# Patient Record
Sex: MALE | Race: WHITE | NOT HISPANIC OR LATINO | Employment: OTHER | URBAN - METROPOLITAN AREA
[De-identification: names, ages, dates, MRNs, and addresses within clinical notes are randomized per-mention and may not be internally consistent; named-entity substitution may affect disease eponyms.]

---

## 2017-01-17 RX ORDER — METFORMIN HYDROCHLORIDE 500 MG/1
500 TABLET ORAL 2 TIMES DAILY WITH MEALS
Qty: 60 TABLET | Refills: 0 | Status: ON HOLD | OUTPATIENT
Start: 2017-01-17 | End: 2017-02-21

## 2017-01-17 RX ORDER — METFORMIN HYDROCHLORIDE 500 MG/1
TABLET ORAL
Qty: 60 TABLET | Refills: 0 | OUTPATIENT
Start: 2017-01-17

## 2017-01-17 NOTE — TELEPHONE ENCOUNTER
I sent in a refill for metformin per pt request. Pt needs to make appt with PCP for regular f/u, as he missed his last one.

## 2017-01-20 ENCOUNTER — TELEPHONE (OUTPATIENT)
Dept: TRANSPLANT | Facility: CLINIC | Age: 47
End: 2017-01-20

## 2017-01-20 NOTE — TELEPHONE ENCOUNTER
Pt states that he has not made an appt with his Cardiologist as instructed by Dr. Michel.  Pt states that he is still having chest pain and will call Dr. Green's office to schedule an appointment on Monday.  Explained that Dr. Michel does want to continue to follow him, but would like cardiac optimization first.  Explained that he may need heart cath with PCI and possible stent placement.  He stated that he already has two stents and will call the Cardiologist to schedule.

## 2017-01-20 NOTE — TELEPHONE ENCOUNTER
Radames for patient as f/u to appt with Dr. Michel in December.  It was recommended that he f/u with his Cardiologist for optimization before moving forward with transplant.  Need to know who his Cardiologist is and if he has had any cardiac work up done.  Having daily chest pains.

## 2017-01-25 DIAGNOSIS — Z72.0 TOBACCO ABUSE: ICD-10-CM

## 2017-01-25 RX ORDER — ALPRAZOLAM 0.25 MG/1
TABLET ORAL
Qty: 90 TABLET | OUTPATIENT
Start: 2017-01-25

## 2017-01-25 RX ORDER — VARENICLINE TARTRATE 0.5 (11)-1
1 KIT ORAL 2 TIMES DAILY
Qty: 60 TABLET | Refills: 1 | Status: SHIPPED | OUTPATIENT
Start: 2017-01-25 | End: 2017-04-21 | Stop reason: SDUPTHER

## 2017-01-26 RX ORDER — ALPRAZOLAM 0.25 MG/1
TABLET ORAL
Qty: 90 TABLET | Refills: 1 | OUTPATIENT
Start: 2017-01-26

## 2017-01-26 NOTE — TELEPHONE ENCOUNTER
----- Message from Frances Arizmendi sent at 1/26/2017 10:19 AM CST -----  Contact: Pt  Pt is out of refills for Xanax. Pt is requesting to have another rx sent to.    [facility] - [facility] [address]  [address] [address]  Phone: [phone] Fax: [phone]    Pt states to let him know if he needs to come in for an appt. Pls call pt back at [phone].      You can tell him he missed his follow up and physical appts.  After Analilia, needs to be seen for full physical in a couple of months.  Federal regulations now restrict these narcotic rxs and pt has to be seen regularly or needs to establish with a Psychiatrist for his anxiety meds.  SM

## 2017-01-27 RX ORDER — ALPRAZOLAM 0.25 MG/1
0.25 TABLET ORAL 3 TIMES DAILY PRN
Qty: 20 TABLET | Refills: 0 | Status: SHIPPED | OUTPATIENT
Start: 2017-01-27 | End: 2017-04-21 | Stop reason: SDUPTHER

## 2017-02-01 ENCOUNTER — OFFICE VISIT (OUTPATIENT)
Dept: INTERNAL MEDICINE | Facility: CLINIC | Age: 47
End: 2017-02-01
Payer: COMMERCIAL

## 2017-02-01 ENCOUNTER — TELEPHONE (OUTPATIENT)
Dept: TRANSPLANT | Facility: CLINIC | Age: 47
End: 2017-02-01

## 2017-02-01 VITALS
BODY MASS INDEX: 34.02 KG/M2 | WEIGHT: 237.63 LBS | HEART RATE: 62 BPM | HEIGHT: 70 IN | DIASTOLIC BLOOD PRESSURE: 72 MMHG | TEMPERATURE: 97 F | SYSTOLIC BLOOD PRESSURE: 116 MMHG | OXYGEN SATURATION: 96 %

## 2017-02-01 DIAGNOSIS — E55.9 VITAMIN D DEFICIENCY: ICD-10-CM

## 2017-02-01 DIAGNOSIS — Z76.82 LUNG TRANSPLANT CANDIDATE: ICD-10-CM

## 2017-02-01 DIAGNOSIS — Z29.9 PREVENTIVE MEASURE: ICD-10-CM

## 2017-02-01 DIAGNOSIS — I25.84 CORONARY ARTERY DISEASE DUE TO CALCIFIED CORONARY LESION: ICD-10-CM

## 2017-02-01 DIAGNOSIS — J44.9 COPD, SEVERE: Chronic | ICD-10-CM

## 2017-02-01 DIAGNOSIS — I10 ESSENTIAL HYPERTENSION: ICD-10-CM

## 2017-02-01 DIAGNOSIS — Z12.5 SCREENING FOR PROSTATE CANCER: ICD-10-CM

## 2017-02-01 DIAGNOSIS — I25.10 CORONARY ARTERY DISEASE DUE TO CALCIFIED CORONARY LESION: ICD-10-CM

## 2017-02-01 DIAGNOSIS — H61.22 IMPACTED CERUMEN OF LEFT EAR: ICD-10-CM

## 2017-02-01 DIAGNOSIS — F41.9 ANXIETY: Primary | ICD-10-CM

## 2017-02-01 DIAGNOSIS — J44.9 COPD, SEVERE: Primary | Chronic | ICD-10-CM

## 2017-02-01 DIAGNOSIS — E11.65 UNCONTROLLED TYPE 2 DIABETES MELLITUS WITH OTHER CIRCULATORY COMPLICATION, UNSPECIFIED LONG TERM INSULIN USE STATUS: ICD-10-CM

## 2017-02-01 DIAGNOSIS — E11.59 UNCONTROLLED TYPE 2 DIABETES MELLITUS WITH OTHER CIRCULATORY COMPLICATION, UNSPECIFIED LONG TERM INSULIN USE STATUS: ICD-10-CM

## 2017-02-01 DIAGNOSIS — E78.2 MIXED HYPERLIPIDEMIA: ICD-10-CM

## 2017-02-01 PROCEDURE — 99214 OFFICE O/P EST MOD 30 MIN: CPT | Mod: S$GLB,,, | Performed by: PHYSICIAN ASSISTANT

## 2017-02-01 PROCEDURE — 99999 PR PBB SHADOW E&M-EST. PATIENT-LVL V: CPT | Mod: PBBFAC,,, | Performed by: PHYSICIAN ASSISTANT

## 2017-02-01 PROCEDURE — 4010F ACE/ARB THERAPY RXD/TAKEN: CPT | Mod: S$GLB,,, | Performed by: PHYSICIAN ASSISTANT

## 2017-02-01 PROCEDURE — 3078F DIAST BP <80 MM HG: CPT | Mod: S$GLB,,, | Performed by: PHYSICIAN ASSISTANT

## 2017-02-01 PROCEDURE — 3074F SYST BP LT 130 MM HG: CPT | Mod: S$GLB,,, | Performed by: PHYSICIAN ASSISTANT

## 2017-02-01 PROCEDURE — 3044F HG A1C LEVEL LT 7.0%: CPT | Mod: S$GLB,,, | Performed by: PHYSICIAN ASSISTANT

## 2017-02-01 RX ORDER — ALPRAZOLAM 0.25 MG/1
TABLET ORAL
Qty: 90 TABLET | Refills: 1 | Status: SHIPPED | OUTPATIENT
Start: 2017-02-01 | End: 2017-03-25 | Stop reason: SDUPTHER

## 2017-02-01 NOTE — MR AVS SNAPSHOT
[facility]  [address] [place] [address]  Phone: [phone]  Fax: [phone]                  Kodak Valentine   2017 8:00 AM   Office Visit    Description:  Male : 1970   Provider:  IRMA Quijano   Department:  [facility] - Internal Medicine           Diagnoses this Visit        Comments    Anxiety    -  Primary     Impacted cerumen of left ear         COPD, severe         Coronary artery disease due to calcified coronary lesion         Essential hypertension         Uncontrolled type 2 diabetes mellitus with other circulatory complication, unspecified long term insulin use status         Preventive measure         Screening for prostate cancer         Mixed hyperlipidemia         Vitamin D deficiency                To Do List           Future Appointments        Provider Department Dept Phone    2017 8:15 AM Eliz Patel MD [facility] [phone]    3/8/2017 11:00 AM Blake Michel MD [facility] - Lung Transplant [phone]    4/15/2017 8:20 AM LABORATORY, [facility] - [facility] [phone]    2017 7:20 AM Eliza Galindo MD [facility] [phone]    2017 8:20 AM PULMONARY LAB, [facility] - Pulm Function [facility] [phone]      Goals (5 Years of Data)     None       These Medications        Disp Refills Start End    alprazolam (XANAX) 0.25 MG tablet 90 tablet 1 2017     TAKE ONE TABLET BY MOUTH 3 TIMES DAILY AS NEEDED FOR ANXIETY (MAY CAUSE DROWSINESS) *NO ALCOHOLIC BEVERAGES*    Pharmacy: [facility] [address] Ph #: [phone]       Notes to Pharmacy: This prescription was filled today. Any refills authorized will be placed on file.      [facility]     [facility] Care [facility] -  Assistance  Registered nurses in the [facility] provide clinical advisement, health education, appointment booking, and other advisory  services.  Call for this free service at [phone].             Medications           Message regarding Medications     Verify the changes and/or additions to your medication regime listed below are the same as discussed with your clinician today.  If any of these changes or additions are incorrect, please notify your healthcare provider.             Verify that the below list of medications is an accurate representation of the medications you are currently taking.  If none reported, the list may be blank. If incorrect, please contact your healthcare provider. Carry this list with you in case of emergency.           Current Medications     alprazolam (XANAX) 0.25 MG tablet Take 1 tablet (0.25 mg total) by mouth 3 (three) times daily as needed for Anxiety.    alprazolam (XANAX) 0.25 MG tablet TAKE ONE TABLET BY MOUTH 3 TIMES DAILY AS NEEDED FOR ANXIETY (MAY CAUSE DROWSINESS) *NO ALCOHOLIC BEVERAGES*    aspirin (ECOTRIN) 81 MG EC tablet Take 81 mg by mouth once daily.    atorvastatin (LIPITOR) 20 MG tablet TAKE 1 TABLET BY MOUTH ONCE A DAY IN THE EVENING FOR CHOLESTEROL    blood sugar diagnostic Strp 1 strip by Misc.(Non-Drug; Combo Route) route 2 (two) times daily.    BREO ELLIPTA 200-25 mcg/dose DsDv diskus inhaler INHALE 1 PUFF ONCE A DAY    chlorhexidine (PERIDEX) 0.12 % solution     cholecalciferol, vitamin D3, 50,000 unit capsule Take 1 capsule (50,000 Units total) by mouth every 7 days.    duloxetine (CYMBALTA) 60 MG capsule TAKE 1 CAPSULE BY MOUTH ONCE A DAY    fenofibrate 160 MG Tab Take 1 tablet (160 mg total) by mouth once daily.    lancets 33 gauge Misc 1 lancet by Misc.(Non-Drug; Combo Route) route 2 (two) times daily.    lisinopril (PRINIVIL,ZESTRIL) 20 MG tablet Take 1 tablet (20 mg total) by mouth once daily.    metformin (GLUCOPHAGE) 500 MG tablet Take 1 tablet (500 mg total) by mouth 2 (two) times daily with meals.    metoprolol tartrate (LOPRESSOR) 50 MG tablet TAKE ONE TABLET BY MOUTH EVERY 12  "HOURS    nitroGLYCERIN 0.2 mg/hr TD PT24 (NITRODUR) 0.2 mg/hr Place 1 patch onto the skin once daily.    oxyMORphone (OPANA) 10 MG tablet Take 10 mg by mouth 4 (four) times daily.    pantoprazole (PROTONIX) 40 MG tablet TAKE ONE TABLET BY MOUTH TWICE DAILY    penicillin v potassium (VEETID) 500 MG tablet Take 1 tablet (500 mg total) by mouth 4 (four) times daily.    PROAIR HFA 90 mcg/actuation inhaler INHALE 2 PUFFS 3 TIMES A DAY AS NEEDED FOR WHEEZING    ranolazine (RANEXA) 1,000 mg Tb12 Take 1 tablet (1,000 mg total) by mouth 2 (two) times daily.    tiotropium bromide 1.25 mcg/actuation Mist Inhale 2.5 mcg into the lungs once daily. Educate on medication adminstration    varenicline (CHANTIX STARTING MONTH BOX) 0.5 mg (11)- 1 mg (42) tablet Take 1 tablet by mouth 2 (two) times daily. one 1mg tab twice daily    ipratropium (ATROVENT) 0.02 % nebulizer solution Take 2.5 mLs (500 mcg total) by nebulization 2 (two) times daily.    predniSONE (DELTASONE) 20 MG tablet TAKE 3 TABLETS EVERY DAY FOR 3 DAYS 2 TABLETS EVERY DAY FOR 3 DAYS 1 TABLET EVERY DAY FOR 3 DAYS THEN 1/2 TABLET EVERY DAY FOR 3 DAYS           Clinical Reference Information           Vital Signs - Last Recorded  Most recent update: 2/1/2017  8:27 AM by Carroll Olivia MA    BP Pulse Temp Ht    116/72 (BP Location: Right arm, Patient Position: Sitting, BP Method: Manual) 62 96.9 °F (36.1 °C) (Tympanic) 5' 10" (1.778 m)    Wt SpO2 BMI    107.8 kg (237 lb 10.5 oz) 96% 34.1 kg/m2      Blood Pressure          Most Recent Value    BP  116/72      Allergies as of 2/1/2017     No Known Allergies      Immunizations Administered on Date of Encounter - 2/1/2017     None      Orders Placed During Today's Visit      Normal Orders This Visit    Ambulatory referral to ENT     Future Labs/Procedures Expected by Expires    CBC auto differential  2/1/2017 4/2/2018    Comprehensive metabolic panel  2/1/2017 4/2/2018    Hemoglobin A1c  2/1/2017 4/2/2018    Lipid panel  " 2/1/2017 4/2/2018    PSA, Screening  2/1/2017 4/2/2018    TSH  2/1/2017 4/2/2018    Vitamin D  2/1/2017 4/2/2018      Maintenance Dialysis History     Patient has no recorded history of maintenance dialysis.

## 2017-02-01 NOTE — TELEPHONE ENCOUNTER
Called Mr. Valentine to see if he has contacted Dr. Green's office to schedule an appointment as discussed previously.  He states he forgot and will call their office after we hang up.  Reminded him that Dr. Michel would like for him to see his cardiologist prior to his f/u appointment scheduled with us on 3/8 so we can see what the plan is as far as his cardiac status.  Pt verbalized understanding.

## 2017-02-01 NOTE — PROGRESS NOTES
Subjective:       Patient ID: Kodak Valentine is a 46 y.o. male.    Chief Complaint: Medication Refill    HPI Comments: 46 year old male presents to clinic for refill of Xanax. PCP Dr. Galindo. Pt reports this is managing his anxiety due to severe COPD and he takes it TID without complications. He reports otherwise doing okay and is down to about 3 Cokes daily. A1C has improved from 7.4 in 7/2016 to 5.7 in 10/2016. Pt reports he has not been checking his BP or glucose. He reports he is waiting on a lung transplant and is seeing his specialists as recommended. He is using his CPAP. He saw transplant team Dec 2016. He reports no N/V, fever, chills, current CP or SOB, or other medical complaints.    Past Medical History:    Anxiety                                                       CAD (coronary artery disease)                                   Comment:PTCA x 2 LAD, restenosis and restent 7/12.    Chest pain syndrome                             10/2/2015     COPD (chronic obstructive pulmonary disease)                  CPAP (continuous positive airway pressure) dep*                 Comment:@ night    Diabetes mellitus type 2, uncontrolled          4/13/2016     History of duodenal ulcer                                       Comment:with bleed    Hypertension                                                  Mixed hyperlipidemia                                          JUDI (obstructive sleep apnea)                                   Comment:severe    S/P PTCA (percutaneous transluminal coronary a* 3/11/2015     Vitamin D deficiency                                              Medication Refill   Pertinent negatives include no chest pain, chills, coughing, fever, headaches, nausea, numbness, rash, vomiting or weakness.     Review of Systems   Constitutional: Negative for chills and fever.   Respiratory: Negative for cough and shortness of breath.    Cardiovascular: Negative for chest pain, palpitations and leg  swelling.   Gastrointestinal: Negative for nausea and vomiting.   Skin: Negative for rash.   Neurological: Negative for dizziness, weakness, numbness and headaches.   Psychiatric/Behavioral: Negative for confusion.       Objective:      Physical Exam   Constitutional: He is oriented to person, place, and time. He appears well-developed and well-nourished. No distress.   HENT:   Head: Normocephalic and atraumatic.   Mouth/Throat: Oropharynx is clear and moist. No oropharyngeal exudate.   Cerumen impaction L ear   Eyes: EOM are normal. No scleral icterus.   Neck: Neck supple.   Cardiovascular: Normal rate and regular rhythm.    Pulmonary/Chest: Effort normal. No respiratory distress. He has no wheezes. He has no rhonchi. He has no rales.   Musculoskeletal: Normal range of motion. He exhibits no edema.   Neurological: He is alert and oriented to person, place, and time. No cranial nerve deficit.   Skin: Skin is dry. No rash noted. He is not diaphoretic.   Psychiatric: He has a normal mood and affect. His speech is normal and behavior is normal. Thought content normal.       Assessment:       1. Anxiety    2. Impacted cerumen of left ear    3. COPD, severe    4. Coronary artery disease due to calcified coronary lesion    5. Essential hypertension    6. Uncontrolled type 2 diabetes mellitus with other circulatory complication, unspecified long term insulin use status    7. Preventive measure    8. Screening for prostate cancer    9. Mixed hyperlipidemia    10. Vitamin D deficiency        Plan:         1. Xanax refilled for pt today (#90 with one refill) - sedation and addiction warnings discussed with pt. Use with caution.  2. Continue current medications and monitor BP and glucose. Follow healthy diet. F/u with specialists as recommended.   3. Refer to ENT for cerumen removal. Avoid using Q-tips in ear canals. Pt to consider OTC earwax softening drops as directed for management.  4. F/u with PCP for annual physical with  prior labs for further health management. ER if any severe sxs occur.

## 2017-02-10 ENCOUNTER — OFFICE VISIT (OUTPATIENT)
Dept: CARDIOLOGY | Facility: CLINIC | Age: 47
End: 2017-02-10
Payer: COMMERCIAL

## 2017-02-10 ENCOUNTER — CLINICAL SUPPORT (OUTPATIENT)
Dept: CARDIOLOGY | Facility: CLINIC | Age: 47
End: 2017-02-10
Payer: COMMERCIAL

## 2017-02-10 VITALS — HEART RATE: 68 BPM | HEIGHT: 70 IN | WEIGHT: 231.94 LBS | OXYGEN SATURATION: 98 % | BODY MASS INDEX: 33.21 KG/M2

## 2017-02-10 DIAGNOSIS — J44.9 COPD, SEVERE: Chronic | ICD-10-CM

## 2017-02-10 DIAGNOSIS — G47.33 OSA TREATED WITH BIPAP: Chronic | ICD-10-CM

## 2017-02-10 DIAGNOSIS — I25.10 CORONARY ARTERY DISEASE, ANGINA PRESENCE UNSPECIFIED, UNSPECIFIED VESSEL OR LESION TYPE, UNSPECIFIED WHETHER NATIVE OR TRANSPLANTED HEART: Primary | ICD-10-CM

## 2017-02-10 DIAGNOSIS — I25.10 CORONARY ARTERY DISEASE, ANGINA PRESENCE UNSPECIFIED, UNSPECIFIED VESSEL OR LESION TYPE, UNSPECIFIED WHETHER NATIVE OR TRANSPLANTED HEART: ICD-10-CM

## 2017-02-10 DIAGNOSIS — E78.2 MIXED HYPERLIPIDEMIA: ICD-10-CM

## 2017-02-10 DIAGNOSIS — I10 ESSENTIAL HYPERTENSION: ICD-10-CM

## 2017-02-10 DIAGNOSIS — Z98.61 S/P PTCA (PERCUTANEOUS TRANSLUMINAL CORONARY ANGIOPLASTY): ICD-10-CM

## 2017-02-10 PROCEDURE — 93000 ELECTROCARDIOGRAM COMPLETE: CPT | Mod: S$GLB,,, | Performed by: INTERNAL MEDICINE

## 2017-02-10 PROCEDURE — 99999 PR PBB SHADOW E&M-EST. PATIENT-LVL IV: CPT | Mod: PBBFAC,,, | Performed by: PHYSICIAN ASSISTANT

## 2017-02-10 PROCEDURE — 99214 OFFICE O/P EST MOD 30 MIN: CPT | Mod: S$GLB,,, | Performed by: PHYSICIAN ASSISTANT

## 2017-02-10 NOTE — PROGRESS NOTES
Subjective:    Patient ID:  Kodak Valentine is a 46 y.o. male who presents for follow-up of chest pain    HPI   Mr Valentine is a 46 year year old male with a PMHx of CAD, HTN, JUDI on CPAP, COPD, HLD, s/p PTCA with LAD stent who presents today for routine follow-up. Patient is in the process of lung transplant workup and Dr. Michel who has recommended repeat LHC for further evaluation of chest pain. Patient returns today and states he is doing ok. He is still experiencing intermittent episodes of substernal chest burning/heaviness. States these can occur at anytime, not necessarily precipitated by exertion. Associated symptoms include SOB (chronic). Patient denies any associated nausea, vomiting, or diaphoresis. States symptoms feel similar to previous angina, just not as severe. He reports compliance with his medications. EKG today shows NSR with sinus arrhythmia. Last LHC in 2014 showed, in which medical management was recommended.     Review of Systems   Constitution: Negative for chills, decreased appetite, fever, weakness and malaise/fatigue.   HENT: Negative for congestion, headaches, hoarse voice and sore throat.    Eyes: Negative for blurred vision and discharge.   Cardiovascular: Positive for chest pain and dyspnea on exertion. Negative for claudication, cyanosis, irregular heartbeat, leg swelling, near-syncope, orthopnea, palpitations and paroxysmal nocturnal dyspnea.   Respiratory: Positive for shortness of breath. Negative for cough, hemoptysis, snoring, sputum production and wheezing.    Endocrine: Negative for cold intolerance and heat intolerance.   Hematologic/Lymphatic: Negative for bleeding problem. Does not bruise/bleed easily.   Skin: Negative for rash.   Musculoskeletal: Negative for arthritis, back pain, joint pain, joint swelling, muscle cramps, muscle weakness and myalgias.   Gastrointestinal: Negative for abdominal pain, constipation, diarrhea, heartburn, melena and nausea.   Genitourinary:  "Negative for hematuria.   Neurological: Negative for dizziness, focal weakness, light-headedness, loss of balance, numbness, paresthesias and seizures.   Psychiatric/Behavioral: Negative for memory loss. The patient does not have insomnia.    Allergic/Immunologic: Negative for hives.         Visit Vitals    Pulse 68    Ht 5' 10" (1.778 m)    Wt 105.2 kg (231 lb 14.8 oz)    SpO2 98%    BMI 33.28 kg/m2       Objective:    Physical Exam   Constitutional: He is oriented to person, place, and time. He appears well-developed and well-nourished. No distress.   HENT:   Head: Normocephalic and atraumatic.   Eyes: Pupils are equal, round, and reactive to light. Right eye exhibits no discharge. Left eye exhibits no discharge.   Neck: Neck supple. No JVD present. No tracheal deviation present. No thyromegaly present.   Cardiovascular: Normal rate, regular rhythm, S1 normal, S2 normal and normal heart sounds.  PMI is not displaced.  Exam reveals no gallop, no S3, no S4 and no friction rub.    No murmur heard.  Pulses:       Radial pulses are 2+ on the right side, and 2+ on the left side.   Pulmonary/Chest: Effort normal and breath sounds normal. No respiratory distress. He has no wheezes. He has no rales.   Abdominal: Soft. He exhibits no distension. There is no tenderness. There is no rebound.   Musculoskeletal: He exhibits no edema.   Neurological: He is alert and oriented to person, place, and time.   Skin: Skin is warm and dry. He is not diaphoretic. No erythema.   Psychiatric: He has a normal mood and affect. His behavior is normal. Thought content normal.   Nursing note and vitals reviewed.      2D echo CONCLUSIONS     1 - Normal left ventricular systolic function (EF 60-65%).     2 - Normal left ventricular diastolic function.     3 - Normal right ventricular systolic function .     4 - The estimated PA systolic pressure is 25 mmHg.     5 - Mild tricuspid regurgitation.   Assessment:       1. Coronary artery disease, " angina presence unspecified, unspecified vessel or lesion type, unspecified whether native or transplanted heart    2. JUDI treated with BiPAP    3. COPD, severe    4. Essential hypertension    5. Mixed hyperlipidemia    6. S/P PTCA (percutaneous transluminal coronary angioplasty)       Patient doing ok, still experiencing CP/angina, on optimal medical therapy. Agree with recommendation for repeat LHC/possible PCI if warranted. Continue current meds. All risks, benefits, and treatment alternatives explained to patient in detail. All questions answered. He has agreed to proceed.   Plan:     -Continue current medical management and risk factor modification  -Cardiac, low salt diet  -ED if severe symptoms  -[facility] TBA    Chart reviewed. Dr. Green agrees with plan as outlined above.

## 2017-02-13 ENCOUNTER — TELEPHONE (OUTPATIENT)
Dept: CARDIOLOGY | Facility: CLINIC | Age: 47
End: 2017-02-13

## 2017-02-13 DIAGNOSIS — I20.9 ANGINA PECTORIS: Primary | ICD-10-CM

## 2017-02-13 NOTE — TELEPHONE ENCOUNTER
Mr. Valentine returned phone call to Cardiology and confirmed [facility] wih Dr. Green on 2/21/17 for 10 AM  Will get lab work on 2/15/17 and sign consents

## 2017-02-14 DIAGNOSIS — J44.9 COPD, SEVERE: ICD-10-CM

## 2017-02-14 RX ORDER — FLUTICASONE FUROATE AND VILANTEROL TRIFENATATE 200; 25 UG/1; UG/1
POWDER RESPIRATORY (INHALATION)
Qty: 60 EACH | Refills: 4 | Status: SHIPPED | OUTPATIENT
Start: 2017-02-14 | End: 2018-03-16 | Stop reason: SDUPTHER

## 2017-02-15 ENCOUNTER — LAB VISIT (OUTPATIENT)
Dept: LAB | Facility: HOSPITAL | Age: 47
End: 2017-02-15
Attending: INTERNAL MEDICINE
Payer: COMMERCIAL

## 2017-02-15 DIAGNOSIS — I20.9 ANGINA PECTORIS: ICD-10-CM

## 2017-02-15 LAB
ANION GAP SERPL CALC-SCNC: 10 MMOL/L
APTT BLDCRRT: 25.5 SEC
BASOPHILS # BLD AUTO: 0.04 K/UL
BASOPHILS NFR BLD: 0.4 %
BUN SERPL-MCNC: 9 MG/DL
CALCIUM SERPL-MCNC: 9.5 MG/DL
CHLORIDE SERPL-SCNC: 99 MMOL/L
CO2 SERPL-SCNC: 24 MMOL/L
CREAT SERPL-MCNC: 1.2 MG/DL
DIFFERENTIAL METHOD: ABNORMAL
EOSINOPHIL # BLD AUTO: 0.1 K/UL
EOSINOPHIL NFR BLD: 1.2 %
ERYTHROCYTE [DISTWIDTH] IN BLOOD BY AUTOMATED COUNT: 12.7 %
EST. GFR  (AFRICAN AMERICAN): >60 ML/MIN/1.73 M^2
EST. GFR  (NON AFRICAN AMERICAN): >60 ML/MIN/1.73 M^2
GLUCOSE SERPL-MCNC: 186 MG/DL
HCT VFR BLD AUTO: 43.8 %
HGB BLD-MCNC: 14.9 G/DL
INR PPP: 1
LYMPHOCYTES # BLD AUTO: 2.1 K/UL
LYMPHOCYTES NFR BLD: 21.8 %
MCH RBC QN AUTO: 35.6 PG
MCHC RBC AUTO-ENTMCNC: 34 %
MCV RBC AUTO: 105 FL
MONOCYTES # BLD AUTO: 0.5 K/UL
MONOCYTES NFR BLD: 4.9 %
NEUTROPHILS # BLD AUTO: 6.9 K/UL
NEUTROPHILS NFR BLD: 71 %
PLATELET # BLD AUTO: 259 K/UL
PMV BLD AUTO: 10.4 FL
POTASSIUM SERPL-SCNC: 4.2 MMOL/L
PROTHROMBIN TIME: 10.5 SEC
RBC # BLD AUTO: 4.19 M/UL
SODIUM SERPL-SCNC: 133 MMOL/L
WBC # BLD AUTO: 9.68 K/UL

## 2017-02-15 PROCEDURE — 85610 PROTHROMBIN TIME: CPT | Mod: PO

## 2017-02-15 PROCEDURE — 80048 BASIC METABOLIC PNL TOTAL CA: CPT

## 2017-02-15 PROCEDURE — 36415 COLL VENOUS BLD VENIPUNCTURE: CPT | Mod: PO

## 2017-02-15 PROCEDURE — 85025 COMPLETE CBC W/AUTO DIFF WBC: CPT

## 2017-02-15 PROCEDURE — 85730 THROMBOPLASTIN TIME PARTIAL: CPT | Mod: PO

## 2017-02-17 DIAGNOSIS — J44.9 CHRONIC OBSTRUCTIVE PULMONARY DISEASE, UNSPECIFIED COPD TYPE: ICD-10-CM

## 2017-02-17 RX ORDER — AZITHROMYCIN 500 MG/1
TABLET, FILM COATED ORAL
Qty: 10 TABLET | Refills: 3 | Status: SHIPPED | OUTPATIENT
Start: 2017-02-17 | End: 2017-04-26 | Stop reason: ALTCHOICE

## 2017-02-21 ENCOUNTER — SURGERY (OUTPATIENT)
Age: 47
End: 2017-02-21

## 2017-02-21 ENCOUNTER — HOSPITAL ENCOUNTER (OUTPATIENT)
Facility: HOSPITAL | Age: 47
Discharge: HOME | End: 2017-02-21
Attending: INTERNAL MEDICINE | Admitting: INTERNAL MEDICINE
Payer: COMMERCIAL

## 2017-02-21 VITALS
HEART RATE: 77 BPM | DIASTOLIC BLOOD PRESSURE: 72 MMHG | SYSTOLIC BLOOD PRESSURE: 114 MMHG | RESPIRATION RATE: 15 BRPM | HEIGHT: 70 IN | TEMPERATURE: 98 F | WEIGHT: 240 LBS | BODY MASS INDEX: 34.36 KG/M2 | OXYGEN SATURATION: 99 %

## 2017-02-21 DIAGNOSIS — I20.9 ANGINA PECTORIS: ICD-10-CM

## 2017-02-21 DIAGNOSIS — R07.9 CHEST PAIN: ICD-10-CM

## 2017-02-21 DIAGNOSIS — I25.10 CORONARY ARTERY DISEASE, ANGINA PRESENCE UNSPECIFIED, UNSPECIFIED VESSEL OR LESION TYPE, UNSPECIFIED WHETHER NATIVE OR TRANSPLANTED HEART: ICD-10-CM

## 2017-02-21 LAB — CORONARY STENOSIS: ABNORMAL

## 2017-02-21 PROCEDURE — C1887 CATHETER, GUIDING: HCPCS

## 2017-02-21 PROCEDURE — 93458 L HRT ARTERY/VENTRICLE ANGIO: CPT | Mod: 26,,, | Performed by: INTERNAL MEDICINE

## 2017-02-21 PROCEDURE — 99152 MOD SED SAME PHYS/QHP 5/>YRS: CPT | Mod: ,,, | Performed by: INTERNAL MEDICINE

## 2017-02-21 PROCEDURE — 63600175 PHARM REV CODE 636 W HCPCS

## 2017-02-21 PROCEDURE — 93571 IV DOP VEL&/PRESS C FLO 1ST: CPT

## 2017-02-21 PROCEDURE — 93571 IV DOP VEL&/PRESS C FLO 1ST: CPT | Mod: 26,,, | Performed by: INTERNAL MEDICINE

## 2017-02-21 PROCEDURE — 25000003 PHARM REV CODE 250

## 2017-02-21 RX ORDER — SODIUM CHLORIDE 9 MG/ML
INJECTION, SOLUTION INTRAVENOUS CONTINUOUS
Status: DISCONTINUED | OUTPATIENT
Start: 2017-02-21 | End: 2017-02-21 | Stop reason: HOSPADM

## 2017-02-21 RX ORDER — DIPHENHYDRAMINE HCL 50 MG
50 CAPSULE ORAL ONCE
Status: COMPLETED | OUTPATIENT
Start: 2017-02-21 | End: 2017-02-21

## 2017-02-21 RX ORDER — METFORMIN HYDROCHLORIDE 500 MG/1
500 TABLET ORAL 2 TIMES DAILY WITH MEALS
Qty: 60 TABLET | Refills: 6 | Status: SHIPPED | OUTPATIENT
Start: 2017-02-23 | End: 2017-10-24 | Stop reason: SDUPTHER

## 2017-02-21 RX ORDER — NAPROXEN SODIUM 220 MG/1
81 TABLET, FILM COATED ORAL ONCE
Status: COMPLETED | OUTPATIENT
Start: 2017-02-21 | End: 2017-02-21

## 2017-02-21 RX ORDER — DIAZEPAM 5 MG/1
5 TABLET ORAL
Status: DISCONTINUED | OUTPATIENT
Start: 2017-02-21 | End: 2017-02-21 | Stop reason: HOSPADM

## 2017-02-21 RX ADMIN — DIAZEPAM 5 MG: 5 TABLET ORAL at 08:02

## 2017-02-21 RX ADMIN — Medication 50 MG: at 08:02

## 2017-02-21 RX ADMIN — SODIUM CHLORIDE: 9 INJECTION, SOLUTION INTRAVENOUS at 09:02

## 2017-02-21 RX ADMIN — NAPROXEN SODIUM 81 MG: 220 TABLET, FILM COATED ORAL at 08:02

## 2017-02-21 NOTE — BRIEF OP NOTE
Date: 02/21/2017  Surgeon/Physician: Sebastian Green MD  Assistants: NAZIA LOPEZ    Pre Op Diagnosis: CHEST PAIN     Post OP Diagnosis: CHEST PAIN    Procedure Performed:  LHC FFR LAD      ANESTHESIA:RN IV SEDATION    COMPLICATION: NONE    Specimen / Tissue Removed: No    Estimated Blood Loss: <50 cc    Prostetics/Devices: None    Findings / Operative Note:  LAD INSTENT 50-70% FFR 0.85  LCX DOMINANT PDA 50% UNCHANGED OTHERWISE NON OBS DISEASE.  RCA NON DOMINANT NORMAL.  LVF NORMAL.      PLAN:  MEDICAL THERAPY REASSURE.      Discharge Note  Short Stay      SUMMARY     Admit Date: 2/21/2017    Attending Physician: Erma Green MD     Discharge Physician: Sebastian Green MD     Discharge Date: 2/21/2017    Final Diagnosis: CAD CHEST PAIN     Outcome of Stay:PATIENT TOLERATED PROCEDURE WELL NO COMPLICATIONS HE WAS DEEMED STABLE FOR DISCHARGE . HE IS ADEQUATELY REVASCULARIZED.    Disposition: Home or Self Care    Patient Instructions:   Current Discharge Medication List      CONTINUE these medications which have CHANGED    Details   metformin (GLUCOPHAGE) 500 MG tablet Take 1 tablet (500 mg total) by mouth 2 (two) times daily with meals.  Qty: 60 tablet, Refills: 6         CONTINUE these medications which have NOT CHANGED    Details   !! alprazolam (XANAX) 0.25 MG tablet TAKE ONE TABLET BY MOUTH 3 TIMES DAILY AS NEEDED FOR ANXIETY (MAY CAUSE DROWSINESS) *NO ALCOHOLIC BEVERAGES*  Qty: 90 tablet, Refills: 1    Comments: This prescription was filled today. Any refills authorized will be placed on file.  Associated Diagnoses: Anxiety; COPD, severe      aspirin (ECOTRIN) 81 MG EC tablet Take 81 mg by mouth once daily.      atorvastatin (LIPITOR) 20 MG tablet TAKE 1 TABLET BY MOUTH ONCE A DAY IN THE EVENING FOR CHOLESTEROL  Qty: 30 tablet, Refills: 11    Comments: This prescription was filled today. Any refills authorized will be placed on file.      BREO ELLIPTA 200-25 mcg/dose DsDv diskus inhaler INHALE 1 PUFF ONCE A  DAY  Qty: 60 each, Refills: 4    Associated Diagnoses: COPD, severe      chlorhexidine (PERIDEX) 0.12 % solution Refills: 3      cholecalciferol, vitamin D3, 50,000 unit capsule Take 1 capsule (50,000 Units total) by mouth every 7 days.  Qty: 4 capsule, Refills: 12    Comments: This prescription was filled today. Any refills authorized will be placed on file.      duloxetine (CYMBALTA) 60 MG capsule TAKE 1 CAPSULE BY MOUTH ONCE A DAY  Qty: 30 capsule, Refills: 11      fenofibrate 160 MG Tab Take 1 tablet (160 mg total) by mouth once daily.  Qty: 30 tablet, Refills: 6    Associated Diagnoses: Mixed hyperlipidemia      lisinopril (PRINIVIL,ZESTRIL) 20 MG tablet Take 1 tablet (20 mg total) by mouth once daily.  Qty: 30 tablet, Refills: 11    Associated Diagnoses: Essential hypertension      metoprolol tartrate (LOPRESSOR) 50 MG tablet TAKE ONE TABLET BY MOUTH EVERY 12 HOURS  Qty: 60 tablet, Refills: 11    Comments: This prescription was filled today(12/28/2016). Any refills authorized will be placed on file.      nitroGLYCERIN 0.2 mg/hr TD PT24 (NITRODUR) 0.2 mg/hr Place 1 patch onto the skin once daily.  Qty: 30 patch, Refills: 6    Comments: This prescription was filled today. Any refills authorized will be placed on file.  Associated Diagnoses: S/P PTCA (percutaneous transluminal coronary angioplasty); Chest pain syndrome      oxyMORphone (OPANA) 10 MG tablet Take 10 mg by mouth 4 (four) times daily.  Refills: 0      pantoprazole (PROTONIX) 40 MG tablet TAKE ONE TABLET BY MOUTH TWICE DAILY  Qty: 60 tablet, Refills: 11      predniSONE (DELTASONE) 20 MG tablet TAKE 3 TABLETS EVERY DAY FOR 3 DAYS 2 TABLETS EVERY DAY FOR 3 DAYS 1 TABLET EVERY DAY FOR 3 DAYS THEN 1/2 TABLET EVERY DAY FOR 3 DAYS  Qty: 26 tablet, Refills: 1    Associated Diagnoses: Chronic obstructive pulmonary disease, unspecified COPD type      PROAIR HFA 90 mcg/actuation inhaler INHALE 2 PUFFS 3 TIMES A DAY AS NEEDED FOR WHEEZING  Qty: 8.5 g, Refills:  11    Comments: This prescription was filled today. Any refills authorized will be placed on file.  Associated Diagnoses: COPD, severity to be determined      ranolazine (RANEXA) 1,000 mg Tb12 Take 1 tablet (1,000 mg total) by mouth 2 (two) times daily.  Qty: 60 tablet, Refills: 11      tiotropium bromide 1.25 mcg/actuation Mist Inhale 2.5 mcg into the lungs once daily. Educate on medication adminstration  Qty: 4 g, Refills: 11    Associated Diagnoses: COPD, severe      varenicline (CHANTIX STARTING MONTH BOX) 0.5 mg (11)- 1 mg (42) tablet Take 1 tablet by mouth 2 (two) times daily. one 1mg tab twice daily  Qty: 60 tablet, Refills: 1    Comments: Please call to pick  Associated Diagnoses: Tobacco abuse      !! alprazolam (XANAX) 0.25 MG tablet Take 1 tablet (0.25 mg total) by mouth 3 (three) times daily as needed for Anxiety.  Qty: 20 tablet, Refills: 0      azithromycin (ZITHROMAX) 500 MG tablet Take 1 tablet (500 mg total) by mouth once daily.  Qty: 10 tablet, Refills: 3    Comments: This prescription was filled today(2/17/2017). Any refills authorized will be placed on file.  Associated Diagnoses: Chronic obstructive pulmonary disease, unspecified COPD type      blood sugar diagnostic Strp 1 strip by Misc.(Non-Drug; Combo Route) route 2 (two) times daily.  Qty: 100 strip, Refills: 11      lancets 33 gauge Misc 1 lancet by Misc.(Non-Drug; Combo Route) route 2 (two) times daily.  Qty: 100 each, Refills: 11      penicillin v potassium (VEETID) 500 MG tablet Take 1 tablet (500 mg total) by mouth 4 (four) times daily.  Qty: 40 tablet, Refills: 1    Associated Diagnoses: Dental infection       !! - Potential duplicate medications found. Please discuss with provider.      STOP taking these medications       ipratropium (ATROVENT) 0.02 % nebulizer solution Comments:   Reason for Stopping:               Discharge Procedure Orders (must include Diet, Follow-up, Activity)    Discharge Procedure Orders (must include Diet,  Follow-up, Activity)  Diet general     Activity as tolerated   Order Comments: As per post cath instructions     Call MD for:  temperature >100.4     Call MD for:  persistent nausea and vomiting     Call MD for:  severe uncontrolled pain     Call MD for:  difficulty breathing, headache or visual disturbances     Call MD for:  redness, tenderness, or signs of infection (pain, swelling, redness, odor or green/yellow discharge around incision site)     Call MD for:  hives     Call MD for:  persistent dizziness or light-headedness     Call MD for:  extreme fatigue       Follow-up Information     Follow up with [facility] Weaver In 1 week.    Specialty:  Cardiology    Contact information:    [address] [place] [address] DR Marika TOWNSEND [phone]

## 2017-02-21 NOTE — INTERVAL H&P NOTE
The patient has been examined and the H&P has been reviewed:  Past Medical History   Diagnosis Date    Anxiety     CAD (coronary artery disease)      PTCA x 2 LAD, restenosis and restent 7/12.    Chest pain syndrome 10/2/2015    COPD (chronic obstructive pulmonary disease)     CPAP (continuous positive airway pressure) dependence      @ night    Diabetes mellitus type 2, uncontrolled 4/13/2016    History of duodenal ulcer      with bleed    Hypertension     Mixed hyperlipidemia     JUDI (obstructive sleep apnea)      severe    S/P PTCA (percutaneous transluminal coronary angioplasty) 3/11/2015    Vitamin D deficiency      Past Surgical History   Procedure Laterality Date    Nissen fundoplication       lap    Hemorrhoid surgery      Coronary stent placement  2012    Skin lesion excision Right      leg    Appendectomy      Cataract extraction w/  intraocular lens implant Right 4-22-15     Family History   Problem Relation Age of Onset    Heart disease Mother     Heart block Father     Heart disease Sister     COPD Maternal Grandmother     Diabetes Maternal Grandfather     COPD Maternal Grandfather      Social History   Substance Use Topics    Smoking status: Former Smoker     Packs/day: 0.50     Years: 20.00     Types: Cigarettes     Quit date: 6/3/2014    Smokeless tobacco: Never Used      Comment: currently vaping with nicotine since quit smoking in 6/ 2014    Alcohol use 2.4 oz/week     4 Cans of beer per week      Comment: 4 beers daily     Review of patient's allergies indicates:  No Known Allergies  I concur with the findings and no changes have occurred since H&P was written.  He is going to have lung transplant eh is being worked up he continues to have shortness of breath with  minimal exertional and continues to have exertional and non exertional chest pain at the request of transplant team will proceed with coronary angio/ptca. Only bare metal if needed.  Anesthesia/Surgery risks,  benefits and alternative options discussed and understood by patient/family.  I have explained the risks, benefits , and alternatives of the procedure in detail.the patient voices understanding and all questions have been answered.the patient agrees to proceed as planned.          Active Hospital Problems    Diagnosis  POA    Chest pain [R07.9]  Yes     Priority: High      Resolved Hospital Problems    Diagnosis Date Resolved POA   No resolved problems to display.

## 2017-02-21 NOTE — PLAN OF CARE
Problem: Patient Care Overview  Goal: Individualization & Mutuality  Wife gaston [phone] at bedside

## 2017-02-21 NOTE — IP AVS SNAPSHOT
[facility]  [id number] [place] [address] [address] Marika TOWNSEND [address]           Patient Discharge Instructions     Our goal is to set you up for success. This packet includes information on your condition, medications, and your home care. It will help you to care for yourself so you don't get sicker and need to go back to the hospital.     Please ask your nurse if you have any questions.        There are many details to remember when preparing to leave the hospital. Here is what you will need to do:    1. Take your medicine. If you are prescribed medications, review your Medication List in the following pages. You may have new medications to  at the pharmacy and others that you'll need to stop taking. Review the instructions for how and when to take your medications. Talk with your doctor or nurses if you are unsure of what to do.     2. Go to your follow-up appointments. Specific follow-up information is listed in the following pages. Your may be contacted by a transition nurse or clinical provider about future appointments. Be sure we have all of the phone numbers to reach you, if needed. Please contact your provider's office if you are unable to make an appointment.     3. Watch for warning signs. Your doctor or nurse will give you detailed warning signs to watch for and when to call for assistance. These instructions may also include educational information about your condition. If you experience any of warning signs to your health, call your doctor.               [facility]  Unless otherwise directed by your provider, please contact [facility], our nurse care line that is available for 24/7 assistance.     [phone] (toll-free)    Registered nurses in the [facility] provide clinical advisement, health education, appointment booking, and other advisory services.                    ** Verify the list of medication(s) below is accurate and up to date. Carry this with you  in case of emergency. If your medications have changed, please notify your healthcare provider.             Medication List      CONTINUE taking these medications        Additional Info    Begin Date AM Noon PM Bedtime    * alprazolam 0.25 MG tablet   Commonly known as:  XANAX   Quantity:  20 tablet   Refills:  0   Dose:  0.25 mg    Instructions:  Take 1 tablet (0.25 mg total) by mouth 3 (three) times daily as needed for Anxiety.                            * alprazolam 0.25 MG tablet   Commonly known as:  XANAX   Quantity:  90 tablet   Refills:  1   Comments:  This prescription was filled today. Any refills authorized will be placed on file.    Instructions:  TAKE ONE TABLET BY MOUTH 3 TIMES DAILY AS NEEDED FOR ANXIETY (MAY CAUSE DROWSINESS) *NO ALCOHOLIC BEVERAGES*                            aspirin 81 MG EC tablet   Commonly known as:  ECOTRIN   Refills:  0   Dose:  81 mg    Instructions:  Take 81 mg by mouth once daily.                            atorvastatin 20 MG tablet   Commonly known as:  LIPITOR   Quantity:  30 tablet   Refills:  11   Comments:  This prescription was filled today. Any refills authorized will be placed on file.    Instructions:  TAKE 1 TABLET BY MOUTH ONCE A DAY IN THE EVENING FOR CHOLESTEROL                            azithromycin 500 MG tablet   Commonly known as:  ZITHROMAX   Quantity:  10 tablet   Refills:  3   Comments:  This prescription was filled today(2/17/2017). Any refills authorized will be placed on file.    Instructions:  Take 1 tablet (500 mg total) by mouth once daily.                            blood sugar diagnostic Strp   Quantity:  100 strip   Refills:  11   Dose:  1 strip    Instructions:  1 strip by Misc.(Non-Drug; Combo Route) route 2 (two) times daily.                            BREO ELLIPTA 200-25 mcg/dose Dsdv diskus inhaler   Quantity:  60 each   Refills:  4   Generic drug:  fluticasone-vilanterol    Instructions:  INHALE 1 PUFF ONCE A DAY                             chlorhexidine 0.12 % solution   Commonly known as:  PERIDEX   Refills:  3                             cholecalciferol (vitamin D3) 50,000 unit capsule   Quantity:  4 capsule   Refills:  12   Dose:  18068 Units   Comments:  This prescription was filled today. Any refills authorized will be placed on file.    Instructions:  Take 1 capsule (50,000 Units total) by mouth every 7 days.                            duloxetine 60 MG capsule   Commonly known as:  CYMBALTA   Quantity:  30 capsule   Refills:  11    Instructions:  TAKE 1 CAPSULE BY MOUTH ONCE A DAY                            fenofibrate 160 MG Tab   Quantity:  30 tablet   Refills:  6   Dose:  160 mg    Instructions:  Take 1 tablet (160 mg total) by mouth once daily.                            lancets 33 gauge Misc   Quantity:  100 each   Refills:  11   Dose:  1 lancet    Instructions:  1 lancet by Misc.(Non-Drug; Combo Route) route 2 (two) times daily.                            lisinopril 20 MG tablet   Commonly known as:  PRINIVIL,ZESTRIL   Quantity:  30 tablet   Refills:  11   Dose:  20 mg    Instructions:  Take 1 tablet (20 mg total) by mouth once daily.                            metformin 500 MG tablet   Commonly known as:  GLUCOPHAGE   Quantity:  60 tablet   Refills:  6   Dose:  500 mg   Notes to Patient:  RESUME TAKING 2/24/17    Start Date:  2/23/2017   Instructions:  Take 1 tablet (500 mg total) by mouth 2 (two) times daily with meals.                            metoprolol tartrate 50 MG tablet   Commonly known as:  LOPRESSOR   Quantity:  60 tablet   Refills:  11   Comments:  This prescription was filled today(12/28/2016). Any refills authorized will be placed on file.    Instructions:  TAKE ONE TABLET BY MOUTH EVERY 12 HOURS                            nitroGLYCERIN 0.2 mg/hr TD PT24 0.2 mg/hr   Commonly known as:  NITRODUR   Quantity:  30 patch   Refills:  6   Dose:  1 patch   Comments:  This prescription was filled today. Any refills authorized  will be placed on file.    Instructions:  Place 1 patch onto the skin once daily.                            oxyMORphone 10 MG tablet   Commonly known as:  OPANA   Refills:  0   Dose:  10 mg    Instructions:  Take 10 mg by mouth 4 (four) times daily.                            pantoprazole 40 MG tablet   Commonly known as:  PROTONIX   Quantity:  60 tablet   Refills:  11    Instructions:  TAKE ONE TABLET BY MOUTH TWICE DAILY                            penicillin v potassium 500 MG tablet   Commonly known as:  VEETID   Quantity:  40 tablet   Refills:  1    Instructions:  Take 1 tablet (500 mg total) by mouth 4 (four) times daily.                            predniSONE 20 MG tablet   Commonly known as:  DELTASONE   Quantity:  26 tablet   Refills:  1    Instructions:  TAKE 3 TABLETS EVERY DAY FOR 3 DAYS 2 TABLETS EVERY DAY FOR 3 DAYS 1 TABLET EVERY DAY FOR 3 DAYS THEN 1/2 TABLET EVERY DAY FOR 3 DAYS                            PROAIR HFA 90 mcg/actuation inhaler   Quantity:  8.5 g   Refills:  11   Comments:  This prescription was filled today. Any refills authorized will be placed on file.   Generic drug:  albuterol    Instructions:  INHALE 2 PUFFS 3 TIMES A DAY AS NEEDED FOR WHEEZING                            ranolazine 1,000 mg Tb12   Commonly known as:  RANEXA   Quantity:  60 tablet   Refills:  11   Dose:  1000 mg    Instructions:  Take 1 tablet (1,000 mg total) by mouth 2 (two) times daily.                            tiotropium bromide 1.25 mcg/actuation Mist   Quantity:  4 g   Refills:  11   Dose:  2.5 mcg    Instructions:  Inhale 2.5 mcg into the lungs once daily. Educate on medication adminstration                            varenicline 0.5 mg (11)- 1 mg (42) tablet   Commonly known as:  CHANTIX STARTING MONTH BOX   Quantity:  60 tablet   Refills:  1   Dose:  1 tablet   Comments:  Please call to pick    Instructions:  Take 1 tablet by mouth 2 (two) times daily. one 1mg tab twice daily                            *  Notice:  This list has 2 medication(s) that are the same as other medications prescribed for you. Read the directions carefully, and ask your doctor or other care provider to review them with you.      STOP taking these medications     ipratropium 0.02 % nebulizer solution   Commonly known as:  ATROVENT            Where to Get Your Medications      These medications were sent to [facility] [address] [place] [address]  [address] [facility] [address]     Phone:  [phone]     metformin 500 MG tablet                  Please bring to all follow up appointments:    1. A copy of your discharge instructions.  2. All medicines you are currently taking in their original bottles.  3. Identification and insurance card.    Please arrive 15 minutes ahead of scheduled appointment time.    Please call 24 hours in advance if you must reschedule your appointment and/or time.        Your Scheduled Appointments     Feb 28, 2017 11:00 AM CST   Established Patient Visit with IRMA Cleaning   [facility] ([facility]    [address]  [place] [address] [phone]            Mar 08, 2017  9:40 AM CST   Proc 15Min/6Min Walk with SIX, MINUTE WALK   [facility] - Pulmonary Lab ([facility] )    [address]  [place] [address]   [phone]            Mar 08, 2017 10:15 AM CST   Spirometry with Tracing with PULMONARY FUNCTION   [facility] - Pulmonary Lab ([facility] )    [address]  [place] [address]   [phone]            Mar 08, 2017 11:00 AM CST   Established Patient Visit with Blake Michel MD   [facility] - Lung Transplant ([facility] )    [address]  [place] [address]   [phone]            Apr 15, 2017  8:20 AM CDT   Fasting Lab with LABORATORY, [facility] ([facility])    [address] [place] [id number] [phone]              Follow-up Information     Follow up with Lulú EDDY  "IRMA Fernando In 1 week.    Specialty:  Cardiology    Contact information:    [address] [place] [address] DR Marika TOWNSEND [address]  [phone]          Discharge Instructions     Future Orders    Activity as tolerated     Comments:    As per post cath instructions    Call MD for:  difficulty breathing, headache or visual disturbances     Call MD for:  extreme fatigue     Call MD for:  hives     Call MD for:  persistent dizziness or light-headedness     Call MD for:  persistent nausea and vomiting     Call MD for:  redness, tenderness, or signs of infection (pain, swelling, redness, odor or green/yellow discharge around incision site)     Call MD for:  severe uncontrolled pain     Call MD for:  temperature >100.4     Diet general     Questions:    Total calories:      Fat restriction, if any:      Protein restriction, if any:      Na restriction, if any:      Fluid restriction:      Additional restrictions:          Discharge Instructions       Post-op Heart Catheterization    1. DIET: It is advisable for you to follow a diet that limits the intake of salt, sugar, saturated fats and cholesterol.     2. DRIVING: Due to sedation you received during your procedure, DO NOT drive or operate machinery for 24 hours. Avoid making critical decisions or signing legal documents until tomorrow.    3. ACTIVITY: AVOID activities that require bending of the affected arm/wrist for 3 days and submerging the site in water for 3 days. REMOVE the dressing the day after  the procedure, you may apply a bandaid to the site if you desire. You may RESUME       your normal activities or prescribed exercise program as instructed by your physician after 3 days.                                                                                                                 4. WOUND CARE: It is not unusual to have a small amount of bruising to appear at or near the puncture site. It is also common to have a tender "knot" develop beneath the skin at the " "puncture site of the wrist/arm. This is usually scar tissue and is not a cause for concern or alarm. This tender knot may take several weeks to fully resolve. The bruise will usually spread over several days. However, if the lump gets bigger, call your doctor immediately.    5. DISCOMFORT: For general discomfort at the puncture site, you may take 1 or 2 Acetaminophen (Tylenol) tablets every 4 - 6 hours as needed. (Do not take more than 4000 mg a day)    6. SIGNS AND SYMPTOMS TO REPORT:  Call your physician immediately if any of the following occur:                                            1. Loss of feeling, warmth or color to the affected arm/wrist                                                                                                          2. Mild beeding from the site                 3. Pain that is sudden, sharp or persistent in the affected arm/wrist                 4. Swelling or a change in "lump" size, increased redness or drainage at                               the puncture site                                                                               5. High fever (101 degrees or higher)    7. GO TO  THE EMERGENCY ROOM OR CALL 911 IF YOU HAVE: Chest pains or discomforts not relieved with 3 nitroglycerin doses (sublingual tablets or spray), numbness or severe pain of limb, if your limb becomes cold or discolored or if you develop uncontrolled bleeding from the puncture site (quickly apply firm, direct pressure above the site).        Primary Diagnosis     Your primary diagnosis was:  Chest Pain      Admission Information     Date & Time Provider Department CSN    2/21/2017  7:57 AM Erma Green MD [facility] [id number]      Care Providers     Provider Role Specialty Primary office phone    Erma Green MD Attending Provider Cardiology [phone]    Erma Green MD Surgeon  Cardiology [phone]      Your Vitals Were     BP Pulse Temp Resp Height Weight    " "123/76 71 97.9 °F (36.6 °C) (Oral) 12 5' 10" (1.778 m) 108.9 kg (240 lb)    SpO2 BMI             99% 34.44 kg/m2         Recent Lab Values        10/26/2015 1/11/2016 4/13/2016 7/13/2016 10/12/2016              11:47 AM  3:03 PM  8:53 AM  9:25 AM  8:23 AM       A1C 6.4 (H) 7.1 (H) 8.9 (H) 7.4 (H) 5.7       Comment for A1C at  9:25 AM on 7/13/2016:  According to ADA guidelines, hemoglobin A1C <7.0% represents  optimal control in non-pregnant diabetic patients.  Different  metrics may apply to specific populations.   Standards of Medical Care in Diabetes - 2016.  For the purpose of screening for the presence of diabetes:  <5.7%     Consistent with the absence of diabetes  5.7-6.4%  Consistent with increasing risk for diabetes   (prediabetes)  >or=6.5%  Consistent with diabetes  Currently no consensus exists for use of hemoglobin A1C  for diagnosis of diabetes for children.      Comment for A1C at  8:23 AM on 10/12/2016:  According to ADA guidelines, hemoglobin A1C <7.0% represents  optimal control in non-pregnant diabetic patients.  Different  metrics may apply to specific populations.   Standards of Medical Care in Diabetes - 2016.  For the purpose of screening for the presence of diabetes:  <5.7%     Consistent with the absence of diabetes  5.7-6.4%  Consistent with increasing risk for diabetes   (prediabetes)  >or=6.5%  Consistent with diabetes  Currently no consensus exists for use of hemoglobin A1C  for diagnosis of diabetes for children.        Allergies as of 2/21/2017     No Known Allergies      Advance Directives     An advance directive is a document which, in the event you are no longer able to make decisions for yourself, tells your healthcare team what kind of treatment you do or do not want to receive, or who you would like to make those decisions for you.  If you do not currently have an advance directive, [facility] encourages you to create one.  For more information call:  [phone]), " [phone] ([phone]),  or log on to [email or web address].        Smoking Cessation     If you would like to quit smoking:   You may be eligible for free services if you are a [place] resident and started smoking cigarettes before September 1, 1988.  Call the Smoking Cessation Trust (SCT) toll free at (911) 585-4683 or (709) 672-3574.   Call 5-316-QUIT-NOW if you do not meet the above criteria.            Language Assistance Services     ATTENTION: Language assistance services are available, free of charge. Please call [phone].      ATENCIÓN: Si habla español, tiene a west disposición servicios gratuitos de asistencia lingüística. Llame al [phone].     CHÚ Ý: N?u b?n nói Ti?ng Vi?t, có các d?ch v? h? tr? ngôn ng? mi?n phí dành cho b?n. G?i s? [phone].        Diabetes Discharge Instructions                                    [facility]  complies with applicable Federal civil rights laws and does not discriminate on the basis of race, color, national origin, age, disability, or sex.

## 2017-02-21 NOTE — H&P (VIEW-ONLY)
Subjective:    Patient ID:  Kodak Valentine is a 46 y.o. male who presents for follow-up of chest pain    HPI   Mr Valentine is a 46 year year old male with a PMHx of CAD, HTN, JUDI on CPAP, COPD, HLD, s/p PTCA with LAD stent who presents today for routine follow-up. Patient is in the process of lung transplant workup and Dr. Michel who has recommended repeat LHC for further evaluation of chest pain. Patient returns today and states he is doing ok. He is still experiencing intermittent episodes of substernal chest burning/heaviness. States these can occur at anytime, not necessarily precipitated by exertion. Associated symptoms include SOB (chronic). Patient denies any associated nausea, vomiting, or diaphoresis. States symptoms feel similar to previous angina, just not as severe. He reports compliance with his medications. EKG today shows NSR with sinus arrhythmia. Last LHC in 2014 showed, in which medical management was recommended.     Review of Systems   Constitution: Negative for chills, decreased appetite, fever, weakness and malaise/fatigue.   HENT: Negative for congestion, headaches, hoarse voice and sore throat.    Eyes: Negative for blurred vision and discharge.   Cardiovascular: Positive for chest pain and dyspnea on exertion. Negative for claudication, cyanosis, irregular heartbeat, leg swelling, near-syncope, orthopnea, palpitations and paroxysmal nocturnal dyspnea.   Respiratory: Positive for shortness of breath. Negative for cough, hemoptysis, snoring, sputum production and wheezing.    Endocrine: Negative for cold intolerance and heat intolerance.   Hematologic/Lymphatic: Negative for bleeding problem. Does not bruise/bleed easily.   Skin: Negative for rash.   Musculoskeletal: Negative for arthritis, back pain, joint pain, joint swelling, muscle cramps, muscle weakness and myalgias.   Gastrointestinal: Negative for abdominal pain, constipation, diarrhea, heartburn, melena and nausea.   Genitourinary:  "Negative for hematuria.   Neurological: Negative for dizziness, focal weakness, light-headedness, loss of balance, numbness, paresthesias and seizures.   Psychiatric/Behavioral: Negative for memory loss. The patient does not have insomnia.    Allergic/Immunologic: Negative for hives.         Visit Vitals    Pulse 68    Ht 5' 10" (1.778 m)    Wt 105.2 kg (231 lb 14.8 oz)    SpO2 98%    BMI 33.28 kg/m2       Objective:    Physical Exam   Constitutional: He is oriented to person, place, and time. He appears well-developed and well-nourished. No distress.   HENT:   Head: Normocephalic and atraumatic.   Eyes: Pupils are equal, round, and reactive to light. Right eye exhibits no discharge. Left eye exhibits no discharge.   Neck: Neck supple. No JVD present. No tracheal deviation present. No thyromegaly present.   Cardiovascular: Normal rate, regular rhythm, S1 normal, S2 normal and normal heart sounds.  PMI is not displaced.  Exam reveals no gallop, no S3, no S4 and no friction rub.    No murmur heard.  Pulses:       Radial pulses are 2+ on the right side, and 2+ on the left side.   Pulmonary/Chest: Effort normal and breath sounds normal. No respiratory distress. He has no wheezes. He has no rales.   Abdominal: Soft. He exhibits no distension. There is no tenderness. There is no rebound.   Musculoskeletal: He exhibits no edema.   Neurological: He is alert and oriented to person, place, and time.   Skin: Skin is warm and dry. He is not diaphoretic. No erythema.   Psychiatric: He has a normal mood and affect. His behavior is normal. Thought content normal.   Nursing note and vitals reviewed.      2D echo CONCLUSIONS     1 - Normal left ventricular systolic function (EF 60-65%).     2 - Normal left ventricular diastolic function.     3 - Normal right ventricular systolic function .     4 - The estimated PA systolic pressure is 25 mmHg.     5 - Mild tricuspid regurgitation.   Assessment:       1. Coronary artery disease, " angina presence unspecified, unspecified vessel or lesion type, unspecified whether native or transplanted heart    2. JUDI treated with BiPAP    3. COPD, severe    4. Essential hypertension    5. Mixed hyperlipidemia    6. S/P PTCA (percutaneous transluminal coronary angioplasty)       Patient doing ok, still experiencing CP/angina, on optimal medical therapy. Agree with recommendation for repeat LHC/possible PCI if warranted. Continue current meds. All risks, benefits, and treatment alternatives explained to patient in detail. All questions answered. He has agreed to proceed.   Plan:     -Continue current medical management and risk factor modification  -Cardiac, low salt diet  -ED if severe symptoms  -[facility] TBA    Chart reviewed. Dr. Green agrees with plan as outlined above.

## 2017-02-24 LAB — POCT GLUCOSE: 127 MG/DL (ref 70–110)

## 2017-02-25 LAB
POC ACTIVATED CLOTTING TIME K: 204 SEC (ref 74–137)
SAMPLE: ABNORMAL

## 2017-02-28 DIAGNOSIS — J44.9 COPD, SEVERE: Chronic | ICD-10-CM

## 2017-02-28 DIAGNOSIS — F41.9 ANXIETY: ICD-10-CM

## 2017-03-03 RX ORDER — ALPRAZOLAM 0.25 MG/1
TABLET ORAL
Qty: 90 TABLET | OUTPATIENT
Start: 2017-03-03

## 2017-03-08 ENCOUNTER — HOSPITAL ENCOUNTER (OUTPATIENT)
Dept: PULMONOLOGY | Facility: CLINIC | Age: 47
Discharge: HOME | End: 2017-03-08
Payer: COMMERCIAL

## 2017-03-08 ENCOUNTER — OFFICE VISIT (OUTPATIENT)
Dept: TRANSPLANT | Facility: CLINIC | Age: 47
End: 2017-03-08
Payer: COMMERCIAL

## 2017-03-08 VITALS
OXYGEN SATURATION: 100 % | RESPIRATION RATE: 20 BRPM | WEIGHT: 237.63 LBS | HEART RATE: 56 BPM | TEMPERATURE: 97 F | WEIGHT: 237 LBS | HEIGHT: 70 IN | DIASTOLIC BLOOD PRESSURE: 70 MMHG | BODY MASS INDEX: 33.93 KG/M2 | SYSTOLIC BLOOD PRESSURE: 126 MMHG | HEIGHT: 70 IN | BODY MASS INDEX: 34.02 KG/M2

## 2017-03-08 DIAGNOSIS — Z76.82 LUNG TRANSPLANT CANDIDATE: ICD-10-CM

## 2017-03-08 DIAGNOSIS — J44.9 COPD, SEVERE: Chronic | ICD-10-CM

## 2017-03-08 DIAGNOSIS — Z76.82 LUNG TRANSPLANT CANDIDATE: Primary | ICD-10-CM

## 2017-03-08 DIAGNOSIS — I25.10 CORONARY ARTERY DISEASE DUE TO CALCIFIED CORONARY LESION: ICD-10-CM

## 2017-03-08 DIAGNOSIS — G47.33 OSA TREATED WITH BIPAP: Chronic | ICD-10-CM

## 2017-03-08 DIAGNOSIS — I25.84 CORONARY ARTERY DISEASE DUE TO CALCIFIED CORONARY LESION: ICD-10-CM

## 2017-03-08 LAB
PRE FEV1 FVC: 69
PRE FEV1: 2
PRE FVC: 2.91
PREDICTED FEV1 FVC: 81
PREDICTED FEV1: 3.92
PREDICTED FVC: 4.78

## 2017-03-08 PROCEDURE — 1160F RVW MEDS BY RX/DR IN RCRD: CPT | Mod: S$GLB,,, | Performed by: INTERNAL MEDICINE

## 2017-03-08 PROCEDURE — 99213 OFFICE O/P EST LOW 20 MIN: CPT | Mod: 25,S$GLB,, | Performed by: INTERNAL MEDICINE

## 2017-03-08 PROCEDURE — 3078F DIAST BP <80 MM HG: CPT | Mod: S$GLB,,, | Performed by: INTERNAL MEDICINE

## 2017-03-08 PROCEDURE — 94010 BREATHING CAPACITY TEST: CPT | Mod: S$GLB,,, | Performed by: INTERNAL MEDICINE

## 2017-03-08 PROCEDURE — 99999 PR PBB SHADOW E&M-EST. PATIENT-LVL III: CPT | Mod: PBBFAC,,, | Performed by: INTERNAL MEDICINE

## 2017-03-08 PROCEDURE — 3074F SYST BP LT 130 MM HG: CPT | Mod: S$GLB,,, | Performed by: INTERNAL MEDICINE

## 2017-03-08 PROCEDURE — 94620 PR PULMONARY STRESS TESTING,SIMPLE: CPT | Mod: S$GLB,,, | Performed by: INTERNAL MEDICINE

## 2017-03-08 RX ORDER — OXYCODONE AND ACETAMINOPHEN 10; 325 MG/1; MG/1
1 TABLET ORAL 4 TIMES DAILY PRN
Refills: 0 | COMMUNITY
Start: 2017-02-17 | End: 2018-11-19

## 2017-03-08 NOTE — PROGRESS NOTES
LUNG TRANSPLANT PRE FOLLOW-UP    Referring Physician: Isaac Polanco    Reason for Visit:  Pre-lung transplant follow-up.         Date of Initial Evaluation:                                                                                              History of Present Illness: Kodak Valentine is a 46 y.o. male who is on 2L of oxygen at night only with CPAP.  He is on CPAP for sleep apnea.  His [place] Heart Association Class is III  and a Karnofsky score of 50. Requires considerable assistance and frequent medical care.  He is diabetic non insulin dependent.  He is currently is being evaluated for lung transplantation for COPD.  Since last visit, he has had resolution of his anginal chest pain since having his Ranexa increased to BID.  He had a LHC which was unchanged from 2014--remains with 50-70% instent stenosis in LAD and 50% PDA.  He states that his breathing is unchanged.  Remains with dyspnea while walking at a brisk pace and up hills.  Denies any cough or sputum production.  Continues inhaler therapy with ICS/LABA, LAMA, and prn BOB.  No recent hospitalizations or ED/Urgent Care visits.  States that he continues to vape but uses nicotine free solutions.    Review of Systems   Constitutional: Negative for chills, diaphoresis, fever, malaise/fatigue and weight loss.   HENT: Negative for congestion, nosebleeds and sore throat.    Eyes: Negative for blurred vision, double vision and photophobia.   Respiratory: Positive for cough and shortness of breath. Negative for hemoptysis, sputum production, wheezing and stridor.    Cardiovascular: Negative for chest pain, palpitations, orthopnea, claudication, leg swelling and PND.   Gastrointestinal: Negative.  Negative for abdominal pain, blood in stool, constipation, diarrhea, heartburn, melena, nausea and vomiting.   Genitourinary: Negative for dysuria, frequency, hematuria and urgency.   Musculoskeletal: Negative for back pain, falls, joint pain, myalgias and  "neck pain.   Skin: Negative for itching and rash.   Neurological: Negative.  Negative for dizziness, tingling, sensory change, speech change, focal weakness, weakness and headaches.   Endo/Heme/Allergies: Negative for environmental allergies and polydipsia. Does not bruise/bleed easily.   Psychiatric/Behavioral: Negative for depression, hallucinations, memory loss, substance abuse and suicidal ideas. The patient is not nervous/anxious and does not have insomnia.      Objective:   /70 (BP Location: Left arm, Patient Position: Sitting, BP Method: Automatic)  Pulse (!) 56  Temp 96.5 °F (35.8 °C) (Oral)   Resp 20  Ht 5' 10" (1.778 m)  Wt 107.5 kg (237 lb)  SpO2 100% Comment: room air  BMI 34.01 kg/m2    Physical Exam   Constitutional: He is oriented to person, place, and time and well-developed, well-nourished, and in no distress. No distress.   HENT:   Head: Normocephalic and atraumatic.   Nose: Nose normal.   Mouth/Throat: Oropharynx is clear and moist. No oropharyngeal exudate.   Eyes: Conjunctivae and EOM are normal. Pupils are equal, round, and reactive to light. Left eye exhibits no discharge. No scleral icterus.   Neck: Normal range of motion. Neck supple. No JVD present. No tracheal deviation present. No thyromegaly present.   Cardiovascular: Normal rate, regular rhythm, normal heart sounds and intact distal pulses.  Exam reveals no gallop and no friction rub.    No murmur heard.  Pulmonary/Chest: Effort normal. No stridor. No respiratory distress. He has no wheezes. He has no rales. He exhibits no tenderness.   Decreased breath sounds throughout all lung fields   Abdominal: Soft. Bowel sounds are normal. He exhibits no distension and no mass. There is no tenderness. There is no rebound and no guarding.   Musculoskeletal: Normal range of motion. He exhibits no edema, tenderness or deformity.   Lymphadenopathy:     He has no cervical adenopathy.   Neurological: He is alert and oriented to person, " place, and time. No cranial nerve deficit. GCS score is 15.   Skin: Skin is warm and dry. No rash noted. He is not diaphoretic. No erythema.   Psychiatric: Mood and affect normal.     Labs:  No visits with results within 7 Day(s) from this visit.  Latest known visit with results is:    Admission on 02/21/2017, Discharged on 02/21/2017   Component Date Value    Coronary Stenosis 02/21/2017 >= 50%*    POCT Glucose 02/21/2017 127*    POC ACTIVATED CLOTTING T* 02/21/2017 204*    Sample 02/21/2017 unknown        Pulmonary Function Tests 3/8/2017 3/27/2015 6/9/2014   FVC 2.91 2.66 3.38   FEV1 2 0.91 -   FEV1/FVC - - 63.3   FVC% 62 51 -   FEV1% 52 22 -   FEF 25-75 1.35 - -   FEF 25-75% 34 - -     Other: 6MWT 1200 feet.  No desatuations    Assessment:-  1. Lung transplant candidate    2. COPD, severe    3. JUID treated with BiPAP    4. Coronary artery disease due to calcified coronary lesion      Plan:   1. Today with a ZACH score of 4.  FEV1 improved from previous exams.  Symptomatically stable.  Does not require supplemental oxygen.    2. Continue inhaler therapy for COPD which includes Breo Ellipta, Tiotroprium, and prn Albuterol.    3. Continue CPAP therapy for JUDI.    4. Had a recent C which showed stable disease when compared to previous study in 2014.  Has 50-70% instent stenosis in LAD and 50% stenosis to PDA.  No further intervention from cardiology standpoint.  Angina has improved with the discontinuation of nicotine and the increase in his Ranexa.    5. Based on his current symptoms and PFTs will not proceed with transplant work-up at this time.  Encouraged to quit vaping.  Encouraged healthy diet and exercise to keep BMI <35.  He is to continue routine follow-up with his primary pulmonologist in [place].    6. Follow-up in 12 months or earlier if needed.    Luly Nair DO  [facility] Fellow  [facility]    Attending Note:    I have seen and evaluated the patient with the fellow. Their note  reflects the content of our discussion and my plan of care. Angina and PFT's dramatically improved after discontinuation of vaped nicotine (still vaping without nicotine). I advised him that vaping without nicotine will still place him at risk of worsening of his lung disease.     No need for transplant at this moment since his condition has dramatically improved.    RTC in 12 months or earlier of necessary.      Blake Michel MD  Pulmonary/Critical Care Medicine

## 2017-03-08 NOTE — LETTER
March 8, 2017        Isaac Polanco  [address] [place] [address]  Phone: [phone]  Fax: [phone]             [facility]  [address]  [facility] [address]  Phone: [phone]   Patient: Kodak Valentine   MR Number: [id number]   YOB: 1970   Date of Visit: 3/8/2017       Dear Dr. Isaac Polanco    Thank you for referring Kodak Valentine to me for evaluation. Attached you will find relevant portions of my assessment and plan of care.    If you have questions, please do not hesitate to call me. I look forward to following Kodak Valentine along with you.    Sincerely,    Blake Michel MD    Enclosure    If you would like to receive this communication electronically, please contact [email or web address] or [phone] to request [employer] access.    [employer] is a tool which provides read-only access to select patient information with whom you have a relationship. Its easy to use and provides real time access to review your patients record including encounter summaries, notes, results, and demographic information.    If you feel you have received this communication in error or would no longer like to receive these types of communications, please e-mail [email or web address]

## 2017-03-10 NOTE — PROCEDURES
Kodak Valentine is a 46 y.o.  male patient, who presents for a 6 minute walk test ordered by [facility] Michel MD.  The diagnosis is Pre Lung Transplant Evaluation; COPD.  The patient's BMI is 34.7 kg/m2.  Predicted distance (lower limit of normal) is 459.21 meters.      Test Results:    The test was completed without stopping.  The total time walked was 360 seconds.  During walking, the patient reported:  Chest pain, Dyspnea, Lightheadedness. The patient used no assistive devices during testing.     03/08/2017---------Distance: 365.76 meters (1200 feet)     O2 Sat % Supplemental Oxygen Heart Rate Blood Pressure Jen Scale   Pre-exercise  (Resting) 97 % Room Air 64 bpm 119/72 mmHg 3   During Exercise 98 % Room Air 71 bpm 135/69 mmHg 5-6   Post-exercise  (Recovery) 98 % Room Air  62 bpm       Recovery Time: 64 seconds    Performing nurse/tech: KIMBERLY Diaz      PREVIOUS STUDY at [facility] on 10/14/2016:  366 meters (1200 feet)      CLINICAL INTERPRETATION:  Six minute walk distance is 365.76 meters (1200 feet) with heavy dyspnea.  During exercise, there was no desaturation while breathing room air.  Both blood pressure and heart rate remained stable with walking.  The patient reported non-pulmonary symptoms during exercise.  Since the previous study in October 2016, exercise capacity is unchanged.  Based upon age and body mass index, exercise capacity is less than predicted.

## 2017-03-15 ENCOUNTER — TELEPHONE (OUTPATIENT)
Dept: PULMONOLOGY | Facility: CLINIC | Age: 47
End: 2017-03-15

## 2017-03-15 NOTE — TELEPHONE ENCOUNTER
----- Message from Christie Child sent at 3/14/2017 11:32 AM CDT -----  Contact: pt  States he would like you to called the pharmacy, he needs a refill on prespadone. Pt uses     [facility] [place] - [address]  [address] [address]  Phone: [phone] Fax: [phone]    Please call pt at [phone]. Thank you

## 2017-03-25 DIAGNOSIS — F41.9 ANXIETY: ICD-10-CM

## 2017-03-25 DIAGNOSIS — J44.9 COPD, SEVERE: Chronic | ICD-10-CM

## 2017-03-28 RX ORDER — ALPRAZOLAM 0.25 MG/1
TABLET ORAL
Qty: 90 TABLET | Refills: 0 | Status: SHIPPED | OUTPATIENT
Start: 2017-03-28 | End: 2017-04-21

## 2017-03-29 DIAGNOSIS — J44.9 COPD, SEVERITY TO BE DETERMINED: ICD-10-CM

## 2017-03-29 RX ORDER — ALBUTEROL SULFATE 90 UG/1
AEROSOL, METERED RESPIRATORY (INHALATION)
Qty: 8.5 G | Refills: 11 | Status: SHIPPED | OUTPATIENT
Start: 2017-03-29 | End: 2018-04-26 | Stop reason: SDUPTHER

## 2017-03-29 RX ORDER — RANOLAZINE 1000 MG/1
TABLET, FILM COATED, EXTENDED RELEASE ORAL
Qty: 60 TABLET | Refills: 11 | Status: SHIPPED | OUTPATIENT
Start: 2017-03-29 | End: 2018-02-15 | Stop reason: SDUPTHER

## 2017-04-17 ENCOUNTER — TELEPHONE (OUTPATIENT)
Dept: INTERNAL MEDICINE | Facility: CLINIC | Age: 47
End: 2017-04-17

## 2017-04-17 NOTE — TELEPHONE ENCOUNTER
----- Message from Meliza Zuluaga sent at 4/17/2017 12:02 PM CDT -----  Contact: pt   States she is calling to see if he can come in any later for his appt on Friday the 21st due to having to take his grandson to school and can be reached at [phone]//trell/thor

## 2017-04-17 NOTE — TELEPHONE ENCOUNTER
Pt called to say that he may be 15 minutes late for his appt Friday am.  I told him as long as he's not 30 min late, then he would have to reschedule.  Verbalized understanding./rpr

## 2017-04-18 ENCOUNTER — LAB VISIT (OUTPATIENT)
Dept: LAB | Facility: HOSPITAL | Age: 47
End: 2017-04-18
Attending: INTERNAL MEDICINE
Payer: COMMERCIAL

## 2017-04-18 DIAGNOSIS — Z29.9 PREVENTIVE MEASURE: ICD-10-CM

## 2017-04-18 DIAGNOSIS — E11.65 UNCONTROLLED TYPE 2 DIABETES MELLITUS WITH OTHER CIRCULATORY COMPLICATION, UNSPECIFIED LONG TERM INSULIN USE STATUS: ICD-10-CM

## 2017-04-18 DIAGNOSIS — E11.59 UNCONTROLLED TYPE 2 DIABETES MELLITUS WITH OTHER CIRCULATORY COMPLICATION, UNSPECIFIED LONG TERM INSULIN USE STATUS: ICD-10-CM

## 2017-04-18 DIAGNOSIS — I10 ESSENTIAL HYPERTENSION: ICD-10-CM

## 2017-04-18 DIAGNOSIS — E78.2 MIXED HYPERLIPIDEMIA: ICD-10-CM

## 2017-04-18 DIAGNOSIS — F41.9 ANXIETY: ICD-10-CM

## 2017-04-18 DIAGNOSIS — J44.9 COPD, SEVERE: Chronic | ICD-10-CM

## 2017-04-18 DIAGNOSIS — E55.9 VITAMIN D DEFICIENCY: ICD-10-CM

## 2017-04-18 DIAGNOSIS — I25.10 CORONARY ARTERY DISEASE DUE TO CALCIFIED CORONARY LESION: ICD-10-CM

## 2017-04-18 DIAGNOSIS — Z12.5 SCREENING FOR PROSTATE CANCER: ICD-10-CM

## 2017-04-18 DIAGNOSIS — I25.84 CORONARY ARTERY DISEASE DUE TO CALCIFIED CORONARY LESION: ICD-10-CM

## 2017-04-18 LAB
25(OH)D3+25(OH)D2 SERPL-MCNC: 20 NG/ML
ALBUMIN SERPL BCP-MCNC: 3.9 G/DL
ALP SERPL-CCNC: 56 U/L
ALT SERPL W/O P-5'-P-CCNC: 17 U/L
ANION GAP SERPL CALC-SCNC: 10 MMOL/L
AST SERPL-CCNC: 20 U/L
BASOPHILS # BLD AUTO: 0.04 K/UL
BASOPHILS NFR BLD: 0.6 %
BILIRUB SERPL-MCNC: 0.5 MG/DL
BUN SERPL-MCNC: 9 MG/DL
CALCIUM SERPL-MCNC: 9.8 MG/DL
CHLORIDE SERPL-SCNC: 101 MMOL/L
CHOLEST/HDLC SERPL: 4.9 {RATIO}
CO2 SERPL-SCNC: 25 MMOL/L
COMPLEXED PSA SERPL-MCNC: 0.35 NG/ML
CREAT SERPL-MCNC: 1.1 MG/DL
DIFFERENTIAL METHOD: ABNORMAL
EOSINOPHIL # BLD AUTO: 0.2 K/UL
EOSINOPHIL NFR BLD: 2.2 %
ERYTHROCYTE [DISTWIDTH] IN BLOOD BY AUTOMATED COUNT: 12.8 %
EST. GFR  (AFRICAN AMERICAN): >60 ML/MIN/1.73 M^2
EST. GFR  (NON AFRICAN AMERICAN): >60 ML/MIN/1.73 M^2
GLUCOSE SERPL-MCNC: 110 MG/DL
HCT VFR BLD AUTO: 40.5 %
HDL/CHOLESTEROL RATIO: 20.3 %
HDLC SERPL-MCNC: 182 MG/DL
HDLC SERPL-MCNC: 37 MG/DL
HGB BLD-MCNC: 14.3 G/DL
LDLC SERPL CALC-MCNC: 86.6 MG/DL
LYMPHOCYTES # BLD AUTO: 1.7 K/UL
LYMPHOCYTES NFR BLD: 23.8 %
MCH RBC QN AUTO: 35 PG
MCHC RBC AUTO-ENTMCNC: 35.3 %
MCV RBC AUTO: 99 FL
MONOCYTES # BLD AUTO: 0.5 K/UL
MONOCYTES NFR BLD: 6.8 %
NEUTROPHILS # BLD AUTO: 4.7 K/UL
NEUTROPHILS NFR BLD: 65.5 %
NONHDLC SERPL-MCNC: 145 MG/DL
PLATELET # BLD AUTO: 241 K/UL
PMV BLD AUTO: 9.8 FL
POTASSIUM SERPL-SCNC: 4.5 MMOL/L
PROT SERPL-MCNC: 7 G/DL
RBC # BLD AUTO: 4.08 M/UL
SODIUM SERPL-SCNC: 136 MMOL/L
T4 FREE SERPL-MCNC: 0.98 NG/DL
TRIGL SERPL-MCNC: 292 MG/DL
TSH SERPL DL<=0.005 MIU/L-ACNC: 0.32 UIU/ML
WBC # BLD AUTO: 7.18 K/UL

## 2017-04-18 PROCEDURE — 84439 ASSAY OF FREE THYROXINE: CPT

## 2017-04-18 PROCEDURE — 83036 HEMOGLOBIN GLYCOSYLATED A1C: CPT

## 2017-04-18 PROCEDURE — 80053 COMPREHEN METABOLIC PANEL: CPT

## 2017-04-18 PROCEDURE — 82306 VITAMIN D 25 HYDROXY: CPT

## 2017-04-18 PROCEDURE — 85025 COMPLETE CBC W/AUTO DIFF WBC: CPT

## 2017-04-18 PROCEDURE — 84153 ASSAY OF PSA TOTAL: CPT

## 2017-04-18 PROCEDURE — 84443 ASSAY THYROID STIM HORMONE: CPT

## 2017-04-18 PROCEDURE — 36415 COLL VENOUS BLD VENIPUNCTURE: CPT | Mod: PO

## 2017-04-18 PROCEDURE — 80061 LIPID PANEL: CPT

## 2017-04-19 LAB
ESTIMATED AVG GLUCOSE: 128 MG/DL
HBA1C MFR BLD HPLC: 6.1 %

## 2017-04-20 DIAGNOSIS — J44.9 COPD, SEVERE: Chronic | ICD-10-CM

## 2017-04-21 ENCOUNTER — OFFICE VISIT (OUTPATIENT)
Dept: INTERNAL MEDICINE | Facility: CLINIC | Age: 47
End: 2017-04-21
Payer: COMMERCIAL

## 2017-04-21 VITALS
OXYGEN SATURATION: 98 % | SYSTOLIC BLOOD PRESSURE: 124 MMHG | TEMPERATURE: 97 F | HEART RATE: 78 BPM | WEIGHT: 238.31 LBS | HEIGHT: 70 IN | BODY MASS INDEX: 34.12 KG/M2 | DIASTOLIC BLOOD PRESSURE: 72 MMHG

## 2017-04-21 DIAGNOSIS — E55.9 VITAMIN D DEFICIENCY: ICD-10-CM

## 2017-04-21 DIAGNOSIS — E11.9 CONTROLLED TYPE 2 DIABETES MELLITUS WITHOUT COMPLICATION, WITHOUT LONG-TERM CURRENT USE OF INSULIN: ICD-10-CM

## 2017-04-21 DIAGNOSIS — I10 ESSENTIAL HYPERTENSION: ICD-10-CM

## 2017-04-21 DIAGNOSIS — E78.2 MIXED HYPERLIPIDEMIA: ICD-10-CM

## 2017-04-21 DIAGNOSIS — Z00.00 ENCOUNTER FOR PREVENTIVE HEALTH EXAMINATION: ICD-10-CM

## 2017-04-21 DIAGNOSIS — G47.36 NOCTURNAL HYPOXEMIA DUE TO OBSTRUCTIVE CHRONIC BRONCHITIS: Chronic | ICD-10-CM

## 2017-04-21 DIAGNOSIS — R79.9 ABNORMAL BLOOD CHEMISTRY: ICD-10-CM

## 2017-04-21 DIAGNOSIS — I25.10 CORONARY ARTERY DISEASE WITHOUT ANGINA PECTORIS, UNSPECIFIED VESSEL OR LESION TYPE, UNSPECIFIED WHETHER NATIVE OR TRANSPLANTED HEART: ICD-10-CM

## 2017-04-21 DIAGNOSIS — Z72.0 TOBACCO ABUSE: ICD-10-CM

## 2017-04-21 DIAGNOSIS — G47.33 OSA TREATED WITH BIPAP: Primary | Chronic | ICD-10-CM

## 2017-04-21 DIAGNOSIS — F41.9 ANXIETY: ICD-10-CM

## 2017-04-21 DIAGNOSIS — J44.89 NOCTURNAL HYPOXEMIA DUE TO OBSTRUCTIVE CHRONIC BRONCHITIS: Chronic | ICD-10-CM

## 2017-04-21 PROCEDURE — 99999 PR PBB SHADOW E&M-EST. PATIENT-LVL III: CPT | Mod: PBBFAC,,, | Performed by: INTERNAL MEDICINE

## 2017-04-21 PROCEDURE — 3078F DIAST BP <80 MM HG: CPT | Mod: S$GLB,,, | Performed by: INTERNAL MEDICINE

## 2017-04-21 PROCEDURE — 99396 PREV VISIT EST AGE 40-64: CPT | Mod: S$GLB,,, | Performed by: INTERNAL MEDICINE

## 2017-04-21 PROCEDURE — 3074F SYST BP LT 130 MM HG: CPT | Mod: S$GLB,,, | Performed by: INTERNAL MEDICINE

## 2017-04-21 RX ORDER — VARENICLINE TARTRATE 1 MG/1
TABLET, FILM COATED ORAL
Qty: 56 TABLET | Refills: 2 | Status: SHIPPED | OUTPATIENT
Start: 2017-04-21 | End: 2017-12-20 | Stop reason: SDUPTHER

## 2017-04-21 RX ORDER — ALPRAZOLAM 0.25 MG/1
0.25 TABLET ORAL 3 TIMES DAILY PRN
Qty: 90 TABLET | Refills: 3 | Status: SHIPPED | OUTPATIENT
Start: 2017-04-21 | End: 2017-07-18 | Stop reason: SDUPTHER

## 2017-04-21 RX ORDER — ERGOCALCIFEROL 1.25 MG/1
50000 CAPSULE ORAL WEEKLY
Qty: 4 CAPSULE | Refills: 11 | Status: SHIPPED | OUTPATIENT
Start: 2017-04-21 | End: 2018-01-31 | Stop reason: SDUPTHER

## 2017-04-21 RX ORDER — ERGOCALCIFEROL 1.25 MG/1
50000 CAPSULE ORAL WEEKLY
Qty: 4 CAPSULE | Refills: 4 | Status: SHIPPED | OUTPATIENT
Start: 2017-04-21 | End: 2017-04-21 | Stop reason: SDUPTHER

## 2017-04-21 NOTE — PROGRESS NOTES
"Subjective:       Patient ID: Kodak Valentine is a 47 y.o. male.    Chief Complaint: Follow-up    HPI Comments: Here for follow up of medical problems.  Anxiety adeq control on rxs.  Sleeping with O2 now, and generally feeling better.  Not taking vit D x past 3 months.  No f/c/sw/cough.  No cp/palp.  Exercise restricted by back pain and breathing.  BMs normal.  On percocet tid for pain.  No steroids at this time.    Updated/ annual due 11/16:  HM: 11/16 fluvax, 7/15 [id number], 1/12 [id number], 7/10 TDaP, 4/17 psa, 6/10 HCV neg, 4/17 Eye doc.                Review of Systems   Constitutional: Negative for chills, diaphoresis, fatigue and fever.   Respiratory: Negative for cough, chest tightness and shortness of breath.    Cardiovascular: Negative for chest pain, palpitations and leg swelling.   Gastrointestinal: Negative for blood in stool, constipation, diarrhea, nausea and vomiting.   Genitourinary: Negative for difficulty urinating and frequency.   Musculoskeletal: Negative for arthralgias.       Objective:   /72 (BP Location: Right arm, Patient Position: Sitting, BP Method: Manual)  Pulse 78  Temp 97.1 °F (36.2 °C) (Tympanic)   Ht 5' 9.5" (1.765 m)  Wt 108.1 kg (238 lb 5.1 oz)  SpO2 98%  BMI 34.69 kg/m2    Physical Exam   Constitutional: He is oriented to person, place, and time. He appears well-developed and well-nourished.   HENT:   Mouth/Throat: Oropharynx is clear and moist.   Neck: Normal range of motion. Neck supple.   Cardiovascular: Normal rate, regular rhythm and intact distal pulses.  Exam reveals no gallop and no friction rub.    No murmur heard.  Pulses:       Dorsalis pedis pulses are 2+ on the right side, and 2+ on the left side.        Posterior tibial pulses are 2+ on the right side, and 2+ on the left side.   Pulmonary/Chest: Effort normal and breath sounds normal. He has no wheezes. He has no rales.   Abdominal: Soft. Bowel sounds are normal. He exhibits no mass. There is no " tenderness.   Musculoskeletal: He exhibits no edema.   Feet:   Right Foot:   Protective Sensation: 10 sites tested. 10 sites sensed.   Skin Integrity: Negative for ulcer, blister, skin breakdown, erythema, warmth, callus or dry skin.   Left Foot:   Protective Sensation: 10 sites tested. 10 sites sensed.   Skin Integrity: Negative for ulcer, blister, skin breakdown, erythema, warmth, callus or dry skin.   Lymphadenopathy:     He has no cervical adenopathy.   Neurological: He is alert and oriented to person, place, and time.   Psychiatric: He has a normal mood and affect.     Results for KIMBERLI BHATTI (MRN [id number]) as of 4/21/2017 08:07   Ref. Range 4/18/2017 10:06   WBC Latest Ref Range: 3.90 - 12.70 K/uL 7.18   RBC Latest Ref Range: 4.60 - 6.20 M/uL 4.08 (L)   Hemoglobin Latest Ref Range: 14.0 - 18.0 g/dL 14.3   Hematocrit Latest Ref Range: 40.0 - 54.0 % 40.5   MCV Latest Ref Range: 82 - 98 fL 99 (H)   MCH Latest Ref Range: 27.0 - 31.0 pg 35.0 (H)   MCHC Latest Ref Range: 32.0 - 36.0 % 35.3   RDW Latest Ref Range: 11.5 - 14.5 % 12.8   Platelets Latest Ref Range: 150 - 350 K/uL 241   MPV Latest Ref Range: 9.2 - 12.9 fL 9.8   Gran% Latest Ref Range: 38.0 - 73.0 % 65.5   Gran # Latest Ref Range: 1.8 - 7.7 K/uL 4.7   Lymph% Latest Ref Range: 18.0 - 48.0 % 23.8   Lymph # Latest Ref Range: 1.0 - 4.8 K/uL 1.7   Mono% Latest Ref Range: 4.0 - 15.0 % 6.8   Mono # Latest Ref Range: 0.3 - 1.0 K/uL 0.5   Eosinophil% Latest Ref Range: 0.0 - 8.0 % 2.2   Eos # Latest Ref Range: 0.0 - 0.5 K/uL 0.2   Basophil% Latest Ref Range: 0.0 - 1.9 % 0.6   Baso # Latest Ref Range: 0.00 - 0.20 K/uL 0.04   Sodium Latest Ref Range: 136 - 145 mmol/L 136   Potassium Latest Ref Range: 3.5 - 5.1 mmol/L 4.5   Chloride Latest Ref Range: 95 - 110 mmol/L 101   CO2 Latest Ref Range: 23 - 29 mmol/L 25   Anion Gap Latest Ref Range: 8 - 16 mmol/L 10   BUN, Bld Latest Ref Range: 6 - 20 mg/dL 9   Creatinine Latest Ref Range: 0.5 - 1.4 mg/dL 1.1   eGFR if  non  Latest Ref Range: >60 mL/min/1.73 m^2 >60.0   eGFR if African American Latest Ref Range: >60 mL/min/1.73 m^2 >60.0   Glucose Latest Ref Range: 70 - 110 mg/dL 110   Calcium Latest Ref Range: 8.7 - 10.5 mg/dL 9.8   Alkaline Phosphatase Latest Ref Range: 55 - 135 U/L 56   Total Protein Latest Ref Range: 6.0 - 8.4 g/dL 7.0   Albumin Latest Ref Range: 3.5 - 5.2 g/dL 3.9   Total Bilirubin Latest Ref Range: 0.1 - 1.0 mg/dL 0.5   AST Latest Ref Range: 10 - 40 U/L 20   ALT Latest Ref Range: 10 - 44 U/L 17   Triglycerides Latest Ref Range: 30 - 150 mg/dL 292 (H)   Cholesterol Latest Ref Range: 120 - 199 mg/dL 182   HDL Latest Ref Range: 40 - 75 mg/dL 37 (L)   LDL Cholesterol Latest Ref Range: 63.0 - 159.0 mg/dL 86.6   Total Cholesterol/HDL Ratio Latest Ref Range: 2.0 - 5.0  4.9   Vit D, 25-Hydroxy Latest Ref Range: 30 - 96 ng/mL 20 (L)   Hemoglobin A1C Latest Ref Range: 4.5 - 6.2 % 6.1   Estimated Avg Glucose Latest Ref Range: 68 - 131 mg/dL 128   TSH Latest Ref Range: 0.400 - 4.000 uIU/mL 0.316 (L)   Free T4 Latest Ref Range: 0.71 - 1.51 ng/dL 0.98   PSA, SCREEN Latest Ref Range: 0.00 - 4.00 ng/mL 0.35     Assessment:       1. JUDI treated with BiPAP    2. Nocturnal hypoxemia due to obstructive chronic bronchitis    3. Vitamin D deficiency    4. Mixed hyperlipidemia    5. Essential hypertension    6. Controlled type 2 diabetes mellitus without complication, without long-term current use of insulin    7. Anxiety    8. Coronary artery disease without angina pectoris, unspecified vessel or lesion type, unspecified whether native or transplanted heart    9. Encounter for preventive health examination    10. Abnormal blood chemistry        Plan:       Kodak was seen today for follow-up.    Diagnoses and all orders for this visit:    JUDI treated with BiPAP/ Nocturnal hypoxemia due to obstructive chronic bronchitis- noct O2.    Vitamin D deficiency- restart weekly rx.  -     Vitamin D; Future  -      ergocalciferol (ERGOCALCIFEROL) 50,000 unit Cap; Take 1 capsule (50,000 Units total) by mouth once a week.    Mixed hyperlipidemia- stable on rx.    Essential hypertension- stable on rx.    Controlled type 2 diabetes mellitus without complication, without long-term current use of insulin- doing well on metformin.  cont to follow.  -     Microalbumin/creatinine urine ratio; Future  -     Hemoglobin A1c; Future  -     Basic metabolic panel; Future    Anxiety- cont SNRI and xanax daily.    Coronary artery disease without angina pectoris, unspecified vessel or lesion type, unspecified whether native or transplanted heart- clin stable.    Encounter for preventive health examination- utd.    Abnormal blood chemistry- recheck thyroid 3mo.  -     TSH; Future    RTC 3 mo.

## 2017-04-21 NOTE — MR AVS SNAPSHOT
[facility]  [address] [facility] [address]  [place] [address]  Phone: [phone]  Fax: [phone]                  Kodak Valentine   2017 7:20 AM   Office Visit    Description:  Male : 1970   Provider:  Eliza Galindo MD   Department:  [facility]           Reason for Visit     Follow-up           Diagnoses this Visit        Comments    JUDI treated with BiPAP    -  Primary     Nocturnal hypoxemia due to obstructive chronic bronchitis         Vitamin D deficiency         Mixed hyperlipidemia         Essential hypertension         Controlled type 2 diabetes mellitus without complication, without long-term current use of insulin         Anxiety         Coronary artery disease without angina pectoris, unspecified vessel or lesion type, unspecified whether native or transplanted heart         Encounter for preventive health examination         Abnormal blood chemistry                To Do List           Future Appointments        Provider Department Dept Phone    2017 8:20 AM PULMONARY LAB, [facility] [phone]    2017 8:40 AM PULMONARY LAB, [facility] [phone]    2017 9:00 AM Isaac Polanco MD [facility] - Pulmonary Services [phone]    2017 9:40 AM LABORATORY, [facility] [phone]    2017 9:50 AM SPECIMEN, [facility] [phone]      Goals (5 Years of Data)     None      Follow-Up and Disposition     Return in about 3 months (around 2017).    Follow-up and Disposition History       These Medications        Disp Refills Start End    ergocalciferol (ERGOCALCIFEROL) 50,000 unit Cap 4 capsule 11 2017     Take 1 capsule (50,000 Units total) by mouth once a week. - Oral    Pharmacy: [facility] [place], [place] - [address] [place] [address] Ph #: [phone]       alprazolam (XANAX) 0.25 MG tablet 90 tablet 3  4/21/2017     Take 1 tablet (0.25 mg total) by mouth 3 (three) times daily as needed for Anxiety. - Oral    Pharmacy: [facility] -  - [place] [address] #: [phone]         [facility]     [facility] Nurse Care Line - 24/7 Assistance  Unless otherwise directed by your provider, please contact [facility], our nurse care line that is available for 24/7 assistance.     Registered nurses in the [facility] provide: appointment scheduling, clinical advisement, health education, and other advisory services.  Call: [phone] (toll free)               Medications           Message regarding Medications     Verify the changes and/or additions to your medication regime listed below are the same as discussed with your clinician today.  If any of these changes or additions are incorrect, please notify your healthcare provider.        START taking these NEW medications        Refills    ergocalciferol (ERGOCALCIFEROL) 50,000 unit Cap 11    Sig: Take 1 capsule (50,000 Units total) by mouth once a week.    Class: Normal    Route: Oral      STOP taking these medications     oxyMORphone (OPANA) 10 MG tablet Take 10 mg by mouth 4 (four) times daily.    predniSONE (DELTASONE) 20 MG tablet TAKE 3 TABLETS EVERY DAY FOR 3 DAYS 2 TABLETS EVERY DAY FOR 3 DAYS 1 TABLET EVERY DAY FOR 3 DAYS THEN 1/2 TABLET EVERY DAY FOR 3 DAYS           Verify that the below list of medications is an accurate representation of the medications you are currently taking.  If none reported, the list may be blank. If incorrect, please contact your healthcare provider. Carry this list with you in case of emergency.           Current Medications     aspirin (ECOTRIN) 81 MG EC tablet Take 81 mg by mouth once daily.    atorvastatin (LIPITOR) 20 MG tablet TAKE 1 TABLET BY MOUTH ONCE A DAY IN THE EVENING FOR CHOLESTEROL    azithromycin (ZITHROMAX) 500 MG tablet Take 1 tablet (500 mg total) by  mouth once daily.    blood sugar diagnostic Strp 1 strip by Misc.(Non-Drug; Combo Route) route 2 (two) times daily.    BREO ELLIPTA 200-25 mcg/dose DsDv diskus inhaler INHALE 1 PUFF ONCE A DAY    chlorhexidine (PERIDEX) 0.12 % solution     cholecalciferol, vitamin D3, 50,000 unit capsule Take 1 capsule (50,000 Units total) by mouth every 7 days.    duloxetine (CYMBALTA) 60 MG capsule TAKE 1 CAPSULE BY MOUTH ONCE A DAY    fenofibrate 160 MG Tab Take 1 tablet (160 mg total) by mouth once daily.    lancets 33 gauge Misc 1 lancet by Misc.(Non-Drug; Combo Route) route 2 (two) times daily.    lisinopril (PRINIVIL,ZESTRIL) 20 MG tablet Take 1 tablet (20 mg total) by mouth once daily.    metformin (GLUCOPHAGE) 500 MG tablet Take 1 tablet (500 mg total) by mouth 2 (two) times daily with meals.    metoprolol tartrate (LOPRESSOR) 50 MG tablet TAKE ONE TABLET BY MOUTH EVERY 12 HOURS    nitroGLYCERIN 0.2 mg/hr TD PT24 (NITRODUR) 0.2 mg/hr Place 1 patch onto the skin once daily.    oxycodone-acetaminophen (PERCOCET)  mg per tablet Take 1 tablet by mouth 4 (four) times daily as needed for Pain.    pantoprazole (PROTONIX) 40 MG tablet TAKE ONE TABLET BY MOUTH TWICE DAILY    penicillin v potassium (VEETID) 500 MG tablet Take 1 tablet (500 mg total) by mouth 4 (four) times daily.    PROAIR HFA 90 mcg/actuation inhaler INHALE 2 PUFFS BY MOUTH 3 TIMES A DAY AS NEEDED FOR WHEEZING    RANEXA 1,000 mg Tb12 TAKE ONE TABLET BY MOUTH TWICE DAILY    tiotropium bromide (SPIRIVA RESPIMAT) 2.5 mcg/actuation Mist Inhale 2 puffs into the lungs once daily.    alprazolam (XANAX) 0.25 MG tablet Take 1 tablet (0.25 mg total) by mouth 3 (three) times daily as needed for Anxiety.    ergocalciferol (ERGOCALCIFEROL) 50,000 unit Cap Take 1 capsule (50,000 Units total) by mouth once a week.           Clinical Reference Information           Your Vitals Were     BP Pulse Temp Height    124/72 (BP Location: Right arm, Patient Position: Sitting, BP  "Method: Manual) 78 97.1 °F (36.2 °C) (Tympanic) 5' 9.5" (1.765 m)    Weight SpO2 BMI    108.1 kg (238 lb 5.1 oz) 98% 34.69 kg/m2      Blood Pressure          Most Recent Value    BP  124/72      Allergies as of 4/21/2017     No Known Allergies      Immunizations Administered on Date of Encounter - 4/21/2017     None      Orders Placed During Today's Visit     Future Labs/Procedures Expected by Expires    Basic metabolic panel  4/21/2017 4/21/2018    Hemoglobin A1c  4/21/2017 6/20/2018    Microalbumin/creatinine urine ratio  4/21/2017 6/20/2018    TSH  4/21/2017 4/21/2018    Vitamin D  As directed 4/21/2018      Maintenance Dialysis History     Patient has no recorded history of maintenance dialysis.      Language Assistance Services     ATTENTION: Language assistance services are available, free of charge. Please call [phone].      ATENCIÓN: Si habla ganesh, tiene a west disposición servicios gratuitos de asistencia lingüística. Llame al [phone].     CHÚ Ý: N?u b?n nói Ti?ng Vi?t, có các d?ch v? h? tr? ngôn ng? mi?n phí dành cho b?n. G?i s? [phone].         [facility] complies with applicable Federal civil rights laws and does not discriminate on the basis of race, color, national origin, age, disability, or sex.        "

## 2017-04-25 DIAGNOSIS — E78.2 MIXED HYPERLIPIDEMIA: ICD-10-CM

## 2017-04-25 RX ORDER — FENOFIBRATE 160 MG/1
TABLET ORAL
Qty: 30 TABLET | Refills: 6 | Status: SHIPPED | OUTPATIENT
Start: 2017-04-25 | End: 2017-12-18 | Stop reason: SDUPTHER

## 2017-04-26 ENCOUNTER — OFFICE VISIT (OUTPATIENT)
Dept: PULMONOLOGY | Facility: CLINIC | Age: 47
End: 2017-04-26
Payer: COMMERCIAL

## 2017-04-26 ENCOUNTER — PROCEDURE VISIT (OUTPATIENT)
Dept: PULMONOLOGY | Facility: CLINIC | Age: 47
End: 2017-04-26
Payer: COMMERCIAL

## 2017-04-26 VITALS
HEIGHT: 70 IN | SYSTOLIC BLOOD PRESSURE: 120 MMHG | OXYGEN SATURATION: 99 % | WEIGHT: 239 LBS | HEART RATE: 58 BPM | BODY MASS INDEX: 34.22 KG/M2 | DIASTOLIC BLOOD PRESSURE: 83 MMHG | RESPIRATION RATE: 18 BRPM

## 2017-04-26 VITALS — WEIGHT: 239.44 LBS | BODY MASS INDEX: 34.28 KG/M2 | HEIGHT: 70 IN

## 2017-04-26 DIAGNOSIS — J44.9 COPD, SEVERE: Chronic | ICD-10-CM

## 2017-04-26 DIAGNOSIS — G47.36 NOCTURNAL HYPOXEMIA DUE TO OBSTRUCTIVE CHRONIC BRONCHITIS: Chronic | ICD-10-CM

## 2017-04-26 DIAGNOSIS — J44.89 NOCTURNAL HYPOXEMIA DUE TO OBSTRUCTIVE CHRONIC BRONCHITIS: Chronic | ICD-10-CM

## 2017-04-26 DIAGNOSIS — G47.33 OSA TREATED WITH BIPAP: Chronic | ICD-10-CM

## 2017-04-26 DIAGNOSIS — J44.9 COPD, SEVERE: Primary | Chronic | ICD-10-CM

## 2017-04-26 PROCEDURE — 3079F DIAST BP 80-89 MM HG: CPT | Mod: S$GLB,,, | Performed by: INTERNAL MEDICINE

## 2017-04-26 PROCEDURE — 94620 PR PULMONARY STRESS TESTING,SIMPLE: CPT | Mod: S$GLB,,, | Performed by: INTERNAL MEDICINE

## 2017-04-26 PROCEDURE — 99999 PR PBB SHADOW E&M-EST. PATIENT-LVL III: CPT | Mod: PBBFAC,,, | Performed by: INTERNAL MEDICINE

## 2017-04-26 PROCEDURE — 94010 BREATHING CAPACITY TEST: CPT | Mod: S$GLB,,, | Performed by: INTERNAL MEDICINE

## 2017-04-26 PROCEDURE — 99214 OFFICE O/P EST MOD 30 MIN: CPT | Mod: 25,S$GLB,, | Performed by: INTERNAL MEDICINE

## 2017-04-26 PROCEDURE — 1160F RVW MEDS BY RX/DR IN RCRD: CPT | Mod: S$GLB,,, | Performed by: INTERNAL MEDICINE

## 2017-04-26 PROCEDURE — 3074F SYST BP LT 130 MM HG: CPT | Mod: S$GLB,,, | Performed by: INTERNAL MEDICINE

## 2017-04-26 NOTE — ASSESSMENT & PLAN NOTE
[place] score 8  Bed time: 09pm  Wake time: 5 am  BASHIRAP 18/14    Naps    Needs to bring for download

## 2017-04-26 NOTE — PROCEDURES
"[facility]  Six Minute Walk     SUMMARY     Ordering Provider: Dr. Polanco   Interpreting Provider: Dr. Polanco  Performing nurse/tech/RT: Flavia RRT  Diagnosis: COPD  Height: 5' 10" (177.8 cm)  Weight: 108.6 kg (239 lb 6.7 oz)  BMI (Calculated): 34.4   Patient Race:             Phase Oxygen Assessment Supplemental O2 Heart   Rate Blood Pressure Jen Dyspnea Scale Rating   Resting 98 % Room Air 59 bpm 125/86 5-6   Exercise        Minute        1 92 % Room Air 73 bpm     2 96 % Room Air 81 bpm     3 97 % Room Air 85 bpm     4 97 % Room Air 79 bpm     5 96 % Room Air 81 bpm     6  94 % Room Air 80 bpm 124/82 9   Recovery        Minute        1 99 % Room Air 78 bpm     2 98 % Room Air 67 bpm     3 97 % Room Air 65 bpm     4 97 % Room Air 61 bpm   7-8     Six Minute Walk Summary  6MWT Status: completed without stopping  Patient Reported: Dizziness, Dyspnea (Right hip pain)     Interpretation:  Did the patient stop or pause?: No         Total Time Walked (Calculated): 360 seconds  Final Partial Lap Distance (feet): 0 feet  Total Distance Meters (Calculated): 365.76 meters  Predicted Distance Meters (Calculated): 599.83 meters  Percentage of Predicted (Calculated): 60.98  Peak VO2 (Calculated): 14.95  Mets: 4.27  Has The Patient Had a Previous Six Minute Walk Test?: Yes       Previous 6MWT Results  Has The Patient Had a Previous Six Minute Walk Test?: Yes  Date of Previous Test: 10/17/16  Total Time Walked: 360 seconds  Total Distance (meters): 365.76  Predicted Distance (meters): 613.17 meters  Percentage of Predicted: 59.65  Percent Change (Calculated): 0    REPORT  6 minute walk was completed with 360 seconds.  Distance completed was 365 m out of 519 19 m.  This was 60% predicted  Exercise capacity was mildly impaired.  Supplementary oxygen was not required  Dyspnea was severe.  Blood pressure was stable  Maximal heart rate 85 bpm  Clinical correlation for right hip pain  Compared to prior " study done 10/17/2016 no significant change    Isaac Polanco MD

## 2017-04-26 NOTE — PATIENT INSTRUCTIONS
Thank You    Thank you for choosing the PULMONARY MEDICINE DEPARTMENT at [facility]. At [facility], your satisfaction is our priority. You may receive a survey asking about your experience with us. We would appreciate you taking the time to complete and return the survey. Our goal is to provide you with a level 5 or Very Good experience. We thank you for allowing us to serve you and value your feedback.    Your Nurse/Medical Assistant: ___Keara Piedra  ___________________________    Sincerely,  Pulmonary Department  Phone: [phone]  Fax: [phone]

## 2017-04-26 NOTE — PROGRESS NOTES
Subjective:      Kodak Valentine is a 47 y.o. male with known COPD who presents  Last visit was 11/2/2016  COPD test score 25  Followed up in [place] also by lung transplant team.,Karnofsky score of 50.    Lower [place] index is 4.    This has improved from prior study is 5  Immunizations are up-to-date  Has chronic dyspnea. mRC grade 2  Medications.BREO ELLIPTA ProAir HFA and Spiriva.  No interval exacerbations since I last saw him.  He is not on any daytime oxygen  He has a nighttime concentrator which has not been serviced for many years and like him to contact his DME provider to have the machine serviced  He has a BiPAP machine which download is not available but he tells me he uses it during the daytime or nighttime.  His spirometry FEV1 has improved overall since he stopped a day.    Nicotine vaping was discouraged  Encourage him to continue to exercise as much as he        The following portions of the patient's history were reviewed and updated as appropriate:   He  has a past medical history of Anxiety; CAD (coronary artery disease); Chest pain syndrome (10/2/2015); COPD (chronic obstructive pulmonary disease); CPAP (continuous positive airway pressure) dependence; Diabetes mellitus type 2, uncontrolled (4/13/2016); History of duodenal ulcer; Hypertension; Mixed hyperlipidemia; JUDI (obstructive sleep apnea); S/P PTCA (percutaneous transluminal coronary angioplasty) (3/11/2015); and Vitamin D deficiency.  He  does not have any pertinent problems on file.  He  has a past surgical history that includes Nissen fundoplication; Hemorrhoid surgery; Coronary stent placement (2012); Skin lesion excision (Right); Appendectomy; Cataract extraction w/  intraocular lens implant (Right, 4-22-15); and Cardiac catheterization.  His family history includes COPD in his maternal grandfather and maternal grandmother; Diabetes in his maternal grandfather; Heart block in his father; Heart disease in his mother and sister.  He   reports that he quit smoking about 2 years ago. His smoking use included Cigarettes. He has a 10.00 pack-year smoking history. He has never used smokeless tobacco. He reports that he does not drink alcohol or use illicit drugs.  He has a current medication list which includes the following prescription(s): alprazolam, aspirin, atorvastatin, blood sugar diagnostic, breo ellipta, chantix continuing month box, chlorhexidine, cholecalciferol (vitamin d3), duloxetine, ergocalciferol, fenofibrate, lancets, lisinopril, metformin, metoprolol tartrate, nitroglycerin 0.2 mg/hr td pt24, oxycodone-acetaminophen, pantoprazole, penicillin v potassium, proair hfa, ranexa, and tiotropium bromide.  Current Outpatient Prescriptions on File Prior to Visit   Medication Sig Dispense Refill    alprazolam (XANAX) 0.25 MG tablet Take 1 tablet (0.25 mg total) by mouth 3 (three) times daily as needed for Anxiety. 90 tablet 3    aspirin (ECOTRIN) 81 MG EC tablet Take 81 mg by mouth once daily.      atorvastatin (LIPITOR) 20 MG tablet TAKE 1 TABLET BY MOUTH ONCE A DAY IN THE EVENING FOR CHOLESTEROL 30 tablet 11    blood sugar diagnostic Strp 1 strip by Misc.(Non-Drug; Combo Route) route 2 (two) times daily. 100 strip 11    BREO ELLIPTA 200-25 mcg/dose DsDv diskus inhaler INHALE 1 PUFF ONCE A DAY 60 each 4    CHANTIX CONTINUING MONTH BOX 1 mg Tab TAKE 1 TABLET BY MOUTH TWICE A DAY 56 tablet 2    chlorhexidine (PERIDEX) 0.12 % solution   3    cholecalciferol, vitamin D3, 50,000 unit capsule Take 1 capsule (50,000 Units total) by mouth every 7 days. 4 capsule 12    duloxetine (CYMBALTA) 60 MG capsule TAKE 1 CAPSULE BY MOUTH ONCE A DAY 30 capsule 11    ergocalciferol (ERGOCALCIFEROL) 50,000 unit Cap Take 1 capsule (50,000 Units total) by mouth once a week. 4 capsule 11    fenofibrate 160 MG Tab TAKE 1 TABLET BY MOUTH once DAILY 30 tablet 6    lancets 33 gauge Misc 1 lancet by Misc.(Non-Drug; Combo Route) route 2 (two) times daily. 100  "each 11    lisinopril (PRINIVIL,ZESTRIL) 20 MG tablet Take 1 tablet (20 mg total) by mouth once daily. 30 tablet 11    metformin (GLUCOPHAGE) 500 MG tablet Take 1 tablet (500 mg total) by mouth 2 (two) times daily with meals. 60 tablet 6    metoprolol tartrate (LOPRESSOR) 50 MG tablet TAKE ONE TABLET BY MOUTH EVERY 12 HOURS 60 tablet 11    nitroGLYCERIN 0.2 mg/hr TD PT24 (NITRODUR) 0.2 mg/hr Place 1 patch onto the skin once daily. 30 patch 6    oxycodone-acetaminophen (PERCOCET)  mg per tablet Take 1 tablet by mouth 4 (four) times daily as needed for Pain.  0    pantoprazole (PROTONIX) 40 MG tablet TAKE ONE TABLET BY MOUTH TWICE DAILY 60 tablet 11    penicillin v potassium (VEETID) 500 MG tablet Take 1 tablet (500 mg total) by mouth 4 (four) times daily. 40 tablet 1    PROAIR HFA 90 mcg/actuation inhaler INHALE 2 PUFFS BY MOUTH 3 TIMES A DAY AS NEEDED FOR WHEEZING 8.5 g 11    RANEXA 1,000 mg Tb12 TAKE ONE TABLET BY MOUTH TWICE DAILY 60 tablet 11    tiotropium bromide (SPIRIVA RESPIMAT) 2.5 mcg/actuation Mist Inhale 2 puffs into the lungs once daily. 4 g 11    [DISCONTINUED] azithromycin (ZITHROMAX) 500 MG tablet Take 1 tablet (500 mg total) by mouth once daily. 10 tablet 3     No current facility-administered medications on file prior to visit.      He has No Known Allergies..    Review of Systems  A comprehensive review of systems was negative except for: Respiratory: positive for dyspnea on exertion       Objective:      /83  Pulse (!) 58  Resp 18  Ht 5' 10" (1.778 m)  Wt 108.4 kg (239 lb)  SpO2 99%  BMI 34.29 kg/m2    General appearance: alert, appears stated age, cooperative and no distress  Head: Normocephalic, without obvious abnormality  Eyes: negative findings: conjunctivae and sclerae normal  Throat: lips, mucosa, and tongue normal; teeth and gums normal  Neck: no adenopathy, no carotid bruit, no JVD, supple, symmetrical, trachea midline and thyroid not enlarged, symmetric, no " tenderness/mass/nodules  Back: negative  Lungs: clear to auscultation bilaterally and normal percussion bilaterally  Heart: regular rate and rhythm, S1, S2 normal, no murmur, click, rub or gallop  Abdomen: soft, non-tender; bowel sounds normal; no masses,  no organomegaly  Extremities: extremities normal, atraumatic, no cyanosis or edema  Pulses: 2+ and symmetric  Skin: Skin color, texture, turgor normal. No rashes or lesions  Lymph nodes: Cervical, supraclavicular, and axillary nodes normal.  Neurologic: Grossly normal       6 minute walk protocol was completed walking 360 seconds.  Distance completed was 365 m out of 599 m.  This was 60% predicted  Very similar to prior study done 10/17/2016  Supplementary oxygen was not required during the walk  Blood pressure remained stable dyspnea was severe.  Maximal heart rate was 85 bpm    Spirometry was reviewed.  Severe obstructive defect.  FEV1 1.2 is 1 (30% predicted) FEV1/FEC was 67.  FVC was 1.81 (35% predicted) FEV1 overall improved by 109%.    Assessment:     Problem List Items Addressed This Visit     JUDI treated with BiPAP (Chronic)     [place] score 8  Bed time: 09pm  Wake time: 5 am  BIPAP 18/14    Naps    Needs to bring for download         COPD, severe - Primary (Chronic)     COPD ROS: taking medications as instructed, no medication side effects noted, no significant ongoing wheezing or shortness of breath, using bronchodilator MDI less than twice a week.   Immunisations current  Meds: BREO, Spiriva respimat, Proair HFA.  FEV1 1.21( 30%), has improved 109%  No exacabations  Discourage vaping  New concerns: Chr GARCIA, mRC grade 1-2.     Exam: appears well, vitals normal, no respiratory distress, acyanotic, normal RR, chest clear, no wheezing, crepitations, rhonchi, normal symmetric air entry.     Assessment: COPD reasonably well controlled.     Plan: current treatment plan is effective, no change in therapy.     ZACH is 4, Estimated 52 month mortality in COPD  60%         Relevant Orders    X-Ray Chest PA And Lateral    Spirometry with/without bronchodilator    Nocturnal hypoxemia due to obstructive chronic bronchitis (Chronic)     Nocturnal oxygen concentrator  Needs servicing                  Plan:      Return in about 6 months (around 10/26/2017) for cxr, reece.    This note was prepared using voice recognition system and is likely to have sound alike errors that may have been overlooked even after proof reading.  Please call me with any questions    Discussed diagnosis, its evaluation, treatment and usual course. All questions answered.    Thank you for the courtesy of participating in the care of this patient    Isaac Polanco MD

## 2017-04-26 NOTE — MR AVS SNAPSHOT
[facility]  [address] [facility] [address]  Phone: [phone]  Fax: [phone]                  Kodak Valentine   2017 9:00 AM   Office Visit    Description:  Male : 1970   Provider:  Isaac Polanco MD   Department:  [facility]           Reason for Visit     Sleep Apnea     COPD           Diagnoses this Visit        Comments    COPD, severe    -  Primary     JUDI treated with BiPAP         Nocturnal hypoxemia due to obstructive chronic bronchitis                To Do List           Future Appointments        Provider Department Dept Phone    2017 9:40 AM LABORATORY, [facility] [phone]    2017 9:50 AM SPECIMEN, [facility] [phone]    2017 8:00 AM Eliza Galindo MD [facility] Internal Medicine [phone]    2017 8:45 AM ONLH XR1- [facility] [phone]    2017 9:10 AM PULMONARY LAB, [facility] - Pulm Function cs [phone]      Goals (5 Years of Data)     None      Follow-Up and Disposition     Return in about 6 months (around 10/26/2017) for cxr, reece.      [facility]     [facility] Nurse Care [facility] -  Assistance  Unless otherwise directed by your provider, please contact [facility], our nurse care line that is available for  assistance.     Registered nurses in the [facility] provide: appointment scheduling, clinical advisement, health education, and other advisory services.  Call: [phone] (toll free)               Medications           Message regarding Medications     Verify the changes and/or additions to your medication regime listed below are the same as discussed with your clinician today.  If any of these changes or additions are incorrect, please notify your healthcare provider.        STOP taking these medications     azithromycin (ZITHROMAX) 500 MG tablet Take 1  tablet (500 mg total) by mouth once daily.           Verify that the below list of medications is an accurate representation of the medications you are currently taking.  If none reported, the list may be blank. If incorrect, please contact your healthcare provider. Carry this list with you in case of emergency.           Current Medications     alprazolam (XANAX) 0.25 MG tablet Take 1 tablet (0.25 mg total) by mouth 3 (three) times daily as needed for Anxiety.    aspirin (ECOTRIN) 81 MG EC tablet Take 81 mg by mouth once daily.    atorvastatin (LIPITOR) 20 MG tablet TAKE 1 TABLET BY MOUTH ONCE A DAY IN THE EVENING FOR CHOLESTEROL    blood sugar diagnostic Strp 1 strip by Misc.(Non-Drug; Combo Route) route 2 (two) times daily.    BREO ELLIPTA 200-25 mcg/dose DsDv diskus inhaler INHALE 1 PUFF ONCE A DAY    CHANTIX CONTINUING MONTH BOX 1 mg Tab TAKE 1 TABLET BY MOUTH TWICE A DAY    chlorhexidine (PERIDEX) 0.12 % solution     cholecalciferol, vitamin D3, 50,000 unit capsule Take 1 capsule (50,000 Units total) by mouth every 7 days.    duloxetine (CYMBALTA) 60 MG capsule TAKE 1 CAPSULE BY MOUTH ONCE A DAY    ergocalciferol (ERGOCALCIFEROL) 50,000 unit Cap Take 1 capsule (50,000 Units total) by mouth once a week.    fenofibrate 160 MG Tab TAKE 1 TABLET BY MOUTH once DAILY    lancets 33 gauge Misc 1 lancet by Misc.(Non-Drug; Combo Route) route 2 (two) times daily.    lisinopril (PRINIVIL,ZESTRIL) 20 MG tablet Take 1 tablet (20 mg total) by mouth once daily.    metformin (GLUCOPHAGE) 500 MG tablet Take 1 tablet (500 mg total) by mouth 2 (two) times daily with meals.    metoprolol tartrate (LOPRESSOR) 50 MG tablet TAKE ONE TABLET BY MOUTH EVERY 12 HOURS    nitroGLYCERIN 0.2 mg/hr TD PT24 (NITRODUR) 0.2 mg/hr Place 1 patch onto the skin once daily.    oxycodone-acetaminophen (PERCOCET)  mg per tablet Take 1 tablet by mouth 4 (four) times daily as needed for Pain.    pantoprazole (PROTONIX) 40 MG tablet TAKE ONE TABLET  "BY MOUTH TWICE DAILY    penicillin v potassium (VEETID) 500 MG tablet Take 1 tablet (500 mg total) by mouth 4 (four) times daily.    PROAIR HFA 90 mcg/actuation inhaler INHALE 2 PUFFS BY MOUTH 3 TIMES A DAY AS NEEDED FOR WHEEZING    RANEXA 1,000 mg Tb12 TAKE ONE TABLET BY MOUTH TWICE DAILY    tiotropium bromide (SPIRIVA RESPIMAT) 2.5 mcg/actuation Mist Inhale 2 puffs into the lungs once daily.           Clinical Reference Information           Your Vitals Were     BP Pulse Resp Height Weight SpO2    120/83 58 18 5' 10" (1.778 m) 108.4 kg (239 lb) 99%    BMI                34.29 kg/m2          Blood Pressure          Most Recent Value    BP  120/83      Allergies as of 4/26/2017     No Known Allergies      Immunizations Administered on Date of Encounter - 4/26/2017     None      Orders Placed During Today's Visit     Future Labs/Procedures Expected by Expires    X-Ray Chest PA And Lateral  4/26/2017 4/26/2018    Spirometry with/without bronchodilator  As directed 4/26/2018      Maintenance Dialysis History     Patient has no recorded history of maintenance dialysis.      Instructions    Thank You    Thank you for choosing the PULMONARY MEDICINE DEPARTMENT at [facility]. At [facility], your satisfaction is our priority. You may receive a survey asking about your experience with us. We would appreciate you taking the time to complete and return the survey. Our goal is to provide you with a level 5 or Very Good experience. We thank you for allowing us to serve you and value your feedback.    Your Nurse/Medical Assistant: ___Keara Piedra  ___________________________    Sincerely,  Pulmonary Department  Phone: [phone]  Fax: [phone]            Language Assistance Services     ATTENTION: Language assistance services are available, free of charge. Please call [phone].      ATENCIÓN: Si habla español, tiene a west disposición servicios gratuitos de asistencia lingüística. Llame al " [phone].     AIDEN Ý: N?u b?n nói Ti?ng Vi?t, có các d?ch v? h? tr? ngôn ng? mi?n phí dành cho b?n. G?i s? [phone].         [facility] complies with applicable Federal civil rights laws and does not discriminate on the basis of race, color, national origin, age, disability, or sex.

## 2017-05-01 LAB
PRE FEF 25 75: 0.46 L/S (ref 2.73–4.33)
PRE FET 100: 12.29 S
PRE FEV1 FVC: 67 %
PRE FEV1: 1.21 L (ref 3.57–4.36)
PRE FIF 50: 5.07 L/S
PRE FVC: 1.81 L (ref 4.63–5.57)
PRE PEF: 5.48 L/S (ref 8.73–11.05)
PREDICTED FEV1 FVC: 77.94 % (ref 73.1–82.78)
PREDICTED FEV1: 3.97 L (ref 3.57–4.36)
PREDICTED FVC: 5.1 L (ref 4.63–5.57)
PROVOCATION PROTOCOL: ABNORMAL

## 2017-07-07 ENCOUNTER — PATIENT OUTREACH (OUTPATIENT)
Dept: ADMINISTRATIVE | Facility: HOSPITAL | Age: 47
End: 2017-07-07

## 2017-07-11 DIAGNOSIS — J44.9 COPD, SEVERE: ICD-10-CM

## 2017-07-11 RX ORDER — FLUTICASONE FUROATE AND VILANTEROL TRIFENATATE 200; 25 UG/1; UG/1
POWDER RESPIRATORY (INHALATION)
Qty: 60 EACH | Refills: 4 | Status: SHIPPED | OUTPATIENT
Start: 2017-07-11 | End: 2017-10-16

## 2017-07-12 ENCOUNTER — LAB VISIT (OUTPATIENT)
Dept: LAB | Facility: HOSPITAL | Age: 47
End: 2017-07-12
Attending: INTERNAL MEDICINE
Payer: COMMERCIAL

## 2017-07-12 ENCOUNTER — PATIENT OUTREACH (OUTPATIENT)
Dept: ADMINISTRATIVE | Facility: HOSPITAL | Age: 47
End: 2017-07-12

## 2017-07-12 DIAGNOSIS — E11.9 CONTROLLED TYPE 2 DIABETES MELLITUS WITHOUT COMPLICATION, WITHOUT LONG-TERM CURRENT USE OF INSULIN: ICD-10-CM

## 2017-07-12 DIAGNOSIS — R79.9 ABNORMAL BLOOD CHEMISTRY: ICD-10-CM

## 2017-07-12 DIAGNOSIS — E55.9 VITAMIN D DEFICIENCY: ICD-10-CM

## 2017-07-12 LAB
25(OH)D3+25(OH)D2 SERPL-MCNC: 34 NG/ML
ANION GAP SERPL CALC-SCNC: 10 MMOL/L
BUN SERPL-MCNC: 8 MG/DL
CALCIUM SERPL-MCNC: 9.3 MG/DL
CHLORIDE SERPL-SCNC: 102 MMOL/L
CO2 SERPL-SCNC: 23 MMOL/L
CREAT SERPL-MCNC: 1 MG/DL
EST. GFR  (AFRICAN AMERICAN): >60 ML/MIN/1.73 M^2
EST. GFR  (NON AFRICAN AMERICAN): >60 ML/MIN/1.73 M^2
GLUCOSE SERPL-MCNC: 161 MG/DL
POTASSIUM SERPL-SCNC: 4.1 MMOL/L
SODIUM SERPL-SCNC: 135 MMOL/L
TSH SERPL DL<=0.005 MIU/L-ACNC: 0.53 UIU/ML

## 2017-07-12 PROCEDURE — 83036 HEMOGLOBIN GLYCOSYLATED A1C: CPT

## 2017-07-12 PROCEDURE — 80048 BASIC METABOLIC PNL TOTAL CA: CPT

## 2017-07-12 PROCEDURE — 36415 COLL VENOUS BLD VENIPUNCTURE: CPT | Mod: PO

## 2017-07-12 PROCEDURE — 82306 VITAMIN D 25 HYDROXY: CPT

## 2017-07-12 PROCEDURE — 84443 ASSAY THYROID STIM HORMONE: CPT

## 2017-07-12 NOTE — PROGRESS NOTES
Portal letter unread concerning overdue health maintenance. Pre visit chart audit and appt letter mailed.

## 2017-07-12 NOTE — LETTER
July 12, 2017    Kodak Valentine  [address]  [place] [address]           [facility]  [address]  [facility] [address]  Phone: [phone] Dear Mr. Valentine:    [facility] is committed to your overall health.  To help you get the most out of each of your visits, we will review your information to make sure you are up to date on all of your recommended tests and/or procedures.      Eliza Huddleston MD has found that you may be due for   Health Maintenance Due   Topic    Eye Exam       If you have had any of the above done at another facility, please bring the records or information with you so that your record at [facility] will be complete.    If you are currently taking medication, please bring it with you to your appointment for review.    We will be happy to assist you with scheduling any necessary appointments or you may contact the [facility] appointment desk at [phone] to schedule at your convenience.     This information was sent to you via your patient portal.  Please check your message portal for important information.      Thank you for choosing [facility] for your healthcare needs,    If you have any questions or concerns, please don't hesitate to call.    Sincerely,    Sally RUBI LPN  Care Coordination Department  [facility]

## 2017-07-13 LAB
ESTIMATED AVG GLUCOSE: 111 MG/DL
HBA1C MFR BLD HPLC: 5.5 %

## 2017-07-18 ENCOUNTER — OFFICE VISIT (OUTPATIENT)
Dept: INTERNAL MEDICINE | Facility: CLINIC | Age: 47
End: 2017-07-18
Payer: COMMERCIAL

## 2017-07-18 VITALS
HEIGHT: 70 IN | TEMPERATURE: 97 F | WEIGHT: 235.69 LBS | SYSTOLIC BLOOD PRESSURE: 144 MMHG | DIASTOLIC BLOOD PRESSURE: 86 MMHG | BODY MASS INDEX: 33.74 KG/M2 | HEART RATE: 70 BPM | OXYGEN SATURATION: 96 %

## 2017-07-18 DIAGNOSIS — Z98.61 S/P PTCA (PERCUTANEOUS TRANSLUMINAL CORONARY ANGIOPLASTY): ICD-10-CM

## 2017-07-18 DIAGNOSIS — E11.22 CONTROLLED TYPE 2 DIABETES MELLITUS WITH STAGE 2 CHRONIC KIDNEY DISEASE, WITHOUT LONG-TERM CURRENT USE OF INSULIN: ICD-10-CM

## 2017-07-18 DIAGNOSIS — I10 ESSENTIAL HYPERTENSION: Primary | ICD-10-CM

## 2017-07-18 DIAGNOSIS — N18.2 CONTROLLED TYPE 2 DIABETES MELLITUS WITH STAGE 2 CHRONIC KIDNEY DISEASE, WITHOUT LONG-TERM CURRENT USE OF INSULIN: ICD-10-CM

## 2017-07-18 DIAGNOSIS — E55.9 VITAMIN D DEFICIENCY: ICD-10-CM

## 2017-07-18 DIAGNOSIS — E78.2 MIXED HYPERLIPIDEMIA: ICD-10-CM

## 2017-07-18 DIAGNOSIS — F41.9 ANXIETY: ICD-10-CM

## 2017-07-18 DIAGNOSIS — I25.10 CORONARY ARTERY DISEASE WITHOUT ANGINA PECTORIS, UNSPECIFIED VESSEL OR LESION TYPE, UNSPECIFIED WHETHER NATIVE OR TRANSPLANTED HEART: ICD-10-CM

## 2017-07-18 PROCEDURE — 99999 PR PBB SHADOW E&M-EST. PATIENT-LVL III: CPT | Mod: PBBFAC,,, | Performed by: INTERNAL MEDICINE

## 2017-07-18 PROCEDURE — 99214 OFFICE O/P EST MOD 30 MIN: CPT | Mod: S$GLB,,, | Performed by: INTERNAL MEDICINE

## 2017-07-18 PROCEDURE — 4010F ACE/ARB THERAPY RXD/TAKEN: CPT | Mod: S$GLB,,, | Performed by: INTERNAL MEDICINE

## 2017-07-18 PROCEDURE — 3044F HG A1C LEVEL LT 7.0%: CPT | Mod: S$GLB,,, | Performed by: INTERNAL MEDICINE

## 2017-07-18 RX ORDER — ASPIRIN 325 MG
50000 TABLET, DELAYED RELEASE (ENTERIC COATED) ORAL
Qty: 12 CAPSULE | Refills: 3 | Status: SHIPPED | OUTPATIENT
Start: 2017-07-18 | End: 2018-01-31

## 2017-07-18 RX ORDER — ALPRAZOLAM 0.25 MG/1
0.25 TABLET ORAL 3 TIMES DAILY PRN
Qty: 90 TABLET | Refills: 3 | Status: SHIPPED | OUTPATIENT
Start: 2017-07-18 | End: 2017-12-18 | Stop reason: SDUPTHER

## 2017-07-18 NOTE — PROGRESS NOTES
"Subjective:       Patient ID: Kodak Valentine is a 47 y.o. male.    Chief Complaint: Follow-up    Here for follow up of medical problems.  Breathing has been some better, still with nocturnal O2.  Taking weekly vit D.  No checking sugars.  Taking metformin bid, walking more for exercise.  No f/c/sw/cough.  No cp/sob/palp.  BMs normal.  Still needing xanax twice a day, taking cymbalta 60mg daily.    Updated/ annual due 11/16:  HM: 11/16 fluvax, 7/15 lcosoc15, 1/12 [id number], 7/10 TDaP, 4/17 psa, 6/10 HCV neg, 4/17 Eye doc.          Review of Systems   Constitutional: Negative for chills, diaphoresis, fatigue and fever.   Respiratory: Negative for cough, chest tightness and shortness of breath.    Cardiovascular: Negative for chest pain, palpitations and leg swelling.   Gastrointestinal: Negative for blood in stool, constipation, diarrhea, nausea and vomiting.   Genitourinary: Negative for difficulty urinating and frequency.   Musculoskeletal: Negative for arthralgias.       Objective:   BP (!) 144/86 (BP Location: Right arm)   Pulse 70   Temp 96.5 °F (35.8 °C) (Tympanic)   Ht 5' 10" (1.778 m)   Wt 106.9 kg (235 lb 10.8 oz)   SpO2 96%   BMI 33.82 kg/m²     Physical Exam   Constitutional: He is oriented to person, place, and time. He appears well-developed and well-nourished.   HENT:   Mouth/Throat: Oropharynx is clear and moist.   Neck: Normal range of motion. Neck supple.   Cardiovascular: Normal rate, regular rhythm and intact distal pulses.  Exam reveals no gallop and no friction rub.    No murmur heard.  Pulmonary/Chest: Effort normal and breath sounds normal. He has no wheezes. He has no rales.   Abdominal: Soft. Bowel sounds are normal. He exhibits no mass. There is no tenderness.   Musculoskeletal: He exhibits no edema.   Lymphadenopathy:     He has no cervical adenopathy.   Neurological: He is alert and oriented to person, place, and time.   Psychiatric: He has a normal mood and affect.     Results for " KIMBERLI BHATTI (MRN [id number]) as of 7/18/2017 10:40   Ref. Range 4/18/2017 10:06 4/26/2017 08:43 7/12/2017 08:49 7/12/2017 09:00   Sodium Latest Ref Range: 136 - 145 mmol/L 136   135 (L)   Potassium Latest Ref Range: 3.5 - 5.1 mmol/L 4.5   4.1   Chloride Latest Ref Range: 95 - 110 mmol/L 101   102   CO2 Latest Ref Range: 23 - 29 mmol/L 25   23   Anion Gap Latest Ref Range: 8 - 16 mmol/L 10   10   BUN, Bld Latest Ref Range: 6 - 20 mg/dL 9   8   Creatinine Latest Ref Range: 0.5 - 1.4 mg/dL 1.1   1.0   eGFR if non African American Latest Ref Range: >60 mL/min/1.73 m^2 >60.0   >60.0   eGFR if African American Latest Ref Range: >60 mL/min/1.73 m^2 >60.0   >60.0   Glucose Latest Ref Range: 70 - 110 mg/dL 110   161 (H)   Calcium Latest Ref Range: 8.7 - 10.5 mg/dL 9.8   9.3   Alkaline Phosphatase Latest Ref Range: 55 - 135 U/L 56      Total Protein Latest Ref Range: 6.0 - 8.4 g/dL 7.0      Albumin Latest Ref Range: 3.5 - 5.2 g/dL 3.9      Total Bilirubin Latest Ref Range: 0.1 - 1.0 mg/dL 0.5      AST Latest Ref Range: 10 - 40 U/L 20      ALT Latest Ref Range: 10 - 44 U/L 17      Triglycerides Latest Ref Range: 30 - 150 mg/dL 292 (H)      Cholesterol Latest Ref Range: 120 - 199 mg/dL 182      HDL Latest Ref Range: 40 - 75 mg/dL 37 (L)      LDL Cholesterol Latest Ref Range: 63.0 - 159.0 mg/dL 86.6      Total Cholesterol/HDL Ratio Latest Ref Range: 2.0 - 5.0  4.9      Vit D, 25-Hydroxy Latest Ref Range: 30 - 96 ng/mL 20 (L)   34   Hemoglobin A1C Latest Ref Range: 4.0 - 5.6 % 6.1   5.5   Estimated Avg Glucose Latest Ref Range: 68 - 131 mg/dL 128   111   TSH Latest Ref Range: 0.400 - 4.000 uIU/mL 0.316 (L)   0.533   Free T4 Latest Ref Range: 0.71 - 1.51 ng/dL 0.98      PSA, SCREEN Latest Ref Range: 0.00 - 4.00 ng/mL 0.35      Microalbum.,U,Random Latest Units: ug/mL   60.0    Microalb Creat Ratio Latest Ref Range: 0.0 - 30.0 ug/mg   24.0      Assessment:       1. Essential hypertension    2. Controlled type 2 diabetes  mellitus with stage 2 chronic kidney disease, without long-term current use of insulin    3. Coronary artery disease without angina pectoris, unspecified vessel or lesion type, unspecified whether native or transplanted heart    4. Anxiety    5. Mixed hyperlipidemia    6. Vitamin D deficiency    7. S/P PTCA (percutaneous transluminal coronary angioplasty)        Plan:       Kodak was seen today for follow-up.    Diagnoses and all orders for this visit:    Essential hypertension- monitor at pharmacy, has been stable prior.    Controlled type 2 diabetes mellitus with stage 2 chronic kidney disease, without long-term current use of insulin- doing well, cont metformin.    Coronary artery disease without angina pectoris, unspecified vessel or lesion type, unspecified whether native or transplanted heart, S/P PTCA- clinically stable.    Anxiety- cont cymbalta and prn xanax;  -     alprazolam (XANAX) 0.25 MG tablet; Take 1 tablet (0.25 mg total) by mouth 3 (three) times daily as needed for Anxiety.    Mixed hyperlipidemia    Vitamin D deficiency- cont weekly rx:  -     cholecalciferol, vitamin D3, 50,000 unit capsule; Take 1 capsule (50,000 Units total) by mouth every 7 days.    RTC 6mo with labs prior.

## 2017-08-24 RX ORDER — ATORVASTATIN CALCIUM 20 MG/1
TABLET, FILM COATED ORAL
Qty: 30 TABLET | Refills: 11 | Status: SHIPPED | OUTPATIENT
Start: 2017-08-24 | End: 2018-09-11 | Stop reason: SDUPTHER

## 2017-09-14 DIAGNOSIS — J44.9 COPD, SEVERE: ICD-10-CM

## 2017-09-14 RX ORDER — FLUTICASONE FUROATE AND VILANTEROL TRIFENATATE 200; 25 UG/1; UG/1
POWDER RESPIRATORY (INHALATION)
Qty: 60 EACH | Refills: 5 | Status: SHIPPED | OUTPATIENT
Start: 2017-09-14 | End: 2017-10-16

## 2017-09-24 RX ORDER — METFORMIN HYDROCHLORIDE 500 MG/1
TABLET ORAL
Qty: 60 TABLET | OUTPATIENT
Start: 2017-09-24

## 2017-10-16 DIAGNOSIS — J44.9 COPD, SEVERE: ICD-10-CM

## 2017-10-16 RX ORDER — FLUTICASONE FUROATE AND VILANTEROL TRIFENATATE 200; 25 UG/1; UG/1
POWDER RESPIRATORY (INHALATION)
Qty: 60 EACH | Refills: 4 | Status: SHIPPED | OUTPATIENT
Start: 2017-10-16 | End: 2017-11-10

## 2017-10-20 ENCOUNTER — TELEPHONE (OUTPATIENT)
Dept: PULMONOLOGY | Facility: CLINIC | Age: 47
End: 2017-10-20

## 2017-10-24 DIAGNOSIS — I10 ESSENTIAL HYPERTENSION: ICD-10-CM

## 2017-10-24 RX ORDER — LISINOPRIL 20 MG/1
TABLET ORAL
Qty: 30 TABLET | Refills: 11 | Status: SHIPPED | OUTPATIENT
Start: 2017-10-24 | End: 2018-11-12 | Stop reason: SDUPTHER

## 2017-10-24 RX ORDER — METFORMIN HYDROCHLORIDE 500 MG/1
500 TABLET ORAL 2 TIMES DAILY WITH MEALS
Qty: 60 TABLET | Refills: 6 | Status: SHIPPED | OUTPATIENT
Start: 2017-10-24 | End: 2018-05-14 | Stop reason: SDUPTHER

## 2017-11-10 DIAGNOSIS — J44.9 COPD, SEVERE: ICD-10-CM

## 2017-11-10 RX ORDER — FLUTICASONE FUROATE AND VILANTEROL TRIFENATATE 200; 25 UG/1; UG/1
POWDER RESPIRATORY (INHALATION)
Qty: 60 EACH | Refills: 6 | Status: SHIPPED | OUTPATIENT
Start: 2017-11-10 | End: 2018-10-01 | Stop reason: SDUPTHER

## 2017-11-24 ENCOUNTER — TELEPHONE (OUTPATIENT)
Dept: PULMONOLOGY | Facility: CLINIC | Age: 47
End: 2017-11-24

## 2017-11-24 NOTE — TELEPHONE ENCOUNTER
----- Message from Gypsy Sabillon LPN sent at 11/21/2017  9:45 AM CST -----  Patient requested reschedule apt with lab

## 2017-11-27 DIAGNOSIS — R07.9 CHEST PAIN SYNDROME: ICD-10-CM

## 2017-11-27 DIAGNOSIS — Z98.61 S/P PTCA (PERCUTANEOUS TRANSLUMINAL CORONARY ANGIOPLASTY): ICD-10-CM

## 2017-11-27 RX ORDER — NITROGLYCERIN 40 MG/1
1 PATCH TRANSDERMAL DAILY
Qty: 30 PATCH | Refills: 6 | Status: SHIPPED | OUTPATIENT
Start: 2017-11-27 | End: 2018-11-19

## 2017-11-30 ENCOUNTER — TELEPHONE (OUTPATIENT)
Dept: SMOKING CESSATION | Facility: CLINIC | Age: 47
End: 2017-11-30

## 2017-11-30 NOTE — TELEPHONE ENCOUNTER
Attempt to contact patient in regards to our smoking cessation program. Recorded message left with return contact information.

## 2017-12-18 DIAGNOSIS — F41.9 ANXIETY: ICD-10-CM

## 2017-12-18 DIAGNOSIS — J44.9 CHRONIC OBSTRUCTIVE PULMONARY DISEASE, UNSPECIFIED COPD TYPE: ICD-10-CM

## 2017-12-18 DIAGNOSIS — E78.2 MIXED HYPERLIPIDEMIA: ICD-10-CM

## 2017-12-18 RX ORDER — FENOFIBRATE 160 MG/1
TABLET ORAL
Qty: 30 TABLET | Refills: 6 | Status: SHIPPED | OUTPATIENT
Start: 2017-12-18 | End: 2018-07-13 | Stop reason: SDUPTHER

## 2017-12-18 RX ORDER — PREDNISONE 20 MG/1
TABLET ORAL
Qty: 26 TABLET | Refills: 0 | Status: SHIPPED | OUTPATIENT
Start: 2017-12-18 | End: 2018-02-15 | Stop reason: SDUPTHER

## 2017-12-18 NOTE — TELEPHONE ENCOUNTER
S/w Luly at Dr. Mishra's office.  She stated that the pharmacy shows that Dr. Galindo gave pt rx for Percocet 10mg in November.  Advised that we do not have record of that.  She stated that it must be a pharmacy mistake and she is going to contact the pharmacy to clarify./rpr

## 2017-12-18 NOTE — TELEPHONE ENCOUNTER
----- Message from Rema Hallman sent at 12/18/2017  8:26 AM CST -----  Contact: Dr Owens/Luly  Please call Luly @ [phone] regarding pt medication, need to clarify.

## 2017-12-19 RX ORDER — PANTOPRAZOLE SODIUM 40 MG/1
TABLET, DELAYED RELEASE ORAL
Qty: 60 TABLET | Refills: 11 | Status: SHIPPED | OUTPATIENT
Start: 2017-12-19 | End: 2018-11-15 | Stop reason: SDUPTHER

## 2017-12-19 RX ORDER — DULOXETIN HYDROCHLORIDE 60 MG/1
CAPSULE, DELAYED RELEASE ORAL
Qty: 30 CAPSULE | Refills: 11 | Status: SHIPPED | OUTPATIENT
Start: 2017-12-19 | End: 2018-11-15 | Stop reason: SDUPTHER

## 2017-12-19 RX ORDER — ALPRAZOLAM 0.25 MG/1
TABLET ORAL
Qty: 90 TABLET | Refills: 1 | Status: SHIPPED | OUTPATIENT
Start: 2017-12-19 | End: 2018-01-22 | Stop reason: SDUPTHER

## 2017-12-20 DIAGNOSIS — Z76.82 LUNG TRANSPLANT CANDIDATE: ICD-10-CM

## 2017-12-20 DIAGNOSIS — J44.9 COPD, SEVERE: Primary | Chronic | ICD-10-CM

## 2017-12-20 DIAGNOSIS — Z72.0 TOBACCO ABUSE: ICD-10-CM

## 2017-12-20 RX ORDER — VARENICLINE TARTRATE 1 MG/1
1 TABLET, FILM COATED ORAL 2 TIMES DAILY
Qty: 56 TABLET | Refills: 2 | Status: SHIPPED | OUTPATIENT
Start: 2017-12-20 | End: 2018-02-15 | Stop reason: SDUPTHER

## 2017-12-20 NOTE — TELEPHONE ENCOUNTER
----- Message from Lata Palacios sent at 12/20/2017 11:58 AM CST -----  Pt at [phone]//states he would like for your office to call in a script for med/Chantix//uses [facility] on [address] in [place]//please call//trell/golden

## 2018-01-15 ENCOUNTER — TELEPHONE (OUTPATIENT)
Dept: INTERNAL MEDICINE | Facility: CLINIC | Age: 48
End: 2018-01-15

## 2018-01-15 NOTE — TELEPHONE ENCOUNTER
----- Message from Rema Hallman sent at 1/15/2018 10:58 AM CST -----  Contact: pt  Pt missed lab appt on Saturday, please put order back, pt reschedule lab for Wednesday, 1/17, but the appt need order to link., please call pt @ [phone].

## 2018-01-22 DIAGNOSIS — F41.9 ANXIETY: ICD-10-CM

## 2018-01-23 ENCOUNTER — PROCEDURE VISIT (OUTPATIENT)
Dept: PULMONOLOGY | Facility: CLINIC | Age: 48
End: 2018-01-23
Payer: COMMERCIAL

## 2018-01-23 ENCOUNTER — OFFICE VISIT (OUTPATIENT)
Dept: PULMONOLOGY | Facility: CLINIC | Age: 48
End: 2018-01-23
Payer: COMMERCIAL

## 2018-01-23 ENCOUNTER — HOSPITAL ENCOUNTER (OUTPATIENT)
Dept: RADIOLOGY | Facility: HOSPITAL | Age: 48
Discharge: HOME | End: 2018-01-23
Attending: INTERNAL MEDICINE
Payer: COMMERCIAL

## 2018-01-23 VITALS
WEIGHT: 233 LBS | RESPIRATION RATE: 18 BRPM | BODY MASS INDEX: 33.36 KG/M2 | SYSTOLIC BLOOD PRESSURE: 138 MMHG | HEART RATE: 55 BPM | DIASTOLIC BLOOD PRESSURE: 98 MMHG | OXYGEN SATURATION: 97 % | HEIGHT: 70 IN

## 2018-01-23 DIAGNOSIS — J44.9 COPD, SEVERE: Chronic | ICD-10-CM

## 2018-01-23 DIAGNOSIS — F17.201 TOBACCO DEPENDENCE IN REMISSION: ICD-10-CM

## 2018-01-23 DIAGNOSIS — J44.89 NOCTURNAL HYPOXEMIA DUE TO OBSTRUCTIVE CHRONIC BRONCHITIS: Chronic | ICD-10-CM

## 2018-01-23 DIAGNOSIS — G47.33 OSA TREATED WITH BIPAP: Chronic | ICD-10-CM

## 2018-01-23 DIAGNOSIS — G47.36 NOCTURNAL HYPOXEMIA DUE TO OBSTRUCTIVE CHRONIC BRONCHITIS: Chronic | ICD-10-CM

## 2018-01-23 DIAGNOSIS — J44.9 MODERATE COPD (CHRONIC OBSTRUCTIVE PULMONARY DISEASE): Primary | ICD-10-CM

## 2018-01-23 PROCEDURE — 90471 IMMUNIZATION ADMIN: CPT | Mod: S$GLB,,, | Performed by: INTERNAL MEDICINE

## 2018-01-23 PROCEDURE — 90686 IIV4 VACC NO PRSV 0.5 ML IM: CPT | Mod: S$GLB,,, | Performed by: INTERNAL MEDICINE

## 2018-01-23 PROCEDURE — 99214 OFFICE O/P EST MOD 30 MIN: CPT | Mod: 25,S$GLB,, | Performed by: INTERNAL MEDICINE

## 2018-01-23 PROCEDURE — 94010 BREATHING CAPACITY TEST: CPT | Mod: S$GLB,,, | Performed by: INTERNAL MEDICINE

## 2018-01-23 PROCEDURE — 71046 X-RAY EXAM CHEST 2 VIEWS: CPT | Mod: TC

## 2018-01-23 PROCEDURE — 99999 PR PBB SHADOW E&M-EST. PATIENT-LVL III: CPT | Mod: PBBFAC,,, | Performed by: INTERNAL MEDICINE

## 2018-01-23 PROCEDURE — 71046 X-RAY EXAM CHEST 2 VIEWS: CPT | Mod: 26,,, | Performed by: RADIOLOGY

## 2018-01-23 RX ORDER — ALPRAZOLAM 0.25 MG/1
TABLET ORAL
Qty: 90 TABLET | Refills: 0 | Status: SHIPPED | OUTPATIENT
Start: 2018-01-23 | End: 2018-03-13 | Stop reason: SDUPTHER

## 2018-01-23 NOTE — PROGRESS NOTES
Subjective:      Kodak Valentine is a 47 y.o. male with known COPD who presents  Last visit was 04/26/2017  COPD test score 10  No cough, No wheezing No SOb  No interval exacebations  Immunizations are up-to-date  Has chronic dyspnea. mRC grade 1  Medications.BREO ELLIPTA ProAir HFA and Spiriva.  He has a nighttime concentrator he uses and benefits  He has a BiPAP machine   Download shows adherence and benefits  His spirometry FEV1 has improved overall since he stopped a day.    Nicotine vaping was discouraged  Encourage him to continue to exercise as much as he can        The following portions of the patient's history were reviewed and updated as appropriate:   He  has a past medical history of Anxiety; CAD (coronary artery disease); Chest pain syndrome (10/2/2015); COPD (chronic obstructive pulmonary disease); CPAP (continuous positive airway pressure) dependence; Diabetes mellitus type 2, uncontrolled (4/13/2016); History of duodenal ulcer; Hypertension; Mixed hyperlipidemia; JUDI (obstructive sleep apnea); S/P PTCA (percutaneous transluminal coronary angioplasty) (3/11/2015); and Vitamin D deficiency.  He  does not have any pertinent problems on file.  He  has a past surgical history that includes Nissen fundoplication; Hemorrhoid surgery; Coronary stent placement (2012); Skin lesion excision (Right); Appendectomy; Cataract extraction w/  intraocular lens implant (Right, 4-22-15); and Cardiac catheterization.  His family history includes COPD in his maternal grandfather and maternal grandmother; Diabetes in his maternal grandfather; Heart block in his father; Heart disease in his mother and sister.  He  reports that he quit smoking about 3 years ago. His smoking use included Cigarettes. He has a 10.00 pack-year smoking history. He has never used smokeless tobacco. He reports that he drinks about 2.4 oz of alcohol per week . He reports that he does not use drugs.  He has a current medication list which includes  the following prescription(s): alprazolam, aspirin, atorvastatin, blood sugar diagnostic, breo ellipta, chlorhexidine, cholecalciferol (vitamin d3), duloxetine, ergocalciferol, fenofibrate, lancets, lisinopril, metformin, metoprolol tartrate, nitroglycerin 0.2 mg/hr td pt24, oxycodone-acetaminophen, pantoprazole, penicillin v potassium, prednisone, proair hfa, ranexa, tiotropium bromide, and varenicline.  Current Outpatient Prescriptions on File Prior to Visit   Medication Sig Dispense Refill    ALPRAZolam (XANAX) 0.25 MG tablet TAKE ONE TABLET BY MOUTH 3 TIMES DAILY AS NEEDED FOR ANXIETY 90 tablet 0    aspirin (ECOTRIN) 81 MG EC tablet Take 81 mg by mouth once daily.      atorvastatin (LIPITOR) 20 MG tablet TAKE 1 TABLET BY MOUTH ONCE A DAY IN THE EVENING FOR CHOLESTEROL 30 tablet 11    blood sugar diagnostic Strp 1 strip by Misc.(Non-Drug; Combo Route) route 2 (two) times daily. 100 strip 11    BREO ELLIPTA 200-25 mcg/dose DsDv diskus inhaler INHALE 1 PUFF ONCE A DAY 60 each 6    chlorhexidine (PERIDEX) 0.12 % solution   3    cholecalciferol, vitamin D3, 50,000 unit capsule Take 1 capsule (50,000 Units total) by mouth every 7 days. 12 capsule 3    DULoxetine (CYMBALTA) 60 MG capsule TAKE 1 CAPSULE BY MOUTH ONCE A DAY 30 capsule 11    ergocalciferol (ERGOCALCIFEROL) 50,000 unit Cap Take 1 capsule (50,000 Units total) by mouth once a week. 4 capsule 11    fenofibrate 160 MG Tab TAKE 1 TABLET BY MOUTH once DAILY 30 tablet 6    lancets 33 gauge Misc 1 lancet by Misc.(Non-Drug; Combo Route) route 2 (two) times daily. 100 each 11    lisinopril (PRINIVIL,ZESTRIL) 20 MG tablet TAKE 1 TABLET BY MOUTH ONCE A DAY 30 tablet 11    metFORMIN (GLUCOPHAGE) 500 MG tablet Take 1 tablet (500 mg total) by mouth 2 (two) times daily with meals. 60 tablet 6    metoprolol tartrate (LOPRESSOR) 50 MG tablet TAKE ONE TABLET BY MOUTH EVERY 12 HOURS 60 tablet 11    nitroGLYCERIN 0.2 mg/hr TD PT24 (NITRODUR) 0.2 mg/hr Place 1  "patch onto the skin once daily. 30 patch 6    oxycodone-acetaminophen (PERCOCET)  mg per tablet Take 1 tablet by mouth 4 (four) times daily as needed for Pain.  0    pantoprazole (PROTONIX) 40 MG tablet TAKE 1 TABLET BY MOUTH TWICE A DAY 60 tablet 11    penicillin v potassium (VEETID) 500 MG tablet Take 1 tablet (500 mg total) by mouth 4 (four) times daily. 40 tablet 1    predniSONE (DELTASONE) 20 MG tablet TAKE 3 TABLETS EVERY DAY FOR 3 DAYS 2 TABLETS EVERY DAY FOR 3 DAYS 1 TABLET EVERY DAY FOR 3 DAYS THEN 1/2 TABLET EVERY DAY FOR 3 DAYS 26 tablet 0    PROAIR HFA 90 mcg/actuation inhaler INHALE 2 PUFFS BY MOUTH 3 TIMES A DAY AS NEEDED FOR WHEEZING 8.5 g 11    RANEXA 1,000 mg Tb12 TAKE ONE TABLET BY MOUTH TWICE DAILY 60 tablet 11    tiotropium bromide (SPIRIVA RESPIMAT) 2.5 mcg/actuation Mist Inhale 2 puffs into the lungs once daily. 4 g 11    varenicline (CHANTIX CONTINUING MONTH BOX) 1 mg Tab Take 1 tablet (1 mg total) by mouth 2 (two) times daily. 56 tablet 2     No current facility-administered medications on file prior to visit.      He has No Known Allergies..    Review of Systems  A comprehensive review of systems was negative except for: Respiratory: positive for dyspnea on exertion       Objective:      BP (!) 138/98   Pulse (!) 55   Resp 18   Ht 5' 10" (1.778 m)   Wt 105.7 kg (233 lb)   SpO2 97%   BMI 33.43 kg/m²     General appearance: alert, appears stated age, cooperative and no distress  Head: Normocephalic, without obvious abnormality  Eyes: negative findings: conjunctivae and sclerae normal  Throat: lips, mucosa, and tongue normal; teeth and gums normal  Neck: no adenopathy, no carotid bruit, no JVD, supple, symmetrical, trachea midline and thyroid not enlarged, symmetric, no tenderness/mass/nodules  Back: negative  Lungs: clear to auscultation bilaterally and normal percussion bilaterally  Heart: regular rate and rhythm, S1, S2 normal, no murmur, click, rub or gallop  Abdomen: " soft, non-tender; bowel sounds normal; no masses,  no organomegaly  Extremities: extremities normal, atraumatic, no cyanosis or edema  Pulses: 2+ and symmetric  Skin: Skin color, texture, turgor normal. No rashes or lesions  Lymph nodes: Cervical, supraclavicular, and axillary nodes normal.  Neurologic: Grossly normal       CXR  Comparison: 10/14/16    Findings: The lungs appear clear without focal pulmonary consolidation. No pleural effusion. No pneumothorax. Cardiac mediastinal silhouette and osseous structures are stable in appearance. Vascular calcification is present.   Impression      No active disease or adverse change.       DOWNLOAD  12/24/2017 - 1/22/2018  YOB: 1970  Mask:  Compliance Summary  12/24/2017 - 1/22/2018 (30 days)  Days with Device Usage 30 days  Days without Device Usage 0 days  Percent Days with Device Usage 100.0%  Cumulative Usage 10 days 22 hrs. 35 mins. 59 secs.  Maximum Usage (1 Day) 11 hrs. 43 mins. 57 secs.  Average Usage (All Days) 8 hrs. 45 mins. 11 secs.  Average Usage (Days Used) 8 hrs. 45 mins. 11 secs.  Minimum Usage (1 Day) 4 hrs. 25 mins. 37 secs.  Percent of Days with Usage >= 4 Hours 100.0%  Percent of Days with Usage < 4 Hours 0.0%  Date Range  Sleep Therapy Statistics (Topix)  Average Time in Large Leak Per Day 1 mins. 24 secs.  Average AHI 0.9  EPAP 14.0 cmH2O  IPAP 18.0 cmH2O    Spirometry was reviewed.  Moderate obstructive defect.  FEV1 2.72 (68% predicted) FEV1/FEC was 70.  FVC was 3.85 (76% predicted) FEV1 and FVC overall improved  .    Assessment:     Problem List Items Addressed This Visit     JUDI treated with BiPAP (Chronic)     [place] 8  Bed time 9 pm  Wake time 5-6 am  BIPAP 18.0/14.0  Downlaod last 30 days 100% usage > 4 hrs  AHI 0.9         Relevant Orders    MyChart Patient Entered CPAP Usage    Nocturnal hypoxemia due to obstructive chronic bronchitis (Chronic)     Nocturnal Oxygen          Moderate COPD (chronic obstructive  pulmonary disease) - Primary     COPD ROS: taking medications as instructed, no medication side effects noted, no significant ongoing wheezing or shortness of breath, using bronchodilator MDI less than twice a week.   Immunisations current  Meds: BREO, Spiriva respimat, Proair HFA.  FEV1 2.72( 68%), has improved   No exacabations  Discourage vaping  New concerns: Chr GARCIA, mRC grade 1.    Group B:   Exam: appears well, vitals normal, no respiratory distress, acyanotic, normal RR, chest clear, no wheezing, crepitations, rhonchi, normal symmetric air entry.      Assessment: COPD reasonably well controlled.     Plan: current treatment plan is effective, no change in therapy.               Relevant Orders    Stress test, pulmonary    X-Ray Chest PA And Lateral    Complete PFT without bronchodilator - Clinic    Tobacco dependence in remission     Currently on Chantix                Plan:        Significant improved Spirometry values  Use BIPAP and benefits    Follow-up in about 1 year (around 1/23/2019) for Spirometry and CXR next visit, 6MWD test, Download, CPAP supplies, Weight loss and exercise, Smoking cessation counselling and referal, nurse schedule flu shot schedule.    This note was prepared using voice recognition system and is likely to have sound alike errors that may have been overlooked even after proof reading.  Please call me with any questions    Discussed diagnosis, its evaluation, treatment and usual course. All questions answered.    Thank you for the courtesy of participating in the care of this patient    Isaac Polanco MD

## 2018-01-23 NOTE — ASSESSMENT & PLAN NOTE
COPD ROS: taking medications as instructed, no medication side effects noted, no significant ongoing wheezing or shortness of breath, using bronchodilator MDI less than twice a week.   Immunisations current  Meds: BREO, Spiriva respimat, Proair HFA.  FEV1 2.72( 68%), has improved   No exacabations  Discourage vaping  New concerns: Chr GARCIA, mRC grade 1.    Group B:   Exam: appears well, vitals normal, no respiratory distress, acyanotic, normal RR, chest clear, no wheezing, crepitations, rhonchi, normal symmetric air entry.      Assessment: COPD reasonably well controlled.     Plan: current treatment plan is effective, no change in therapy.

## 2018-01-23 NOTE — ASSESSMENT & PLAN NOTE
[facility]  Bed time 9 pm  Wake time 5-6 am  BIPAP 18.0/14.0  Downlaod last 30 days 100% usage > 4 hrs  AHI 0.9

## 2018-01-26 RX ORDER — METOPROLOL TARTRATE 50 MG/1
TABLET ORAL
Qty: 60 TABLET | Refills: 11 | Status: SHIPPED | OUTPATIENT
Start: 2018-01-26 | End: 2019-02-16 | Stop reason: SDUPTHER

## 2018-01-30 DIAGNOSIS — I25.10 CORONARY ARTERY DISEASE, ANGINA PRESENCE UNSPECIFIED, UNSPECIFIED VESSEL OR LESION TYPE, UNSPECIFIED WHETHER NATIVE OR TRANSPLANTED HEART: Primary | ICD-10-CM

## 2018-01-31 ENCOUNTER — CLINICAL SUPPORT (OUTPATIENT)
Dept: CARDIOLOGY | Facility: CLINIC | Age: 48
End: 2018-01-31
Payer: COMMERCIAL

## 2018-01-31 ENCOUNTER — OFFICE VISIT (OUTPATIENT)
Dept: CARDIOLOGY | Facility: CLINIC | Age: 48
End: 2018-01-31
Payer: COMMERCIAL

## 2018-01-31 ENCOUNTER — OFFICE VISIT (OUTPATIENT)
Dept: INTERNAL MEDICINE | Facility: CLINIC | Age: 48
End: 2018-01-31
Payer: COMMERCIAL

## 2018-01-31 VITALS
BODY MASS INDEX: 26.45 KG/M2 | DIASTOLIC BLOOD PRESSURE: 98 MMHG | SYSTOLIC BLOOD PRESSURE: 164 MMHG | OXYGEN SATURATION: 97 % | HEIGHT: 70 IN | TEMPERATURE: 95 F | WEIGHT: 184.75 LBS | HEART RATE: 78 BPM

## 2018-01-31 VITALS
SYSTOLIC BLOOD PRESSURE: 168 MMHG | WEIGHT: 184 LBS | HEIGHT: 70 IN | HEART RATE: 66 BPM | DIASTOLIC BLOOD PRESSURE: 80 MMHG | BODY MASS INDEX: 26.34 KG/M2

## 2018-01-31 DIAGNOSIS — N18.2 CONTROLLED TYPE 2 DIABETES MELLITUS WITH STAGE 2 CHRONIC KIDNEY DISEASE, WITHOUT LONG-TERM CURRENT USE OF INSULIN: ICD-10-CM

## 2018-01-31 DIAGNOSIS — G47.00 INSOMNIA, UNSPECIFIED TYPE: ICD-10-CM

## 2018-01-31 DIAGNOSIS — E55.9 VITAMIN D DEFICIENCY: ICD-10-CM

## 2018-01-31 DIAGNOSIS — G47.36 NOCTURNAL HYPOXEMIA DUE TO OBSTRUCTIVE CHRONIC BRONCHITIS: Chronic | ICD-10-CM

## 2018-01-31 DIAGNOSIS — I25.10 CORONARY ARTERY DISEASE WITHOUT ANGINA PECTORIS, UNSPECIFIED VESSEL OR LESION TYPE, UNSPECIFIED WHETHER NATIVE OR TRANSPLANTED HEART: ICD-10-CM

## 2018-01-31 DIAGNOSIS — E78.2 MIXED HYPERLIPIDEMIA: ICD-10-CM

## 2018-01-31 DIAGNOSIS — I25.10 CORONARY ARTERY DISEASE, ANGINA PRESENCE UNSPECIFIED, UNSPECIFIED VESSEL OR LESION TYPE, UNSPECIFIED WHETHER NATIVE OR TRANSPLANTED HEART: ICD-10-CM

## 2018-01-31 DIAGNOSIS — G47.33 OSA TREATED WITH BIPAP: Chronic | ICD-10-CM

## 2018-01-31 DIAGNOSIS — F17.201 TOBACCO DEPENDENCE IN REMISSION: ICD-10-CM

## 2018-01-31 DIAGNOSIS — Z98.61 S/P PTCA (PERCUTANEOUS TRANSLUMINAL CORONARY ANGIOPLASTY): ICD-10-CM

## 2018-01-31 DIAGNOSIS — F41.9 ANXIETY: ICD-10-CM

## 2018-01-31 DIAGNOSIS — I25.10 CORONARY ARTERY DISEASE WITHOUT ANGINA PECTORIS, UNSPECIFIED VESSEL OR LESION TYPE, UNSPECIFIED WHETHER NATIVE OR TRANSPLANTED HEART: Primary | ICD-10-CM

## 2018-01-31 DIAGNOSIS — K21.9 GASTROESOPHAGEAL REFLUX DISEASE, ESOPHAGITIS PRESENCE NOT SPECIFIED: ICD-10-CM

## 2018-01-31 DIAGNOSIS — J44.89 NOCTURNAL HYPOXEMIA DUE TO OBSTRUCTIVE CHRONIC BRONCHITIS: Chronic | ICD-10-CM

## 2018-01-31 DIAGNOSIS — J44.9 MODERATE COPD (CHRONIC OBSTRUCTIVE PULMONARY DISEASE): ICD-10-CM

## 2018-01-31 DIAGNOSIS — E11.22 CONTROLLED TYPE 2 DIABETES MELLITUS WITH STAGE 2 CHRONIC KIDNEY DISEASE, WITHOUT LONG-TERM CURRENT USE OF INSULIN: ICD-10-CM

## 2018-01-31 DIAGNOSIS — I10 ESSENTIAL HYPERTENSION: Primary | ICD-10-CM

## 2018-01-31 DIAGNOSIS — I10 ESSENTIAL HYPERTENSION: ICD-10-CM

## 2018-01-31 PROCEDURE — 99214 OFFICE O/P EST MOD 30 MIN: CPT | Mod: S$GLB,,, | Performed by: INTERNAL MEDICINE

## 2018-01-31 PROCEDURE — 93000 ELECTROCARDIOGRAM COMPLETE: CPT | Mod: S$GLB,,, | Performed by: INTERNAL MEDICINE

## 2018-01-31 PROCEDURE — 3008F BODY MASS INDEX DOCD: CPT | Mod: S$GLB,,, | Performed by: INTERNAL MEDICINE

## 2018-01-31 PROCEDURE — 99999 PR PBB SHADOW E&M-EST. PATIENT-LVL II: CPT | Mod: PBBFAC,,, | Performed by: INTERNAL MEDICINE

## 2018-01-31 PROCEDURE — 99214 OFFICE O/P EST MOD 30 MIN: CPT | Mod: S$GLB,,, | Performed by: PHYSICIAN ASSISTANT

## 2018-01-31 PROCEDURE — 3008F BODY MASS INDEX DOCD: CPT | Mod: S$GLB,,, | Performed by: PHYSICIAN ASSISTANT

## 2018-01-31 PROCEDURE — 99999 PR PBB SHADOW E&M-EST. PATIENT-LVL IV: CPT | Mod: PBBFAC,,, | Performed by: PHYSICIAN ASSISTANT

## 2018-01-31 RX ORDER — ERGOCALCIFEROL 1.25 MG/1
50000 CAPSULE ORAL
Qty: 8 CAPSULE | Refills: 3 | Status: SHIPPED | OUTPATIENT
Start: 2018-02-01 | End: 2018-07-18

## 2018-01-31 RX ORDER — AMLODIPINE BESYLATE 2.5 MG/1
2.5 TABLET ORAL DAILY
Qty: 30 TABLET | Refills: 11 | Status: SHIPPED | OUTPATIENT
Start: 2018-01-31 | End: 2019-02-16 | Stop reason: SDUPTHER

## 2018-01-31 RX ORDER — TRAZODONE HYDROCHLORIDE 50 MG/1
50 TABLET ORAL NIGHTLY PRN
Qty: 30 TABLET | Refills: 1 | Status: SHIPPED | OUTPATIENT
Start: 2018-01-31 | End: 2018-02-21 | Stop reason: SDUPTHER

## 2018-01-31 NOTE — PROGRESS NOTES
Subjective:       Patient ID: Kodak Vaelntine is a 47 y.o. male.    Chief Complaint: Follow-up    47 year old male presents to clinic for 6 month f/u from last PCP appt. He reports BP has been 160-170/110-110 X 2 months. He reports drinking about 3 sodas daily and is not following a healthy diet. He reports eating fried foods and is not currently exercising due to chronic back pain. He does not check his glucose at home. He had prior labs and results were reviewed with him today. He reports no CP, SOB, fever, chills, or other medical complaints. Pt taking Xanax TID. Pt saw pulmonology last with week and had improvement of spirometry values. Pt is interested in trying trazodone for insomnia.    PCP: Dr. Galindo    Patient Active Problem List:     CAD (coronary artery disease)     Anxiety     Essential hypertension     Mixed hyperlipidemia     Vitamin D deficiency     GERD (gastroesophageal reflux disease)     S/P PTCA (percutaneous transluminal coronary angioplasty)     Verruca vulgaris     JUDI treated with BiPAP     Moderate COPD (chronic obstructive pulmonary disease)     Nocturnal hypoxemia due to obstructive chronic bronchitis     Controlled type 2 diabetes mellitus with stage 2 chronic kidney disease, without long-term current use of insulin     Lung transplant candidate     Tobacco dependence in remission      Review of Systems   Constitutional: Negative for chills and fever.   Respiratory: Negative for cough and shortness of breath.    Cardiovascular: Negative for chest pain, palpitations and leg swelling.   Gastrointestinal: Negative for nausea and vomiting.   Skin: Negative for rash.   Neurological: Negative for dizziness, weakness, numbness and headaches.   Psychiatric/Behavioral: Negative for confusion.       Objective:       Vitals:    01/31/18 0858   BP: (!) 164/98   BP Location: Right arm   Patient Position: Sitting   BP Method: Large (Manual)   Pulse: 78   Temp: (!) 95.2 °F (35.1 °C)   TempSrc:  "Tympanic   SpO2: 97%   Weight: 83.8 kg (184 lb 11.9 oz)   Height: 5' 10" (1.778 m)     Physical Exam   Constitutional: He is oriented to person, place, and time. He appears well-developed and well-nourished. No distress.   HENT:   Head: Normocephalic and atraumatic.   Eyes: EOM are normal. No scleral icterus.   Neck: Neck supple.   Cardiovascular: Normal rate and regular rhythm.    Pulmonary/Chest: Effort normal and breath sounds normal. No respiratory distress. He has no decreased breath sounds. He has no wheezes. He has no rhonchi. He has no rales.   Musculoskeletal: Normal range of motion. He exhibits no edema.   Neurological: He is alert and oriented to person, place, and time. No cranial nerve deficit.   Skin: Skin is dry. No rash noted. He is not diaphoretic.   Psychiatric: He has a normal mood and affect. His speech is normal and behavior is normal. Thought content normal.       Component      Latest Ref Rng & Units 1/23/2018   Hemoglobin A1C      4.0 - 5.6 % 5.2   Estimated Avg Glucose      68 - 131 mg/dL 103   Vit D, 25-Hydroxy      30 - 96 ng/mL 23 (L)     Assessment:       1. Essential hypertension    2. Coronary artery disease without angina pectoris, unspecified vessel or lesion type, unspecified whether native or transplanted heart    3. S/P PTCA (percutaneous transluminal coronary angioplasty)    4. Mixed hyperlipidemia    5. Anxiety    6. Moderate COPD (chronic obstructive pulmonary disease)    7. Nocturnal hypoxemia due to obstructive chronic bronchitis    8. JUDI treated with BiPAP    9. Vitamin D deficiency    10. Controlled type 2 diabetes mellitus with stage 2 chronic kidney disease, without long-term current use of insulin    11. Insomnia, unspecified type        Plan:         Kodak was seen today for follow-up.    Diagnoses and all orders for this visit:    Essential hypertension  Uncontrolled. Pt to see his cardiologist today for recommendations.    Coronary artery disease without angina " pectoris, S/P PTCA (percutaneous transluminal coronary angioplasty)  Pt to f/u with cardiologist today.    Mixed hyperlipidemia  Healthy diet. Pt taking statin and aspirin.    Anxiety  Pt taking Xanax TID per PCP.    Moderate COPD (chronic obstructive pulmonary disease), Nocturnal hypoxemia due to obstructive chronic bronchitis, JUDI treated with BiPAP  F/u with pulm as recommended.    Vitamin D deficiency  Increase vit D2 from once weekly to twice weekly.    Controlled type 2 diabetes mellitus with stage 2 chronic kidney disease, without long-term current use of insulin  A1C 5.2. Healthy diet.     Insomnia  Trazodone 50mg QHS PRN insomnia. Avoid taking with other sedating medications.    Case discussed with PCP Dr. Galindo today. F/u with PCP in 2 months with prior vit D lab for further management. RTC sooner if needed. ER if severe sxs or severely elevated BP occurs.

## 2018-01-31 NOTE — PROGRESS NOTES
Subjective:   Patient ID:  Kodak Valentine is a 47 y.o. male who presents for follow up of Coronary Artery Disease (Patient presents to clinic today for 6 month f/u. ); Hypertension; and Hyperlipidemia      HPI  A 48 yo male with htn cad s/p lad stent copd on o2 sleep apnea obesity who is here for f/u he is on o2 therapy he feels better he is breathing better now no smoking. He Is not exercising due to back pain he does not  pulmonary rehab due to cost . His ekg is normal. He gest  Short of breath with walking uphill. he gets short of breath walking 50 feet or doing yard work. he has no chf symptoms. He has chest pain like tioghtness gripping pain when eh exerts himself it is not consistent and can occur at rest. This is improved a since last visit. The ranexa has helped. Has no nocturnal symptoms. No leg swelling  His blood pressure howeevr has not been well controlled he thinks he si reasonably complaint with salt no tia claudication.no recent lipids his vitamin D is low.last heart cath 2/2017 50% lad instent ffr 0.85 .  Past Medical History:   Diagnosis Date    Anxiety     CAD (coronary artery disease)     PTCA x 2 LAD, restenosis and restent 7/12.    Chest pain syndrome 10/2/2015    COPD (chronic obstructive pulmonary disease)     CPAP (continuous positive airway pressure) dependence     @ night    Diabetes mellitus type 2, uncontrolled 4/13/2016    History of duodenal ulcer     with bleed    Hypertension     Mixed hyperlipidemia     JUDI (obstructive sleep apnea)     severe    S/P PTCA (percutaneous transluminal coronary angioplasty) 3/11/2015    Vitamin D deficiency        Past Surgical History:   Procedure Laterality Date    APPENDECTOMY      CARDIAC CATHETERIZATION      CATARACT EXTRACTION W/  INTRAOCULAR LENS IMPLANT Right 4-22-15    CORONARY STENT PLACEMENT  2012    HEMORRHOID SURGERY      NISSEN FUNDOPLICATION      lap    SKIN LESION EXCISION Right     leg       Social History    Substance Use Topics    Smoking status: Former Smoker     Packs/day: 0.50     Years: 20.00     Types: Cigarettes     Quit date: 6/3/2014    Smokeless tobacco: Never Used      Comment: currently vaping with nicotine since quit smoking in 6/ 2014    Alcohol use 2.4 oz/week     4 Cans of beer per week       Family History   Problem Relation Age of Onset    Heart disease Mother     Heart block Father     Heart disease Sister     COPD Maternal Grandmother     Diabetes Maternal Grandfather     COPD Maternal Grandfather        Current Outpatient Prescriptions   Medication Sig    ALPRAZolam (XANAX) 0.25 MG tablet TAKE ONE TABLET BY MOUTH 3 TIMES DAILY AS NEEDED FOR ANXIETY    aspirin (ECOTRIN) 81 MG EC tablet Take 81 mg by mouth once daily.    atorvastatin (LIPITOR) 20 MG tablet TAKE 1 TABLET BY MOUTH ONCE A DAY IN THE EVENING FOR CHOLESTEROL    blood sugar diagnostic Strp 1 strip by Misc.(Non-Drug; Combo Route) route 2 (two) times daily.    BREO ELLIPTA 200-25 mcg/dose DsDv diskus inhaler INHALE 1 PUFF ONCE A DAY    chlorhexidine (PERIDEX) 0.12 % solution     DULoxetine (CYMBALTA) 60 MG capsule TAKE 1 CAPSULE BY MOUTH ONCE A DAY    [START ON 2/1/2018] ergocalciferol (ERGOCALCIFEROL) 50,000 unit Cap Take 1 capsule (50,000 Units total) by mouth twice a week.    fenofibrate 160 MG Tab TAKE 1 TABLET BY MOUTH once DAILY    lancets 33 gauge Misc 1 lancet by Misc.(Non-Drug; Combo Route) route 2 (two) times daily.    lisinopril (PRINIVIL,ZESTRIL) 20 MG tablet TAKE 1 TABLET BY MOUTH ONCE A DAY    metFORMIN (GLUCOPHAGE) 500 MG tablet Take 1 tablet (500 mg total) by mouth 2 (two) times daily with meals.    metoprolol tartrate (LOPRESSOR) 50 MG tablet TAKE 1 TABLET BY MOUTH EVERY 12 HOURS    nitroGLYCERIN 0.2 mg/hr TD PT24 (NITRODUR) 0.2 mg/hr Place 1 patch onto the skin once daily.    oxycodone-acetaminophen (PERCOCET)  mg per tablet Take 1 tablet by mouth 4 (four) times daily as needed for Pain.     pantoprazole (PROTONIX) 40 MG tablet TAKE 1 TABLET BY MOUTH TWICE A DAY    penicillin v potassium (VEETID) 500 MG tablet Take 1 tablet (500 mg total) by mouth 4 (four) times daily.    predniSONE (DELTASONE) 20 MG tablet TAKE 3 TABLETS EVERY DAY FOR 3 DAYS 2 TABLETS EVERY DAY FOR 3 DAYS 1 TABLET EVERY DAY FOR 3 DAYS THEN 1/2 TABLET EVERY DAY FOR 3 DAYS    PROAIR HFA 90 mcg/actuation inhaler INHALE 2 PUFFS BY MOUTH 3 TIMES A DAY AS NEEDED FOR WHEEZING    RANEXA 1,000 mg Tb12 TAKE ONE TABLET BY MOUTH TWICE DAILY    tiotropium bromide (SPIRIVA RESPIMAT) 2.5 mcg/actuation Mist Inhale 2 puffs into the lungs once daily.    traZODone (DESYREL) 50 MG tablet Take 1 tablet (50 mg total) by mouth nightly as needed for Insomnia.    varenicline (CHANTIX CONTINUING MONTH BOX) 1 mg Tab Take 1 tablet (1 mg total) by mouth 2 (two) times daily.     No current facility-administered medications for this visit.      Current Outpatient Prescriptions on File Prior to Visit   Medication Sig    ALPRAZolam (XANAX) 0.25 MG tablet TAKE ONE TABLET BY MOUTH 3 TIMES DAILY AS NEEDED FOR ANXIETY    aspirin (ECOTRIN) 81 MG EC tablet Take 81 mg by mouth once daily.    atorvastatin (LIPITOR) 20 MG tablet TAKE 1 TABLET BY MOUTH ONCE A DAY IN THE EVENING FOR CHOLESTEROL    blood sugar diagnostic Strp 1 strip by Misc.(Non-Drug; Combo Route) route 2 (two) times daily.    BREO ELLIPTA 200-25 mcg/dose DsDv diskus inhaler INHALE 1 PUFF ONCE A DAY    chlorhexidine (PERIDEX) 0.12 % solution     DULoxetine (CYMBALTA) 60 MG capsule TAKE 1 CAPSULE BY MOUTH ONCE A DAY    fenofibrate 160 MG Tab TAKE 1 TABLET BY MOUTH once DAILY    lancets 33 gauge Misc 1 lancet by Misc.(Non-Drug; Combo Route) route 2 (two) times daily.    lisinopril (PRINIVIL,ZESTRIL) 20 MG tablet TAKE 1 TABLET BY MOUTH ONCE A DAY    metFORMIN (GLUCOPHAGE) 500 MG tablet Take 1 tablet (500 mg total) by mouth 2 (two) times daily with meals.    metoprolol tartrate (LOPRESSOR) 50 MG  tablet TAKE 1 TABLET BY MOUTH EVERY 12 HOURS    nitroGLYCERIN 0.2 mg/hr TD PT24 (NITRODUR) 0.2 mg/hr Place 1 patch onto the skin once daily.    oxycodone-acetaminophen (PERCOCET)  mg per tablet Take 1 tablet by mouth 4 (four) times daily as needed for Pain.    pantoprazole (PROTONIX) 40 MG tablet TAKE 1 TABLET BY MOUTH TWICE A DAY    penicillin v potassium (VEETID) 500 MG tablet Take 1 tablet (500 mg total) by mouth 4 (four) times daily.    predniSONE (DELTASONE) 20 MG tablet TAKE 3 TABLETS EVERY DAY FOR 3 DAYS 2 TABLETS EVERY DAY FOR 3 DAYS 1 TABLET EVERY DAY FOR 3 DAYS THEN 1/2 TABLET EVERY DAY FOR 3 DAYS    PROAIR HFA 90 mcg/actuation inhaler INHALE 2 PUFFS BY MOUTH 3 TIMES A DAY AS NEEDED FOR WHEEZING    RANEXA 1,000 mg Tb12 TAKE ONE TABLET BY MOUTH TWICE DAILY    tiotropium bromide (SPIRIVA RESPIMAT) 2.5 mcg/actuation Mist Inhale 2 puffs into the lungs once daily.    varenicline (CHANTIX CONTINUING MONTH BOX) 1 mg Tab Take 1 tablet (1 mg total) by mouth 2 (two) times daily.    [DISCONTINUED] cholecalciferol, vitamin D3, 50,000 unit capsule Take 1 capsule (50,000 Units total) by mouth every 7 days.    [DISCONTINUED] ergocalciferol (ERGOCALCIFEROL) 50,000 unit Cap Take 1 capsule (50,000 Units total) by mouth once a week.     No current facility-administered medications on file prior to visit.        Review of Systems   Constitution: Negative for weakness and malaise/fatigue.   Eyes: Negative for blurred vision.   Cardiovascular: Positive for chest pain and dyspnea on exertion. Negative for claudication, cyanosis, irregular heartbeat, leg swelling, near-syncope, orthopnea, palpitations and paroxysmal nocturnal dyspnea.   Respiratory: Positive for shortness of breath and snoring. Negative for cough and hemoptysis.    Hematologic/Lymphatic: Negative for bleeding problem. Does not bruise/bleed easily.   Skin: Negative for dry skin and itching.   Musculoskeletal: Negative for falls, muscle weakness  "and myalgias.   Gastrointestinal: Negative for abdominal pain, diarrhea, heartburn, hematemesis, hematochezia and melena.   Genitourinary: Negative for flank pain and hematuria.   Neurological: Negative for dizziness, focal weakness, headaches, light-headedness, numbness, paresthesias and seizures.   Psychiatric/Behavioral: Negative for altered mental status and memory loss. The patient is not nervous/anxious.    Allergic/Immunologic: Negative for hives.       Objective:   Physical Exam   Constitutional: He is oriented to person, place, and time. He appears well-developed and well-nourished. No distress.   HENT:   Head: Normocephalic and atraumatic.   Eyes: EOM are normal. Pupils are equal, round, and reactive to light. Right eye exhibits no discharge. Left eye exhibits no discharge.   Neck: Neck supple. No JVD present. No thyromegaly present.   Cardiovascular: Normal rate, regular rhythm, normal heart sounds and intact distal pulses.  Exam reveals no gallop and no friction rub.    No murmur heard.  Pulmonary/Chest: Effort normal and breath sounds normal. No respiratory distress. He has no wheezes. He has no rales. He exhibits no tenderness.   Abdominal: Soft. Bowel sounds are normal. He exhibits no distension. There is no tenderness.   Obese abdomen   Musculoskeletal: Normal range of motion. He exhibits no edema.   Neurological: He is alert and oriented to person, place, and time. No cranial nerve deficit.   Skin: Skin is warm and dry. No rash noted. He is not diaphoretic. No erythema.   Psychiatric: He has a normal mood and affect. His behavior is normal.   Nursing note and vitals reviewed.    Vitals:    01/31/18 1105   BP: (!) 168/80   Pulse: 66   Weight: 83.5 kg (184 lb)   Height: 5' 10" (1.778 m)     Lab Results   Component Value Date    CHOL 182 04/18/2017    CHOL 145 04/07/2016    CHOL 152 10/26/2015     Lab Results   Component Value Date    HDL 37 (L) 04/18/2017    HDL 29 (L) 04/07/2016    HDL 34 (L) " 10/26/2015     Lab Results   Component Value Date    LDLCALC 86.6 04/18/2017    LDLCALC 68.0 04/07/2016    LDLCALC 71.0 10/26/2015     Lab Results   Component Value Date    TRIG 292 (H) 04/18/2017    TRIG 240 (H) 04/07/2016    TRIG 235 (H) 10/26/2015     Lab Results   Component Value Date    CHOLHDL 20.3 04/18/2017    CHOLHDL 20.0 04/07/2016    CHOLHDL 22.4 10/26/2015       Chemistry        Component Value Date/Time     (L) 07/12/2017 0900    K 4.1 07/12/2017 0900     07/12/2017 0900    CO2 23 07/12/2017 0900    BUN 8 07/12/2017 0900    CREATININE 1.0 07/12/2017 0900     (H) 07/12/2017 0900        Component Value Date/Time    CALCIUM 9.3 07/12/2017 0900    ALKPHOS 56 04/18/2017 1006    AST 20 04/18/2017 1006    ALT 17 04/18/2017 1006    BILITOT 0.5 04/18/2017 1006    ESTGFRAFRICA >60.0 07/12/2017 0900    EGFRNONAA >60.0 07/12/2017 0900        Lab Results   Component Value Date    HGBA1C 5.2 01/23/2018       Lab Results   Component Value Date    TSH 0.533 07/12/2017     Lab Results   Component Value Date    INR 1.0 02/15/2017    INR 1.0 05/29/2014    INR 1.0 04/29/2013     Lab Results   Component Value Date    WBC 7.18 04/18/2017    HGB 14.3 04/18/2017    HCT 40.5 04/18/2017    MCV 99 (H) 04/18/2017     04/18/2017     BMP  Sodium   Date Value Ref Range Status   07/12/2017 135 (L) 136 - 145 mmol/L Final     Potassium   Date Value Ref Range Status   07/12/2017 4.1 3.5 - 5.1 mmol/L Final     Chloride   Date Value Ref Range Status   07/12/2017 102 95 - 110 mmol/L Final     CO2   Date Value Ref Range Status   07/12/2017 23 23 - 29 mmol/L Final     BUN, Bld   Date Value Ref Range Status   07/12/2017 8 6 - 20 mg/dL Final     Creatinine   Date Value Ref Range Status   07/12/2017 1.0 0.5 - 1.4 mg/dL Final     Calcium   Date Value Ref Range Status   07/12/2017 9.3 8.7 - 10.5 mg/dL Final     Anion Gap   Date Value Ref Range Status   07/12/2017 10 8 - 16 mmol/L Final     eGFR if    Date  Value Ref Range Status   07/12/2017 >60.0 >60 mL/min/1.73 m^2 Final     eGFR if non    Date Value Ref Range Status   07/12/2017 >60.0 >60 mL/min/1.73 m^2 Final     Comment:     Calculation used to obtain the estimated glomerular filtration  rate (eGFR) is the CKD-EPI equation. Since race is unknown   in our information system, the eGFR values for   -American and Non--American patients are given   for each creatinine result.       CrCl cannot be calculated (Patient's most recent lab result is older than the maximum 7 days allowed.).    Assessment:     1. Coronary artery disease without angina pectoris, unspecified vessel or lesion type, unspecified whether native or transplanted heart    2. JUDI treated with BiPAP    3. Nocturnal hypoxemia due to obstructive chronic bronchitis    4. Essential hypertension    5. Mixed hyperlipidemia    6. Vitamin D deficiency    7. Gastroesophageal reflux disease, esophagitis presence not specified    8. S/P PTCA (percutaneous transluminal coronary angioplasty)    9. Moderate COPD (chronic obstructive pulmonary disease)    10. Controlled type 2 diabetes mellitus with stage 2 chronic kidney disease, without long-term current use of insulin    11. Tobacco dependence in remission      His issues are lack of exercise  Being overweight and being compliant in order to improve breathing and lipids.  htn not well controlled will need to add amlodipine 2.5 mg po every evening and  assess response on f/u with mid level in 2 weeks in addition to compliance.   Plan:   Amlodipine 2.5 mg po daily   Continue current therapy  Cardiac low salt diet.  Risk factor modification and excercise program./weight loss  F/u in 6 months with lipid cmp a1c  F/u in 2 weeks with mid level.

## 2018-02-15 DIAGNOSIS — Z72.0 TOBACCO ABUSE: ICD-10-CM

## 2018-02-15 DIAGNOSIS — J44.9 CHRONIC OBSTRUCTIVE PULMONARY DISEASE, UNSPECIFIED COPD TYPE: ICD-10-CM

## 2018-02-15 RX ORDER — VARENICLINE TARTRATE 1 MG/1
TABLET, FILM COATED ORAL
Qty: 56 TABLET | Refills: 2 | Status: SHIPPED | OUTPATIENT
Start: 2018-02-15 | End: 2018-07-18

## 2018-02-15 RX ORDER — PREDNISONE 20 MG/1
TABLET ORAL
Qty: 26 TABLET | Refills: 0 | Status: SHIPPED | OUTPATIENT
Start: 2018-02-15 | End: 2018-07-18

## 2018-02-20 ENCOUNTER — PATIENT MESSAGE (OUTPATIENT)
Dept: INTERNAL MEDICINE | Facility: CLINIC | Age: 48
End: 2018-02-20

## 2018-02-20 DIAGNOSIS — E78.00 PURE HYPERCHOLESTEROLEMIA: Primary | ICD-10-CM

## 2018-02-21 ENCOUNTER — PATIENT MESSAGE (OUTPATIENT)
Dept: INTERNAL MEDICINE | Facility: CLINIC | Age: 48
End: 2018-02-21

## 2018-02-21 DIAGNOSIS — G47.00 INSOMNIA, UNSPECIFIED TYPE: ICD-10-CM

## 2018-02-21 RX ORDER — TRAZODONE HYDROCHLORIDE 50 MG/1
50-100 TABLET ORAL NIGHTLY PRN
Qty: 60 TABLET | Refills: 6 | Status: SHIPPED | OUTPATIENT
Start: 2018-02-21 | End: 2018-04-04 | Stop reason: SDUPTHER

## 2018-02-22 RX ORDER — RANOLAZINE 1000 MG/1
TABLET, FILM COATED, EXTENDED RELEASE ORAL
Qty: 60 TABLET | Refills: 0 | Status: SHIPPED | OUTPATIENT
Start: 2018-02-22 | End: 2018-04-13 | Stop reason: SDUPTHER

## 2018-02-28 DIAGNOSIS — K04.7 DENTAL INFECTION: ICD-10-CM

## 2018-02-28 RX ORDER — PENICILLIN V POTASSIUM 500 MG/1
TABLET, FILM COATED ORAL
Qty: 40 TABLET | OUTPATIENT
Start: 2018-02-28

## 2018-03-05 DIAGNOSIS — J44.9 COPD, SEVERE: ICD-10-CM

## 2018-03-05 RX ORDER — FLUTICASONE FUROATE AND VILANTEROL TRIFENATATE 200; 25 UG/1; UG/1
POWDER RESPIRATORY (INHALATION)
Qty: 60 EACH | Refills: 11 | Status: SHIPPED | OUTPATIENT
Start: 2018-03-05 | End: 2018-07-18 | Stop reason: SDUPTHER

## 2018-03-06 ENCOUNTER — TELEPHONE (OUTPATIENT)
Dept: RADIOLOGY | Facility: HOSPITAL | Age: 48
End: 2018-03-06

## 2018-03-07 ENCOUNTER — HOSPITAL ENCOUNTER (OUTPATIENT)
Dept: RADIOLOGY | Facility: HOSPITAL | Age: 48
Discharge: HOME | End: 2018-03-07
Attending: PHYSICAL MEDICINE & REHABILITATION
Payer: COMMERCIAL

## 2018-03-07 DIAGNOSIS — M54.50 LOW BACK PAIN: ICD-10-CM

## 2018-03-07 PROCEDURE — 76770 US EXAM ABDO BACK WALL COMP: CPT | Mod: TC,PO

## 2018-03-07 PROCEDURE — 76770 US EXAM ABDO BACK WALL COMP: CPT | Mod: 26,,, | Performed by: RADIOLOGY

## 2018-03-13 DIAGNOSIS — F41.9 ANXIETY: ICD-10-CM

## 2018-03-14 RX ORDER — ALPRAZOLAM 0.25 MG/1
TABLET ORAL
Qty: 90 TABLET | Refills: 0 | Status: SHIPPED | OUTPATIENT
Start: 2018-03-14 | End: 2018-04-04

## 2018-03-16 DIAGNOSIS — J44.9 COPD, SEVERE: ICD-10-CM

## 2018-03-16 RX ORDER — TIOTROPIUM BROMIDE INHALATION SPRAY 3.12 UG/1
2 SPRAY, METERED RESPIRATORY (INHALATION) DAILY
Qty: 4 G | Refills: 11 | Status: SHIPPED | OUTPATIENT
Start: 2018-03-16 | End: 2018-11-19 | Stop reason: SDUPTHER

## 2018-03-16 RX ORDER — FLUTICASONE FUROATE AND VILANTEROL TRIFENATATE 200; 25 UG/1; UG/1
POWDER RESPIRATORY (INHALATION)
Qty: 60 EACH | Refills: 4 | Status: SHIPPED | OUTPATIENT
Start: 2018-03-16 | End: 2018-07-18 | Stop reason: SDUPTHER

## 2018-03-22 ENCOUNTER — PATIENT OUTREACH (OUTPATIENT)
Dept: ADMINISTRATIVE | Facility: HOSPITAL | Age: 48
End: 2018-03-22

## 2018-03-28 ENCOUNTER — LAB VISIT (OUTPATIENT)
Dept: LAB | Facility: HOSPITAL | Age: 48
End: 2018-03-28
Attending: PHYSICIAN ASSISTANT
Payer: COMMERCIAL

## 2018-03-28 DIAGNOSIS — E78.00 PURE HYPERCHOLESTEROLEMIA: ICD-10-CM

## 2018-03-28 DIAGNOSIS — E55.9 VITAMIN D DEFICIENCY: ICD-10-CM

## 2018-03-28 LAB
25(OH)D3+25(OH)D2 SERPL-MCNC: 24 NG/ML
CHOLEST SERPL-MCNC: 135 MG/DL
CHOLEST/HDLC SERPL: 3.8 {RATIO}
HDLC SERPL-MCNC: 36 MG/DL
HDLC SERPL: 26.7 %
LDLC SERPL CALC-MCNC: 60.8 MG/DL
NONHDLC SERPL-MCNC: 99 MG/DL
TRIGL SERPL-MCNC: 191 MG/DL

## 2018-03-28 PROCEDURE — 36415 COLL VENOUS BLD VENIPUNCTURE: CPT | Mod: PO

## 2018-03-28 PROCEDURE — 82306 VITAMIN D 25 HYDROXY: CPT

## 2018-03-28 PROCEDURE — 80061 LIPID PANEL: CPT

## 2018-04-04 ENCOUNTER — OFFICE VISIT (OUTPATIENT)
Dept: INTERNAL MEDICINE | Facility: CLINIC | Age: 48
End: 2018-04-04
Payer: COMMERCIAL

## 2018-04-04 VITALS
DIASTOLIC BLOOD PRESSURE: 84 MMHG | OXYGEN SATURATION: 99 % | SYSTOLIC BLOOD PRESSURE: 130 MMHG | WEIGHT: 233.69 LBS | TEMPERATURE: 96 F | HEART RATE: 79 BPM | HEIGHT: 70 IN | BODY MASS INDEX: 33.46 KG/M2

## 2018-04-04 DIAGNOSIS — E11.69 HYPERLIPIDEMIA ASSOCIATED WITH TYPE 2 DIABETES MELLITUS: ICD-10-CM

## 2018-04-04 DIAGNOSIS — Z29.9 PREVENTIVE MEASURE: ICD-10-CM

## 2018-04-04 DIAGNOSIS — I15.2 HYPERTENSION ASSOCIATED WITH DIABETES: ICD-10-CM

## 2018-04-04 DIAGNOSIS — E11.22 CONTROLLED TYPE 2 DIABETES MELLITUS WITH STAGE 2 CHRONIC KIDNEY DISEASE, WITHOUT LONG-TERM CURRENT USE OF INSULIN: ICD-10-CM

## 2018-04-04 DIAGNOSIS — E11.59 HYPERTENSION ASSOCIATED WITH DIABETES: ICD-10-CM

## 2018-04-04 DIAGNOSIS — I25.10 CORONARY ARTERY DISEASE WITHOUT ANGINA PECTORIS, UNSPECIFIED VESSEL OR LESION TYPE, UNSPECIFIED WHETHER NATIVE OR TRANSPLANTED HEART: ICD-10-CM

## 2018-04-04 DIAGNOSIS — N18.2 CONTROLLED TYPE 2 DIABETES MELLITUS WITH STAGE 2 CHRONIC KIDNEY DISEASE, WITHOUT LONG-TERM CURRENT USE OF INSULIN: ICD-10-CM

## 2018-04-04 DIAGNOSIS — E55.9 VITAMIN D DEFICIENCY: ICD-10-CM

## 2018-04-04 DIAGNOSIS — E78.5 HYPERLIPIDEMIA ASSOCIATED WITH TYPE 2 DIABETES MELLITUS: ICD-10-CM

## 2018-04-04 DIAGNOSIS — F41.9 ANXIETY: Primary | ICD-10-CM

## 2018-04-04 DIAGNOSIS — J44.9 MODERATE COPD (CHRONIC OBSTRUCTIVE PULMONARY DISEASE): ICD-10-CM

## 2018-04-04 DIAGNOSIS — G47.00 INSOMNIA, UNSPECIFIED TYPE: ICD-10-CM

## 2018-04-04 DIAGNOSIS — J44.89 NOCTURNAL HYPOXEMIA DUE TO OBSTRUCTIVE CHRONIC BRONCHITIS: ICD-10-CM

## 2018-04-04 DIAGNOSIS — G47.36 NOCTURNAL HYPOXEMIA DUE TO OBSTRUCTIVE CHRONIC BRONCHITIS: ICD-10-CM

## 2018-04-04 PROCEDURE — 3075F SYST BP GE 130 - 139MM HG: CPT | Mod: CPTII,S$GLB,, | Performed by: INTERNAL MEDICINE

## 2018-04-04 PROCEDURE — 99999 PR PBB SHADOW E&M-EST. PATIENT-LVL III: CPT | Mod: PBBFAC,,, | Performed by: INTERNAL MEDICINE

## 2018-04-04 PROCEDURE — 3079F DIAST BP 80-89 MM HG: CPT | Mod: CPTII,S$GLB,, | Performed by: INTERNAL MEDICINE

## 2018-04-04 PROCEDURE — 99214 OFFICE O/P EST MOD 30 MIN: CPT | Mod: S$GLB,,, | Performed by: INTERNAL MEDICINE

## 2018-04-04 PROCEDURE — 3044F HG A1C LEVEL LT 7.0%: CPT | Mod: CPTII,S$GLB,, | Performed by: INTERNAL MEDICINE

## 2018-04-04 RX ORDER — ACETAMINOPHEN 500 MG
1 TABLET ORAL DAILY
Qty: 100 CAPSULE | Refills: 6 | Status: SHIPPED | OUTPATIENT
Start: 2018-04-04 | End: 2019-01-08 | Stop reason: SDUPTHER

## 2018-04-04 RX ORDER — CLONAZEPAM 0.25 MG/1
0.25 TABLET, ORALLY DISINTEGRATING ORAL 2 TIMES DAILY
Qty: 60 TABLET | Refills: 3 | Status: SHIPPED | OUTPATIENT
Start: 2018-04-04 | End: 2018-07-18

## 2018-04-04 RX ORDER — TRAZODONE HYDROCHLORIDE 100 MG/1
100 TABLET ORAL NIGHTLY PRN
Qty: 90 TABLET | Refills: 3 | Status: SHIPPED | OUTPATIENT
Start: 2018-04-04 | End: 2018-08-07 | Stop reason: SDUPTHER

## 2018-04-04 NOTE — PROGRESS NOTES
"Subjective:      Patient ID: Kodak Valentine is a 47 y.o. male.    Chief Complaint: Follow-up      HPI  Here for f/u medical problems and preventive exam.  Not able to exercise.  Breathing ok with inhalers, night O2.  Anxiety needing xanax twice a day.  Surgery was not approved for lumbar disc, on cymbalta.  Taking percocet qid.  No f/c/sw/cough.    Not on prednisone daily, last steroid more than 6mo ago.  No cp/palp.  Much better on increased ranexa.  BMs normal.  Trazodone works well for sleep, but thinks needs higher dose.    Updated/ annual overdue:  HM: 11/16 fluvax, 7/15 [id number], 1/12 [id number], 7/10 TDaP, 4/17 psa, 6/10 HCV neg, 4/17 Eye doc.     Review of Systems   Constitutional: Negative for chills, diaphoresis, fatigue and fever.   Respiratory: Negative for cough, chest tightness and shortness of breath.    Cardiovascular: Negative for chest pain, palpitations and leg swelling.   Gastrointestinal: Negative for blood in stool, constipation, diarrhea, nausea and vomiting.   Genitourinary: Negative for difficulty urinating and frequency.   Musculoskeletal: Negative for arthralgias.         Objective:   /84 (BP Location: Right arm, Patient Position: Sitting, BP Method: Medium (Manual))   Pulse 79   Temp 96.2 °F (35.7 °C) (Tympanic)   Ht 5' 10" (1.778 m)   Wt 106 kg (233 lb 11 oz)   SpO2 99%   BMI 33.53 kg/m²     Physical Exam   Constitutional: He is oriented to person, place, and time. He appears well-developed and well-nourished.   HENT:   Mouth/Throat: Oropharynx is clear and moist.   Neck: Normal range of motion. Neck supple.   Cardiovascular: Normal rate, regular rhythm and intact distal pulses.  Exam reveals no gallop and no friction rub.    No murmur heard.  Pulmonary/Chest: Effort normal and breath sounds normal. He has no wheezes. He has no rales.   Abdominal: Soft. Bowel sounds are normal. He exhibits no mass. There is no tenderness.   Musculoskeletal: He exhibits no edema. "   Lymphadenopathy:     He has no cervical adenopathy.   Neurological: He is alert and oriented to person, place, and time.   Psychiatric: He has a normal mood and affect.         Results for orders placed or performed in visit on 03/28/18   Vitamin D   Result Value Ref Range    Vit D, 25-Hydroxy 24 (L) 30 - 96 ng/mL   Lipid panel   Result Value Ref Range    Cholesterol 135 120 - 199 mg/dL    Triglycerides 191 (H) 30 - 150 mg/dL    HDL 36 (L) 40 - 75 mg/dL    LDL Cholesterol 60.8 (L) 63.0 - 159.0 mg/dL    HDL/Chol Ratio 26.7 20.0 - 50.0 %    Total Cholesterol/HDL Ratio 3.8 2.0 - 5.0    Non-HDL Cholesterol 99 mg/dL       Assessment:       1. Encounter for preventive health examination    2. Anxiety    3. Coronary artery disease without angina pectoris, unspecified vessel or lesion type, unspecified whether native or transplanted heart    4. Controlled type 2 diabetes mellitus with stage 2 chronic kidney disease, without long-term current use of insulin    5. Hypertension associated with diabetes    6. Vitamin D deficiency    7. Moderate COPD (chronic obstructive pulmonary disease)    8. Hyperlipidemia associated with type 2 diabetes mellitus    9. Nocturnal hypoxemia due to obstructive chronic bronchitis    10. Insomnia, unspecified type    11. Preventive measure          Plan:       Anxiety- cont cymbalta, change xanax to clonaz.  -     clonazePAM (KLONOPIN) 0.25 MG TbDL; Take 1 tablet (0.25 mg total) by mouth 2 (two) times daily.  Dispense: 60 tablet; Refill: 3    Coronary artery disease without angina pectoris, unspecified vessel or lesion type, unspecified whether native or transplanted heart- clin stable.    Controlled type 2 diabetes mellitus with stage 2 chronic kidney disease, without long-term current use of insulin- check lab.  -     Vitamin D; Future  -     Hemoglobin A1c; Future; Expected date: 04/04/2018  -     Microalbumin/creatinine urine ratio; Future; Expected date: 04/04/2018    Hypertension  associated with diabetes- doing well.    Vitamin D deficiency  -     cholecalciferol, vitamin D3, 2,000 unit Cap; Take 1 capsule (2,000 Units total) by mouth once daily.  Dispense: 100 capsule; Refill: 6  -     Vitamin D; Future    Moderate COPD (chronic obstructive pulmonary disease)    Hyperlipidemia associated with type 2 diabetes mellitus- on statin.    Nocturnal hypoxemia due to obstructive chronic bronchitis    Insomnia, unspecified type  -     traZODone (DESYREL) 100 MG tablet; Take 1 tablet (100 mg total) by mouth nightly as needed for Insomnia.  Dispense: 90 tablet; Refill: 3    Preventive measure- will do 3mo.  -     Vitamin D; Future  -     Hemoglobin A1c; Future; Expected date: 04/04/2018  -     Microalbumin/creatinine urine ratio; Future; Expected date: 04/04/2018  -     TSH; Future; Expected date: 04/04/2018  -     PSA, Screening; Future; Expected date: 04/04/2018  -     Comprehensive metabolic panel; Future; Expected date: 04/04/2018  -     CBC auto differential; Future; Expected date: 04/04/2018

## 2018-04-13 RX ORDER — METFORMIN HYDROCHLORIDE 500 MG/1
TABLET ORAL
Qty: 60 TABLET | OUTPATIENT
Start: 2018-04-13

## 2018-04-17 RX ORDER — RANOLAZINE 1000 MG/1
TABLET, FILM COATED, EXTENDED RELEASE ORAL
Qty: 60 TABLET | Refills: 3 | Status: SHIPPED | OUTPATIENT
Start: 2018-04-17 | End: 2018-07-13 | Stop reason: SDUPTHER

## 2018-04-18 DIAGNOSIS — J44.9 COPD, SEVERE: ICD-10-CM

## 2018-04-19 RX ORDER — METFORMIN HYDROCHLORIDE 500 MG/1
TABLET ORAL
Qty: 60 TABLET | OUTPATIENT
Start: 2018-04-19

## 2018-04-19 RX ORDER — FLUTICASONE FUROATE AND VILANTEROL 200; 25 UG/1; UG/1
POWDER RESPIRATORY (INHALATION)
Qty: 60 EACH | Refills: 4 | Status: SHIPPED | OUTPATIENT
Start: 2018-04-19 | End: 2018-09-26

## 2018-04-26 DIAGNOSIS — J44.9 COPD, SEVERITY TO BE DETERMINED: ICD-10-CM

## 2018-04-27 DIAGNOSIS — J44.9 COPD, SEVERITY TO BE DETERMINED: ICD-10-CM

## 2018-04-27 RX ORDER — ALBUTEROL SULFATE 90 UG/1
AEROSOL, METERED RESPIRATORY (INHALATION)
Qty: 8.5 G | Refills: 11 | Status: SHIPPED | OUTPATIENT
Start: 2018-04-27 | End: 2018-11-19

## 2018-04-27 RX ORDER — ALBUTEROL SULFATE 90 UG/1
AEROSOL, METERED RESPIRATORY (INHALATION)
Qty: 8.5 G | Refills: 0 | Status: SHIPPED | OUTPATIENT
Start: 2018-04-27 | End: 2018-07-16 | Stop reason: SDUPTHER

## 2018-05-10 ENCOUNTER — PATIENT MESSAGE (OUTPATIENT)
Dept: INTERNAL MEDICINE | Facility: CLINIC | Age: 48
End: 2018-05-10

## 2018-05-11 RX ORDER — ALPRAZOLAM 0.25 MG/1
0.25 TABLET ORAL 3 TIMES DAILY PRN
Qty: 90 TABLET | Refills: 1 | Status: SHIPPED | OUTPATIENT
Start: 2018-05-11 | End: 2018-07-13 | Stop reason: SDUPTHER

## 2018-05-14 RX ORDER — METFORMIN HYDROCHLORIDE 500 MG/1
TABLET ORAL
Qty: 60 TABLET | OUTPATIENT
Start: 2018-05-14

## 2018-05-14 RX ORDER — METFORMIN HYDROCHLORIDE 500 MG/1
TABLET ORAL
Qty: 60 TABLET | Refills: 11 | Status: SHIPPED | OUTPATIENT
Start: 2018-05-14 | End: 2019-05-20 | Stop reason: SDUPTHER

## 2018-05-16 DIAGNOSIS — J44.9 CHRONIC OBSTRUCTIVE PULMONARY DISEASE, UNSPECIFIED COPD TYPE: ICD-10-CM

## 2018-05-16 RX ORDER — AZITHROMYCIN 500 MG/1
TABLET, FILM COATED ORAL
Qty: 10 TABLET | Refills: 0 | Status: SHIPPED | OUTPATIENT
Start: 2018-05-16 | End: 2018-07-18

## 2018-06-13 DIAGNOSIS — Z72.0 TOBACCO ABUSE: ICD-10-CM

## 2018-06-13 RX ORDER — VARENICLINE TARTRATE 1 MG/1
TABLET, FILM COATED ORAL
Qty: 56 TABLET | OUTPATIENT
Start: 2018-06-13

## 2018-06-13 RX ORDER — ALPRAZOLAM 0.25 MG/1
TABLET ORAL
Qty: 90 TABLET | OUTPATIENT
Start: 2018-06-13

## 2018-06-18 ENCOUNTER — TELEPHONE (OUTPATIENT)
Dept: OPHTHALMOLOGY | Facility: CLINIC | Age: 48
End: 2018-06-18

## 2018-06-18 NOTE — TELEPHONE ENCOUNTER
I spoke to patient to book his annual eye exam  He defers and appointment and states he receives care elsewhere for his eye exams

## 2018-06-19 ENCOUNTER — PATIENT OUTREACH (OUTPATIENT)
Dept: ADMINISTRATIVE | Facility: HOSPITAL | Age: 48
End: 2018-06-19

## 2018-07-11 ENCOUNTER — LAB VISIT (OUTPATIENT)
Dept: LAB | Facility: HOSPITAL | Age: 48
End: 2018-07-11
Attending: INTERNAL MEDICINE
Payer: COMMERCIAL

## 2018-07-11 DIAGNOSIS — Z29.9 PREVENTIVE MEASURE: ICD-10-CM

## 2018-07-11 DIAGNOSIS — E78.5 HYPERLIPIDEMIA ASSOCIATED WITH TYPE 2 DIABETES MELLITUS: ICD-10-CM

## 2018-07-11 DIAGNOSIS — N18.2 CONTROLLED TYPE 2 DIABETES MELLITUS WITH STAGE 2 CHRONIC KIDNEY DISEASE, WITHOUT LONG-TERM CURRENT USE OF INSULIN: ICD-10-CM

## 2018-07-11 DIAGNOSIS — E11.22 CONTROLLED TYPE 2 DIABETES MELLITUS WITH STAGE 2 CHRONIC KIDNEY DISEASE, WITHOUT LONG-TERM CURRENT USE OF INSULIN: ICD-10-CM

## 2018-07-11 DIAGNOSIS — E55.9 VITAMIN D DEFICIENCY: ICD-10-CM

## 2018-07-11 DIAGNOSIS — E11.69 HYPERLIPIDEMIA ASSOCIATED WITH TYPE 2 DIABETES MELLITUS: ICD-10-CM

## 2018-07-11 LAB
25(OH)D3+25(OH)D2 SERPL-MCNC: 34 NG/ML
ALBUMIN SERPL BCP-MCNC: 3.8 G/DL
ALP SERPL-CCNC: 56 U/L
ALT SERPL W/O P-5'-P-CCNC: 20 U/L
ANION GAP SERPL CALC-SCNC: 11 MMOL/L
AST SERPL-CCNC: 24 U/L
BASOPHILS # BLD AUTO: 0.04 K/UL
BASOPHILS NFR BLD: 0.7 %
BILIRUB SERPL-MCNC: 0.5 MG/DL
BUN SERPL-MCNC: 7 MG/DL
CALCIUM SERPL-MCNC: 9.6 MG/DL
CHLORIDE SERPL-SCNC: 101 MMOL/L
CO2 SERPL-SCNC: 24 MMOL/L
COMPLEXED PSA SERPL-MCNC: 0.33 NG/ML
CREAT SERPL-MCNC: 1 MG/DL
DIFFERENTIAL METHOD: ABNORMAL
EOSINOPHIL # BLD AUTO: 0.1 K/UL
EOSINOPHIL NFR BLD: 2.5 %
ERYTHROCYTE [DISTWIDTH] IN BLOOD BY AUTOMATED COUNT: 12.4 %
EST. GFR  (AFRICAN AMERICAN): >60 ML/MIN/1.73 M^2
EST. GFR  (NON AFRICAN AMERICAN): >60 ML/MIN/1.73 M^2
ESTIMATED AVG GLUCOSE: 123 MG/DL
GLUCOSE SERPL-MCNC: 158 MG/DL
HBA1C MFR BLD HPLC: 5.9 %
HCT VFR BLD AUTO: 39.9 %
HGB BLD-MCNC: 13.8 G/DL
IMM GRANULOCYTES # BLD AUTO: 0.06 K/UL
IMM GRANULOCYTES NFR BLD AUTO: 1.1 %
LYMPHOCYTES # BLD AUTO: 1.6 K/UL
LYMPHOCYTES NFR BLD: 28 %
MCH RBC QN AUTO: 37.1 PG
MCHC RBC AUTO-ENTMCNC: 34.6 G/DL
MCV RBC AUTO: 107 FL
MONOCYTES # BLD AUTO: 0.5 K/UL
MONOCYTES NFR BLD: 9.1 %
NEUTROPHILS # BLD AUTO: 3.4 K/UL
NEUTROPHILS NFR BLD: 58.6 %
NRBC BLD-RTO: 0 /100 WBC
PLATELET # BLD AUTO: 177 K/UL
PMV BLD AUTO: 9.6 FL
POTASSIUM SERPL-SCNC: 3.7 MMOL/L
PROT SERPL-MCNC: 6.8 G/DL
RBC # BLD AUTO: 3.72 M/UL
SODIUM SERPL-SCNC: 136 MMOL/L
TSH SERPL DL<=0.005 MIU/L-ACNC: 1.38 UIU/ML
WBC # BLD AUTO: 5.71 K/UL

## 2018-07-11 PROCEDURE — 80053 COMPREHEN METABOLIC PANEL: CPT

## 2018-07-11 PROCEDURE — 83036 HEMOGLOBIN GLYCOSYLATED A1C: CPT

## 2018-07-11 PROCEDURE — 85025 COMPLETE CBC W/AUTO DIFF WBC: CPT

## 2018-07-11 PROCEDURE — 82306 VITAMIN D 25 HYDROXY: CPT

## 2018-07-11 PROCEDURE — 84153 ASSAY OF PSA TOTAL: CPT

## 2018-07-11 PROCEDURE — 36415 COLL VENOUS BLD VENIPUNCTURE: CPT | Mod: PO

## 2018-07-11 PROCEDURE — 84443 ASSAY THYROID STIM HORMONE: CPT

## 2018-07-13 DIAGNOSIS — J44.9 COPD, SEVERITY TO BE DETERMINED: ICD-10-CM

## 2018-07-13 DIAGNOSIS — E78.2 MIXED HYPERLIPIDEMIA: ICD-10-CM

## 2018-07-13 RX ORDER — FENOFIBRATE 160 MG/1
TABLET ORAL
Qty: 30 TABLET | Refills: 6 | Status: SHIPPED | OUTPATIENT
Start: 2018-07-13 | End: 2019-01-17 | Stop reason: SDUPTHER

## 2018-07-16 RX ORDER — ALBUTEROL SULFATE 90 UG/1
2 AEROSOL, METERED RESPIRATORY (INHALATION) EVERY 4 HOURS PRN
Qty: 1 INHALER | Refills: 0 | Status: SHIPPED | OUTPATIENT
Start: 2018-07-16 | End: 2018-07-18 | Stop reason: SDUPTHER

## 2018-07-16 RX ORDER — ALPRAZOLAM 0.25 MG/1
TABLET ORAL
Qty: 90 TABLET | Refills: 0 | Status: SHIPPED | OUTPATIENT
Start: 2018-07-16 | End: 2018-08-11 | Stop reason: SDUPTHER

## 2018-07-18 ENCOUNTER — OFFICE VISIT (OUTPATIENT)
Dept: INTERNAL MEDICINE | Facility: CLINIC | Age: 48
End: 2018-07-18
Payer: COMMERCIAL

## 2018-07-18 ENCOUNTER — DOCUMENTATION ONLY (OUTPATIENT)
Dept: INTERNAL MEDICINE | Facility: CLINIC | Age: 48
End: 2018-07-18

## 2018-07-18 VITALS
WEIGHT: 235 LBS | HEART RATE: 86 BPM | SYSTOLIC BLOOD PRESSURE: 140 MMHG | TEMPERATURE: 98 F | DIASTOLIC BLOOD PRESSURE: 78 MMHG | OXYGEN SATURATION: 96 % | HEIGHT: 70 IN | BODY MASS INDEX: 33.64 KG/M2

## 2018-07-18 DIAGNOSIS — Z12.11 SCREEN FOR COLON CANCER: ICD-10-CM

## 2018-07-18 DIAGNOSIS — F41.9 ANXIETY: ICD-10-CM

## 2018-07-18 DIAGNOSIS — K21.9 GASTROESOPHAGEAL REFLUX DISEASE WITHOUT ESOPHAGITIS: ICD-10-CM

## 2018-07-18 DIAGNOSIS — E78.5 HYPERLIPIDEMIA ASSOCIATED WITH TYPE 2 DIABETES MELLITUS: ICD-10-CM

## 2018-07-18 DIAGNOSIS — R13.10 DYSPHAGIA, UNSPECIFIED TYPE: ICD-10-CM

## 2018-07-18 DIAGNOSIS — E11.22 CONTROLLED TYPE 2 DIABETES MELLITUS WITH STAGE 2 CHRONIC KIDNEY DISEASE, WITHOUT LONG-TERM CURRENT USE OF INSULIN: ICD-10-CM

## 2018-07-18 DIAGNOSIS — I25.10 CORONARY ARTERY DISEASE WITHOUT ANGINA PECTORIS, UNSPECIFIED VESSEL OR LESION TYPE, UNSPECIFIED WHETHER NATIVE OR TRANSPLANTED HEART: ICD-10-CM

## 2018-07-18 DIAGNOSIS — J44.9 MODERATE COPD (CHRONIC OBSTRUCTIVE PULMONARY DISEASE): ICD-10-CM

## 2018-07-18 DIAGNOSIS — I15.2 HYPERTENSION ASSOCIATED WITH DIABETES: ICD-10-CM

## 2018-07-18 DIAGNOSIS — E11.69 HYPERLIPIDEMIA ASSOCIATED WITH TYPE 2 DIABETES MELLITUS: ICD-10-CM

## 2018-07-18 DIAGNOSIS — Z00.00 ENCOUNTER FOR PREVENTIVE HEALTH EXAMINATION: Primary | ICD-10-CM

## 2018-07-18 DIAGNOSIS — E11.59 HYPERTENSION ASSOCIATED WITH DIABETES: ICD-10-CM

## 2018-07-18 DIAGNOSIS — N18.2 CONTROLLED TYPE 2 DIABETES MELLITUS WITH STAGE 2 CHRONIC KIDNEY DISEASE, WITHOUT LONG-TERM CURRENT USE OF INSULIN: ICD-10-CM

## 2018-07-18 DIAGNOSIS — N52.9 ERECTILE DYSFUNCTION, UNSPECIFIED ERECTILE DYSFUNCTION TYPE: ICD-10-CM

## 2018-07-18 DIAGNOSIS — G47.33 OSA TREATED WITH BIPAP: Chronic | ICD-10-CM

## 2018-07-18 PROCEDURE — 3044F HG A1C LEVEL LT 7.0%: CPT | Mod: CPTII,S$GLB,, | Performed by: INTERNAL MEDICINE

## 2018-07-18 PROCEDURE — 3077F SYST BP >= 140 MM HG: CPT | Mod: CPTII,S$GLB,, | Performed by: INTERNAL MEDICINE

## 2018-07-18 PROCEDURE — 3078F DIAST BP <80 MM HG: CPT | Mod: CPTII,S$GLB,, | Performed by: INTERNAL MEDICINE

## 2018-07-18 PROCEDURE — 99396 PREV VISIT EST AGE 40-64: CPT | Mod: S$GLB,,, | Performed by: INTERNAL MEDICINE

## 2018-07-18 PROCEDURE — 99999 PR PBB SHADOW E&M-EST. PATIENT-LVL V: CPT | Mod: PBBFAC,,, | Performed by: INTERNAL MEDICINE

## 2018-07-18 RX ORDER — SILDENAFIL 50 MG/1
50 TABLET, FILM COATED ORAL DAILY PRN
Qty: 5 TABLET | Refills: 3 | Status: SHIPPED | OUTPATIENT
Start: 2018-07-18 | End: 2018-08-07 | Stop reason: SDUPTHER

## 2018-07-18 RX ORDER — SODIUM, POTASSIUM,MAG SULFATES 17.5-3.13G
1 SOLUTION, RECONSTITUTED, ORAL ORAL
Qty: 354 ML | Refills: 0 | Status: SHIPPED | OUTPATIENT
Start: 2018-07-18 | End: 2018-10-17 | Stop reason: ALTCHOICE

## 2018-07-18 NOTE — PROGRESS NOTES
"Subjective:      Patient ID: Kodak Valentine is a 48 y.o. male.    Chief Complaint: Annual Exam      HPI  Here for f/u medical problems and preventive exam.  3 days having trouble swallowing solids and liquids- feels burning.  Taking protonix 40mg daily for years.  No other changes in meds.  Prior to 3d ago was feeling well in general.  On percocet for chronic LBP.  No f/c/sw/cough.  No HA or postnasal drip.  No cp/sob/palp.  On nocturnal O2.  BMs normal.  1x nocturia.  Anxiety doing well, xanax 0-3x daily.  Better than before.  Doesn't check sugars.  Hasn't check BPs.  Off nitrates now.  Wants to try viagra for prn use.    HM: 11/16 fluvax, 7/15 [id number], 1/12 [id number], 7/10 TDaP, no cscope yet, 7/18 PSA, 6/10 HCV neg, 5/18 Eye Dr. At [facility].     Review of Systems   Constitutional: Negative for appetite change, chills, diaphoresis, fatigue and fever.   HENT: Negative for congestion, ear pain, rhinorrhea and sinus pressure.    Respiratory: Negative for cough and shortness of breath.    Cardiovascular: Negative for chest pain and palpitations.   Gastrointestinal: Negative for abdominal distention, abdominal pain, blood in stool, constipation, diarrhea, nausea and vomiting.   Genitourinary: Negative for difficulty urinating, dysuria, frequency, hematuria and urgency.   Musculoskeletal: Negative for arthralgias.   Skin: Negative for rash.   Neurological: Negative for dizziness and headaches.   Psychiatric/Behavioral: The patient is not nervous/anxious.          Objective:   BP (!) 140/78 (BP Location: Right arm, Patient Position: Sitting)   Pulse 86   Temp 97.5 °F (36.4 °C) (Tympanic)   Ht 5' 10" (1.778 m)   Wt 106.6 kg (235 lb 0.2 oz)   SpO2 96%   BMI 33.72 kg/m²     Physical Exam   Constitutional: He is oriented to person, place, and time. He appears well-developed and well-nourished.   HENT:   Head: Normocephalic and atraumatic.   Right Ear: External ear normal. Tympanic membrane is not injected.   Left Ear: " External ear normal. Tympanic membrane is not injected.   Mouth/Throat: Oropharynx is clear and moist.   Eyes: Conjunctivae are normal. Pupils are equal, round, and reactive to light.   Neck: Neck supple. No thyromegaly present.   Cardiovascular: Normal rate, regular rhythm and intact distal pulses.  Exam reveals no gallop and no friction rub.    No murmur heard.  Pulses:       Dorsalis pedis pulses are 2+ on the right side, and 2+ on the left side.        Posterior tibial pulses are 2+ on the right side, and 2+ on the left side.   Pulmonary/Chest: Effort normal and breath sounds normal. He has no wheezes. He has no rales.   Abdominal: Soft. Bowel sounds are normal. He exhibits no mass. There is no tenderness.   Musculoskeletal: He exhibits no edema.   Feet:   Right Foot:   Protective Sensation: 10 sites tested. 10 sites sensed.   Skin Integrity: Negative for ulcer, blister, skin breakdown, erythema, warmth, callus or dry skin.   Left Foot:   Protective Sensation: 10 sites tested. 10 sites sensed.   Skin Integrity: Negative for ulcer, blister, skin breakdown, erythema, warmth, callus or dry skin.   Lymphadenopathy:     He has no cervical adenopathy.   Neurological: He is alert and oriented to person, place, and time.   Skin: Skin is warm. No rash noted.   Psychiatric: He has a normal mood and affect.         Results for KIMBERLI BHATTI (MRN [id number]) as of 7/18/2018 06:53   Ref. Range 7/11/2018 07:37 7/11/2018 07:45   WBC Latest Ref Range: 3.90 - 12.70 K/uL  5.71   RBC Latest Ref Range: 4.60 - 6.20 M/uL  3.72 (L)   Hemoglobin Latest Ref Range: 14.0 - 18.0 g/dL  13.8 (L)   Hematocrit Latest Ref Range: 40.0 - 54.0 %  39.9 (L)   MCV Latest Ref Range: 82 - 98 fL  107 (H)   MCH Latest Ref Range: 27.0 - 31.0 pg  37.1 (H)   MCHC Latest Ref Range: 32.0 - 36.0 g/dL  34.6   RDW Latest Ref Range: 11.5 - 14.5 %  12.4   Platelets Latest Ref Range: 150 - 350 K/uL  177   MPV Latest Ref Range: 9.2 - 12.9 fL  9.6   Gran% Latest  Ref Range: 38.0 - 73.0 %  58.6   Gran # (ANC) Latest Ref Range: 1.8 - 7.7 K/uL  3.4   Immature Granulocytes Latest Ref Range: 0.0 - 0.5 %  1.1 (H)   Immature Grans (Abs) Latest Ref Range: 0.00 - 0.04 K/uL  0.06 (H)   Lymph% Latest Ref Range: 18.0 - 48.0 %  28.0   Lymph # Latest Ref Range: 1.0 - 4.8 K/uL  1.6   Mono% Latest Ref Range: 4.0 - 15.0 %  9.1   Mono # Latest Ref Range: 0.3 - 1.0 K/uL  0.5   Eosinophil% Latest Ref Range: 0.0 - 8.0 %  2.5   Eos # Latest Ref Range: 0.0 - 0.5 K/uL  0.1   Basophil% Latest Ref Range: 0.0 - 1.9 %  0.7   Baso # Latest Ref Range: 0.00 - 0.20 K/uL  0.04   nRBC Latest Ref Range: 0 /100 WBC  0   Sodium Latest Ref Range: 136 - 145 mmol/L  136   Potassium Latest Ref Range: 3.5 - 5.1 mmol/L  3.7   Chloride Latest Ref Range: 95 - 110 mmol/L  101   CO2 Latest Ref Range: 23 - 29 mmol/L  24   Anion Gap Latest Ref Range: 8 - 16 mmol/L  11   BUN, Bld Latest Ref Range: 6 - 20 mg/dL  7   Creatinine Latest Ref Range: 0.5 - 1.4 mg/dL  1.0   eGFR if non African American Latest Ref Range: >60 mL/min/1.73 m^2  >60.0   eGFR if African American Latest Ref Range: >60 mL/min/1.73 m^2  >60.0   Glucose Latest Ref Range: 70 - 110 mg/dL  158 (H)   Calcium Latest Ref Range: 8.7 - 10.5 mg/dL  9.6   Alkaline Phosphatase Latest Ref Range: 55 - 135 U/L  56   Total Protein Latest Ref Range: 6.0 - 8.4 g/dL  6.8   Albumin Latest Ref Range: 3.5 - 5.2 g/dL  3.8   Total Bilirubin Latest Ref Range: 0.1 - 1.0 mg/dL  0.5   AST Latest Ref Range: 10 - 40 U/L  24   ALT Latest Ref Range: 10 - 44 U/L  20   Vit D, 25-Hydroxy Latest Ref Range: 30 - 96 ng/mL  34   Hemoglobin A1C Latest Ref Range: 4.0 - 5.6 %  5.9 (H)   Estimated Avg Glucose Latest Ref Range: 68 - 131 mg/dL  123   TSH Latest Ref Range: 0.400 - 4.000 uIU/mL  1.384   PSA, SCREEN Latest Ref Range: 0.00 - 4.00 ng/mL  0.33   Microalbum.,U,Random Latest Units: ug/mL 64.0    Microalb Creat Ratio Latest Ref Range: 0.0 - 30.0 ug/mg 29.5      Assessment:       1. Encounter  for preventive health examination    2. Moderate COPD (chronic obstructive pulmonary disease)    3. JUDI treated with BiPAP    4. Controlled type 2 diabetes mellitus with stage 2 chronic kidney disease, without long-term current use of insulin    5. Hyperlipidemia associated with type 2 diabetes mellitus    6. Hypertension associated with diabetes    7. Gastroesophageal reflux disease without esophagitis    8. Coronary artery disease without angina pectoris, unspecified vessel or lesion type, unspecified whether native or transplanted heart    9. Anxiety    10. Dysphagia, unspecified type    11. Screen for colon cancer          Plan:     Encounter for preventive health examination- utd.  Viagra trial.    Moderate COPD (chronic obstructive pulmonary disease)    JUDI treated with BiPAP and nocturnal O2.    Controlled type 2 diabetes mellitus with stage 2 chronic kidney disease, without long-term current use of insulin- doing well.    Hyperlipidemia associated with type 2 diabetes mellitus- 10yr vasc risk 7.3%, cont statin and ASA.    Hypertension associated with diabetes- monitor BPs for next visit.    Gastroesophageal reflux disease without esophagitis- with symptoms despite PPI- EGD.    Coronary artery disease without angina pectoris, unspecified vessel or lesion type, unspecified whether native or transplanted heart- clin stable.    Anxiety- cont present treatment.    Dysphagia, unspecified type  -     Case request GI: ESOPHAGOGASTRODUODENOSCOPY (EGD)    Screen for colon cancer  -     Case request GI: COLONOSCOPY  -     sodium,potassium,mag sulfates (SUPREP BOWEL PREP KIT) 17.5-3.13-1.6 gram SolR; Take 354 mLs by mouth daily 2 hours after breakfast.  Dispense: 1 Bottle; Refill: 0

## 2018-07-21 RX ORDER — RANOLAZINE 1000 MG/1
TABLET, FILM COATED, EXTENDED RELEASE ORAL
Qty: 60 TABLET | Refills: 0 | Status: SHIPPED | OUTPATIENT
Start: 2018-07-21 | End: 2018-11-26 | Stop reason: SDUPTHER

## 2018-08-06 ENCOUNTER — PATIENT MESSAGE (OUTPATIENT)
Dept: INTERNAL MEDICINE | Facility: CLINIC | Age: 48
End: 2018-08-06

## 2018-08-06 DIAGNOSIS — N52.9 ERECTILE DYSFUNCTION, UNSPECIFIED ERECTILE DYSFUNCTION TYPE: ICD-10-CM

## 2018-08-06 DIAGNOSIS — G47.00 INSOMNIA, UNSPECIFIED TYPE: ICD-10-CM

## 2018-08-07 RX ORDER — SILDENAFIL 50 MG/1
50 TABLET, FILM COATED ORAL DAILY PRN
Qty: 6 TABLET | Refills: 11 | Status: SHIPPED | OUTPATIENT
Start: 2018-08-07 | End: 2018-11-19 | Stop reason: DRUGHIGH

## 2018-08-07 RX ORDER — TRAZODONE HYDROCHLORIDE 100 MG/1
100-200 TABLET ORAL NIGHTLY PRN
Qty: 60 TABLET | Refills: 11 | Status: SHIPPED | OUTPATIENT
Start: 2018-08-07 | End: 2019-09-16 | Stop reason: SDUPTHER

## 2018-08-08 ENCOUNTER — DOCUMENTATION ONLY (OUTPATIENT)
Dept: ENDOSCOPY | Facility: HOSPITAL | Age: 48
End: 2018-08-08

## 2018-08-14 RX ORDER — ALPRAZOLAM 0.25 MG/1
TABLET ORAL
Qty: 90 TABLET | Refills: 2 | Status: SHIPPED | OUTPATIENT
Start: 2018-08-14 | End: 2018-11-16 | Stop reason: SDUPTHER

## 2018-09-11 RX ORDER — ATORVASTATIN CALCIUM 20 MG/1
TABLET, FILM COATED ORAL
Qty: 30 TABLET | Refills: 11 | Status: SHIPPED | OUTPATIENT
Start: 2018-09-11 | End: 2019-08-12 | Stop reason: SDUPTHER

## 2018-09-14 RX ORDER — RANOLAZINE 1000 MG/1
TABLET, FILM COATED, EXTENDED RELEASE ORAL
Qty: 60 TABLET | Refills: 0 | OUTPATIENT
Start: 2018-09-14

## 2018-09-20 ENCOUNTER — TELEPHONE (OUTPATIENT)
Dept: INTERNAL MEDICINE | Facility: CLINIC | Age: 48
End: 2018-09-20

## 2018-09-21 DIAGNOSIS — J44.9 COPD, SEVERITY TO BE DETERMINED: ICD-10-CM

## 2018-09-24 RX ORDER — ALBUTEROL SULFATE 90 UG/1
AEROSOL, METERED RESPIRATORY (INHALATION)
Qty: 8.5 G | Refills: 0 | Status: SHIPPED | OUTPATIENT
Start: 2018-09-24 | End: 2019-02-08 | Stop reason: SDUPTHER

## 2018-09-26 ENCOUNTER — OFFICE VISIT (OUTPATIENT)
Dept: INTERNAL MEDICINE | Facility: CLINIC | Age: 48
End: 2018-09-26
Payer: COMMERCIAL

## 2018-09-26 ENCOUNTER — CLINICAL SUPPORT (OUTPATIENT)
Dept: INTERNAL MEDICINE | Facility: CLINIC | Age: 48
End: 2018-09-26
Payer: COMMERCIAL

## 2018-09-26 VITALS
BODY MASS INDEX: 32.19 KG/M2 | DIASTOLIC BLOOD PRESSURE: 76 MMHG | SYSTOLIC BLOOD PRESSURE: 128 MMHG | HEART RATE: 58 BPM | WEIGHT: 224.88 LBS | OXYGEN SATURATION: 96 % | HEIGHT: 70 IN | TEMPERATURE: 98 F

## 2018-09-26 VITALS — SYSTOLIC BLOOD PRESSURE: 136 MMHG | DIASTOLIC BLOOD PRESSURE: 78 MMHG

## 2018-09-26 DIAGNOSIS — J44.9 MODERATE COPD (CHRONIC OBSTRUCTIVE PULMONARY DISEASE): ICD-10-CM

## 2018-09-26 DIAGNOSIS — E78.5 HYPERLIPIDEMIA ASSOCIATED WITH TYPE 2 DIABETES MELLITUS: ICD-10-CM

## 2018-09-26 DIAGNOSIS — E11.22 CONTROLLED TYPE 2 DIABETES MELLITUS WITH STAGE 2 CHRONIC KIDNEY DISEASE, WITHOUT LONG-TERM CURRENT USE OF INSULIN: ICD-10-CM

## 2018-09-26 DIAGNOSIS — E11.69 HYPERLIPIDEMIA ASSOCIATED WITH TYPE 2 DIABETES MELLITUS: ICD-10-CM

## 2018-09-26 DIAGNOSIS — G47.33 OSA TREATED WITH BIPAP: Chronic | ICD-10-CM

## 2018-09-26 DIAGNOSIS — G47.36 NOCTURNAL HYPOXEMIA DUE TO OBSTRUCTIVE CHRONIC BRONCHITIS: Chronic | ICD-10-CM

## 2018-09-26 DIAGNOSIS — I25.10 CORONARY ARTERY DISEASE WITHOUT ANGINA PECTORIS, UNSPECIFIED VESSEL OR LESION TYPE, UNSPECIFIED WHETHER NATIVE OR TRANSPLANTED HEART: ICD-10-CM

## 2018-09-26 DIAGNOSIS — J44.9 COPD, SEVERE: ICD-10-CM

## 2018-09-26 DIAGNOSIS — J44.89 NOCTURNAL HYPOXEMIA DUE TO OBSTRUCTIVE CHRONIC BRONCHITIS: Chronic | ICD-10-CM

## 2018-09-26 DIAGNOSIS — N63.0 BREAST MASS IN MALE: Primary | ICD-10-CM

## 2018-09-26 DIAGNOSIS — E55.9 VITAMIN D DEFICIENCY: ICD-10-CM

## 2018-09-26 DIAGNOSIS — E11.59 HYPERTENSION ASSOCIATED WITH DIABETES: ICD-10-CM

## 2018-09-26 DIAGNOSIS — F41.9 ANXIETY: ICD-10-CM

## 2018-09-26 DIAGNOSIS — I15.2 HYPERTENSION ASSOCIATED WITH DIABETES: ICD-10-CM

## 2018-09-26 DIAGNOSIS — N18.2 CONTROLLED TYPE 2 DIABETES MELLITUS WITH STAGE 2 CHRONIC KIDNEY DISEASE, WITHOUT LONG-TERM CURRENT USE OF INSULIN: ICD-10-CM

## 2018-09-26 PROCEDURE — 90686 IIV4 VACC NO PRSV 0.5 ML IM: CPT | Mod: S$GLB,,, | Performed by: PHYSICIAN ASSISTANT

## 2018-09-26 PROCEDURE — 3078F DIAST BP <80 MM HG: CPT | Mod: CPTII,S$GLB,, | Performed by: PHYSICIAN ASSISTANT

## 2018-09-26 PROCEDURE — 99214 OFFICE O/P EST MOD 30 MIN: CPT | Mod: 25,S$GLB,, | Performed by: PHYSICIAN ASSISTANT

## 2018-09-26 PROCEDURE — 3008F BODY MASS INDEX DOCD: CPT | Mod: CPTII,S$GLB,, | Performed by: PHYSICIAN ASSISTANT

## 2018-09-26 PROCEDURE — 99999 PR PBB SHADOW E&M-EST. PATIENT-LVL V: CPT | Mod: PBBFAC,,, | Performed by: PHYSICIAN ASSISTANT

## 2018-09-26 PROCEDURE — 99999 PR PBB SHADOW E&M-EST. PATIENT-LVL III: CPT | Mod: PBBFAC,,,

## 2018-09-26 PROCEDURE — 3044F HG A1C LEVEL LT 7.0%: CPT | Mod: CPTII,S$GLB,, | Performed by: PHYSICIAN ASSISTANT

## 2018-09-26 PROCEDURE — 3074F SYST BP LT 130 MM HG: CPT | Mod: CPTII,S$GLB,, | Performed by: PHYSICIAN ASSISTANT

## 2018-09-26 PROCEDURE — 90471 IMMUNIZATION ADMIN: CPT | Mod: S$GLB,,, | Performed by: PHYSICIAN ASSISTANT

## 2018-09-26 RX ORDER — CLONAZEPAM 0.5 MG/1
0.25 TABLET ORAL 2 TIMES DAILY
Refills: 3 | COMMUNITY
Start: 2018-07-13 | End: 2019-08-30 | Stop reason: ALTCHOICE

## 2018-09-26 NOTE — PROGRESS NOTES
Subjective:       Patient ID: Kodak Valentine is a 48 y.o. male.    Chief Complaint: Mass    48 year old male c/o tender lump to R breast X 4-5 days. He reports he felt some discomfort to the affected area at onset and when he palpated area he felt a lump. He reports lump has decreased in size since onset. He reports lump is tender but he denies any other breast or nipple tenderness. He reports no known injury, swelling, redness, warmth, drainage, open wounds, rash, other masses elsewhere on body, fever, chills, CP, SOB, nipple discharge / changes, or other medical complaints. He has not used anything for sxs.    PCP: Dr. Galindo    Patient Active Problem List:     CAD (coronary artery disease)     Anxiety     Hypertension associated with diabetes     Vitamin D deficiency     GERD (gastroesophageal reflux disease)     S/P PTCA (percutaneous transluminal coronary angioplasty)     Verruca vulgaris     JUDI treated with BiPAP     Moderate COPD (chronic obstructive pulmonary disease)     Nocturnal hypoxemia due to obstructive chronic bronchitis     Controlled type 2 diabetes mellitus with stage 2 chronic kidney disease, without long-term current use of insulin     Lung transplant candidate     Tobacco dependence in remission     Hyperlipidemia associated with type 2 diabetes mellitus      Review of Systems   Constitutional: Negative for chills and fever.   HENT: Negative for congestion, ear pain, rhinorrhea, sore throat and trouble swallowing.    Eyes: Negative for visual disturbance.   Respiratory: Negative for cough and shortness of breath.    Cardiovascular: Negative for chest pain, palpitations and leg swelling.   Gastrointestinal: Negative for abdominal pain, nausea and vomiting.   Endocrine: Negative for polydipsia and polyuria.   Musculoskeletal: Negative for joint swelling.   Skin: Negative for rash.   Neurological: Negative for dizziness, weakness, numbness and headaches.   Psychiatric/Behavioral: Negative  "for confusion.       Objective:       Vitals:    09/26/18 0858   BP: 128/76   BP Location: Right arm   Patient Position: Sitting   BP Method: Small (Manual)   Pulse: (!) 58   Temp: 97.9 °F (36.6 °C)   TempSrc: Tympanic   SpO2: 96%   Weight: 102 kg (224 lb 13.9 oz)   Height: 5' 10" (1.778 m)     Physical Exam   Constitutional: He is oriented to person, place, and time. He appears well-developed and well-nourished. No distress.   HENT:   Head: Normocephalic and atraumatic.   Eyes: EOM are normal.   Neck: Neck supple.   Cardiovascular: Normal rate and regular rhythm.   Pulmonary/Chest: Effort normal and breath sounds normal. No stridor. No respiratory distress. He has no decreased breath sounds. He has no wheezes. He has no rhonchi. He has no rales. He exhibits no crepitus, no edema and no swelling. Right breast exhibits no inverted nipple, no nipple discharge and no skin change.   R medial breast with localized subQ nodule approx. 5mm in diameter with associated TTP; no erythema, warmth, induration, fluctuance, or overlying skin changes   Musculoskeletal: Normal range of motion.   Lymphadenopathy:     He has no axillary adenopathy.   Neurological: He is alert and oriented to person, place, and time. No cranial nerve deficit.   Skin: Skin is dry. No rash noted. He is not diaphoretic.   Psychiatric: He has a normal mood and affect. His speech is normal and behavior is normal. Thought content normal.       Component      Latest Ref Rng & Units 7/11/2018   WBC      3.90 - 12.70 K/uL 5.71   RBC      4.60 - 6.20 M/uL 3.72 (L)   Hemoglobin      14.0 - 18.0 g/dL 13.8 (L)   Hematocrit      40.0 - 54.0 % 39.9 (L)   MCV      82 - 98 fL 107 (H)   MCH      27.0 - 31.0 pg 37.1 (H)   MCHC      32.0 - 36.0 g/dL 34.6   RDW      11.5 - 14.5 % 12.4   Platelets      150 - 350 K/uL 177   MPV      9.2 - 12.9 fL 9.6   Immature Granulocytes      0.0 - 0.5 % 1.1 (H)   Gran # (ANC)      1.8 - 7.7 K/uL 3.4   Immature Grans (Abs)      0.00 - " 0.04 K/uL 0.06 (H)   Lymph #      1.0 - 4.8 K/uL 1.6   Mono #      0.3 - 1.0 K/uL 0.5   Eos #      0.0 - 0.5 K/uL 0.1   Baso #      0.00 - 0.20 K/uL 0.04   nRBC      0 /100 WBC 0   Gran%      38.0 - 73.0 % 58.6   Lymph%      18.0 - 48.0 % 28.0   Mono%      4.0 - 15.0 % 9.1   Eosinophil%      0.0 - 8.0 % 2.5   Basophil%      0.0 - 1.9 % 0.7   Differential Method       Automated   Sodium      136 - 145 mmol/L 136   Potassium      3.5 - 5.1 mmol/L 3.7   Chloride      95 - 110 mmol/L 101   CO2      23 - 29 mmol/L 24   Glucose      70 - 110 mg/dL 158 (H)   BUN, Bld      6 - 20 mg/dL 7   Creatinine      0.5 - 1.4 mg/dL 1.0   Calcium      8.7 - 10.5 mg/dL 9.6   Total Protein      6.0 - 8.4 g/dL 6.8   Albumin      3.5 - 5.2 g/dL 3.8   Total Bilirubin      0.1 - 1.0 mg/dL 0.5   Alkaline Phosphatase      55 - 135 U/L 56   AST      10 - 40 U/L 24   ALT      10 - 44 U/L 20   Anion Gap      8 - 16 mmol/L 11   eGFR if African American      >60 mL/min/1.73 m:2 >60.0   eGFR if non African American      >60 mL/min/1.73 m:2 >60.0   Microalbum.,U,Random      ug/mL 64.0   Creatinine, Random Ur      23.0 - 375.0 mg/dL 217.0   Microalb Creat Ratio      0.0 - 30.0 ug/mg 29.5   Hemoglobin A1C      4.0 - 5.6 % 5.9 (H)   Estimated Avg Glucose      68 - 131 mg/dL 123   Vit D, 25-Hydroxy      30 - 96 ng/mL 34   TSH      0.400 - 4.000 uIU/mL 1.384   PSA, SCREEN      0.00 - 4.00 ng/mL 0.33     Component      Latest Ref Rng & Units 3/28/2018   Cholesterol      120 - 199 mg/dL 135   Triglycerides      30 - 150 mg/dL 191 (H)   HDL      40 - 75 mg/dL 36 (L)   LDL Cholesterol      63.0 - 159.0 mg/dL 60.8 (L)   HDL/Chol Ratio      20.0 - 50.0 % 26.7   Total Cholesterol/HDL Ratio      2.0 - 5.0 3.8   Non-HDL Cholesterol      mg/dL 99     Assessment:       1. Breast mass in male    2. Moderate COPD (chronic obstructive pulmonary disease)    3. Nocturnal hypoxemia due to obstructive chronic bronchitis    4. Coronary artery disease without angina  pectoris, unspecified vessel or lesion type, unspecified whether native or transplanted heart    5. Hypertension associated with diabetes    6. Hyperlipidemia associated with type 2 diabetes mellitus    7. Vitamin D deficiency    8. Controlled type 2 diabetes mellitus with stage 2 chronic kidney disease, without long-term current use of insulin    9. JUDI treated with BiPAP    10. Anxiety        Plan:         Kodak was seen today for mass.    Diagnoses and all orders for this visit:    Breast mass in male  -     Mammo Digital Diagnostic Right With CAD; Future  -     US Breast Right Complete; Future  Breast imaging as above with result review following. Monitor for new / worsening sxs.    Moderate COPD (chronic obstructive pulmonary disease), Nocturnal hypoxemia due to obstructive chronic bronchitis, JUDI treated with BiPAP  As per pulmonology.    Coronary artery disease without angina pectoris, unspecified vessel or lesion type, unspecified whether native or transplanted heart  Stable. Pt taking metoprolol, Lipitor, Ranexa, and aspirin. F/u with cardiology as recommended.    Hypertension associated with diabetes  Controlled. Pt taking amlodipine, metoprolol, and lisinopril. Monitor BP and f/u with PCP / cardiology.    Hyperlipidemia associated with type 2 diabetes mellitus  Controlled. Pt taking Lipitor, fenofibrate, and aspirin.    Vitamin D deficiency  Controlled. Pt taking vit D suppl.    Controlled type 2 diabetes mellitus with stage 2 chronic kidney disease, without long-term current use of insulin  Pt taking metformin. Monitor glucose. Check feet daily and stay UTD with eye doctor. F/u with PCP.    Anxiety  Controlled. Pt taking Cymbalta and Xanax    Other orders  -     Influenza - Quadrivalent (3 years & older) (PF)    Follow-up with your PCP as scheduled in one month for health management.

## 2018-09-26 NOTE — PROGRESS NOTES
Pt presents to clinic to check BP/nurse visit.   States that is has been running high.  BP was 136/78.  Pt also c/o having a lump under the skin on the right chest wall.  Painful to touch.    Scheduled appt w/Analilia Marquez this am to get evaluated./rpr

## 2018-10-01 DIAGNOSIS — J44.9 COPD, SEVERE: ICD-10-CM

## 2018-10-01 RX ORDER — FLUTICASONE FUROATE AND VILANTEROL 200; 25 UG/1; UG/1
POWDER RESPIRATORY (INHALATION)
Qty: 60 EACH | Refills: 6 | Status: SHIPPED | OUTPATIENT
Start: 2018-10-01 | End: 2018-10-17 | Stop reason: SDUPTHER

## 2018-10-02 ENCOUNTER — TELEPHONE (OUTPATIENT)
Dept: PULMONOLOGY | Facility: CLINIC | Age: 48
End: 2018-10-02

## 2018-10-02 ENCOUNTER — DOCUMENTATION ONLY (OUTPATIENT)
Dept: ENDOSCOPY | Facility: HOSPITAL | Age: 48
End: 2018-10-02

## 2018-10-02 NOTE — TELEPHONE ENCOUNTER
----- Message from Naomy Loaiza sent at 10/2/2018 10:38 AM CDT -----  ..1. What is the name of the medication you are requesting? z pack  2. What is the dose? 500mg  3. How do you take the medication? Orally, topically, etc?   4. How often do you take this medication?   5. Do you need a 30 day or 90 day supply?   6. How many refills are you requesting?  7. What is your preferred pharmacy and location of the pharmacy?..  [facility] [address]  [address] [address]  Phone: [phone] Fax: [phone]      8. Who can we contact with further questions?..[phone]

## 2018-10-02 NOTE — PROGRESS NOTES
10/02/18. Pt called to reschedule procedure, no ride. Colonoscopy has been rescheduled to 01/07/18. Already has Suprep. New instructions mailed.

## 2018-10-17 ENCOUNTER — OFFICE VISIT (OUTPATIENT)
Dept: INTERNAL MEDICINE | Facility: CLINIC | Age: 48
End: 2018-10-17
Payer: COMMERCIAL

## 2018-10-17 VITALS
WEIGHT: 225.31 LBS | TEMPERATURE: 96 F | OXYGEN SATURATION: 98 % | HEART RATE: 68 BPM | HEIGHT: 70 IN | BODY MASS INDEX: 32.26 KG/M2 | SYSTOLIC BLOOD PRESSURE: 120 MMHG | DIASTOLIC BLOOD PRESSURE: 70 MMHG

## 2018-10-17 DIAGNOSIS — J44.89 NOCTURNAL HYPOXEMIA DUE TO OBSTRUCTIVE CHRONIC BRONCHITIS: Chronic | ICD-10-CM

## 2018-10-17 DIAGNOSIS — G47.33 OSA TREATED WITH BIPAP: Chronic | ICD-10-CM

## 2018-10-17 DIAGNOSIS — Z29.9 PREVENTIVE MEASURE: ICD-10-CM

## 2018-10-17 DIAGNOSIS — E11.69 HYPERLIPIDEMIA ASSOCIATED WITH TYPE 2 DIABETES MELLITUS: ICD-10-CM

## 2018-10-17 DIAGNOSIS — F41.9 ANXIETY: ICD-10-CM

## 2018-10-17 DIAGNOSIS — I15.2 HYPERTENSION ASSOCIATED WITH DIABETES: Primary | ICD-10-CM

## 2018-10-17 DIAGNOSIS — I25.10 CORONARY ARTERY DISEASE WITHOUT ANGINA PECTORIS, UNSPECIFIED VESSEL OR LESION TYPE, UNSPECIFIED WHETHER NATIVE OR TRANSPLANTED HEART: ICD-10-CM

## 2018-10-17 DIAGNOSIS — G47.36 NOCTURNAL HYPOXEMIA DUE TO OBSTRUCTIVE CHRONIC BRONCHITIS: Chronic | ICD-10-CM

## 2018-10-17 DIAGNOSIS — E11.59 HYPERTENSION ASSOCIATED WITH DIABETES: Primary | ICD-10-CM

## 2018-10-17 DIAGNOSIS — N18.2 CONTROLLED TYPE 2 DIABETES MELLITUS WITH STAGE 2 CHRONIC KIDNEY DISEASE, WITHOUT LONG-TERM CURRENT USE OF INSULIN: ICD-10-CM

## 2018-10-17 DIAGNOSIS — E11.22 CONTROLLED TYPE 2 DIABETES MELLITUS WITH STAGE 2 CHRONIC KIDNEY DISEASE, WITHOUT LONG-TERM CURRENT USE OF INSULIN: ICD-10-CM

## 2018-10-17 DIAGNOSIS — E78.5 HYPERLIPIDEMIA ASSOCIATED WITH TYPE 2 DIABETES MELLITUS: ICD-10-CM

## 2018-10-17 DIAGNOSIS — J44.9 MODERATE COPD (CHRONIC OBSTRUCTIVE PULMONARY DISEASE): ICD-10-CM

## 2018-10-17 DIAGNOSIS — N63.10 BREAST MASS, RIGHT: ICD-10-CM

## 2018-10-17 PROCEDURE — 3078F DIAST BP <80 MM HG: CPT | Mod: CPTII,S$GLB,, | Performed by: INTERNAL MEDICINE

## 2018-10-17 PROCEDURE — 99214 OFFICE O/P EST MOD 30 MIN: CPT | Mod: S$GLB,,, | Performed by: INTERNAL MEDICINE

## 2018-10-17 PROCEDURE — 3074F SYST BP LT 130 MM HG: CPT | Mod: CPTII,S$GLB,, | Performed by: INTERNAL MEDICINE

## 2018-10-17 PROCEDURE — 99999 PR PBB SHADOW E&M-EST. PATIENT-LVL IV: CPT | Mod: PBBFAC,,, | Performed by: INTERNAL MEDICINE

## 2018-10-17 PROCEDURE — 3008F BODY MASS INDEX DOCD: CPT | Mod: CPTII,S$GLB,, | Performed by: INTERNAL MEDICINE

## 2018-10-17 PROCEDURE — 3044F HG A1C LEVEL LT 7.0%: CPT | Mod: CPTII,S$GLB,, | Performed by: INTERNAL MEDICINE

## 2018-10-17 NOTE — MEDICAL/APP STUDENT
"Subjective:       Patient ID: Kodak Valentine is a 48 y.o. male.    Chief Complaint: Follow-up    HPI   Patient presents today for f/u of medical problem.  Still c tender right breast lump; has not grown.  No skin changes or discharge. No family history of breast ca, colon ca, ovarian ca, cervical ca.  Did not go for mmg or US previously ordered.  Heartburn improved c remembering Protonix. Viagra 50mg not helping E.D.  Uses 1-2 xanax/day for anxiety.  Lost 10lbs since July b/c on liquid diet due to dental work.  Does not check sugars at home.  Energy good. Does not exercise. Restarted smoking 1/2 ppd. BM and urine okay; nocturia once per night.  Denies CP, SOB, wheezing, palpitations, n/v/d, abd pain.  Denies fever, cough, sweats chills.  On vitamin D.     Updated/ annual due 7/19:  HM: 10/18 fluvax, 7/15 [id number], 1/12 [id number], 7/10 TDaP, no cscope yet, 7/18 PSA, 6/10 HCV neg, [facility]    Review of Systems   Constitutional: Negative for activity change, chills, diaphoresis, fatigue, fever and unexpected weight change.   HENT: Negative for congestion and sore throat.    Respiratory: Negative for cough, shortness of breath and wheezing.    Cardiovascular: Negative for chest pain and palpitations.   Gastrointestinal: Negative for abdominal pain, blood in stool, constipation, diarrhea, nausea and vomiting.   Endocrine: Negative for polydipsia, polyphagia and polyuria.   Genitourinary: Negative for difficulty urinating, dysuria and hematuria.   Musculoskeletal: Negative for arthralgias.   Skin: Negative for color change and rash.        Lump to right breast     Neurological: Negative for dizziness and syncope.   Psychiatric/Behavioral: The patient is not nervous/anxious.        Objective:     Blood pressure 120/70, pulse 68, temperature 96.2 °F (35.7 °C), temperature source Tympanic, height 5' 10" (1.778 m), weight 102.2 kg (225 lb 5 oz), SpO2 98 %.    Physical Exam   Constitutional: He is oriented to " person, place, and time. He appears well-developed and well-nourished.   HENT:   Head: Normocephalic.   Right Ear: External ear normal.   Left Ear: External ear normal.   Nose: Nose normal.   Mouth/Throat: Oropharynx is clear and moist.   Eyes: Conjunctivae and EOM are normal. Pupils are equal, round, and reactive to light.   Neck: Normal range of motion. Neck supple.   Cardiovascular: Normal rate, regular rhythm and normal heart sounds. Exam reveals no gallop and no friction rub.   No murmur heard.  Pulmonary/Chest: Effort normal. No respiratory distress. He has wheezes (expiratory; anterior RUL, JOHN). He has no rales. Mass: 3 o'clock on right breast. tender to palpation. movable.  Right breast exhibits mass ( 3 o'clock on right breast. tender to palpation. movable. ) and tenderness. Right breast exhibits no inverted nipple, no nipple discharge and no skin change. Left breast exhibits no inverted nipple, no mass, no nipple discharge, no skin change and no tenderness.   Abdominal: Soft. Bowel sounds are normal. He exhibits no distension and no mass. There is no tenderness. There is no guarding.   Musculoskeletal: Normal range of motion.   Lymphadenopathy:     He has no cervical adenopathy.   Neurological: He is alert and oriented to person, place, and time.   Skin: Skin is warm and dry. He is not diaphoretic.   Psychiatric: He has a normal mood and affect.       Assessment:       1. Hypertension associated with diabetes    2. Hyperlipidemia associated with type 2 diabetes mellitus    3. Controlled type 2 diabetes mellitus with stage 2 chronic kidney disease, without long-term current use of insulin    4. Coronary artery disease without angina pectoris, unspecified vessel or lesion type, unspecified whether native or transplanted heart    5. Moderate COPD (chronic obstructive pulmonary disease)    6. Nocturnal hypoxemia due to obstructive chronic bronchitis    7. JUDI treated with BiPAP    8. Anxiety    9. Preventive  measure    10. Breast mass, right        Plan:     Kodak was seen today for follow-up.    Diagnoses and all orders for this visit:    Hypertension associated with diabetes  Stable. Continue current treatment.    Hyperlipidemia associated with type 2 diabetes mellitus    Controlled type 2 diabetes mellitus with stage 2 chronic kidney disease, without long-term current use of insulin    Coronary artery disease without angina pectoris, unspecified vessel or lesion type, unspecified whether native or transplanted heart  Clinically stable. Followed by cardiology.    Moderate COPD (chronic obstructive pulmonary disease), Nocturnal hypoxemia due to obstructive chronic bronchitis, JUDI treated with BiPAP   Per pulm.    Anxiety  Still needing 1-2 xanxax/day.    Preventive measure  Already got flu shot. Discussed getting Shingrix at pharmacy. Cscope scheduled for January.    Breast mass, right  Stressed importance of getting imaging. Reordered MMG and US.  -     Mammo Digital Diagnostic Right With CAD; Future  -     US Breast Right Complete; Future

## 2018-10-17 NOTE — PROGRESS NOTES
"Subjective:      Patient ID: Kodak Valentine is a 48 y.o. male.    Chief Complaint: Follow-up      HPI  Here for follow up of medical problems.  Still with right breast mass, didn't get imaging done.  No nipple d/c.  Present for about 2 months.  No exercise.  Restarted smoking.  No more dysphagia once he restarted PPI.  Has had some dental work, has lost 10# since he was on a liquid diet.  Taking xanax about bid, working well.  Cscope scheduled in 2mo.  Breathing well lately.  CPAP working well, sleeping well.  Viagra didn't work.      Updated/ annual due 7/19:  HM: 10/18 fluvax, 7/15 [id number], 1/12 [id number], 7/10 TDaP, no cscope yet, 7/18 PSA, 6/10 HCV neg, 5/18 Eye Dr. At [facility].     Review of Systems   Constitutional: Negative for chills, diaphoresis, fatigue and fever.   Respiratory: Negative for cough, chest tightness and shortness of breath.    Cardiovascular: Negative for chest pain, palpitations and leg swelling.   Gastrointestinal: Negative for blood in stool, constipation, diarrhea, nausea and vomiting.   Genitourinary: Negative for difficulty urinating and frequency.   Musculoskeletal: Negative for arthralgias.         Objective:   /70 (BP Location: Right arm, Patient Position: Sitting, BP Method: Medium (Manual))   Pulse 68   Temp 96.2 °F (35.7 °C) (Tympanic)   Ht 5' 10" (1.778 m)   Wt 102.2 kg (225 lb 5 oz)   SpO2 98%   BMI 32.33 kg/m²     Physical Exam   Constitutional: He is oriented to person, place, and time. He appears well-developed and well-nourished.   HENT:   Mouth/Throat: Oropharynx is clear and moist.   Neck: Normal range of motion. Neck supple.   Cardiovascular: Normal rate, regular rhythm and intact distal pulses. Exam reveals no gallop and no friction rub.   No murmur heard.  Pulmonary/Chest: Effort normal and breath sounds normal. He has no wheezes. He has no rales. Right breast exhibits mass (3:00, fibrous mass, mobile, 0.5 x 1cm.).   Abdominal: Soft. Bowel sounds are " normal. He exhibits no mass. There is no tenderness.   Musculoskeletal: He exhibits no edema.   Lymphadenopathy:     He has no cervical adenopathy.     He has no axillary adenopathy.   Neurological: He is alert and oriented to person, place, and time.   Psychiatric: He has a normal mood and affect.           Assessment:       1. Hypertension associated with diabetes    2. Hyperlipidemia associated with type 2 diabetes mellitus    3. Controlled type 2 diabetes mellitus with stage 2 chronic kidney disease, without long-term current use of insulin    4. Coronary artery disease without angina pectoris, unspecified vessel or lesion type, unspecified whether native or transplanted heart    5. Moderate COPD (chronic obstructive pulmonary disease)    6. Nocturnal hypoxemia due to obstructive chronic bronchitis    7. JUDI treated with BiPAP    8. Anxiety    9. Preventive measure    10. Breast mass, right          Plan:     Hypertension associated with diabetes- stable, cont rx.    Hyperlipidemia associated with type 2 diabetes mellitus    Controlled type 2 diabetes mellitus with stage 2 chronic kidney disease, without long-term current use of insulin    Coronary artery disease without angina pectoris, unspecified vessel or lesion type, unspecified whether native or transplanted heart- clin stable.    Moderate COPD (chronic obstructive pulmonary disease), Nocturnal hypoxemia due to obstructive chronic bronchitis, JUDI treated with BiPAP- per Pulm.    Anxiety- doing well, cont rx.  RTC 3 mo.    Preventive measure- all utd.    Breast mass, right x 6 weeks-  -     Mammo Digital Diagnostic Right With CAD; Future; Expected date: 10/17/2018  -      Breast Right Complete; Future; Expected date: 10/17/2018

## 2018-10-19 RX ORDER — FLUTICASONE FUROATE AND VILANTEROL 200; 25 UG/1; UG/1
POWDER RESPIRATORY (INHALATION)
Qty: 60 EACH | Refills: 4 | Status: SHIPPED | OUTPATIENT
Start: 2018-10-19 | End: 2019-05-31 | Stop reason: SDUPTHER

## 2018-10-24 RX ORDER — RANOLAZINE 1000 MG/1
TABLET, FILM COATED, EXTENDED RELEASE ORAL
Qty: 60 TABLET | Refills: 0 | OUTPATIENT
Start: 2018-10-24

## 2018-11-12 DIAGNOSIS — I10 ESSENTIAL HYPERTENSION: ICD-10-CM

## 2018-11-12 RX ORDER — LISINOPRIL 20 MG/1
TABLET ORAL
Qty: 30 TABLET | Refills: 3 | Status: SHIPPED | OUTPATIENT
Start: 2018-11-12 | End: 2019-02-16 | Stop reason: SDUPTHER

## 2018-11-13 ENCOUNTER — TELEPHONE (OUTPATIENT)
Dept: INTERNAL MEDICINE | Facility: CLINIC | Age: 48
End: 2018-11-13

## 2018-11-13 RX ORDER — VARENICLINE TARTRATE 0.5 (11)-1
KIT ORAL
Qty: 53 TABLET | Refills: 0 | Status: SHIPPED | OUTPATIENT
Start: 2018-11-13 | End: 2018-12-10

## 2018-11-15 RX ORDER — SILDENAFIL 100 MG/1
TABLET, FILM COATED ORAL
Qty: 30 TABLET | Refills: 6 | Status: SHIPPED | OUTPATIENT
Start: 2018-11-15 | End: 2020-10-13 | Stop reason: SDUPTHER

## 2018-11-15 RX ORDER — PANTOPRAZOLE SODIUM 40 MG/1
TABLET, DELAYED RELEASE ORAL
Qty: 60 TABLET | Refills: 11 | Status: SHIPPED | OUTPATIENT
Start: 2018-11-15 | End: 2019-11-19 | Stop reason: SDUPTHER

## 2018-11-15 RX ORDER — DULOXETIN HYDROCHLORIDE 60 MG/1
CAPSULE, DELAYED RELEASE ORAL
Qty: 30 CAPSULE | Refills: 11 | Status: SHIPPED | OUTPATIENT
Start: 2018-11-15 | End: 2019-12-02 | Stop reason: SDUPTHER

## 2018-11-16 ENCOUNTER — TELEPHONE (OUTPATIENT)
Dept: RADIOLOGY | Facility: HOSPITAL | Age: 48
End: 2018-11-16

## 2018-11-16 RX ORDER — ALPRAZOLAM 0.25 MG/1
TABLET ORAL
Qty: 90 TABLET | Refills: 2 | Status: SHIPPED | OUTPATIENT
Start: 2018-11-16 | End: 2018-11-26 | Stop reason: SDUPTHER

## 2018-11-19 ENCOUNTER — PATIENT MESSAGE (OUTPATIENT)
Dept: INTERNAL MEDICINE | Facility: CLINIC | Age: 48
End: 2018-11-19

## 2018-11-26 RX ORDER — RANOLAZINE 1000 MG/1
TABLET, FILM COATED, EXTENDED RELEASE ORAL
Qty: 60 TABLET | Refills: 0 | Status: SHIPPED | OUTPATIENT
Start: 2018-11-26 | End: 2018-12-26 | Stop reason: SDUPTHER

## 2018-11-27 RX ORDER — ALPRAZOLAM 0.25 MG/1
TABLET ORAL
Qty: 90 TABLET | Refills: 1 | Status: SHIPPED | OUTPATIENT
Start: 2018-11-27 | End: 2018-12-26 | Stop reason: SDUPTHER

## 2018-12-11 ENCOUNTER — TELEPHONE (OUTPATIENT)
Dept: INTERNAL MEDICINE | Facility: CLINIC | Age: 48
End: 2018-12-11

## 2018-12-11 RX ORDER — VARENICLINE TARTRATE 0.5 (11)-1
KIT ORAL
Qty: 53 TABLET | Refills: 0 | Status: SHIPPED | OUTPATIENT
Start: 2018-12-11 | End: 2018-12-11 | Stop reason: ALTCHOICE

## 2018-12-11 NOTE — TELEPHONE ENCOUNTER
----- Message from Guerline Starks sent at 12/11/2018  8:06 AM CST -----  Contact: pt pharmacy  Caller is requesting a call back from the nurse in regards to the pt med chantix they want to know if this is to be the continuing pack   [phone]

## 2018-12-11 NOTE — TELEPHONE ENCOUNTER
Pharmacy called to ok continuation pack of Chantix.  Pt has already completed starter pk.  Is that ok?/rpr

## 2018-12-12 ENCOUNTER — TELEPHONE (OUTPATIENT)
Dept: PULMONOLOGY | Facility: CLINIC | Age: 48
End: 2018-12-12

## 2018-12-12 NOTE — TELEPHONE ENCOUNTER
----- Message from Jaspreet Mccurdy sent at 2018  9:51 AM CST -----  Contact: self [phone]  Would like to reschedule pulmonary test and office visit, order for pulmonary test  on , next available for Dr. Polanco is . Please call back at [phone].  Md Ayad

## 2018-12-26 RX ORDER — RANOLAZINE 1000 MG/1
TABLET, FILM COATED, EXTENDED RELEASE ORAL
Qty: 60 TABLET | Refills: 0 | Status: SHIPPED | OUTPATIENT
Start: 2018-12-26 | End: 2019-01-07 | Stop reason: SDUPTHER

## 2019-01-02 RX ORDER — SODIUM, POTASSIUM,MAG SULFATES 17.5-3.13G
SOLUTION, RECONSTITUTED, ORAL ORAL
Qty: 354 ML | Refills: 0 | Status: SHIPPED | OUTPATIENT
Start: 2019-01-02 | End: 2019-05-31

## 2019-01-02 RX ORDER — ALPRAZOLAM 0.25 MG/1
TABLET ORAL
Qty: 90 TABLET | Refills: 0 | Status: SHIPPED | OUTPATIENT
Start: 2019-01-02 | End: 2019-01-08 | Stop reason: SDUPTHER

## 2019-01-07 RX ORDER — RANOLAZINE 1000 MG/1
1000 TABLET, EXTENDED RELEASE ORAL 2 TIMES DAILY
Qty: 60 TABLET | Refills: 3 | Status: SHIPPED | OUTPATIENT
Start: 2019-01-07 | End: 2019-06-13 | Stop reason: SDUPTHER

## 2019-01-08 DIAGNOSIS — E55.9 VITAMIN D DEFICIENCY: ICD-10-CM

## 2019-01-08 RX ORDER — VARENICLINE TARTRATE 1 MG/1
TABLET, FILM COATED ORAL
Qty: 56 TABLET | Refills: 0 | Status: SHIPPED | OUTPATIENT
Start: 2019-01-08 | End: 2019-06-13

## 2019-01-08 RX ORDER — ALPRAZOLAM 0.25 MG/1
0.25 TABLET ORAL 3 TIMES DAILY PRN
Qty: 90 TABLET | Refills: 0 | Status: SHIPPED | OUTPATIENT
Start: 2019-01-08 | End: 2019-03-24 | Stop reason: SDUPTHER

## 2019-01-08 RX ORDER — ACETAMINOPHEN 500 MG
1 TABLET ORAL DAILY
Qty: 100 CAPSULE | Refills: 6 | Status: SHIPPED | OUTPATIENT
Start: 2019-01-08 | End: 2020-12-30 | Stop reason: SDUPTHER

## 2019-01-17 DIAGNOSIS — E78.2 MIXED HYPERLIPIDEMIA: ICD-10-CM

## 2019-01-17 RX ORDER — FENOFIBRATE 160 MG/1
TABLET ORAL
Qty: 30 TABLET | Refills: 6 | Status: SHIPPED | OUTPATIENT
Start: 2019-01-17 | End: 2019-09-16 | Stop reason: SDUPTHER

## 2019-02-08 DIAGNOSIS — J44.9 COPD, SEVERITY TO BE DETERMINED: ICD-10-CM

## 2019-02-08 RX ORDER — ALBUTEROL SULFATE 90 UG/1
AEROSOL, METERED RESPIRATORY (INHALATION)
Qty: 18 G | Refills: 0 | Status: SHIPPED | OUTPATIENT
Start: 2019-02-08 | End: 2019-05-15 | Stop reason: SDUPTHER

## 2019-02-16 DIAGNOSIS — I10 ESSENTIAL HYPERTENSION: ICD-10-CM

## 2019-02-16 RX ORDER — AMLODIPINE BESYLATE 2.5 MG/1
TABLET ORAL
Qty: 30 TABLET | Refills: 11 | Status: SHIPPED | OUTPATIENT
Start: 2019-02-16 | End: 2019-08-30

## 2019-02-18 RX ORDER — LISINOPRIL 20 MG/1
TABLET ORAL
Qty: 30 TABLET | Refills: 11 | Status: SHIPPED | OUTPATIENT
Start: 2019-02-18 | End: 2019-08-30

## 2019-02-18 RX ORDER — METOPROLOL TARTRATE 50 MG/1
TABLET ORAL
Qty: 60 TABLET | Refills: 11 | Status: SHIPPED | OUTPATIENT
Start: 2019-02-18 | End: 2019-08-30

## 2019-02-25 DIAGNOSIS — I25.10 CORONARY ARTERY DISEASE, ANGINA PRESENCE UNSPECIFIED, UNSPECIFIED VESSEL OR LESION TYPE, UNSPECIFIED WHETHER NATIVE OR TRANSPLANTED HEART: Primary | ICD-10-CM

## 2019-02-27 ENCOUNTER — TELEPHONE (OUTPATIENT)
Dept: CARDIOLOGY | Facility: CLINIC | Age: 49
End: 2019-02-27

## 2019-03-13 ENCOUNTER — PATIENT OUTREACH (OUTPATIENT)
Dept: ADMINISTRATIVE | Facility: HOSPITAL | Age: 49
End: 2019-03-13

## 2019-03-26 RX ORDER — ALPRAZOLAM 0.25 MG/1
TABLET ORAL
Qty: 90 TABLET | Refills: 2 | Status: SHIPPED | OUTPATIENT
Start: 2019-03-26 | End: 2019-05-27 | Stop reason: SDUPTHER

## 2019-03-29 DIAGNOSIS — E11.9 TYPE 2 DIABETES MELLITUS WITHOUT COMPLICATION: ICD-10-CM

## 2019-04-05 DIAGNOSIS — E11.9 TYPE 2 DIABETES MELLITUS WITHOUT COMPLICATION: ICD-10-CM

## 2019-05-13 DIAGNOSIS — J44.9 COPD, SEVERE: ICD-10-CM

## 2019-05-15 ENCOUNTER — TELEPHONE (OUTPATIENT)
Dept: PULMONOLOGY | Facility: CLINIC | Age: 49
End: 2019-05-15

## 2019-05-15 DIAGNOSIS — J44.9 COPD, SEVERITY TO BE DETERMINED: ICD-10-CM

## 2019-05-15 DIAGNOSIS — J44.9 COPD, SEVERE: ICD-10-CM

## 2019-05-15 NOTE — TELEPHONE ENCOUNTER
----- Message from Jt Cuadra, PharmD sent at 5/15/2019 11:20 AM CDT -----  Kevin Patel is looking for a refill on his spiriva respimat to be sent to [facility] at the [place].    Thanks,    Jt

## 2019-05-15 NOTE — TELEPHONE ENCOUNTER
Spoke to pt.  Scheduled appt for 5/31/19.      Pt requesting refills on Spiriva and albuterol inhaler before appt.  Please advise.

## 2019-05-16 DIAGNOSIS — J44.9 COPD, SEVERE: ICD-10-CM

## 2019-05-16 RX ORDER — ALBUTEROL SULFATE 90 UG/1
2 AEROSOL, METERED RESPIRATORY (INHALATION) EVERY 4 HOURS PRN
Qty: 18 G | Refills: 0 | Status: SHIPPED | OUTPATIENT
Start: 2019-05-16 | End: 2019-05-31 | Stop reason: SDUPTHER

## 2019-05-21 RX ORDER — METFORMIN HYDROCHLORIDE 500 MG/1
TABLET ORAL
Qty: 60 TABLET | Refills: 11 | Status: SHIPPED | OUTPATIENT
Start: 2019-05-21 | End: 2019-09-18

## 2019-05-27 RX ORDER — ALPRAZOLAM 0.25 MG/1
TABLET ORAL
Qty: 90 TABLET | Refills: 0 | Status: SHIPPED | OUTPATIENT
Start: 2019-05-27 | End: 2019-09-16 | Stop reason: SDUPTHER

## 2019-05-31 ENCOUNTER — OFFICE VISIT (OUTPATIENT)
Dept: PULMONOLOGY | Facility: CLINIC | Age: 49
End: 2019-05-31
Payer: COMMERCIAL

## 2019-05-31 VITALS
BODY MASS INDEX: 31.22 KG/M2 | RESPIRATION RATE: 17 BRPM | WEIGHT: 218.06 LBS | HEIGHT: 70 IN | OXYGEN SATURATION: 95 % | SYSTOLIC BLOOD PRESSURE: 130 MMHG | HEART RATE: 71 BPM | DIASTOLIC BLOOD PRESSURE: 80 MMHG

## 2019-05-31 DIAGNOSIS — F17.201 TOBACCO DEPENDENCE IN REMISSION: ICD-10-CM

## 2019-05-31 DIAGNOSIS — J44.9 COPD, SEVERITY TO BE DETERMINED: ICD-10-CM

## 2019-05-31 DIAGNOSIS — J44.9 MODERATE COPD (CHRONIC OBSTRUCTIVE PULMONARY DISEASE): ICD-10-CM

## 2019-05-31 DIAGNOSIS — J44.9 COPD, SEVERE: ICD-10-CM

## 2019-05-31 DIAGNOSIS — Z12.9 SCREENING FOR CANCER: ICD-10-CM

## 2019-05-31 DIAGNOSIS — G47.33 OSA TREATED WITH BIPAP: Primary | Chronic | ICD-10-CM

## 2019-05-31 PROCEDURE — 3079F DIAST BP 80-89 MM HG: CPT | Mod: CPTII,S$GLB,, | Performed by: NURSE PRACTITIONER

## 2019-05-31 PROCEDURE — 3075F PR MOST RECENT SYSTOLIC BLOOD PRESS GE 130-139MM HG: ICD-10-PCS | Mod: CPTII,S$GLB,, | Performed by: NURSE PRACTITIONER

## 2019-05-31 PROCEDURE — 3079F PR MOST RECENT DIASTOLIC BLOOD PRESSURE 80-89 MM HG: ICD-10-PCS | Mod: CPTII,S$GLB,, | Performed by: NURSE PRACTITIONER

## 2019-05-31 PROCEDURE — 99214 OFFICE O/P EST MOD 30 MIN: CPT | Mod: S$GLB,,, | Performed by: NURSE PRACTITIONER

## 2019-05-31 PROCEDURE — 99999 PR PBB SHADOW E&M-EST. PATIENT-LVL V: ICD-10-PCS | Mod: PBBFAC,,, | Performed by: NURSE PRACTITIONER

## 2019-05-31 PROCEDURE — 3008F BODY MASS INDEX DOCD: CPT | Mod: CPTII,S$GLB,, | Performed by: NURSE PRACTITIONER

## 2019-05-31 PROCEDURE — 3008F PR BODY MASS INDEX (BMI) DOCUMENTED: ICD-10-PCS | Mod: CPTII,S$GLB,, | Performed by: NURSE PRACTITIONER

## 2019-05-31 PROCEDURE — 99999 PR PBB SHADOW E&M-EST. PATIENT-LVL V: CPT | Mod: PBBFAC,,, | Performed by: NURSE PRACTITIONER

## 2019-05-31 PROCEDURE — 3075F SYST BP GE 130 - 139MM HG: CPT | Mod: CPTII,S$GLB,, | Performed by: NURSE PRACTITIONER

## 2019-05-31 PROCEDURE — 99214 PR OFFICE/OUTPT VISIT, EST, LEVL IV, 30-39 MIN: ICD-10-PCS | Mod: S$GLB,,, | Performed by: NURSE PRACTITIONER

## 2019-05-31 RX ORDER — ALBUTEROL SULFATE 90 UG/1
2 AEROSOL, METERED RESPIRATORY (INHALATION) EVERY 4 HOURS PRN
Qty: 18 G | Refills: 3 | Status: SHIPPED | OUTPATIENT
Start: 2019-05-31 | End: 2020-06-12 | Stop reason: SDUPTHER

## 2019-05-31 RX ORDER — OXYCODONE AND ACETAMINOPHEN 10; 325 MG/1; MG/1
1 TABLET ORAL 4 TIMES DAILY PRN
Refills: 0 | Status: ON HOLD | COMMUNITY
Start: 2019-05-09 | End: 2021-06-21 | Stop reason: SDUPTHER

## 2019-05-31 RX ORDER — FLUTICASONE FUROATE AND VILANTEROL 200; 25 UG/1; UG/1
POWDER RESPIRATORY (INHALATION)
Qty: 60 EACH | Refills: 11 | Status: SHIPPED | OUTPATIENT
Start: 2019-05-31 | End: 2020-06-08

## 2019-06-04 ENCOUNTER — TELEPHONE (OUTPATIENT)
Dept: PULMONOLOGY | Facility: CLINIC | Age: 49
End: 2019-06-04

## 2019-06-04 DIAGNOSIS — J44.9 CHRONIC OBSTRUCTIVE PULMONARY DISEASE, UNSPECIFIED COPD TYPE: Primary | ICD-10-CM

## 2019-06-04 NOTE — TELEPHONE ENCOUNTER
Left voicemail about screening CT. He is not of age at this time. Age 53 will start annual screening CT scans. CXR for now annually.

## 2019-06-05 ENCOUNTER — HOSPITAL ENCOUNTER (OUTPATIENT)
Dept: RADIOLOGY | Facility: HOSPITAL | Age: 49
Discharge: HOME | End: 2019-06-05
Attending: NURSE PRACTITIONER
Payer: COMMERCIAL

## 2019-06-05 DIAGNOSIS — J44.9 CHRONIC OBSTRUCTIVE PULMONARY DISEASE, UNSPECIFIED COPD TYPE: ICD-10-CM

## 2019-06-05 PROCEDURE — 71046 X-RAY EXAM CHEST 2 VIEWS: CPT | Mod: TC

## 2019-06-05 PROCEDURE — 71046 XR CHEST PA AND LATERAL: ICD-10-PCS | Mod: 26,,, | Performed by: RADIOLOGY

## 2019-06-05 PROCEDURE — 71046 X-RAY EXAM CHEST 2 VIEWS: CPT | Mod: 26,,, | Performed by: RADIOLOGY

## 2019-06-13 ENCOUNTER — TELEPHONE (OUTPATIENT)
Dept: FAMILY MEDICINE | Facility: CLINIC | Age: 49
End: 2019-06-13

## 2019-06-13 ENCOUNTER — OFFICE VISIT (OUTPATIENT)
Dept: CARDIOLOGY | Facility: CLINIC | Age: 49
End: 2019-06-13
Payer: COMMERCIAL

## 2019-06-13 VITALS
HEART RATE: 72 BPM | SYSTOLIC BLOOD PRESSURE: 142 MMHG | BODY MASS INDEX: 30.74 KG/M2 | DIASTOLIC BLOOD PRESSURE: 80 MMHG | HEIGHT: 70 IN | WEIGHT: 214.75 LBS

## 2019-06-13 DIAGNOSIS — E78.5 HYPERLIPIDEMIA ASSOCIATED WITH TYPE 2 DIABETES MELLITUS: ICD-10-CM

## 2019-06-13 DIAGNOSIS — E55.9 VITAMIN D DEFICIENCY: ICD-10-CM

## 2019-06-13 DIAGNOSIS — I15.2 HYPERTENSION ASSOCIATED WITH DIABETES: ICD-10-CM

## 2019-06-13 DIAGNOSIS — G47.33 OSA TREATED WITH BIPAP: Chronic | ICD-10-CM

## 2019-06-13 DIAGNOSIS — N18.2 CONTROLLED TYPE 2 DIABETES MELLITUS WITH STAGE 2 CHRONIC KIDNEY DISEASE, WITHOUT LONG-TERM CURRENT USE OF INSULIN: Primary | ICD-10-CM

## 2019-06-13 DIAGNOSIS — E11.59 HYPERTENSION ASSOCIATED WITH DIABETES: ICD-10-CM

## 2019-06-13 DIAGNOSIS — E11.69 HYPERLIPIDEMIA ASSOCIATED WITH TYPE 2 DIABETES MELLITUS: ICD-10-CM

## 2019-06-13 DIAGNOSIS — N18.2 CONTROLLED TYPE 2 DIABETES MELLITUS WITH STAGE 2 CHRONIC KIDNEY DISEASE, WITHOUT LONG-TERM CURRENT USE OF INSULIN: ICD-10-CM

## 2019-06-13 DIAGNOSIS — E11.22 CONTROLLED TYPE 2 DIABETES MELLITUS WITH STAGE 2 CHRONIC KIDNEY DISEASE, WITHOUT LONG-TERM CURRENT USE OF INSULIN: Primary | ICD-10-CM

## 2019-06-13 DIAGNOSIS — J44.9 MODERATE COPD (CHRONIC OBSTRUCTIVE PULMONARY DISEASE): ICD-10-CM

## 2019-06-13 DIAGNOSIS — Z98.61 S/P PTCA (PERCUTANEOUS TRANSLUMINAL CORONARY ANGIOPLASTY): ICD-10-CM

## 2019-06-13 DIAGNOSIS — E11.22 CONTROLLED TYPE 2 DIABETES MELLITUS WITH STAGE 2 CHRONIC KIDNEY DISEASE, WITHOUT LONG-TERM CURRENT USE OF INSULIN: ICD-10-CM

## 2019-06-13 DIAGNOSIS — I25.118 CORONARY ARTERY DISEASE OF NATIVE ARTERY OF NATIVE HEART WITH STABLE ANGINA PECTORIS: Primary | ICD-10-CM

## 2019-06-13 PROBLEM — M47.26 OSTEOARTHRITIS OF SPINE WITH RADICULOPATHY, LUMBAR REGION: Status: ACTIVE | Noted: 2019-06-13

## 2019-06-13 PROCEDURE — 99999 PR PBB SHADOW E&M-EST. PATIENT-LVL IV: ICD-10-PCS | Mod: PBBFAC,,, | Performed by: NURSE PRACTITIONER

## 2019-06-13 PROCEDURE — 99214 OFFICE O/P EST MOD 30 MIN: CPT | Mod: S$GLB,,, | Performed by: NURSE PRACTITIONER

## 2019-06-13 PROCEDURE — 3044F HG A1C LEVEL LT 7.0%: CPT | Mod: CPTII,S$GLB,, | Performed by: NURSE PRACTITIONER

## 2019-06-13 PROCEDURE — 3077F SYST BP >= 140 MM HG: CPT | Mod: CPTII,S$GLB,, | Performed by: NURSE PRACTITIONER

## 2019-06-13 PROCEDURE — 3077F PR MOST RECENT SYSTOLIC BLOOD PRESSURE >= 140 MM HG: ICD-10-PCS | Mod: CPTII,S$GLB,, | Performed by: NURSE PRACTITIONER

## 2019-06-13 PROCEDURE — 99999 PR PBB SHADOW E&M-EST. PATIENT-LVL IV: CPT | Mod: PBBFAC,,, | Performed by: NURSE PRACTITIONER

## 2019-06-13 PROCEDURE — 99214 PR OFFICE/OUTPT VISIT, EST, LEVL IV, 30-39 MIN: ICD-10-PCS | Mod: S$GLB,,, | Performed by: NURSE PRACTITIONER

## 2019-06-13 PROCEDURE — 3079F DIAST BP 80-89 MM HG: CPT | Mod: CPTII,S$GLB,, | Performed by: NURSE PRACTITIONER

## 2019-06-13 PROCEDURE — 3079F PR MOST RECENT DIASTOLIC BLOOD PRESSURE 80-89 MM HG: ICD-10-PCS | Mod: CPTII,S$GLB,, | Performed by: NURSE PRACTITIONER

## 2019-06-13 PROCEDURE — 3044F PR MOST RECENT HEMOGLOBIN A1C LEVEL <7.0%: ICD-10-PCS | Mod: CPTII,S$GLB,, | Performed by: NURSE PRACTITIONER

## 2019-06-13 PROCEDURE — 3008F BODY MASS INDEX DOCD: CPT | Mod: CPTII,S$GLB,, | Performed by: NURSE PRACTITIONER

## 2019-06-13 PROCEDURE — 3008F PR BODY MASS INDEX (BMI) DOCUMENTED: ICD-10-PCS | Mod: CPTII,S$GLB,, | Performed by: NURSE PRACTITIONER

## 2019-06-13 RX ORDER — ASPIRIN 81 MG/1
81 TABLET ORAL DAILY
Refills: 0
Start: 2019-06-13 | End: 2020-12-30 | Stop reason: SDUPTHER

## 2019-06-13 RX ORDER — RANOLAZINE 1000 MG/1
1000 TABLET, EXTENDED RELEASE ORAL 2 TIMES DAILY
Qty: 180 TABLET | Refills: 3 | Status: SHIPPED | OUTPATIENT
Start: 2019-06-13 | End: 2020-06-16

## 2019-06-13 NOTE — TELEPHONE ENCOUNTER
----- Message from Candice Sabillon sent at 6/13/2019 10:29 AM CDT -----  Contact: Tejal- Dr AnthonyJtppv-[phone]  Would like to consult , with the nurse, patient is having surgery on June 19, and needs to be work in for a sooner appt, Ms Toure from the  office would like to speak with the nurse as soon as possible concerning this, please call back at

## 2019-06-13 NOTE — PROGRESS NOTES
Subjective:   Patient ID:  Kodak Valentine is a 49 y.o. male who presents for evaluation of Pre-op Exam      HPI     Mr. Valentine is a 49 year old male who presents to clinic for preop exam/risk assessment.  His current medical conditions include CAD s/p coronary stenting, COPD, JUDI on Bipap, smoker, DM Type II, HTN, HLP.  He returns today and states he is doing ok. He is scheduled for L1-2 lateral foraminotomy, L4-5 and L5-S1 TLIF MIS per Dr. Reagan on June 19th. Denies chest pain or anginal equivalents. No shortness of breath, GARCIA or palpitations. He is smoking a few cigs per day, encouraged cessation. Reports compliance with oxygen therapy nightly with Bipap. No leg swelling or claudications. Denies orthopnea, PND or abdominal bloating. He is trying to walk daily as tolerated. NO dizziness, lightheadedness, syncope or near syncopal events. Reports compliance with medications and dietary restrictions. No CNS complaints to suggest TIA or CVA today. NO signs of abnormal bleeding on ASA daily.     He stopped taking ASA yesterday per surgeon's directions.     Past Medical History:   Diagnosis Date    Anxiety     CAD (coronary artery disease)     PTCA x 2 LAD, restenosis and restent 7/12.    Chest pain syndrome 10/2/2015    COPD (chronic obstructive pulmonary disease)     CPAP (continuous positive airway pressure) dependence     @ night    Diabetes mellitus type 2, uncontrolled 4/13/2016    History of duodenal ulcer     with bleed    Hypertension     Mixed hyperlipidemia     JUDI (obstructive sleep apnea)     severe    S/P PTCA (percutaneous transluminal coronary angioplasty) 3/11/2015    Vitamin D deficiency        Past Surgical History:   Procedure Laterality Date    APPENDECTOMY      CARDIAC CATHETERIZATION      CATARACT EXTRACTION W/  INTRAOCULAR LENS IMPLANT Right 4-22-15    CORONARY STENT PLACEMENT  2012    ESOPHAGOGASTRODUODENOSCOPY (EGD) N/A 7/31/2014    Performed by Jose Dela Cruz [facility] at  [facility] ENDO    EXCISION-SKIN Right 2015    Performed by Louis O. Jeansonne IV, MD at [facility] OR    HEART CATH WITH GRAFTS-LEFT Right 2014    Performed by Erma Green MD at [facility] CATH LAB    HEART CATH-LEFT Left 2017    Performed by Erma Green MD at [facility] CATH LAB    HEMORRHOID SURGERY      INCISION AND DRAINAGE (I&D), BUTTOCK  3/26/2015    Performed by [facility]. [facility]ansonne IV, MD at [facility] OR    INCISION AND DRAINAGE-THIGH Right 3/26/2015    Performed by [facility]ne IV, MD at [facility] OR    NISSEN FUNDOPLICATION      lap    SKIN LESION EXCISION Right     leg       Social History     Tobacco Use    Smoking status: Former Smoker     Packs/day: 0.50     Years: 20.00     Pack years: 10.00     Types: Cigarettes     Last attempt to quit: 6/3/2014     Years since quittin.0    Smokeless tobacco: Never Used    Tobacco comment: currently vaping with nicotine since quit smoking in 2014   Substance Use Topics    Alcohol use: Yes     Alcohol/week: 2.4 oz     Types: 4 Cans of beer per week    Drug use: No       Family History   Problem Relation Age of Onset    Heart disease Mother     Heart block Father     Heart disease Sister     COPD Maternal Grandmother     Diabetes Maternal Grandfather     COPD Maternal Grandfather      Wt Readings from Last 3 Encounters:   19 97.4 kg (214 lb 11.7 oz)   19 98.9 kg (218 lb 0.6 oz)   10/17/18 102.2 kg (225 lb 5 oz)     Temp Readings from Last 3 Encounters:   10/17/18 96.2 °F (35.7 °C) (Tympanic)   18 97.9 °F (36.6 °C) (Tympanic)   18 97.5 °F (36.4 °C) (Tympanic)     BP Readings from Last 3 Encounters:   19 (!) 142/80   19 130/80   10/17/18 120/70     Pulse Readings from Last 3 Encounters:   19 72   19 71   10/17/18 68     Current Outpatient Medications on File Prior to Visit   Medication Sig Dispense Refill    albuterol (PROAIR HFA) 90 mcg/actuation inhaler Inhale 2 puffs into the lungs every 4 (four)  hours as needed. Rescue 18 g 3    ALPRAZolam (XANAX) 0.25 MG tablet TAKE ONE TABLET BY MOUTH 3 TIMES DAILY AS NEEDED FOR ANXIETY 90 tablet 0    amLODIPine (NORVASC) 2.5 MG tablet TAKE 1 TABLET BY MOUTH ONCE A DAY 30 tablet 11    atorvastatin (LIPITOR) 20 MG tablet TAKE 1 TABLET BY MOUTH ONCE A DAY IN THE EVENING FOR CHOLESTEROL 30 tablet 11    cholecalciferol, vitamin D3, (VITAMIN D3) 2,000 unit Cap Take 1 capsule (2,000 Units total) by mouth once daily. 100 capsule 6    clonazePAM (KLONOPIN) 0.5 MG tablet Take 0.25 mg by mouth 2 (two) times daily.  3    DULoxetine (CYMBALTA) 60 MG capsule TAKE 1 CAPSULE BY MOUTH ONCE A DAY 30 capsule 11    fenofibrate 160 MG Tab TAKE 1 TABLET BY MOUTH ONCE A DAY 30 tablet 6    fluticasone furoate-vilanterol (BREO) 200-25 mcg/dose DsDv diskus inhaler INHALE 1 PUFF ONCE A DAY 60 each 11    lisinopril (PRINIVIL,ZESTRIL) 20 MG tablet TAKE 1 TABLET BY MOUTH ONCE A DAY 30 tablet 11    metFORMIN (GLUCOPHAGE) 500 MG tablet TAKE ONE TABLET BY MOUTH TWICE DAILY WITH MEALS 60 tablet 11    metoprolol tartrate (LOPRESSOR) 50 MG tablet TAKE 1 TABLET BY MOUTH EVERY 12 HOURS 60 tablet 11    oxyCODONE-acetaminophen (PERCOCET)  mg per tablet Take 1 tablet by mouth 4 (four) times daily as needed.  0    pantoprazole (PROTONIX) 40 MG tablet TAKE 1 TABLET BY MOUTH TWICE A DAY 60 tablet 11    sildenafil (VIAGRA) 100 MG tablet TAKE AS DIRECTED 30 tablet 6    tiotropium bromide (SPIRIVA RESPIMAT) 2.5 mcg/actuation Mist INHALE 2 PUFFS INTO THE LUNGS ONCE DAILY 4 g 11    traZODone (DESYREL) 100 MG tablet Take 1-2 tablets (100-200 mg total) by mouth nightly as needed for Insomnia. 60 tablet 11    [DISCONTINUED] ranolazine (RANEXA) 1,000 mg Tb12 Take 1 tablet (1,000 mg total) by mouth 2 (two) times daily. 60 tablet 3    [DISCONTINUED] varenicline (CHANTIX CONTINUING MONTH BOX) 1 mg Tab use as directed on package 56 tablet 0     No current facility-administered medications on file prior  "to visit.        Review of Systems   Constitution: Negative for malaise/fatigue.   HENT: Negative for hearing loss and hoarse voice.    Eyes: Negative for blurred vision and visual disturbance.   Cardiovascular: Negative for chest pain, claudication, dyspnea on exertion, irregular heartbeat, leg swelling, near-syncope, orthopnea, palpitations, paroxysmal nocturnal dyspnea and syncope.   Respiratory: Negative for cough, hemoptysis, shortness of breath, sleep disturbances due to breathing, snoring and wheezing.         +JUDI on Bipap   Endocrine: Negative for cold intolerance and heat intolerance.   Hematologic/Lymphatic: Does not bruise/bleed easily.   Skin: Negative for color change, dry skin and nail changes.   Musculoskeletal: Positive for arthritis and back pain. Negative for joint pain and myalgias.   Gastrointestinal: Negative for bloating, abdominal pain, constipation, nausea and vomiting.   Genitourinary: Negative for dysuria, flank pain, hematuria and hesitancy.   Neurological: Negative for headaches, light-headedness, loss of balance, numbness, paresthesias and weakness.   Psychiatric/Behavioral: Negative for altered mental status.   Allergic/Immunologic: Negative for environmental allergies.     BP (!) 142/80 (BP Location: Right arm, Patient Position: Sitting, BP Method: Large (Manual))   Pulse 72   Ht 5' 10" (1.778 m)   Wt 97.4 kg (214 lb 11.7 oz)   BMI 30.81 kg/m²     Objective:   Physical Exam   Constitutional: He is oriented to person, place, and time. He appears well-developed and well-nourished. No distress.   HENT:   Head: Normocephalic and atraumatic.   Eyes: Pupils are equal, round, and reactive to light.   Neck: Normal range of motion and full passive range of motion without pain. Neck supple. No JVD present.   Cardiovascular: Normal rate, regular rhythm, S1 normal, S2 normal and intact distal pulses.  Occasional extrasystoles are present. PMI is not displaced. Exam reveals no distant heart " sounds.   No murmur heard.  Pulses:       Radial pulses are 2+ on the right side, and 2+ on the left side.        Dorsalis pedis pulses are 2+ on the right side, and 2+ on the left side.   BP controlled today on exam  No chest pain or SOB today.   Pulmonary/Chest: Effort normal and breath sounds normal. No accessory muscle usage. No respiratory distress. He has no decreased breath sounds. He has no wheezes. He has no rales.   +JUDI on Bipap, nocturnal oxygen   Abdominal: Soft. Bowel sounds are normal. He exhibits no distension. There is no tenderness.   Musculoskeletal: Normal range of motion. He exhibits no edema.        Right ankle: He exhibits no swelling.        Left ankle: He exhibits no swelling.   Neurological: He is alert and oriented to person, place, and time.   Skin: Skin is warm and dry. He is not diaphoretic. No cyanosis. Nails show no clubbing.   Psychiatric: He has a normal mood and affect. His speech is normal and behavior is normal. Judgment and thought content normal. Cognition and memory are normal.   Nursing note and vitals reviewed.      Lab Results   Component Value Date    CHOL 135 03/28/2018    CHOL 182 04/18/2017    CHOL 145 04/07/2016     Lab Results   Component Value Date    HDL 36 (L) 03/28/2018    HDL 37 (L) 04/18/2017    HDL 29 (L) 04/07/2016     Lab Results   Component Value Date    LDLCALC 60.8 (L) 03/28/2018    LDLCALC 86.6 04/18/2017    LDLCALC 68.0 04/07/2016     Lab Results   Component Value Date    TRIG 191 (H) 03/28/2018    TRIG 292 (H) 04/18/2017    TRIG 240 (H) 04/07/2016     Lab Results   Component Value Date    CHOLHDL 26.7 03/28/2018    CHOLHDL 20.3 04/18/2017    CHOLHDL 20.0 04/07/2016       Chemistry        Component Value Date/Time     07/11/2018 0745    K 3.7 07/11/2018 0745     07/11/2018 0745    CO2 24 07/11/2018 0745    BUN 7 07/11/2018 0745    CREATININE 1.0 07/11/2018 0745     (H) 07/11/2018 0745        Component Value Date/Time    CALCIUM 9.6  07/11/2018 0745    ALKPHOS 56 07/11/2018 0745    AST 24 07/11/2018 0745    ALT 20 07/11/2018 0745    BILITOT 0.5 07/11/2018 0745    ESTGFRAFRICA >60.0 07/11/2018 0745    EGFRNONAA >60.0 07/11/2018 0745          Lab Results   Component Value Date    TSH 1.384 07/11/2018     Lab Results   Component Value Date    INR 1.0 02/15/2017    INR 1.0 05/29/2014    INR 1.0 04/29/2013     Lab Results   Component Value Date    WBC 5.71 07/11/2018    HGB 13.8 (L) 07/11/2018    HCT 39.9 (L) 07/11/2018     (H) 07/11/2018     07/11/2018      TEST DESCRIPTION   Technical Quality: This is a technically adequate study.     Aorta: The aortic root is normal in size, measuring 2.5 cm at sinotubular junction and 3.2 cm at Sinuses of Valsalva. The proximal ascending aorta is normal in size, measuring 2.9 cm across.     Left Atrium: The left atrial volume index is normal, measuring 30.74 cc/m2.     Left Ventricle: The left ventricle is normal in size, with an end-diastolic diameter of 4.9 cm, and an end-systolic diameter of 3.3 cm. LV wall thickness is normal, with the septum and the posterior wall each measuring 0.9 cm across. Relative wall   thickness was normal at 0.37, and the LV mass index was 80.0 g/m2 consistent with normal left ventricular mass. Global left ventricular systolic function appears normal. Visually estimated ejection fraction is 60-65%. The LV Doppler derived stroke volume   equals 86.0 ccs.   The E/e'(lat) is 6, consistent with normal diastolic function.     Right Atrium: The right atrium is normal in size, measuring 5.5 cm in length and 3.9 cm in width in the apical view.     Right Ventricle: The right ventricle is normal in size measuring 3.0 cm at the base in the apical right ventricle-focused view. Global right ventricular systolic function appears normal. Tricuspid annular plane systolic excursion (TAPSE) is 2.8 cm. The   estimated PA systolic pressure is 25 mmHg.     Aortic Valve:  Aortic valve is  normal in structure with normal leaflet mobility.     Mitral Valve:  Mitral valve is normal in structure with normal leaflet mobility.     Tricuspid Valve:  Tricuspid valve is normal in structure with normal leaflet mobility. There is mild tricuspid regurgitation.     Pulmonary Valve:  Pulmonary valve is normal in structure with normal leaflet mobility.     IVC: IVC is normal in size and collapses > 50% with a sniff, suggesting normal right atrial pressure of 3 mmHg.     Intracavitary: There is no evidence of pericardial effusion, intracavity mass, thrombi, or vegetation.         CONCLUSIONS     1 - Normal left ventricular systolic function (EF 60-65%).     2 - Normal left ventricular diastolic function.     3 - Normal right ventricular systolic function .     4 - The estimated PA systolic pressure is 25 mmHg.     5 - Mild tricuspid regurgitation.             This document has been electronically    SIGNED BY: Margaux Merlos MD On: 12/06/2016 11:47  Assessment:      1. Coronary artery disease of native artery of native heart with stable angina pectoris    2. S/P PTCA (percutaneous transluminal coronary angioplasty)    3. Moderate COPD (chronic obstructive pulmonary disease)    4. Hypertension associated with diabetes    5. Hyperlipidemia associated with type 2 diabetes mellitus    6. Controlled type 2 diabetes mellitus with stage 2 chronic kidney disease, without long-term current use of insulin    7. JUDI treated with BiPAP      Patient presents to clinic for preoperative evaluation/risk assessment.   NO chest pain or SOB today.   Reports compliance with medications and dietary restrictions.   No CNS complaints to suggest TIA or CVA today.  No signs of abnormal bleeding.  Plan:     Continue same CV meds for now  Resume ASA daily once cleared from surgical standpoint  Ranexa Rx sent to pharmacy today  Dash diet, 2 gm sodium restriction  1.5L FLuid restriction  Encourage regular physical activity as  tolerated  Encourage weight loss  Continue Bipap nightly  Monitor BP at home  Follow up in 6 months with Dr. Green or sooner if needed.     ESTEFANI RubioC

## 2019-06-13 NOTE — TELEPHONE ENCOUNTER
----- Message from Jennie Kee sent at 6/13/2019  9:45 AM CDT -----  Contact: Patient  Patient called and scheduled his Annual for 9/4/19; he would like to be labs before coming in. Please call him at [phone].    Thanks,  Jennie

## 2019-06-13 NOTE — TELEPHONE ENCOUNTER
Notified pt of lab appt.  Pt states that he did some lab work for Dr. Reagan in [place] for pre op.  He was told that all of his numbers are off and he needed to speak w/PCP.  I scheduled pt appt tomorrow.  His labs are in his chart under care everywhere./rpr

## 2019-06-14 ENCOUNTER — OFFICE VISIT (OUTPATIENT)
Dept: FAMILY MEDICINE | Facility: CLINIC | Age: 49
End: 2019-06-14
Payer: COMMERCIAL

## 2019-06-14 VITALS
HEIGHT: 70 IN | BODY MASS INDEX: 30.58 KG/M2 | TEMPERATURE: 98 F | WEIGHT: 213.63 LBS | DIASTOLIC BLOOD PRESSURE: 80 MMHG | SYSTOLIC BLOOD PRESSURE: 152 MMHG | OXYGEN SATURATION: 98 % | HEART RATE: 83 BPM

## 2019-06-14 DIAGNOSIS — M47.26 OSTEOARTHRITIS OF SPINE WITH RADICULOPATHY, LUMBAR REGION: ICD-10-CM

## 2019-06-14 DIAGNOSIS — D69.6 THROMBOCYTOPENIA: ICD-10-CM

## 2019-06-14 DIAGNOSIS — F41.9 ANXIETY: ICD-10-CM

## 2019-06-14 DIAGNOSIS — N18.2 CONTROLLED TYPE 2 DIABETES MELLITUS WITH STAGE 2 CHRONIC KIDNEY DISEASE, WITHOUT LONG-TERM CURRENT USE OF INSULIN: ICD-10-CM

## 2019-06-14 DIAGNOSIS — I15.2 HYPERTENSION ASSOCIATED WITH DIABETES: ICD-10-CM

## 2019-06-14 DIAGNOSIS — Z98.61 S/P PTCA (PERCUTANEOUS TRANSLUMINAL CORONARY ANGIOPLASTY): ICD-10-CM

## 2019-06-14 DIAGNOSIS — E11.69 HYPERLIPIDEMIA ASSOCIATED WITH TYPE 2 DIABETES MELLITUS: ICD-10-CM

## 2019-06-14 DIAGNOSIS — I25.10 CORONARY ARTERY DISEASE WITHOUT ANGINA PECTORIS, UNSPECIFIED VESSEL OR LESION TYPE, UNSPECIFIED WHETHER NATIVE OR TRANSPLANTED HEART: ICD-10-CM

## 2019-06-14 DIAGNOSIS — E78.5 HYPERLIPIDEMIA ASSOCIATED WITH TYPE 2 DIABETES MELLITUS: ICD-10-CM

## 2019-06-14 DIAGNOSIS — E11.59 HYPERTENSION ASSOCIATED WITH DIABETES: ICD-10-CM

## 2019-06-14 DIAGNOSIS — E11.22 CONTROLLED TYPE 2 DIABETES MELLITUS WITH STAGE 2 CHRONIC KIDNEY DISEASE, WITHOUT LONG-TERM CURRENT USE OF INSULIN: ICD-10-CM

## 2019-06-14 DIAGNOSIS — J44.9 MODERATE COPD (CHRONIC OBSTRUCTIVE PULMONARY DISEASE): ICD-10-CM

## 2019-06-14 DIAGNOSIS — Z01.810 PREOP CARDIOVASCULAR EXAM: Primary | ICD-10-CM

## 2019-06-14 PROCEDURE — 99214 PR OFFICE/OUTPT VISIT, EST, LEVL IV, 30-39 MIN: ICD-10-PCS | Mod: S$GLB,,, | Performed by: INTERNAL MEDICINE

## 2019-06-14 PROCEDURE — 99999 PR PBB SHADOW E&M-EST. PATIENT-LVL IV: CPT | Mod: PBBFAC,,, | Performed by: INTERNAL MEDICINE

## 2019-06-14 PROCEDURE — 3044F HG A1C LEVEL LT 7.0%: CPT | Mod: CPTII,S$GLB,, | Performed by: INTERNAL MEDICINE

## 2019-06-14 PROCEDURE — 3008F PR BODY MASS INDEX (BMI) DOCUMENTED: ICD-10-PCS | Mod: CPTII,S$GLB,, | Performed by: INTERNAL MEDICINE

## 2019-06-14 PROCEDURE — 3079F PR MOST RECENT DIASTOLIC BLOOD PRESSURE 80-89 MM HG: ICD-10-PCS | Mod: CPTII,S$GLB,, | Performed by: INTERNAL MEDICINE

## 2019-06-14 PROCEDURE — 3044F PR MOST RECENT HEMOGLOBIN A1C LEVEL <7.0%: ICD-10-PCS | Mod: CPTII,S$GLB,, | Performed by: INTERNAL MEDICINE

## 2019-06-14 PROCEDURE — 3008F BODY MASS INDEX DOCD: CPT | Mod: CPTII,S$GLB,, | Performed by: INTERNAL MEDICINE

## 2019-06-14 PROCEDURE — 3079F DIAST BP 80-89 MM HG: CPT | Mod: CPTII,S$GLB,, | Performed by: INTERNAL MEDICINE

## 2019-06-14 PROCEDURE — 99214 OFFICE O/P EST MOD 30 MIN: CPT | Mod: S$GLB,,, | Performed by: INTERNAL MEDICINE

## 2019-06-14 PROCEDURE — 3077F PR MOST RECENT SYSTOLIC BLOOD PRESSURE >= 140 MM HG: ICD-10-PCS | Mod: CPTII,S$GLB,, | Performed by: INTERNAL MEDICINE

## 2019-06-14 PROCEDURE — 3077F SYST BP >= 140 MM HG: CPT | Mod: CPTII,S$GLB,, | Performed by: INTERNAL MEDICINE

## 2019-06-14 PROCEDURE — 99999 PR PBB SHADOW E&M-EST. PATIENT-LVL IV: ICD-10-PCS | Mod: PBBFAC,,, | Performed by: INTERNAL MEDICINE

## 2019-06-14 NOTE — PROGRESS NOTES
Subjective:      Patient ID: Kodak Valentine is a 49 y.o. male.    Chief Complaint: Follow-up      HPI  Patient is here for follow up of medical problems and preoperative evaluation for lumbar surgery due to trauma 6 years ago.  No bleeding diathesis, no gum bleeding.  Off ASA x 4d, in prep for surgery.  No f/c/sw/cough.  No cp/sob/palp.  BMs normal, no black or blood.  Urine normal.  No signif kidney or liver disease.  Mild anemaia x over 4y.    Past Medical History:   Diagnosis Date    Anxiety     CAD (coronary artery disease)     PTCA x 2 LAD, restenosis and restent 7/12.    Chest pain syndrome 10/2/2015    COPD (chronic obstructive pulmonary disease)     CPAP (continuous positive airway pressure) dependence     @ night    Diabetes mellitus type 2, uncontrolled 4/13/2016    History of duodenal ulcer     with bleed    Hypertension     Mixed hyperlipidemia     JUDI (obstructive sleep apnea)     severe    S/P PTCA (percutaneous transluminal coronary angioplasty) 3/11/2015    Vitamin D deficiency        Past Surgical History:   Procedure Laterality Date    APPENDECTOMY      CARDIAC CATHETERIZATION      CATARACT EXTRACTION W/  INTRAOCULAR LENS IMPLANT Right 4-22-15    CORONARY STENT PLACEMENT  2012    ESOPHAGOGASTRODUODENOSCOPY (EGD) N/A 7/31/2014    Performed by [facility] Cruz [facility] at [facility] ENDO    EXCISION-SKIN Right 4/20/2015    Performed by Louis O. Jeansonne IV, MD at [facility] OR    HEART CATH WITH GRAFTS-LEFT Right 6/2/2014    Performed by Erma Green MD at [facility] CATH LAB    HEART CATH-LEFT Left 2/21/2017    Performed by Erma Green MD at [facility] CATH LAB    HEMORRHOID SURGERY      INCISION AND DRAINAGE (I&D), BUTTOCK  3/26/2015    Performed by [facility]ne [facility], [facility] at [facility] OR    INCISION AND DRAINAGE-THIGH Right 3/26/2015    Performed by [facility]ne IV, MD at [facility] OR    NISSEN FUNDOPLICATION      lap    SKIN LESION EXCISION Right     leg  "      ALLERGIES:  NKDA      Updated/ annual due 7/19:  HM: 10/18 fluvax, 7/15 [id number], 1/12 [id number], 7/10 TDaP, no cscope yet, 7/18 PSA, 6/10 HCV neg, 5/18 [facility].     Review of Systems   Constitutional: Negative for appetite change, chills, diaphoresis, fatigue and fever.   HENT: Negative for congestion, ear pain, rhinorrhea and sinus pressure.    Respiratory: Negative for cough and shortness of breath.    Cardiovascular: Negative for chest pain and palpitations.   Gastrointestinal: Negative for abdominal distention, abdominal pain, blood in stool, constipation, diarrhea, nausea and vomiting.   Genitourinary: Negative for difficulty urinating, dysuria, frequency, hematuria and urgency.   Musculoskeletal: Negative for arthralgias.   Skin: Negative for rash.   Neurological: Negative for dizziness and headaches.   Psychiatric/Behavioral: The patient is not nervous/anxious.          Objective:   BP (!) 152/80 (BP Location: Right arm, Patient Position: Sitting, BP Method: Medium (Manual))   Pulse 83   Temp 98.3 °F (36.8 °C) (Oral)   Ht 5' 10" (1.778 m)   Wt 96.9 kg (213 lb 10 oz)   SpO2 98%   BMI 30.65 kg/m²     Physical Exam   Constitutional: He is oriented to person, place, and time. He appears well-developed and well-nourished.   HENT:   Mouth/Throat: Oropharynx is clear and moist.   Neck: Normal range of motion. Neck supple.   Cardiovascular: Normal rate, regular rhythm and intact distal pulses. Exam reveals no gallop and no friction rub.   No murmur heard.  Pulmonary/Chest: Effort normal and breath sounds normal. He has no wheezes. He has no rales.   Abdominal: Soft. Bowel sounds are normal. He exhibits no mass. There is no tenderness.   Musculoskeletal: He exhibits no edema.   Lymphadenopathy:     He has no cervical adenopathy.   Neurological: He is alert and oriented to person, place, and time.   Psychiatric: He has a normal mood and affect.       6/5/19 CXR clear.  Outside labs reviewed- UA " ok, plt 117, WBC 3.3, Hct 39.8 stable, creat 0.82, K 3.5, PT/INR not elevated, PTT 30,     Assessment:       1. Preop cardiovascular exam    2. Osteoarthritis of spine with radiculopathy, lumbar region    3. Controlled type 2 diabetes mellitus with stage 2 chronic kidney disease, without long-term current use of insulin    4. Anxiety    5. Coronary artery disease without angina pectoris, unspecified vessel or lesion type, unspecified whether native or transplanted heart    6. Hypertension associated with diabetes    7. Moderate COPD (chronic obstructive pulmonary disease)    8. S/P PTCA (percutaneous transluminal coronary angioplasty)    9. Hyperlipidemia associated with type 2 diabetes mellitus    10. Thrombocytopenia          Plan:     Kodak was seen today for follow-up.    Diagnoses and all orders for this visit:    Preop cardiovascular exam for lumbar spine with radiculopathy, lumbar region with Dr. Reagan- Pt is clear for anesthesia and surgery with David Index Class II, low risk for perioperative complications.  Please note Cardiac clearance note.    Controlled type 2 diabetes mellitus with stage 2 chronic kidney disease, without long-term current use of insulin    Anxiety- doing well on rx, cont.    Coronary artery disease without angina pectoris, unspecified vessel or lesion type, unspecified whether native or transplanted heart, S/P PTCA , clinically stable on meds.    Hypertension associated with diabetes- monitor BPs for next visit.    Moderate COPD (chronic obstructive pulmonary disease)- stable on meds.    Hyperlipidemia associated with type 2 diabetes mellitus- cont statin and fibrate.    Thrombocytopenia- will follow, no sign of bleeding.  Recheck 3mo.

## 2019-06-19 ENCOUNTER — HISTORICAL (OUTPATIENT)
Dept: ONCOLOGY | Facility: HOSPITAL | Age: 49
End: 2019-06-19

## 2019-06-19 LAB
ABS NEUT (OLG): 1.09 X10(3)/MCL (ref 2.1–9.2)
ALBUMIN SERPL-MCNC: 3.5 GM/DL (ref 3.4–5)
ALBUMIN/GLOB SERPL: 1.5 RATIO (ref 1.1–2)
ALP SERPL-CCNC: 51 UNIT/L (ref 50–136)
ALT SERPL-CCNC: 15 UNIT/L (ref 12–78)
ANISOCYTOSIS BLD QL SMEAR: 1
AST SERPL-CCNC: 19 UNIT/L (ref 15–37)
BASOPHILS NFR BLD MANUAL: 1 % (ref 0–2)
BILIRUB SERPL-MCNC: 1.5 MG/DL (ref 0.2–1)
BILIRUBIN DIRECT+TOT PNL SERPL-MCNC: 0.7 MG/DL (ref 0–0.5)
BILIRUBIN DIRECT+TOT PNL SERPL-MCNC: 0.8 MG/DL (ref 0–0.8)
BUN SERPL-MCNC: 4 MG/DL (ref 7–18)
CALCIUM SERPL-MCNC: 8.4 MG/DL (ref 8.5–10.1)
CHLORIDE SERPL-SCNC: 106 MMOL/L (ref 98–107)
CO2 SERPL-SCNC: 26 MMOL/L (ref 21–32)
CREAT SERPL-MCNC: 0.69 MG/DL (ref 0.7–1.3)
EOSINOPHIL NFR BLD MANUAL: 1 % (ref 0–8)
ERYTHROCYTE [DISTWIDTH] IN BLOOD BY AUTOMATED COUNT: 13.5 % (ref 11.5–17)
GLOBULIN SER-MCNC: 2.4 GM/DL (ref 2.4–3.5)
GLUCOSE SERPL-MCNC: 80 MG/DL (ref 74–106)
GROUP & RH: NORMAL
HCT VFR BLD AUTO: 33.6 % (ref 42–52)
HGB BLD-MCNC: 11.7 GM/DL (ref 14–18)
LYMPHOCYTES NFR BLD MANUAL: 41 % (ref 13–40)
MACROCYTES BLD QL SMEAR: 1 /MCL
MCH RBC QN AUTO: 38.1 PG (ref 27–31)
MCHC RBC AUTO-ENTMCNC: 34.8 GM/DL (ref 33–36)
MCV RBC AUTO: 109.4 FL (ref 80–94)
MONOCYTES NFR BLD MANUAL: 13 % (ref 2–11)
NEUTROPHILS NFR BLD MANUAL: 44 % (ref 47–80)
PLATELET # BLD AUTO: 100 X10(3)/MCL (ref 130–400)
PLATELET # BLD EST: ABNORMAL 10*3/UL
PMV BLD AUTO: 9.4 FL (ref 7.4–10.4)
POTASSIUM SERPL-SCNC: 3.3 MMOL/L (ref 3.5–5.1)
PROT SERPL-MCNC: 5.9 GM/DL (ref 6.4–8.2)
RBC # BLD AUTO: 3.07 X10(6)/MCL (ref 4.7–6.1)
SODIUM SERPL-SCNC: 140 MMOL/L (ref 136–145)
WBC # SPEC AUTO: 2.6 X10(3)/MCL (ref 4.5–11.5)

## 2019-06-20 LAB
ABS NEUT (OLG): 1.54 X10(3)/MCL (ref 2.1–9.2)
ANISOCYTOSIS BLD QL SMEAR: 1
BUN SERPL-MCNC: 6 MG/DL (ref 7–18)
CALCIUM SERPL-MCNC: 8.4 MG/DL (ref 8.5–10.1)
CHLORIDE SERPL-SCNC: 105 MMOL/L (ref 98–107)
CO2 SERPL-SCNC: 33 MMOL/L (ref 21–32)
CREAT SERPL-MCNC: 0.76 MG/DL (ref 0.7–1.3)
CREAT/UREA NIT SERPL: 7.9
EOSINOPHIL NFR BLD MANUAL: 1 % (ref 0–8)
ERYTHROCYTE [DISTWIDTH] IN BLOOD BY AUTOMATED COUNT: 13.9 % (ref 11.5–17)
GLUCOSE SERPL-MCNC: 116 MG/DL (ref 74–106)
HCT VFR BLD AUTO: 31.7 % (ref 42–52)
HGB BLD-MCNC: 10.5 GM/DL (ref 14–18)
LYMPHOCYTES NFR BLD MANUAL: 3 % (ref 13–40)
MACROCYTES BLD QL SMEAR: 1 /MCL
MCH RBC QN AUTO: 38.5 PG (ref 27–31)
MCHC RBC AUTO-ENTMCNC: 33.1 GM/DL (ref 33–36)
MCV RBC AUTO: 116.1 FL (ref 80–94)
MONOCYTES NFR BLD MANUAL: 10 % (ref 2–11)
NEUTROPHILS NFR BLD MANUAL: 85 % (ref 47–80)
PLATELET # BLD AUTO: 118 X10(3)/MCL (ref 130–400)
PLATELET # BLD EST: ABNORMAL 10*3/UL
PLATELET # BLD EST: ABNORMAL 10*3/UL
PMV BLD AUTO: 10 FL (ref 7.4–10.4)
POIKILOCYTOSIS BLD QL SMEAR: 1
POTASSIUM SERPL-SCNC: 3.8 MMOL/L (ref 3.5–5.1)
RBC # BLD AUTO: 2.73 X10(6)/MCL (ref 4.7–6.1)
SODIUM SERPL-SCNC: 143 MMOL/L (ref 136–145)
WBC # SPEC AUTO: 2.3 X10(3)/MCL (ref 4.5–11.5)

## 2019-06-21 LAB
ABS NEUT (OLG): 0.72 X10(3)/MCL (ref 2.1–9.2)
BASOPHILS # BLD AUTO: 0 X10(3)/MCL (ref 0–0.2)
BASOPHILS NFR BLD AUTO: 0 %
BUN SERPL-MCNC: 9 MG/DL (ref 7–18)
CALCIUM SERPL-MCNC: 8.3 MG/DL (ref 8.5–10.1)
CHLORIDE SERPL-SCNC: 102 MMOL/L (ref 98–107)
CO2 SERPL-SCNC: 31 MMOL/L (ref 21–32)
CREAT SERPL-MCNC: 0.74 MG/DL (ref 0.7–1.3)
CREAT/UREA NIT SERPL: 12.2
EOSINOPHIL # BLD AUTO: 0 X10(3)/MCL (ref 0–0.9)
EOSINOPHIL NFR BLD AUTO: 1 %
ERYTHROCYTE [DISTWIDTH] IN BLOOD BY AUTOMATED COUNT: 14 % (ref 11.5–17)
GLUCOSE SERPL-MCNC: 114 MG/DL (ref 74–106)
HCT VFR BLD AUTO: 27.3 % (ref 42–52)
HGB BLD-MCNC: 8.9 GM/DL (ref 14–18)
LYMPHOCYTES # BLD AUTO: 0.7 X10(3)/MCL (ref 0.6–4.6)
LYMPHOCYTES NFR BLD AUTO: 40 %
MCH RBC QN AUTO: 38 PG (ref 27–31)
MCHC RBC AUTO-ENTMCNC: 32.6 GM/DL (ref 33–36)
MCV RBC AUTO: 116.7 FL (ref 80–94)
MONOCYTES # BLD AUTO: 0.4 X10(3)/MCL (ref 0.1–1.3)
MONOCYTES NFR BLD AUTO: 19 %
NEUTROPHILS # BLD AUTO: 0.72 X10(3)/MCL (ref 2.1–9.2)
NEUTROPHILS NFR BLD AUTO: 38 %
PLATELET # BLD AUTO: 102 X10(3)/MCL (ref 130–400)
PMV BLD AUTO: 9.5 FL (ref 9.4–12.4)
POTASSIUM SERPL-SCNC: 3.6 MMOL/L (ref 3.5–5.1)
RBC # BLD AUTO: 2.34 X10(6)/MCL (ref 4.7–6.1)
SODIUM SERPL-SCNC: 141 MMOL/L (ref 136–145)
WBC # SPEC AUTO: 1.9 X10(3)/MCL (ref 4.5–11.5)

## 2019-06-22 LAB
ABS NEUT (OLG): 0.48 X10(3)/MCL (ref 2.1–9.2)
BASOPHILS # BLD AUTO: 0 X10(3)/MCL (ref 0–0.2)
BASOPHILS NFR BLD AUTO: 1 %
EOSINOPHIL # BLD AUTO: 0 X10(3)/MCL (ref 0–0.9)
EOSINOPHIL NFR BLD AUTO: 2 %
ERYTHROCYTE [DISTWIDTH] IN BLOOD BY AUTOMATED COUNT: 14.2 % (ref 11.5–17)
HCT VFR BLD AUTO: 26.8 % (ref 42–52)
HGB BLD-MCNC: 8.8 GM/DL (ref 14–18)
LYMPHOCYTES # BLD AUTO: 1 X10(3)/MCL (ref 0.6–4.6)
LYMPHOCYTES NFR BLD AUTO: 55 % (ref 13–40)
MCH RBC QN AUTO: 38.8 PG (ref 27–31)
MCHC RBC AUTO-ENTMCNC: 32.8 GM/DL (ref 33–36)
MCV RBC AUTO: 118.1 FL (ref 80–94)
MONOCYTES # BLD AUTO: 0.3 X10(3)/MCL (ref 0.1–1.3)
MONOCYTES NFR BLD AUTO: 15 %
NEUTROPHILS # BLD AUTO: 0.48 X10(3)/MCL (ref 2.1–9.2)
NEUTROPHILS NFR BLD AUTO: 26 %
PLATELET # BLD AUTO: 106 X10(3)/MCL (ref 130–400)
PMV BLD AUTO: 9.4 FL (ref 9.4–12.4)
RBC # BLD AUTO: 2.27 X10(6)/MCL (ref 4.7–6.1)
WBC # SPEC AUTO: 1.9 X10(3)/MCL (ref 4.5–11.5)

## 2019-08-12 ENCOUNTER — HOSPITAL ENCOUNTER (INPATIENT)
Facility: HOSPITAL | Age: 49
LOS: 9 days | Discharge: HOME | DRG: 872 | End: 2019-08-21
Attending: EMERGENCY MEDICINE | Admitting: INTERNAL MEDICINE
Payer: COMMERCIAL

## 2019-08-12 DIAGNOSIS — R11.2 NON-INTRACTABLE VOMITING WITH NAUSEA, UNSPECIFIED VOMITING TYPE: ICD-10-CM

## 2019-08-12 DIAGNOSIS — E87.6 HYPOKALEMIA: ICD-10-CM

## 2019-08-12 DIAGNOSIS — D61.818 PANCYTOPENIA: ICD-10-CM

## 2019-08-12 DIAGNOSIS — K52.9 ACUTE COLITIS: Primary | ICD-10-CM

## 2019-08-12 DIAGNOSIS — I49.9 IRREGULAR CARDIAC RHYTHM: ICD-10-CM

## 2019-08-12 DIAGNOSIS — R19.7 DIARRHEA, UNSPECIFIED TYPE: ICD-10-CM

## 2019-08-12 DIAGNOSIS — D70.9 NEUTROPENIA, UNSPECIFIED TYPE: ICD-10-CM

## 2019-08-12 PROBLEM — A41.9 SEPSIS: Status: ACTIVE | Noted: 2019-08-12

## 2019-08-12 LAB
ALBUMIN SERPL BCP-MCNC: 3.2 G/DL (ref 3.5–5.2)
ALP SERPL-CCNC: 90 U/L (ref 55–135)
ALT SERPL W/O P-5'-P-CCNC: 10 U/L (ref 10–44)
ANION GAP SERPL CALC-SCNC: 10 MMOL/L (ref 8–16)
ANISOCYTOSIS BLD QL SMEAR: ABNORMAL
ANISOCYTOSIS BLD QL SMEAR: SLIGHT
AST SERPL-CCNC: 13 U/L (ref 10–40)
BACTERIA #/AREA URNS HPF: NORMAL /HPF
BASOPHILS # BLD AUTO: 0.01 K/UL (ref 0–0.2)
BASOPHILS # BLD AUTO: ABNORMAL K/UL (ref 0–0.2)
BASOPHILS NFR BLD: 0 % (ref 0–1.9)
BASOPHILS NFR BLD: 1 % (ref 0–1.9)
BILIRUB SERPL-MCNC: 1.5 MG/DL (ref 0.1–1)
BILIRUB UR QL STRIP: ABNORMAL
BUN SERPL-MCNC: 7 MG/DL (ref 6–20)
CALCIUM SERPL-MCNC: 9.1 MG/DL (ref 8.7–10.5)
CHLORIDE SERPL-SCNC: 97 MMOL/L (ref 95–110)
CLARITY UR: ABNORMAL
CO2 SERPL-SCNC: 26 MMOL/L (ref 23–29)
COLOR UR: ABNORMAL
CREAT SERPL-MCNC: 0.8 MG/DL (ref 0.5–1.4)
CRP SERPL-MCNC: 156.8 MG/L (ref 0–8.2)
DACRYOCYTES BLD QL SMEAR: ABNORMAL
DACRYOCYTES BLD QL SMEAR: ABNORMAL
DIFFERENTIAL METHOD: ABNORMAL
DIFFERENTIAL METHOD: ABNORMAL
EOSINOPHIL # BLD AUTO: 0 K/UL (ref 0–0.5)
EOSINOPHIL # BLD AUTO: ABNORMAL K/UL (ref 0–0.5)
EOSINOPHIL NFR BLD: 3 % (ref 0–8)
EOSINOPHIL NFR BLD: 4 % (ref 0–8)
ERYTHROCYTE [DISTWIDTH] IN BLOOD BY AUTOMATED COUNT: 12.7 % (ref 11.5–14.5)
ERYTHROCYTE [DISTWIDTH] IN BLOOD BY AUTOMATED COUNT: 14.1 % (ref 11.5–14.5)
EST. GFR  (AFRICAN AMERICAN): >60 ML/MIN/1.73 M^2
EST. GFR  (NON AFRICAN AMERICAN): >60 ML/MIN/1.73 M^2
FERRITIN SERPL-MCNC: 273 NG/ML (ref 20–300)
GIANT PLATELETS BLD QL SMEAR: PRESENT
GLUCOSE SERPL-MCNC: 176 MG/DL (ref 70–110)
GLUCOSE UR QL STRIP: ABNORMAL
HCT VFR BLD AUTO: 31.3 % (ref 40–54)
HCT VFR BLD AUTO: 32.7 % (ref 40–54)
HGB BLD-MCNC: 11.1 G/DL (ref 14–18)
HGB BLD-MCNC: 11.5 G/DL (ref 14–18)
HGB UR QL STRIP: ABNORMAL
HIV 1+2 AB+HIV1 P24 AG SERPL QL IA: NEGATIVE
HYALINE CASTS #/AREA URNS LPF: 0 /LPF
INR PPP: 1 (ref 0.8–1.2)
IRON SERPL-MCNC: 84 UG/DL (ref 45–160)
KETONES UR QL STRIP: ABNORMAL
LACTATE SERPL-SCNC: 1 MMOL/L (ref 0.5–2.2)
LACTATE SERPL-SCNC: 1.2 MMOL/L (ref 0.5–2.2)
LDH SERPL L TO P-CCNC: 157 U/L (ref 110–260)
LEUKOCYTE ESTERASE UR QL STRIP: ABNORMAL
LIPASE SERPL-CCNC: 31 U/L (ref 4–60)
LYMPHOCYTES # BLD AUTO: 0.5 K/UL (ref 1–4.8)
LYMPHOCYTES # BLD AUTO: ABNORMAL K/UL (ref 1–4.8)
LYMPHOCYTES NFR BLD: 51.5 % (ref 18–48)
LYMPHOCYTES NFR BLD: 57 % (ref 18–48)
MAGNESIUM SERPL-MCNC: 1 MG/DL (ref 1.6–2.6)
MCH RBC QN AUTO: 37.1 PG (ref 27–31)
MCH RBC QN AUTO: 37.9 PG (ref 27–31)
MCHC RBC AUTO-ENTMCNC: 35.2 G/DL (ref 32–36)
MCHC RBC AUTO-ENTMCNC: 35.5 G/DL (ref 32–36)
MCV RBC AUTO: 106 FL (ref 82–98)
MCV RBC AUTO: 107 FL (ref 82–98)
MICROSCOPIC COMMENT: NORMAL
MONOCYTES # BLD AUTO: 0.3 K/UL (ref 0.3–1)
MONOCYTES # BLD AUTO: ABNORMAL K/UL (ref 0.3–1)
MONOCYTES NFR BLD: 18 % (ref 4–15)
MONOCYTES NFR BLD: 30.7 % (ref 4–15)
NEUTROPHILS # BLD AUTO: 0.1 K/UL (ref 1.8–7.7)
NEUTROPHILS NFR BLD: 12.8 % (ref 38–73)
NEUTROPHILS NFR BLD: 22 % (ref 38–73)
NITRITE UR QL STRIP: ABNORMAL
OVALOCYTES BLD QL SMEAR: ABNORMAL
OVALOCYTES BLD QL SMEAR: ABNORMAL
PATH REV BLD -IMP: NORMAL
PATH REV BLD -IMP: NORMAL
PH UR STRIP: ABNORMAL [PH] (ref 5–8)
PHOSPHATE SERPL-MCNC: 2.8 MG/DL (ref 2.7–4.5)
PLATELET # BLD AUTO: 101 K/UL (ref 150–350)
PLATELET # BLD AUTO: 92 K/UL (ref 150–350)
PLATELET BLD QL SMEAR: ABNORMAL
PLATELET BLD QL SMEAR: ABNORMAL
PMV BLD AUTO: 9.3 FL (ref 9.2–12.9)
PMV BLD AUTO: 9.4 FL (ref 9.2–12.9)
POCT GLUCOSE: 99 MG/DL (ref 70–110)
POIKILOCYTOSIS BLD QL SMEAR: SLIGHT
POIKILOCYTOSIS BLD QL SMEAR: SLIGHT
POTASSIUM SERPL-SCNC: 2.6 MMOL/L (ref 3.5–5.1)
PROT SERPL-MCNC: 6.6 G/DL (ref 6–8.4)
PROT UR QL STRIP: ABNORMAL
PROTHROMBIN TIME: 11 SEC (ref 9–12.5)
RBC # BLD AUTO: 2.93 M/UL (ref 4.6–6.2)
RBC # BLD AUTO: 3.1 M/UL (ref 4.6–6.2)
RBC #/AREA URNS HPF: 0 /HPF (ref 0–4)
SATURATED IRON: 22 % (ref 20–50)
SODIUM SERPL-SCNC: 133 MMOL/L (ref 136–145)
SP GR UR STRIP: ABNORMAL (ref 1–1.03)
SPHEROCYTES BLD QL SMEAR: ABNORMAL
SPHEROCYTES BLD QL SMEAR: ABNORMAL
SQUAMOUS #/AREA URNS HPF: 2 /HPF
STOMATOCYTES BLD QL SMEAR: PRESENT
STOMATOCYTES BLD QL SMEAR: PRESENT
TOTAL IRON BINDING CAPACITY: 377 UG/DL (ref 250–450)
TRANSFERRIN SERPL-MCNC: 255 MG/DL (ref 200–375)
TROPONIN I SERPL DL<=0.01 NG/ML-MCNC: 0.01 NG/ML (ref 0–0.03)
TSH SERPL DL<=0.005 MIU/L-ACNC: 0.78 UIU/ML (ref 0.4–4)
URN SPEC COLLECT METH UR: ABNORMAL
UROBILINOGEN UR STRIP-ACNC: ABNORMAL EU/DL
WBC # BLD AUTO: 0.8 K/UL (ref 3.9–12.7)
WBC # BLD AUTO: 1.01 K/UL (ref 3.9–12.7)
WBC #/AREA URNS HPF: 0 /HPF (ref 0–5)

## 2019-08-12 PROCEDURE — 99291 CRITICAL CARE FIRST HOUR: CPT

## 2019-08-12 PROCEDURE — 85027 COMPLETE CBC AUTOMATED: CPT

## 2019-08-12 PROCEDURE — 87449 NOS EACH ORGANISM AG IA: CPT

## 2019-08-12 PROCEDURE — 80074 ACUTE HEPATITIS PANEL: CPT

## 2019-08-12 PROCEDURE — 85060 BLOOD SMEAR INTERPRETATION: CPT | Mod: 91,,, | Performed by: PATHOLOGY

## 2019-08-12 PROCEDURE — S0030 INJECTION, METRONIDAZOLE: HCPCS | Performed by: NURSE PRACTITIONER

## 2019-08-12 PROCEDURE — 83010 ASSAY OF HAPTOGLOBIN QUANT: CPT

## 2019-08-12 PROCEDURE — 63600175 PHARM REV CODE 636 W HCPCS: Performed by: NURSE PRACTITIONER

## 2019-08-12 PROCEDURE — 85007 BL SMEAR W/DIFF WBC COUNT: CPT

## 2019-08-12 PROCEDURE — 82728 ASSAY OF FERRITIN: CPT

## 2019-08-12 PROCEDURE — 25000003 PHARM REV CODE 250: Performed by: INTERNAL MEDICINE

## 2019-08-12 PROCEDURE — 87040 BLOOD CULTURE FOR BACTERIA: CPT

## 2019-08-12 PROCEDURE — 81000 URINALYSIS NONAUTO W/SCOPE: CPT

## 2019-08-12 PROCEDURE — 63600175 PHARM REV CODE 636 W HCPCS: Performed by: PHYSICIAN ASSISTANT

## 2019-08-12 PROCEDURE — 83690 ASSAY OF LIPASE: CPT

## 2019-08-12 PROCEDURE — 85025 COMPLETE CBC W/AUTO DIFF WBC: CPT

## 2019-08-12 PROCEDURE — 83735 ASSAY OF MAGNESIUM: CPT

## 2019-08-12 PROCEDURE — 85060 PATHOLOGIST REVIEW: ICD-10-PCS | Mod: 91,,, | Performed by: PATHOLOGY

## 2019-08-12 PROCEDURE — 25000003 PHARM REV CODE 250: Performed by: NURSE PRACTITIONER

## 2019-08-12 PROCEDURE — 80053 COMPREHEN METABOLIC PANEL: CPT

## 2019-08-12 PROCEDURE — 82962 GLUCOSE BLOOD TEST: CPT

## 2019-08-12 PROCEDURE — 85060 PATHOLOGIST REVIEW: ICD-10-PCS | Mod: ,,, | Performed by: PATHOLOGY

## 2019-08-12 PROCEDURE — 84100 ASSAY OF PHOSPHORUS: CPT

## 2019-08-12 PROCEDURE — 82746 ASSAY OF FOLIC ACID SERUM: CPT

## 2019-08-12 PROCEDURE — 86140 C-REACTIVE PROTEIN: CPT

## 2019-08-12 PROCEDURE — 83540 ASSAY OF IRON: CPT

## 2019-08-12 PROCEDURE — 84484 ASSAY OF TROPONIN QUANT: CPT

## 2019-08-12 PROCEDURE — 83036 HEMOGLOBIN GLYCOSYLATED A1C: CPT

## 2019-08-12 PROCEDURE — 83605 ASSAY OF LACTIC ACID: CPT | Mod: 91

## 2019-08-12 PROCEDURE — 25500020 PHARM REV CODE 255: Performed by: EMERGENCY MEDICINE

## 2019-08-12 PROCEDURE — 84443 ASSAY THYROID STIM HORMONE: CPT

## 2019-08-12 PROCEDURE — 85610 PROTHROMBIN TIME: CPT

## 2019-08-12 PROCEDURE — 36415 COLL VENOUS BLD VENIPUNCTURE: CPT

## 2019-08-12 PROCEDURE — 85060 BLOOD SMEAR INTERPRETATION: CPT | Mod: ,,, | Performed by: PATHOLOGY

## 2019-08-12 PROCEDURE — 82608 B-12 BINDING CAPACITY: CPT

## 2019-08-12 PROCEDURE — 86703 HIV-1/HIV-2 1 RESULT ANTBDY: CPT

## 2019-08-12 PROCEDURE — 25000003 PHARM REV CODE 250: Performed by: PHYSICIAN ASSISTANT

## 2019-08-12 PROCEDURE — 11000001 HC ACUTE MED/SURG PRIVATE ROOM

## 2019-08-12 PROCEDURE — 83615 LACTATE (LD) (LDH) ENZYME: CPT

## 2019-08-12 RX ORDER — ASPIRIN 81 MG/1
81 TABLET ORAL DAILY
Status: DISCONTINUED | OUTPATIENT
Start: 2019-08-13 | End: 2019-08-21 | Stop reason: HOSPADM

## 2019-08-12 RX ORDER — OXYCODONE AND ACETAMINOPHEN 10; 325 MG/1; MG/1
1 TABLET ORAL EVERY 6 HOURS PRN
Status: DISCONTINUED | OUTPATIENT
Start: 2019-08-12 | End: 2019-08-12 | Stop reason: SDUPTHER

## 2019-08-12 RX ORDER — LISINOPRIL 20 MG/1
20 TABLET ORAL DAILY
Status: DISCONTINUED | OUTPATIENT
Start: 2019-08-13 | End: 2019-08-21 | Stop reason: HOSPADM

## 2019-08-12 RX ORDER — TIZANIDINE 4 MG/1
4 TABLET ORAL 2 TIMES DAILY PRN
Status: DISCONTINUED | OUTPATIENT
Start: 2019-08-12 | End: 2019-08-15

## 2019-08-12 RX ORDER — CIPROFLOXACIN 2 MG/ML
400 INJECTION, SOLUTION INTRAVENOUS
Status: DISCONTINUED | OUTPATIENT
Start: 2019-08-12 | End: 2019-08-12

## 2019-08-12 RX ORDER — ATORVASTATIN CALCIUM 10 MG/1
20 TABLET, FILM COATED ORAL DAILY
Status: DISCONTINUED | OUTPATIENT
Start: 2019-08-13 | End: 2019-08-21 | Stop reason: HOSPADM

## 2019-08-12 RX ORDER — ALPRAZOLAM 0.25 MG/1
0.25 TABLET ORAL 3 TIMES DAILY PRN
Status: DISCONTINUED | OUTPATIENT
Start: 2019-08-12 | End: 2019-08-21 | Stop reason: HOSPADM

## 2019-08-12 RX ORDER — POTASSIUM CHLORIDE 20 MEQ/1
40 TABLET, EXTENDED RELEASE ORAL EVERY 6 HOURS
Status: DISCONTINUED | OUTPATIENT
Start: 2019-08-12 | End: 2019-08-16

## 2019-08-12 RX ORDER — ARFORMOTEROL TARTRATE 15 UG/2ML
15 SOLUTION RESPIRATORY (INHALATION) EVERY 12 HOURS
Status: DISCONTINUED | OUTPATIENT
Start: 2019-08-12 | End: 2019-08-21 | Stop reason: HOSPADM

## 2019-08-12 RX ORDER — METRONIDAZOLE 500 MG/100ML
500 INJECTION, SOLUTION INTRAVENOUS
Status: COMPLETED | OUTPATIENT
Start: 2019-08-12 | End: 2019-08-12

## 2019-08-12 RX ORDER — TIZANIDINE 4 MG/1
4 TABLET ORAL 2 TIMES DAILY
COMMUNITY
End: 2019-09-18

## 2019-08-12 RX ORDER — DULOXETIN HYDROCHLORIDE 30 MG/1
60 CAPSULE, DELAYED RELEASE ORAL DAILY
Status: DISCONTINUED | OUTPATIENT
Start: 2019-08-13 | End: 2019-08-21 | Stop reason: HOSPADM

## 2019-08-12 RX ORDER — BUDESONIDE 0.5 MG/2ML
0.5 INHALANT ORAL EVERY 12 HOURS
Status: DISCONTINUED | OUTPATIENT
Start: 2019-08-13 | End: 2019-08-21 | Stop reason: HOSPADM

## 2019-08-12 RX ORDER — SODIUM CHLORIDE 9 MG/ML
INJECTION, SOLUTION INTRAVENOUS CONTINUOUS
Status: DISCONTINUED | OUTPATIENT
Start: 2019-08-12 | End: 2019-08-20

## 2019-08-12 RX ORDER — INSULIN ASPART 100 [IU]/ML
0-5 INJECTION, SOLUTION INTRAVENOUS; SUBCUTANEOUS
Status: DISCONTINUED | OUTPATIENT
Start: 2019-08-12 | End: 2019-08-14

## 2019-08-12 RX ORDER — CIPROFLOXACIN 2 MG/ML
400 INJECTION, SOLUTION INTRAVENOUS
Status: DISCONTINUED | OUTPATIENT
Start: 2019-08-12 | End: 2019-08-12 | Stop reason: SDUPTHER

## 2019-08-12 RX ORDER — CIPROFLOXACIN 2 MG/ML
400 INJECTION, SOLUTION INTRAVENOUS
Status: DISCONTINUED | OUTPATIENT
Start: 2019-08-13 | End: 2019-08-15

## 2019-08-12 RX ORDER — ONDANSETRON 2 MG/ML
4 INJECTION INTRAMUSCULAR; INTRAVENOUS
Status: COMPLETED | OUTPATIENT
Start: 2019-08-12 | End: 2019-08-12

## 2019-08-12 RX ORDER — METRONIDAZOLE 500 MG/100ML
500 INJECTION, SOLUTION INTRAVENOUS
Status: DISCONTINUED | OUTPATIENT
Start: 2019-08-13 | End: 2019-08-15

## 2019-08-12 RX ORDER — TRAZODONE HYDROCHLORIDE 100 MG/1
100 TABLET ORAL NIGHTLY
Status: DISCONTINUED | OUTPATIENT
Start: 2019-08-12 | End: 2019-08-12

## 2019-08-12 RX ORDER — FENOFIBRATE 160 MG/1
160 TABLET ORAL DAILY
Status: DISCONTINUED | OUTPATIENT
Start: 2019-08-13 | End: 2019-08-21 | Stop reason: HOSPADM

## 2019-08-12 RX ORDER — METOPROLOL TARTRATE 50 MG/1
50 TABLET ORAL EVERY 12 HOURS
Status: DISCONTINUED | OUTPATIENT
Start: 2019-08-12 | End: 2019-08-21 | Stop reason: HOSPADM

## 2019-08-12 RX ORDER — FAMOTIDINE 20 MG/1
20 TABLET, FILM COATED ORAL 2 TIMES DAILY
Status: DISCONTINUED | OUTPATIENT
Start: 2019-08-12 | End: 2019-08-16

## 2019-08-12 RX ORDER — ONDANSETRON 2 MG/ML
4 INJECTION INTRAMUSCULAR; INTRAVENOUS EVERY 8 HOURS PRN
Status: DISCONTINUED | OUTPATIENT
Start: 2019-08-12 | End: 2019-08-21 | Stop reason: HOSPADM

## 2019-08-12 RX ORDER — POTASSIUM CHLORIDE 7.45 MG/ML
10 INJECTION INTRAVENOUS
Status: COMPLETED | OUTPATIENT
Start: 2019-08-12 | End: 2019-08-12

## 2019-08-12 RX ORDER — METRONIDAZOLE 500 MG/100ML
500 INJECTION, SOLUTION INTRAVENOUS
Status: DISCONTINUED | OUTPATIENT
Start: 2019-08-12 | End: 2019-08-12

## 2019-08-12 RX ORDER — CIPROFLOXACIN 2 MG/ML
400 INJECTION, SOLUTION INTRAVENOUS
Status: COMPLETED | OUTPATIENT
Start: 2019-08-12 | End: 2019-08-12

## 2019-08-12 RX ORDER — RANOLAZINE 500 MG/1
1000 TABLET, EXTENDED RELEASE ORAL 2 TIMES DAILY
Status: DISCONTINUED | OUTPATIENT
Start: 2019-08-12 | End: 2019-08-21 | Stop reason: HOSPADM

## 2019-08-12 RX ORDER — OXYCODONE AND ACETAMINOPHEN 10; 325 MG/1; MG/1
1 TABLET ORAL EVERY 6 HOURS PRN
Status: DISCONTINUED | OUTPATIENT
Start: 2019-08-12 | End: 2019-08-14

## 2019-08-12 RX ORDER — IBUPROFEN 200 MG
24 TABLET ORAL
Status: DISCONTINUED | OUTPATIENT
Start: 2019-08-12 | End: 2019-08-14

## 2019-08-12 RX ORDER — FORMOTEROL FUMARATE DIHYDRATE 20 UG/2ML
20 SOLUTION RESPIRATORY (INHALATION) EVERY 12 HOURS
Status: DISCONTINUED | OUTPATIENT
Start: 2019-08-12 | End: 2019-08-12 | Stop reason: RX

## 2019-08-12 RX ORDER — IBUPROFEN 200 MG
16 TABLET ORAL
Status: DISCONTINUED | OUTPATIENT
Start: 2019-08-12 | End: 2019-08-14

## 2019-08-12 RX ORDER — GLUCAGON 1 MG
1 KIT INJECTION
Status: DISCONTINUED | OUTPATIENT
Start: 2019-08-12 | End: 2019-08-14

## 2019-08-12 RX ORDER — SODIUM CHLORIDE 9 MG/ML
1000 INJECTION, SOLUTION INTRAVENOUS
Status: COMPLETED | OUTPATIENT
Start: 2019-08-12 | End: 2019-08-12

## 2019-08-12 RX ORDER — DICYCLOMINE HYDROCHLORIDE 10 MG/ML
20 INJECTION INTRAMUSCULAR
Status: COMPLETED | OUTPATIENT
Start: 2019-08-12 | End: 2019-08-12

## 2019-08-12 RX ORDER — TRAZODONE HYDROCHLORIDE 100 MG/1
200 TABLET ORAL NIGHTLY
Status: DISCONTINUED | OUTPATIENT
Start: 2019-08-12 | End: 2019-08-21 | Stop reason: HOSPADM

## 2019-08-12 RX ORDER — ATORVASTATIN CALCIUM 20 MG/1
TABLET, FILM COATED ORAL
Qty: 30 TABLET | Refills: 11 | Status: SHIPPED | OUTPATIENT
Start: 2019-08-12 | End: 2020-10-02 | Stop reason: SDUPTHER

## 2019-08-12 RX ORDER — ALBUTEROL SULFATE 0.83 MG/ML
2.5 SOLUTION RESPIRATORY (INHALATION) EVERY 4 HOURS PRN
Status: DISCONTINUED | OUTPATIENT
Start: 2019-08-12 | End: 2019-08-21 | Stop reason: HOSPADM

## 2019-08-12 RX ADMIN — CIPROFLOXACIN 400 MG: 2 INJECTION, SOLUTION INTRAVENOUS at 07:08

## 2019-08-12 RX ADMIN — METRONIDAZOLE 500 MG: 500 INJECTION, SOLUTION INTRAVENOUS at 06:08

## 2019-08-12 RX ADMIN — TRAZODONE HYDROCHLORIDE 200 MG: 100 TABLET ORAL at 08:08

## 2019-08-12 RX ADMIN — TIZANIDINE 4 MG: 4 TABLET ORAL at 08:08

## 2019-08-12 RX ADMIN — IOHEXOL 30 ML: 350 INJECTION, SOLUTION INTRAVENOUS at 03:08

## 2019-08-12 RX ADMIN — METOPROLOL TARTRATE 50 MG: 50 TABLET ORAL at 08:08

## 2019-08-12 RX ADMIN — DICYCLOMINE HYDROCHLORIDE 20 MG: 20 INJECTION, SOLUTION INTRAMUSCULAR at 03:08

## 2019-08-12 RX ADMIN — SODIUM CHLORIDE: 0.9 INJECTION, SOLUTION INTRAVENOUS at 08:08

## 2019-08-12 RX ADMIN — ONDANSETRON 4 MG: 2 INJECTION INTRAMUSCULAR; INTRAVENOUS at 03:08

## 2019-08-12 RX ADMIN — RANOLAZINE 1000 MG: 500 TABLET, FILM COATED, EXTENDED RELEASE ORAL at 08:08

## 2019-08-12 RX ADMIN — POTASSIUM CHLORIDE 40 MEQ: 1500 TABLET, EXTENDED RELEASE ORAL at 03:08

## 2019-08-12 RX ADMIN — FAMOTIDINE 20 MG: 20 TABLET ORAL at 08:08

## 2019-08-12 RX ADMIN — OXYCODONE HYDROCHLORIDE AND ACETAMINOPHEN 1 TABLET: 10; 325 TABLET ORAL at 04:08

## 2019-08-12 RX ADMIN — OXYCODONE HYDROCHLORIDE AND ACETAMINOPHEN 1 TABLET: 10; 325 TABLET ORAL at 10:08

## 2019-08-12 RX ADMIN — POTASSIUM CHLORIDE 10 MEQ: 7.46 INJECTION, SOLUTION INTRAVENOUS at 05:08

## 2019-08-12 RX ADMIN — IOHEXOL 75 ML: 350 INJECTION, SOLUTION INTRAVENOUS at 04:08

## 2019-08-12 RX ADMIN — SODIUM CHLORIDE 1700 ML: 0.9 INJECTION, SOLUTION INTRAVENOUS at 06:08

## 2019-08-12 RX ADMIN — POTASSIUM CHLORIDE 10 MEQ: 7.46 INJECTION, SOLUTION INTRAVENOUS at 03:08

## 2019-08-12 RX ADMIN — DICYCLOMINE HYDROCHLORIDE 50 ML: 10 SOLUTION ORAL at 03:08

## 2019-08-12 RX ADMIN — SODIUM CHLORIDE 1000 ML: 0.9 INJECTION, SOLUTION INTRAVENOUS at 02:08

## 2019-08-12 NOTE — ASSESSMENT & PLAN NOTE
Criteria including WBC 0.80 & 1.01, tachycardia 113  CT of ABD/Pelvis notes Diffuse inflammatory changes of the descending colon and rectosigmoid consistent with colitis/proctitis.  No evidence of a pericolonic abscess can be seen.  The appendix cannot be identified.  Fluid-filled small bowel loops in the pelvis likely represent an ileus.  1.1 cm in diameter gallbladder calculus.  Splenomegaly with the spleen measuring 18 cm.  Initial 1000 ml saline given  Additional 1700 ml IV bolus given  Lactic acid pending, repeat in 4 hours and 8 hours  Blood cultures ordered  Platelet count 92, 000  INR pending  Cipro and Flagyl prescribed

## 2019-08-12 NOTE — HPI
Pt is a 48 yo male with PMHx of CAD with stenting x 2 LAD, COPD, DM II, HTN, JUDI and HPL who presents to the ED with reports of severe, constant cramping lower abdominal pain x 5 days. Associated symptoms include profuse, greenish watery diarrhea, diaphoresis, lightheadedness, dark urine (today) and vomiting (1-2 days now resolved). Pt had back surgery 6 weeks ago but his back pain is no worse than usual. Pt's wife was treated at [facility] approx 6 weeks ago. Pt denies URI symptoms, cough, chest pain, palpitations, bloody diarrhea and other symptoms. He last ate some yogurt yesterday. V/S on arrival: Temp 98.0, pulse 113, resp 18 and B/P 133/70. Labs find WBC 0.80, Hgb 11.5?Hct 32.7, plt 92, left shift 22 %, hyponatremia 133, hypokalemia 2.6, gluc 176, total bili 1.5, .8. U/A was altered due to dark color. A contrasted CT ABD/Pelvis was resulted at 16:17 which found diffuse inflammatory changes of the descending colon and rectosigmoid consistent with colitis/proctitis.  No evidence of a pericolonic abscess can be seen. Pt is admitted for Sepsis, Acute Colitis and electrolyte imbalances.

## 2019-08-12 NOTE — ASSESSMENT & PLAN NOTE
Hold metformin  Clear liquid diet for now  accuchecks  Sliding scale insulin  Lab Results   Component Value Date    HGBA1C 5.9 (H) 07/11/2018

## 2019-08-12 NOTE — ED PROVIDER NOTES
Encounter Date: 8/12/2019    History     Chief Complaint   Patient presents with    Abdominal Pain     c/o abdominal pain, N/V/D for the last 4 days       48 yo male reports to ED with 4 day hx of nausea, vomiting, diarrhea and abdominal cramping.         SCRIBE #1 NOTE: I, Corinne Mack, am scribing for, and in the presence of, Yoseph Weems MD. I have scribed the entire note.      History      Chief Complaint   Patient presents with    Abdominal Pain     c/o abdominal pain, N/V/D for the last 4 days       Review of patient's allergies indicates:  No Known Allergies     HPI   HPI    8/12/2019, 2:00 PM   History obtained from the patient      History of Present Illness: Kodak Valentine is a 49 y.o. male patient with PMHx of anxiety, CAD, COPD, DM, and HTN who presents to the Emergency Department for abd pain which onset gradually 4 days ago. Symptoms are constant and moderate in severity. No mitigating or exacerbating factors reported. Associated sxs include N/V/D and dark urine. Patient denies any fever, chills, CP, SOB, back pain, neck pain, HA, dizziness, and all other sxs at this time. No prior Tx reported. No further complaints or concerns at this time.         Arrival mode: Personal vehicle    PCP: Eliza Huddleston MD       Past Medical History:  Past Medical History:   Diagnosis Date    Anxiety     CAD (coronary artery disease)     PTCA x 2 LAD, restenosis and restent 7/12.    Chest pain syndrome 10/2/2015    COPD (chronic obstructive pulmonary disease)     CPAP (continuous positive airway pressure) dependence     @ night    Diabetes mellitus type 2, uncontrolled 4/13/2016    History of duodenal ulcer     with bleed    Hypertension     Mixed hyperlipidemia     JUDI (obstructive sleep apnea)     severe    S/P PTCA (percutaneous transluminal coronary angioplasty) 3/11/2015    Vitamin D deficiency        Past Surgical History:  Past Surgical History:   Procedure Laterality Date     APPENDECTOMY      CARDIAC CATHETERIZATION      CATARACT EXTRACTION W/  INTRAOCULAR LENS IMPLANT Right 4-22-15    CORONARY STENT PLACEMENT      ESOPHAGOGASTRODUODENOSCOPY (EGD) N/A 2014    Performed by Jose Dela Cruz MD at [facility] ENDO    EXCISION-SKIN Right 2015    Performed by Louis O. Jeansonne IV, MD at [facility] OR    HEART CATH WITH GRAFTS-LEFT Right 2014    Performed by Erma Green MD at [facility] CATH LAB    HEART CATH-LEFT Left 2017    Performed by Erma Green MD at [facility] CATH LAB    HEMORRHOID SURGERY      INCISION AND DRAINAGE (I&D), BUTTOCK  3/26/2015    Performed by [facility]. [facility]ne IV, MD at [facility] OR    INCISION AND DRAINAGE-THIGH Right 3/26/2015    Performed by [facility]. [facility]ne IV, [facility] at [facility] OR    NISSEN FUNDOPLICATION      lap    SKIN LESION EXCISION Right     leg         Family History:  Family History   Problem Relation Age of Onset    Heart disease Mother     Heart block Father     Heart disease Sister     COPD Maternal Grandmother     Diabetes Maternal Grandfather     COPD Maternal Grandfather        Social History:  Social History     Tobacco Use    Smoking status: Former Smoker     Packs/day: 0.50     Years: 20.00     Pack years: 10.00     Types: Cigarettes     Last attempt to quit: 6/3/2014     Years since quittin.1    Smokeless tobacco: Never Used    Tobacco comment: currently vaping with nicotine since quit smoking in 2014   Substance and Sexual Activity    Alcohol use: Yes     Alcohol/week: 2.4 oz     Types: 4 Cans of beer per week    Drug use: No    Sexual activity: Unknown       ROS   Review of Systems   Constitutional: Negative for chills and fever.   Respiratory: Negative for cough and shortness of breath.    Cardiovascular: Negative for chest pain and leg swelling.   Gastrointestinal: Positive for abdominal pain, diarrhea, nausea and vomiting.   Genitourinary: Negative for dysuria, flank pain and hematuria.        (+) dark urine    Musculoskeletal: Negative for back pain, neck pain and neck stiffness.   Skin: Negative for rash and wound.   Neurological: Negative for dizziness, light-headedness, numbness and headaches.   All other systems reviewed and are negative.    Physical Exam      Initial Vitals [08/12/19 1222]   BP Pulse Resp Temp SpO2   133/70 (!) 113 18 98 °F (36.7 °C) 100 %      MAP       --          Physical Exam  Nursing Notes and Vital Signs Reviewed.  Constitutional: Patient is in no acute distress. Well-developed and well-nourished.  Head: Atraumatic. Normocephalic.  Eyes: PERRL. EOM intact. Conjunctivae are not pale. No scleral icterus.  ENT: Mucous membranes are moist. Oropharynx is clear and symmetric.    Neck: Supple. Full ROM. No lymphadenopathy.  Cardiovascular: Tachycardic. Regular rhythm. No murmurs, rubs, or gallops. Distal pulses are 2+ and symmetric.  Pulmonary/Chest: No respiratory distress. Clear to auscultation bilaterally. No wheezing or rales.  Abdominal: Soft and non-distended.  There is no tenderness.  No rebound, guarding, or rigidity.   Musculoskeletal: Moves all extremities. No obvious deformities. No edema.  Skin: Warm and dry.  Neurological:  Alert, awake, and appropriate.  Normal speech.  No acute focal neurological deficits are appreciated.  Psychiatric: Normal affect. Good eye contact. Appropriate in content.    ED Course    Critical Care  Date/Time: 8/12/2019 2:42 PM  Performed by: Yoseph Weems MD  Authorized by: Yoseph Weems MD   Direct patient critical care time: 15 minutes  Additional history critical care time: 8 minutes  Ordering / reviewing critical care time: 5 minutes  Documentation critical care time: 10 minutes  Consulting other physicians critical care time: 7 minutes  Total critical care time (exclusive of procedural time) : 45 minutes  Critical care time was exclusive of separately billable procedures and treating other patients and teaching time.  Critical care was necessary  "to treat or prevent imminent or life-threatening deterioration of the following conditions: neutropenia.  Critical care was time spent personally by me on the following activities: blood draw for specimens, development of treatment plan with patient or surrogate, discussions with consultants, evaluation of patient's response to treatment, examination of patient, obtaining history from patient or surrogate, ordering and performing treatments and interventions, ordering and review of laboratory studies, pulse oximetry, re-evaluation of patient's condition and review of old charts.        ED Vital Signs:  Vitals:    08/12/19 1222 08/12/19 1447 08/12/19 1521 08/12/19 1522   BP: 133/70  (!) 145/83    Pulse: (!) 113  101 100   Resp: 18      Temp: 98 °F (36.7 °C)      TempSrc: Oral      SpO2: 100%  100% 98%   Weight: 88.2 kg (194 lb 5.4 oz)      Height:  5' 10" (1.778 m)      08/12/19 1531   BP: (!) 144/84   Pulse: 99   Resp: 16   Temp:    TempSrc:    SpO2: 100%   Weight:    Height:        Abnormal Lab Results:  Labs Reviewed   CBC W/ AUTO DIFFERENTIAL - Abnormal; Notable for the following components:       Result Value    WBC 0.80 (*)     RBC 3.10 (*)     Hemoglobin 11.5 (*)     Hematocrit 32.7 (*)     Mean Corpuscular Volume 106 (*)     Mean Corpuscular Hemoglobin 37.1 (*)     Platelets 92 (*)     Gran% 22.0 (*)     Lymph% 57.0 (*)     Mono% 18.0 (*)     Platelet Estimate Decreased (*)     All other components within normal limits    Narrative:       WBC critical result(s) called and verbal readback obtained from   MILKA PERLA RN, 08/12/2019 13:15   COMPREHENSIVE METABOLIC PANEL - Abnormal; Notable for the following components:    Sodium 133 (*)     Potassium 2.6 (*)     Glucose 176 (*)     Albumin 3.2 (*)     Total Bilirubin 1.5 (*)     All other components within normal limits    Narrative:        POTASSIUM critical result(s) called and verbal readback obtained   from NITHIN MILLER RN, 08/12/2019 13:43 "   URINALYSIS - Abnormal; Notable for the following components:    Color, UA Brown (*)     Appearance, UA Hazy (*)     All other components within normal limits   C-REACTIVE PROTEIN - Abnormal; Notable for the following components:    .8 (*)     All other components within normal limits   CLOSTRIDIUM DIFFICILE   HIV 1 / 2 ANTIBODY   LIPASE   PATHOLOGIST INTERPRETATION DIFFERENTIAL   PATHOLOGIST REVIEW   TSH   VITAMIN B12   HEPATITIS PANEL, ACUTE   IRON AND TIBC   FERRITIN   VITAMIN B-12 BINDING CAPACITY   FOLATE   LACTATE DEHYDROGENASE   HAPTOGLOBIN   CBC W/ AUTO DIFFERENTIAL   URINALYSIS MICROSCOPIC        All Lab Results:  Results for orders placed or performed during the hospital encounter of 08/12/19   HIV 1/2 Ag/Ab (4th Gen)   Result Value Ref Range    HIV 1/2 Ag/Ab Negative Negative   CBC auto differential   Result Value Ref Range    WBC 0.80 (LL) 3.90 - 12.70 K/uL    RBC 3.10 (L) 4.60 - 6.20 M/uL    Hemoglobin 11.5 (L) 14.0 - 18.0 g/dL    Hematocrit 32.7 (L) 40.0 - 54.0 %    Mean Corpuscular Volume 106 (H) 82 - 98 fL    Mean Corpuscular Hemoglobin 37.1 (H) 27.0 - 31.0 pg    Mean Corpuscular Hemoglobin Conc 35.2 32.0 - 36.0 g/dL    RDW 14.1 11.5 - 14.5 %    Platelets 92 (L) 150 - 350 K/uL    MPV 9.3 9.2 - 12.9 fL    Lymph # CANCELED 1.0 - 4.8 K/uL    Mono # CANCELED 0.3 - 1.0 K/uL    Eos # CANCELED 0.0 - 0.5 K/uL    Baso # CANCELED 0.00 - 0.20 K/uL    Gran% 22.0 (L) 38.0 - 73.0 %    Lymph% 57.0 (H) 18.0 - 48.0 %    Mono% 18.0 (H) 4.0 - 15.0 %    Eosinophil% 3.0 0.0 - 8.0 %    Basophil% 0.0 0.0 - 1.9 %    Platelet Estimate Decreased (A)     Aniso CANCELED     Poik Slight     Ovalocytes Occasional     Tear Drop Cells Occasional     Stomatocytes Present     Spherocytes Occasional     Large/Giant Platelets Present     Differential Method Manual    Comprehensive metabolic panel   Result Value Ref Range    Sodium 133 (L) 136 - 145 mmol/L    Potassium 2.6 (LL) 3.5 - 5.1 mmol/L    Chloride 97 95 - 110 mmol/L     CO2 26 23 - 29 mmol/L    Glucose 176 (H) 70 - 110 mg/dL    BUN, Bld 7 6 - 20 mg/dL    Creatinine 0.8 0.5 - 1.4 mg/dL    Calcium 9.1 8.7 - 10.5 mg/dL    Total Protein 6.6 6.0 - 8.4 g/dL    Albumin 3.2 (L) 3.5 - 5.2 g/dL    Total Bilirubin 1.5 (H) 0.1 - 1.0 mg/dL    Alkaline Phosphatase 90 55 - 135 U/L    AST 13 10 - 40 U/L    ALT 10 10 - 44 U/L    Anion Gap 10 8 - 16 mmol/L    eGFR if African American >60 >60 mL/min/1.73 m^2    eGFR if non African American >60 >60 mL/min/1.73 m^2   Lipase   Result Value Ref Range    Lipase 31 4 - 60 U/L   Urinalysis Clean Catch   Result Value Ref Range    Specimen UA Urine, Clean Catch     Color, UA Brown (A) Yellow, Straw, Barb    Appearance, UA Hazy (A) Clear    pH, UA SEE COMMENT 5.0 - 8.0    Specific Gravity, UA SEE COMMENT 1.005 - 1.030    Protein, UA SEE COMMENT Negative    Glucose, UA SEE COMMENT Negative    Ketones, UA SEE COMMENT Negative    Bilirubin (UA) SEE COMMENT Negative    Occult Blood UA SEE COMMENT Negative    Nitrite, UA SEE COMMENT Negative    Urobilinogen, UA SEE COMMENT <2.0 EU/dL    Leukocytes, UA SEE COMMENT Negative   Pathologist Interpretation Differential   Result Value Ref Range    Pathologist Review Review required    C-reactive protein   Result Value Ref Range    .8 (H) 0.0 - 8.2 mg/L   Pathologist Review   Result Value Ref Range    Pathologist Review Peripheral Smear REVIEWED        Imaging Results:  Imaging Results          CT Abdomen Pelvis With Contrast (In process)                         The Emergency Provider reviewed the vital signs and test results, which are outlined above.    ED Discussion     2:20 PM: Dr. Weems discussed the pt's case with Dr. Nowak (Hem/Onc) who recommends admission and making the pt NPO after midnight for a bone marrow biopsy tomorrow.    2:31 PM: Re-evaluated pt. I have discussed test results, shared treatment plan, and the need for admission with patient and family at bedside. Pt and family express  understanding at this time and agree with all information. All questions answered. Pt and family have no further questions or concerns at this time. Pt is ready for admit.    2:44 PM: Discussed case with IRMA Holman ([facility]). Dr. Crowell agrees with current care and management of pt and accepts admission.   Admitting Service: Hospital medicine   Admitting Physician: Dr. Crowell  Admit to: Med Tele      ED Medication(s):  Medications   potassium chloride 10 mEq in 100 mL IVPB (10 mEq Intravenous New Bag 8/12/19 1525)   potassium chloride SA CR tablet 40 mEq (40 mEq Oral Given 8/12/19 1524)   0.9%  NaCl infusion (1,000 mLs Intravenous New Bag 8/12/19 1459)   ondansetron injection 4 mg (4 mg Intravenous Given 8/12/19 1500)   dicyclomine injection 20 mg (20 mg Intramuscular Given 8/12/19 1500)          Medication List      ASK your doctor about these medications    albuterol 90 mcg/actuation inhaler  Commonly known as:  PROAIR HFA  Inhale 2 puffs into the lungs every 4 (four) hours as needed. Rescue     ALPRAZolam 0.25 MG tablet  Commonly known as:  XANAX  TAKE ONE TABLET BY MOUTH 3 TIMES DAILY AS NEEDED FOR ANXIETY     amLODIPine 2.5 MG tablet  Commonly known as:  NORVASC  TAKE 1 TABLET BY MOUTH ONCE A DAY     aspirin 81 MG EC tablet  Commonly known as:  ECOTRIN  Take 1 tablet (81 mg total) by mouth once daily.     atorvastatin 20 MG tablet  Commonly known as:  LIPITOR  TAKE 1 TABLET BY MOUTH ONCE A DAY IN THE EVENING FOR CHOLESTEROL     cholecalciferol (vitamin D3) 2,000 unit Cap  Commonly known as:  VITAMIN D3  Take 1 capsule (2,000 Units total) by mouth once daily.     clonazePAM 0.5 MG tablet  Commonly known as:  KLONOPIN     DULoxetine 60 MG capsule  Commonly known as:  CYMBALTA  TAKE 1 CAPSULE BY MOUTH ONCE A DAY     fenofibrate 160 MG Tab  TAKE 1 TABLET BY MOUTH ONCE A DAY     fluticasone furoate-vilanterol 200-25 mcg/dose Dsdv diskus inhaler  Commonly known as:  BREO  INHALE 1 PUFF ONCE A  DAY     lisinopril 20 MG tablet  Commonly known as:  PRINIVIL,ZESTRIL  TAKE 1 TABLET BY MOUTH ONCE A DAY     metFORMIN 500 MG tablet  Commonly known as:  GLUCOPHAGE  TAKE ONE TABLET BY MOUTH TWICE DAILY WITH MEALS     metoprolol tartrate 50 MG tablet  Commonly known as:  LOPRESSOR  TAKE 1 TABLET BY MOUTH EVERY 12 HOURS     oxyCODONE-acetaminophen  mg per tablet  Commonly known as:  PERCOCET     pantoprazole 40 MG tablet  Commonly known as:  PROTONIX  TAKE 1 TABLET BY MOUTH TWICE A DAY     ranolazine 1,000 mg Tb12  Commonly known as:  RANEXA  Take 1 tablet (1,000 mg total) by mouth 2 (two) times daily.     sildenafil 100 MG tablet  Commonly known as:  VIAGRA  TAKE AS DIRECTED     tiotropium bromide 2.5 mcg/actuation Mist  Commonly known as:  SPIRIVA RESPIMAT  INHALE 2 PUFFS INTO THE LUNGS ONCE DAILY     traZODone 100 MG tablet  Commonly known as:  DESYREL  Take 1-2 tablets (100-200 mg total) by mouth nightly as needed for Insomnia.                  Medical Decision Making    Medical Decision Making:   Clinical Tests:   Lab Tests: Ordered and Reviewed           Scribe Attestation:   Scribe #1: I performed the above scribed service and the documentation accurately describes the services I performed. I attest to the accuracy of the note 08/12/2019.    Attending:   Physician Attestation Statement for Scribe #1: I, Yoseph Weems MD, personally performed the services described in this documentation, as scribed by Corinne Mack, in my presence, and it is both accurate and complete.          Clinical Impression       ICD-10-CM ICD-9-CM   1. Neutropenia, unspecified type D70.9 288.00   2. Pancytopenia D61.818 284.19   3. Hypokalemia E87.6 276.8   4. Non-intractable vomiting with nausea, unspecified vomiting type R11.2 787.01   5. Diarrhea, unspecified type R19.7 787.91       Disposition:   Disposition: Admitted  Condition: Fair           Yoseph Weems MD  08/12/19 [id number]

## 2019-08-12 NOTE — SUBJECTIVE & OBJECTIVE
Past Medical History:   Diagnosis Date    Anxiety     CAD (coronary artery disease)     PTCA x 2 LAD, restenosis and restent 7/12.    Chest pain syndrome 10/2/2015    COPD (chronic obstructive pulmonary disease)     CPAP (continuous positive airway pressure) dependence     @ night    Diabetes mellitus type 2, uncontrolled 4/13/2016    History of duodenal ulcer     with bleed    Hypertension     Mixed hyperlipidemia     JUDI (obstructive sleep apnea)     severe    S/P PTCA (percutaneous transluminal coronary angioplasty) 3/11/2015    Vitamin D deficiency        Past Surgical History:   Procedure Laterality Date    APPENDECTOMY      CARDIAC CATHETERIZATION      CATARACT EXTRACTION W/  INTRAOCULAR LENS IMPLANT Right 4-22-15    CORONARY STENT PLACEMENT  2012    ESOPHAGOGASTRODUODENOSCOPY (EGD) N/A 7/31/2014    Performed by Jose Dela Cruz MD at [facility] ENDO    EXCISION-SKIN Right 4/20/2015    Performed by Louis O. Jeansonne IV, MD at [facility] OR    HEART CATH WITH GRAFTS-LEFT Right 6/2/2014    Performed by Erma Green MD at [facility] CATH LAB    HEART CATH-LEFT Left 2/21/2017    Performed by Erma Green MD at [facility] CATH LAB    HEMORRHOID SURGERY      INCISION AND DRAINAGE (I&D), BUTTOCK  3/26/2015    Performed by Louis O. Jeansonne IV, MD at [facility] OR    INCISION AND DRAINAGE-THIGH Right 3/26/2015    Performed by Louis O. Jeansonne IV, MD at [facility] OR    NISSEN FUNDOPLICATION      lap    SKIN LESION EXCISION Right     leg       Review of patient's allergies indicates:  No Known Allergies    No current facility-administered medications on file prior to encounter.      Current Outpatient Medications on File Prior to Encounter   Medication Sig    albuterol (PROAIR HFA) 90 mcg/actuation inhaler Inhale 2 puffs into the lungs every 4 (four) hours as needed. Rescue    ALPRAZolam (XANAX) 0.25 MG tablet TAKE ONE TABLET BY MOUTH 3 TIMES DAILY AS NEEDED FOR ANXIETY    amLODIPine (NORVASC) 2.5 MG tablet TAKE 1  TABLET BY MOUTH ONCE A DAY    aspirin (ECOTRIN) 81 MG EC tablet Take 1 tablet (81 mg total) by mouth once daily.    atorvastatin (LIPITOR) 20 MG tablet TAKE 1 TABLET BY MOUTH ONCE A DAY IN THE EVENING FOR CHOLESTEROL    cholecalciferol, vitamin D3, (VITAMIN D3) 2,000 unit Cap Take 1 capsule (2,000 Units total) by mouth once daily.    DULoxetine (CYMBALTA) 60 MG capsule TAKE 1 CAPSULE BY MOUTH ONCE A DAY    fenofibrate 160 MG Tab TAKE 1 TABLET BY MOUTH ONCE A DAY    fluticasone furoate-vilanterol (BREO) 200-25 mcg/dose DsDv diskus inhaler INHALE 1 PUFF ONCE A DAY    lisinopril (PRINIVIL,ZESTRIL) 20 MG tablet TAKE 1 TABLET BY MOUTH ONCE A DAY    metFORMIN (GLUCOPHAGE) 500 MG tablet TAKE ONE TABLET BY MOUTH TWICE DAILY WITH MEALS    metoprolol tartrate (LOPRESSOR) 50 MG tablet TAKE 1 TABLET BY MOUTH EVERY 12 HOURS    oxyCODONE-acetaminophen (PERCOCET)  mg per tablet Take 1 tablet by mouth 4 (four) times daily as needed.    ranolazine (RANEXA) 1,000 mg Tb12 Take 1 tablet (1,000 mg total) by mouth 2 (two) times daily.    tiotropium bromide (SPIRIVA RESPIMAT) 2.5 mcg/actuation Mist INHALE 2 PUFFS INTO THE LUNGS ONCE DAILY    traZODone (DESYREL) 100 MG tablet Take 1-2 tablets (100-200 mg total) by mouth nightly as needed for Insomnia.    clonazePAM (KLONOPIN) 0.5 MG tablet Take 0.25 mg by mouth 2 (two) times daily.    pantoprazole (PROTONIX) 40 MG tablet TAKE 1 TABLET BY MOUTH TWICE A DAY    sildenafil (VIAGRA) 100 MG tablet TAKE AS DIRECTED    [DISCONTINUED] atorvastatin (LIPITOR) 20 MG tablet TAKE 1 TABLET BY MOUTH ONCE A DAY IN THE EVENING FOR CHOLESTEROL     Family History     Problem Relation (Age of Onset)    COPD Maternal Grandmother, Maternal Grandfather    Diabetes Maternal Grandfather    Heart block Father    Heart disease Mother, Sister        Tobacco Use    Smoking status: Former Smoker     Packs/day: 0.50     Years: 20.00     Pack years: 10.00     Types: Cigarettes     Last attempt to  quit: 6/3/2014     Years since quittin.1    Smokeless tobacco: Never Used    Tobacco comment: currently vaping with nicotine since quit smoking in 2014   Substance and Sexual Activity    Alcohol use: Yes     Alcohol/week: 2.4 oz     Types: 4 Cans of beer per week    Drug use: No    Sexual activity: Not on file     Review of Systems   Constitutional: Positive for activity change, appetite change, diaphoresis and fatigue. Negative for chills and fever.   HENT: Negative.    Eyes: Negative.    Respiratory: Negative for cough and shortness of breath.    Cardiovascular: Negative for chest pain, palpitations and leg swelling.   Gastrointestinal: Positive for abdominal pain, diarrhea, nausea and vomiting.   Genitourinary: Positive for difficulty urinating.        Darkened urine   Musculoskeletal: Positive for back pain.   Skin: Negative for pallor, rash and wound.   Neurological: Positive for weakness and light-headedness. Negative for syncope.   Psychiatric/Behavioral: The patient is nervous/anxious.      Objective:     Vital Signs (Most Recent):  Temp: 98.6 °F (37 °C) (19 1555)  Pulse: 98 (19 1702)  Resp: 20 (19 1702)  BP: 128/72 (19 1702)  SpO2: 98 % (19 1702) Vital Signs (24h Range):  Temp:  [98 °F (36.7 °C)-98.6 °F (37 °C)] 98.6 °F (37 °C)  Pulse:  [] 98  Resp:  [16-20] 20  SpO2:  [98 %-100 %] 98 %  BP: (128-151)/(70-87) 128/72     Weight: 88.2 kg (194 lb 5.4 oz)  Body mass index is 27.88 kg/m².    Physical Exam   Constitutional: He is oriented to person, place, and time. He appears well-developed. He has a sickly appearance.   HENT:   Head: Normocephalic and atraumatic.   Nose: Nose normal.   Eyes: Pupils are equal, round, and reactive to light. Conjunctivae are normal. No scleral icterus.   Neck: Normal range of motion. Neck supple.   Cardiovascular: Normal rate, regular rhythm and normal heart sounds. Exam reveals no gallop and no friction rub.   No murmur  heard.  Pulmonary/Chest: Effort normal and breath sounds normal.   Abdominal: Soft. Bowel sounds are increased. Tenderness: especially to RLQ.   Musculoskeletal: Normal range of motion. He exhibits no edema or tenderness.   Neurological: He is alert and oriented to person, place, and time.   Skin: Skin is warm and dry. There is pallor.   Psychiatric: He has a normal mood and affect. His behavior is normal.   Vitals reviewed.        CRANIAL NERVES     CN III, IV, VI   Pupils are equal, round, and reactive to light.       Significant Labs:   CBC:   Recent Labs   Lab 08/12/19  1300 08/12/19  1458   WBC 0.80* 1.01*   HGB 11.5* 11.1*   HCT 32.7* 31.3*   PLT 92* 101*     CMP:   Recent Labs   Lab 08/12/19  1300   *   K 2.6*   CL 97   CO2 26   *   BUN 7   CREATININE 0.8   CALCIUM 9.1   PROT 6.6   ALBUMIN 3.2*   BILITOT 1.5*   ALKPHOS 90   AST 13   ALT 10   ANIONGAP 10   EGFRNONAA >60     All pertinent labs within the past 24 hours have been reviewed.    Significant Imaging: I have reviewed all pertinent imaging results/findings within the past 24 hours.

## 2019-08-12 NOTE — ED NOTES
"Messaged hospital medicine: "Patient states he has excruciating heart burn right now and severe back pain. Patient states he take Percocet  mg 4x daily at home and requests something for pain and for heart burn."  "

## 2019-08-12 NOTE — ED NOTES
"[facility] medicine: "Notified by lab that patient has a WBC of 1.01. Patient currently in CT."  "

## 2019-08-12 NOTE — ED NOTES
Pt AAOx3, resting in bed, side rails up x 2, call bell within reach with lights dimmed. NAD at this time. Will continue to monitor.

## 2019-08-12 NOTE — ASSESSMENT & PLAN NOTE
Stool studies pending  Pt recently hospitalized 6 weeks ago for 5 days  IV Cipro and Flagyl  Prn analgesia and antiemetics  IV fluids

## 2019-08-12 NOTE — H&P
[facility] Hospital Medicine  History & Physical    Patient Name: Kodak Valetnine  MRN: [id number]  Admission Date: 8/12/2019  Attending Physician: Jorge Crowell MD  Primary Care Provider: Eliza Huddleston MD         Patient information was obtained from patient, past medical records and ER records.     Subjective:     Principal Problem:Sepsis    Chief Complaint:   Chief Complaint   Patient presents with    Abdominal Pain     c/o abdominal pain, N/V/D for the last 4 days        HPI: Pt is a 48 yo male with PMHx of CAD with stenting x 2 LAD, COPD, DM II, HTN, JUDI and HPL who presents to the ED with reports of severe, constant cramping lower abdominal pain x 5 days. Associated symptoms include profuse, greenish watery diarrhea, diaphoresis, lightheadedness, dark urine (today) and vomiting (1-2 days now resolved). Pt had back surgery 6 weeks ago but his back pain is no worse than usual. Pt's wife was treated at [facility] approx 6 weeks ago. Pt denies URI symptoms, cough, chest pain, palpitations, bloody diarrhea and other symptoms. He last ate some yogurt yesterday. V/S on arrival: Temp 98.0, pulse 113, resp 18 and B/P 133/70. Labs find WBC 0.80, Hgb 11.5?Hct 32.7, plt 92, left shift 22 %, hyponatremia 133, hypokalemia 2.6, gluc 176, total bili 1.5, .8. U/A was altered due to dark color. A contrasted CT ABD/Pelvis was resulted at 16:17 which found diffuse inflammatory changes of the descending colon and rectosigmoid consistent with colitis/proctitis.  No evidence of a pericolonic abscess can be seen. Pt is admitted for Sepsis, Acute Colitis and electrolyte imbalances.     Past Medical History:   Diagnosis Date    Anxiety     CAD (coronary artery disease)     PTCA x 2 LAD, restenosis and restent 7/12.    Chest pain syndrome 10/2/2015    COPD (chronic obstructive pulmonary disease)     CPAP (continuous positive airway pressure) dependence     @ night    Diabetes mellitus type 2,  uncontrolled 4/13/2016    History of duodenal ulcer     with bleed    Hypertension     Mixed hyperlipidemia     JUDI (obstructive sleep apnea)     severe    S/P PTCA (percutaneous transluminal coronary angioplasty) 3/11/2015    Vitamin D deficiency        Past Surgical History:   Procedure Laterality Date    APPENDECTOMY      CARDIAC CATHETERIZATION      CATARACT EXTRACTION W/  INTRAOCULAR LENS IMPLANT Right 4-22-15    CORONARY STENT PLACEMENT  2012    ESOPHAGOGASTRODUODENOSCOPY (EGD) N/A 7/31/2014    Performed by Jose Dela Cruz MD at [facility] ENDO    EXCISION-SKIN Right 4/20/2015    Performed by Louis O. Jeansonne IV, MD at [facility] OR    HEART CATH WITH GRAFTS-LEFT Right 6/2/2014    Performed by Erma Green MD at [facility] CATH LAB    HEART CATH-LEFT Left 2/21/2017    Performed by Erma Green MD at [facility] CATH LAB    HEMORRHOID SURGERY      INCISION AND DRAINAGE (I&D), BUTTOCK  3/26/2015    Performed by Louis O. Jeansonne IV, MD at [facility] OR    INCISION AND DRAINAGE-THIGH Right 3/26/2015    Performed by Louis O. Jeansonne IV, MD at [facility] OR    NISSEN FUNDOPLICATION      lap    SKIN LESION EXCISION Right     leg       Review of patient's allergies indicates:  No Known Allergies    No current facility-administered medications on file prior to encounter.      Current Outpatient Medications on File Prior to Encounter   Medication Sig    albuterol (PROAIR HFA) 90 mcg/actuation inhaler Inhale 2 puffs into the lungs every 4 (four) hours as needed. Rescue    ALPRAZolam (XANAX) 0.25 MG tablet TAKE ONE TABLET BY MOUTH 3 TIMES DAILY AS NEEDED FOR ANXIETY    amLODIPine (NORVASC) 2.5 MG tablet TAKE 1 TABLET BY MOUTH ONCE A DAY    aspirin (ECOTRIN) 81 MG EC tablet Take 1 tablet (81 mg total) by mouth once daily.    atorvastatin (LIPITOR) 20 MG tablet TAKE 1 TABLET BY MOUTH ONCE A DAY IN THE EVENING FOR CHOLESTEROL    cholecalciferol, vitamin D3, (VITAMIN D3) 2,000 unit Cap Take 1 capsule (2,000 Units total)  by mouth once daily.    DULoxetine (CYMBALTA) 60 MG capsule TAKE 1 CAPSULE BY MOUTH ONCE A DAY    fenofibrate 160 MG Tab TAKE 1 TABLET BY MOUTH ONCE A DAY    fluticasone furoate-vilanterol (BREO) 200-25 mcg/dose DsDv diskus inhaler INHALE 1 PUFF ONCE A DAY    lisinopril (PRINIVIL,ZESTRIL) 20 MG tablet TAKE 1 TABLET BY MOUTH ONCE A DAY    metFORMIN (GLUCOPHAGE) 500 MG tablet TAKE ONE TABLET BY MOUTH TWICE DAILY WITH MEALS    metoprolol tartrate (LOPRESSOR) 50 MG tablet TAKE 1 TABLET BY MOUTH EVERY 12 HOURS    oxyCODONE-acetaminophen (PERCOCET)  mg per tablet Take 1 tablet by mouth 4 (four) times daily as needed.    ranolazine (RANEXA) 1,000 mg Tb12 Take 1 tablet (1,000 mg total) by mouth 2 (two) times daily.    tiotropium bromide (SPIRIVA RESPIMAT) 2.5 mcg/actuation Mist INHALE 2 PUFFS INTO THE LUNGS ONCE DAILY    traZODone (DESYREL) 100 MG tablet Take 1-2 tablets (100-200 mg total) by mouth nightly as needed for Insomnia.    clonazePAM (KLONOPIN) 0.5 MG tablet Take 0.25 mg by mouth 2 (two) times daily.    pantoprazole (PROTONIX) 40 MG tablet TAKE 1 TABLET BY MOUTH TWICE A DAY    sildenafil (VIAGRA) 100 MG tablet TAKE AS DIRECTED    [DISCONTINUED] atorvastatin (LIPITOR) 20 MG tablet TAKE 1 TABLET BY MOUTH ONCE A DAY IN THE EVENING FOR CHOLESTEROL     Family History     Problem Relation (Age of Onset)    COPD Maternal Grandmother, Maternal Grandfather    Diabetes Maternal Grandfather    Heart block Father    Heart disease Mother, Sister        Tobacco Use    Smoking status: Former Smoker     Packs/day: 0.50     Years: 20.00     Pack years: 10.00     Types: Cigarettes     Last attempt to quit: 6/3/2014     Years since quittin.1    Smokeless tobacco: Never Used    Tobacco comment: currently vaping with nicotine since quit smoking in 2014   Substance and Sexual Activity    Alcohol use: Yes     Alcohol/week: 2.4 oz     Types: 4 Cans of beer per week    Drug use: No    Sexual activity: Not  on file     Review of Systems   Constitutional: Positive for activity change, appetite change, diaphoresis and fatigue. Negative for chills and fever.   HENT: Negative.    Eyes: Negative.    Respiratory: Negative for cough and shortness of breath.    Cardiovascular: Negative for chest pain, palpitations and leg swelling.   Gastrointestinal: Positive for abdominal pain, diarrhea, nausea and vomiting.   Genitourinary: Positive for difficulty urinating.        Darkened urine   Musculoskeletal: Positive for back pain.   Skin: Negative for pallor, rash and wound.   Neurological: Positive for weakness and light-headedness. Negative for syncope.   Psychiatric/Behavioral: The patient is nervous/anxious.      Objective:     Vital Signs (Most Recent):  Temp: 98.6 °F (37 °C) (08/12/19 1555)  Pulse: 98 (08/12/19 1702)  Resp: 20 (08/12/19 1702)  BP: 128/72 (08/12/19 1702)  SpO2: 98 % (08/12/19 1702) Vital Signs (24h Range):  Temp:  [98 °F (36.7 °C)-98.6 °F (37 °C)] 98.6 °F (37 °C)  Pulse:  [] 98  Resp:  [16-20] 20  SpO2:  [98 %-100 %] 98 %  BP: (128-151)/(70-87) 128/72     Weight: 88.2 kg (194 lb 5.4 oz)  Body mass index is 27.88 kg/m².    Physical Exam   Constitutional: He is oriented to person, place, and time. He appears well-developed. He has a sickly appearance.   HENT:   Head: Normocephalic and atraumatic.   Nose: Nose normal.   Eyes: Pupils are equal, round, and reactive to light. Conjunctivae are normal. No scleral icterus.   Neck: Normal range of motion. Neck supple.   Cardiovascular: Normal rate, regular rhythm and normal heart sounds. Exam reveals no gallop and no friction rub.   No murmur heard.  Pulmonary/Chest: Effort normal and breath sounds normal.   Abdominal: Soft. Bowel sounds are increased. Tenderness: especially to RLQ.   Musculoskeletal: Normal range of motion. He exhibits no edema or tenderness.   Neurological: He is alert and oriented to person, place, and time.   Skin: Skin is warm and dry. There  is pallor.   Psychiatric: He has a normal mood and affect. His behavior is normal.   Vitals reviewed.        CRANIAL NERVES     CN III, IV, VI   Pupils are equal, round, and reactive to light.       Significant Labs:   CBC:   Recent Labs   Lab 08/12/19  1300 08/12/19  1458   WBC 0.80* 1.01*   HGB 11.5* 11.1*   HCT 32.7* 31.3*   PLT 92* 101*     CMP:   Recent Labs   Lab 08/12/19  1300   *   K 2.6*   CL 97   CO2 26   *   BUN 7   CREATININE 0.8   CALCIUM 9.1   PROT 6.6   ALBUMIN 3.2*   BILITOT 1.5*   ALKPHOS 90   AST 13   ALT 10   ANIONGAP 10   EGFRNONAA >60     All pertinent labs within the past 24 hours have been reviewed.    Significant Imaging: I have reviewed all pertinent imaging results/findings within the past 24 hours.    Assessment/Plan:     * Sepsis  Criteria including WBC 0.80 & 1.01, tachycardia 113  CT of ABD/Pelvis notes Diffuse inflammatory changes of the descending colon and rectosigmoid consistent with colitis/proctitis.  No evidence of a pericolonic abscess can be seen.  The appendix cannot be identified.  Fluid-filled small bowel loops in the pelvis likely represent an ileus.  1.1 cm in diameter gallbladder calculus.  Splenomegaly with the spleen measuring 18 cm.  Initial 1000 ml saline given  Additional 1700 ml IV bolus given  Lactic acid pending, repeat in 4 hours and 8 hours  Blood cultures ordered  Platelet count 92, 000  INR pending  Cipro and Flagyl prescribed      Acute colitis  Stool studies pending  Pt recently hospitalized 6 weeks ago for 5 days  IV Cipro and Flagyl  Prn analgesia and antiemetics  IV fluids      Hypokalemia  Supplementation  Magnesium and phosphorous pending  Repeat chemistries in AM      Pancytopenia  Hematology consult but likely secondary to sepsis and colitis      Hyperlipidemia associated with type 2 diabetes mellitus  Continue STATIN      Controlled type 2 diabetes mellitus with stage 2 chronic kidney disease, without long-term current use of  insulin  Hold metformin  Clear liquid diet for now  accuchecks  Sliding scale insulin  Lab Results   Component Value Date    HGBA1C 5.9 (H) 07/11/2018         Hypertension associated with diabetes  Continue lopressor, lisinopril and amlodipine        CAD (coronary artery disease)  Denies any chest pain  Continue ASA, atorvastatin,fenofibrate, lopressor and lisinopril         VTE Risk Mitigation (From admission, onward)    None             Yandy Alas NP  Department of Hospital Medicine   [facility]

## 2019-08-13 LAB
ALBUMIN SERPL BCP-MCNC: 2.5 G/DL (ref 3.5–5.2)
ALP SERPL-CCNC: 75 U/L (ref 55–135)
ALT SERPL W/O P-5'-P-CCNC: 10 U/L (ref 10–44)
ANION GAP SERPL CALC-SCNC: 7 MMOL/L (ref 8–16)
AST SERPL-CCNC: 14 U/L (ref 10–40)
BASOPHILS # BLD AUTO: 0.01 K/UL (ref 0–0.2)
BASOPHILS NFR BLD: 1.2 % (ref 0–1.9)
BILIRUB SERPL-MCNC: 1.1 MG/DL (ref 0.1–1)
BUN SERPL-MCNC: 4 MG/DL (ref 6–20)
C DIFF GDH STL QL: NEGATIVE
C DIFF TOX A+B STL QL IA: NEGATIVE
CALCIUM SERPL-MCNC: 8.1 MG/DL (ref 8.7–10.5)
CHLORIDE SERPL-SCNC: 105 MMOL/L (ref 95–110)
CO2 SERPL-SCNC: 25 MMOL/L (ref 23–29)
CREAT SERPL-MCNC: 0.6 MG/DL (ref 0.5–1.4)
DIFFERENTIAL METHOD: ABNORMAL
EOSINOPHIL # BLD AUTO: 0 K/UL (ref 0–0.5)
EOSINOPHIL NFR BLD: 3.6 % (ref 0–8)
ERYTHROCYTE [DISTWIDTH] IN BLOOD BY AUTOMATED COUNT: 14.1 % (ref 11.5–14.5)
EST. GFR  (AFRICAN AMERICAN): >60 ML/MIN/1.73 M^2
EST. GFR  (NON AFRICAN AMERICAN): >60 ML/MIN/1.73 M^2
ESTIMATED AVG GLUCOSE: 71 MG/DL (ref 68–131)
FOLATE SERPL-MCNC: <2 NG/ML (ref 4–24)
GLUCOSE SERPL-MCNC: 92 MG/DL (ref 70–110)
HAPTOGLOB SERPL-MCNC: 238 MG/DL (ref 30–250)
HAV IGM SERPL QL IA: NEGATIVE
HBA1C MFR BLD HPLC: 4.1 % (ref 4–5.6)
HBV CORE IGM SERPL QL IA: NEGATIVE
HBV SURFACE AG SERPL QL IA: NEGATIVE
HCT VFR BLD AUTO: 28.5 % (ref 40–54)
HCV AB SERPL QL IA: NEGATIVE
HGB BLD-MCNC: 9.9 G/DL (ref 14–18)
INR PPP: 1.1 (ref 0.8–1.2)
LACTATE SERPL-SCNC: 1.1 MMOL/L (ref 0.5–2.2)
LYMPHOCYTES # BLD AUTO: 0.4 K/UL (ref 1–4.8)
LYMPHOCYTES NFR BLD: 53 % (ref 18–48)
MCH RBC QN AUTO: 36.8 PG (ref 27–31)
MCHC RBC AUTO-ENTMCNC: 34.7 G/DL (ref 32–36)
MCV RBC AUTO: 106 FL (ref 82–98)
MONOCYTES # BLD AUTO: 0.3 K/UL (ref 0.3–1)
MONOCYTES NFR BLD: 31.3 % (ref 4–15)
NEUTROPHILS # BLD AUTO: 0.1 K/UL (ref 1.8–7.7)
NEUTROPHILS NFR BLD: 12.1 % (ref 38–73)
PATH REV BLD -IMP: NORMAL
PLATELET # BLD AUTO: 83 K/UL (ref 150–350)
PMV BLD AUTO: 9.3 FL (ref 9.2–12.9)
POIKILOCYTOSIS BLD QL SMEAR: SLIGHT
POLYCHROMASIA BLD QL SMEAR: ABNORMAL
POTASSIUM SERPL-SCNC: 3.2 MMOL/L (ref 3.5–5.1)
PROT SERPL-MCNC: 5.2 G/DL (ref 6–8.4)
PROTHROMBIN TIME: 11.5 SEC (ref 9–12.5)
RBC # BLD AUTO: 2.69 M/UL (ref 4.6–6.2)
SODIUM SERPL-SCNC: 137 MMOL/L (ref 136–145)
STOMATOCYTES BLD QL SMEAR: PRESENT
TARGETS BLD QL SMEAR: ABNORMAL
WBC # BLD AUTO: 0.83 K/UL (ref 3.9–12.7)

## 2019-08-13 PROCEDURE — 25000003 PHARM REV CODE 250: Performed by: PHYSICIAN ASSISTANT

## 2019-08-13 PROCEDURE — 63700000 PHARM REV CODE 250 ALT 637 W/O HCPCS: Performed by: NURSE PRACTITIONER

## 2019-08-13 PROCEDURE — 63600175 PHARM REV CODE 636 W HCPCS: Performed by: NURSE PRACTITIONER

## 2019-08-13 PROCEDURE — 94761 N-INVAS EAR/PLS OXIMETRY MLT: CPT

## 2019-08-13 PROCEDURE — 85025 COMPLETE CBC W/AUTO DIFF WBC: CPT

## 2019-08-13 PROCEDURE — S0030 INJECTION, METRONIDAZOLE: HCPCS | Performed by: NURSE PRACTITIONER

## 2019-08-13 PROCEDURE — 94640 AIRWAY INHALATION TREATMENT: CPT

## 2019-08-13 PROCEDURE — 80053 COMPREHEN METABOLIC PANEL: CPT

## 2019-08-13 PROCEDURE — 99255 PR INITIAL INPATIENT CONSULT,LEVL V: ICD-10-PCS | Mod: ,,, | Performed by: INTERNAL MEDICINE

## 2019-08-13 PROCEDURE — 36415 COLL VENOUS BLD VENIPUNCTURE: CPT

## 2019-08-13 PROCEDURE — 25000003 PHARM REV CODE 250: Performed by: NURSE PRACTITIONER

## 2019-08-13 PROCEDURE — 11000001 HC ACUTE MED/SURG PRIVATE ROOM

## 2019-08-13 PROCEDURE — 99255 IP/OBS CONSLTJ NEW/EST HI 80: CPT | Mod: ,,, | Performed by: INTERNAL MEDICINE

## 2019-08-13 PROCEDURE — 25000003 PHARM REV CODE 250: Performed by: INTERNAL MEDICINE

## 2019-08-13 PROCEDURE — 21400001 HC TELEMETRY ROOM

## 2019-08-13 PROCEDURE — 25000242 PHARM REV CODE 250 ALT 637 W/ HCPCS: Performed by: NURSE PRACTITIONER

## 2019-08-13 PROCEDURE — 83605 ASSAY OF LACTIC ACID: CPT

## 2019-08-13 PROCEDURE — 85610 PROTHROMBIN TIME: CPT

## 2019-08-13 RX ORDER — MAGNESIUM SULFATE HEPTAHYDRATE 40 MG/ML
2 INJECTION, SOLUTION INTRAVENOUS ONCE
Status: COMPLETED | OUTPATIENT
Start: 2019-08-13 | End: 2019-08-13

## 2019-08-13 RX ORDER — CHOLESTYRAMINE 4 G/9G
1 POWDER, FOR SUSPENSION ORAL 2 TIMES DAILY
Status: DISCONTINUED | OUTPATIENT
Start: 2019-08-13 | End: 2019-08-21 | Stop reason: HOSPADM

## 2019-08-13 RX ADMIN — FAMOTIDINE 20 MG: 20 TABLET ORAL at 08:08

## 2019-08-13 RX ADMIN — TRAZODONE HYDROCHLORIDE 200 MG: 100 TABLET ORAL at 08:08

## 2019-08-13 RX ADMIN — OXYCODONE HYDROCHLORIDE AND ACETAMINOPHEN 1 TABLET: 10; 325 TABLET ORAL at 03:08

## 2019-08-13 RX ADMIN — FENOFIBRATE 160 MG: 160 TABLET ORAL at 08:08

## 2019-08-13 RX ADMIN — METOPROLOL TARTRATE 50 MG: 50 TABLET ORAL at 08:08

## 2019-08-13 RX ADMIN — BUDESONIDE 0.5 MG: 0.5 SUSPENSION RESPIRATORY (INHALATION) at 08:08

## 2019-08-13 RX ADMIN — METRONIDAZOLE 500 MG: 500 INJECTION, SOLUTION INTRAVENOUS at 06:08

## 2019-08-13 RX ADMIN — METRONIDAZOLE 500 MG: 500 INJECTION, SOLUTION INTRAVENOUS at 04:08

## 2019-08-13 RX ADMIN — POTASSIUM CHLORIDE 40 MEQ: 1500 TABLET, EXTENDED RELEASE ORAL at 11:08

## 2019-08-13 RX ADMIN — POTASSIUM CHLORIDE 40 MEQ: 1500 TABLET, EXTENDED RELEASE ORAL at 12:08

## 2019-08-13 RX ADMIN — POTASSIUM CHLORIDE 40 MEQ: 1500 TABLET, EXTENDED RELEASE ORAL at 05:08

## 2019-08-13 RX ADMIN — RANOLAZINE 1000 MG: 500 TABLET, FILM COATED, EXTENDED RELEASE ORAL at 08:08

## 2019-08-13 RX ADMIN — CHOLESTYRAMINE 4 G: 4 POWDER, FOR SUSPENSION ORAL at 08:08

## 2019-08-13 RX ADMIN — LISINOPRIL 20 MG: 20 TABLET ORAL at 08:08

## 2019-08-13 RX ADMIN — ARFORMOTEROL TARTRATE 15 MCG: 15 SOLUTION RESPIRATORY (INHALATION) at 08:08

## 2019-08-13 RX ADMIN — ASPIRIN 81 MG: 81 TABLET, COATED ORAL at 08:08

## 2019-08-13 RX ADMIN — BUDESONIDE 0.5 MG: 0.5 SUSPENSION RESPIRATORY (INHALATION) at 07:08

## 2019-08-13 RX ADMIN — ATORVASTATIN CALCIUM 20 MG: 10 TABLET, FILM COATED ORAL at 08:08

## 2019-08-13 RX ADMIN — POTASSIUM CHLORIDE 40 MEQ: 1500 TABLET, EXTENDED RELEASE ORAL at 01:08

## 2019-08-13 RX ADMIN — CIPROFLOXACIN 400 MG: 2 INJECTION, SOLUTION INTRAVENOUS at 05:08

## 2019-08-13 RX ADMIN — OXYCODONE HYDROCHLORIDE AND ACETAMINOPHEN 1 TABLET: 10; 325 TABLET ORAL at 04:08

## 2019-08-13 RX ADMIN — OXYCODONE HYDROCHLORIDE AND ACETAMINOPHEN 1 TABLET: 10; 325 TABLET ORAL at 10:08

## 2019-08-13 RX ADMIN — ARFORMOTEROL TARTRATE 15 MCG: 15 SOLUTION RESPIRATORY (INHALATION) at 07:08

## 2019-08-13 RX ADMIN — TIZANIDINE 4 MG: 4 TABLET ORAL at 09:08

## 2019-08-13 RX ADMIN — DULOXETINE 60 MG: 30 CAPSULE, DELAYED RELEASE ORAL at 08:08

## 2019-08-13 RX ADMIN — MAGNESIUM SULFATE HEPTAHYDRATE 2 G: 40 INJECTION, SOLUTION INTRAVENOUS at 05:08

## 2019-08-13 RX ADMIN — SODIUM CHLORIDE: 0.9 INJECTION, SOLUTION INTRAVENOUS at 05:08

## 2019-08-13 RX ADMIN — METRONIDAZOLE 500 MG: 500 INJECTION, SOLUTION INTRAVENOUS at 12:08

## 2019-08-13 NOTE — HPI
Pt is a 48 yo male with PMHx of CAD with stenting x 2 LAD, COPD, DM II, HTN, JUDI and HPL who presents to the ED with reports of severe, constant cramping lower abdominal pain x 5 days. Associated symptoms include profuse, greenish watery diarrhea, diaphoresis, lightheadedness, dark urine (today) and vomiting (1-2 days now resolved). Pt had back surgery 6 weeks ago but his back pain is no worse than usual. Pt's wife was treated at [facility] approx 6 weeks ago. Pt denies URI symptoms, cough, chest pain, palpitations, bloody diarrhea and other symptoms. He last ate some yogurt yesterday. V/S on arrival: Temp 98.0, pulse 113, resp 18 and B/P 133/70. Labs find WBC 0.80, Hgb 11.5?Hct 32.7, plt 92, left shift 22 %, hyponatremia 133, hypokalemia 2.6, gluc 176, total bili 1.5, .8. U/A was altered due to dark color. A contrasted CT ABD/Pelvis was resulted at 16:17 which found diffuse inflammatory changes of the descending colon and rectosigmoid consistent with colitis/proctitis.  No evidence of a pericolonic abscess can be seen. Pt is admitted for Sepsis, Acute Colitis and electrolyte imbalances.     Hematology/oncology consulted for pancytopenia.

## 2019-08-13 NOTE — PROGRESS NOTES
[facility] Hospital Medicine  Progress Note    Patient Name: Kodak Valentine  MRN: [id number]  Patient Class: IP- Inpatient   Admission Date: 8/12/2019  Length of Stay: 1 days  Attending Physician: Jorge Tijerina, *  Primary Care Provider: Eliza Huddleston MD        Subjective:     Principal Problem:Sepsis        HPI:  Pt is a 48 yo male with PMHx of CAD with stenting x 2 LAD, COPD, DM II, HTN, JUDI and HPL who presents to the ED with reports of severe, constant cramping lower abdominal pain x 5 days. Associated symptoms include profuse, greenish watery diarrhea, diaphoresis, lightheadedness, dark urine (today) and vomiting (1-2 days now resolved). Pt had back surgery 6 weeks ago but his back pain is no worse than usual. Pt's wife was treated at [facility] approx 6 weeks ago. Pt denies URI symptoms, cough, chest pain, palpitations, bloody diarrhea and other symptoms. He last ate some yogurt yesterday. V/S on arrival: Temp 98.0, pulse 113, resp 18 and B/P 133/70. Labs find WBC 0.80, Hgb 11.5?Hct 32.7, plt 92, left shift 22 %, hyponatremia 133, hypokalemia 2.6, gluc 176, total bili 1.5, .8. U/A was altered due to dark color. A contrasted CT ABD/Pelvis was resulted at 16:17 which found diffuse inflammatory changes of the descending colon and rectosigmoid consistent with colitis/proctitis.  No evidence of a pericolonic abscess can be seen. Pt is admitted for Sepsis, Acute Colitis and electrolyte imbalances.     Overview/Hospital Course:  Admitted with Sepsis, Acute Colitis, electrolyte abnormalities, dehydration and pancytopenia. IV fluids given, lactic acid normal, Cipro/Flagyl in process, blood cultures NGTD and C diff negative. Hematology saw the patient with plans for bone marrow biopsy.     Interval History: Urine not as dark, still with abdominal pain, wanting to eat as nausea/vomiting resolved.    Review of Systems   Constitutional: Positive for activity change and  fatigue. Negative for appetite change, chills, diaphoresis and fever.   HENT: Negative.    Eyes: Negative.    Respiratory: Negative for cough and shortness of breath.    Cardiovascular: Negative for chest pain, palpitations and leg swelling.   Gastrointestinal: Positive for abdominal pain and diarrhea. Negative for nausea and vomiting.   Genitourinary: Positive for difficulty urinating.        Darkened urine   Musculoskeletal: Positive for back pain.   Skin: Negative for pallor, rash and wound.   Neurological: Positive for weakness. Negative for syncope and light-headedness.   Psychiatric/Behavioral: The patient is nervous/anxious.      Objective:     Vital Signs (Most Recent):  Temp: 97.7 °F (36.5 °C) (08/13/19 1214)  Pulse: 61 (08/13/19 1214)  Resp: 18 (08/13/19 1214)  BP: 111/70 (08/13/19 1214)  SpO2: 98 % (08/13/19 1214) Vital Signs (24h Range):  Temp:  [97.7 °F (36.5 °C)-98.6 °F (37 °C)] 97.7 °F (36.5 °C)  Pulse:  [] 61  Resp:  [14-20] 18  SpO2:  [95 %-100 %] 98 %  BP: (111-172)/(62-88) 111/70     Weight: 88.2 kg (194 lb 5.4 oz)  Body mass index is 27.88 kg/m².    Intake/Output Summary (Last 24 hours) at 8/13/2019 1553  Last data filed at 8/13/2019 0840  Gross per 24 hour   Intake 2686.25 ml   Output --   Net 2686.25 ml      Physical Exam   Constitutional: He is oriented to person, place, and time. He appears well-developed. He has a sickly appearance.   HENT:   Head: Normocephalic and atraumatic.   Nose: Nose normal.   Eyes: Pupils are equal, round, and reactive to light. Conjunctivae are normal. No scleral icterus.   Neck: Normal range of motion. Neck supple.   Cardiovascular: Normal rate, regular rhythm and normal heart sounds. Exam reveals no gallop and no friction rub.   No murmur heard.  Pulmonary/Chest: Effort normal and breath sounds normal.   Abdominal: Soft. Bowel sounds are increased. Tenderness: especially to RLQ.   Musculoskeletal: Normal range of motion. He exhibits no edema or tenderness.    Neurological: He is alert and oriented to person, place, and time.   Skin: Skin is warm and dry. There is pallor.   Psychiatric: He has a normal mood and affect. His behavior is normal.   Vitals reviewed.      Significant Labs:   CBC:   Recent Labs   Lab 08/12/19  1300 08/12/19  1458 08/13/19  0531   WBC 0.80* 1.01* 0.83*   HGB 11.5* 11.1* 9.9*   HCT 32.7* 31.3* 28.5*   PLT 92* 101* 83*     CMP:   Recent Labs   Lab 08/12/19  1300 08/13/19  0531   * 137   K 2.6* 3.2*   CL 97 105   CO2 26 25   * 92   BUN 7 4*   CREATININE 0.8 0.6   CALCIUM 9.1 8.1*   PROT 6.6 5.2*   ALBUMIN 3.2* 2.5*   BILITOT 1.5* 1.1*   ALKPHOS 90 75   AST 13 14   ALT 10 10   ANIONGAP 10 7*   EGFRNONAA >60 >60     All pertinent labs within the past 24 hours have been reviewed.    Significant Imaging: I have reviewed all pertinent imaging results/findings within the past 24 hours.      Assessment/Plan:      * Sepsis  Criteria including WBC 0.80 & 1.01, tachycardia 113  CT of ABD/Pelvis notes Diffuse inflammatory changes of the descending colon and rectosigmoid consistent with colitis/proctitis.  No evidence of a pericolonic abscess can be seen.  The appendix cannot be identified.  Fluid-filled small bowel loops in the pelvis likely represent an ileus.  1.1 cm in diameter gallbladder calculus.  Splenomegaly with the spleen measuring 18 cm.  Initial 1000 ml saline given  Additional 1700 ml IV bolus given  Lactic acid pending, repeat in 4 hours and 8 hours  Blood cultures ordered  Platelet count 92, 000  INR pending  Cipro and Flagyl prescribed  8/13 - lactic acid normal, vital signs normal, IV fluids/antibiotics continued, blood cultures NgTD      Acute colitis  Negative C diff  IV Cipro and Flagyl  Prn analgesia and antiemetics  IV fluids      Hypokalemia  Supplementation  Magnesium and phosphorous pending  Repeat chemistries in AM  Replace magnesium      Pancytopenia  Hematology consult but likely secondary to sepsis and  colitis  Hematology has scheduled a bone marrow biopsy - CT imaging Splenomegaly with the spleen measuring 18 cm.      Hyperlipidemia associated with type 2 diabetes mellitus  Continue STATIN      Controlled type 2 diabetes mellitus with stage 2 chronic kidney disease, without long-term current use of insulin  Hold metformin  Clear liquid diet for now  accuchecks  Sliding scale insulin  Lab Results   Component Value Date    HGBA1C 4.1 08/12/2019         Hypertension associated with diabetes  Continue lopressor, lisinopril and amlodipine        CAD (coronary artery disease)  Denies any chest pain  Continue ASA, atorvastatin,fenofibrate, lopressor and lisinopril         VTE Risk Mitigation (From admission, onward)    None                Yandy Alas NP  Department of Hospital Medicine   [facility]

## 2019-08-13 NOTE — ASSESSMENT & PLAN NOTE
Hematology consult but likely secondary to sepsis and colitis  Hematology has scheduled a bone marrow biopsy - CT imaging Splenomegaly with the spleen measuring 18 cm.

## 2019-08-13 NOTE — PROGRESS NOTES
No respiratory distress noted at this time . Currently on room air , patient says he only wears oxygen at night at home

## 2019-08-13 NOTE — ED NOTES
Attempted to call report to [facility], no answer when transferred to nurse. Will attempt again shortly.

## 2019-08-13 NOTE — CHAPLAIN
Initial visit with patient. Provided ministries of listening, presence, and prayer.  Pt currently had no other spiritual needs and I will follow up as needed.    Chaplain Tay Hancock M.Div., [facility]

## 2019-08-13 NOTE — PLAN OF CARE
Met with patient. Patient is independent with adls and iadls.  Patient is currently using a standard walker due to back surgery x 6 weeks ago. He also had home health PT after his surgery but had been discharge as he met his goals.  Patient denies any post hospital needs or services at this time.  Updated white board with 's name and number. Transitional Care Folder, Discharge Planning Begins on Admission pamphlet, [facility] Bed[facility] Delivery pamphlet, Advance Directive information given to patient along with the contact information given.Instructed patient or family to call with any questions or concerns.    D/c plans: Home no needs  Pref pharmacy:  [email or web address]  Pharmacy Bedside Delivery: No  [facility]: Portal active  D/c transportation:  Family  PCP:  Eliza Galindo       08/13/19 7957   Discharge Assessment   Assessment Type Discharge Planning Assessment   Confirmed/corrected address and phone number on facesheet? Yes   Assessment information obtained from? Patient   Expected Length of Stay (days)   (tbd)   Communicated expected length of stay with patient/caregiver yes   Prior to hospitilization cognitive status: Alert/Oriented   Prior to hospitalization functional status: Assistive Equipment   Current cognitive status: Alert/Oriented   Current Functional Status: Assistive Equipment   Lives With spouse;child(alexandra), dependent   Able to Return to Prior Arrangements yes   Is patient able to care for self after discharge? Yes   Readmission Within the Last 30 Days no previous admission in last 30 days   Patient currently being followed by outpatient case management? No   Patient currently receives any other outside agency services? No   Equipment Currently Used at Home walker, standard;BIPAP;bath bench   Do you have any problems affording any of your prescribed medications? No   Is the patient taking medications as prescribed? yes   Does the patient have transportation home? Yes   Transportation  Anticipated family or friend will provide   Does the patient receive services at the [facility]? No   Discharge Plan A Home with family   DME Needed Upon Discharge  none   Patient/Family in Agreement with Plan yes

## 2019-08-13 NOTE — SUBJECTIVE & OBJECTIVE
Interval History: Urine not as dark, still with abdominal pain, wanting to eat as nausea/vomiting resolved.    Review of Systems   Constitutional: Positive for activity change and fatigue. Negative for appetite change, chills, diaphoresis and fever.   HENT: Negative.    Eyes: Negative.    Respiratory: Negative for cough and shortness of breath.    Cardiovascular: Negative for chest pain, palpitations and leg swelling.   Gastrointestinal: Positive for abdominal pain and diarrhea. Negative for nausea and vomiting.   Genitourinary: Positive for difficulty urinating.        Darkened urine   Musculoskeletal: Positive for back pain.   Skin: Negative for pallor, rash and wound.   Neurological: Positive for weakness. Negative for syncope and light-headedness.   Psychiatric/Behavioral: The patient is nervous/anxious.      Objective:     Vital Signs (Most Recent):  Temp: 97.7 °F (36.5 °C) (08/13/19 1214)  Pulse: 61 (08/13/19 1214)  Resp: 18 (08/13/19 1214)  BP: 111/70 (08/13/19 1214)  SpO2: 98 % (08/13/19 1214) Vital Signs (24h Range):  Temp:  [97.7 °F (36.5 °C)-98.6 °F (37 °C)] 97.7 °F (36.5 °C)  Pulse:  [] 61  Resp:  [14-20] 18  SpO2:  [95 %-100 %] 98 %  BP: (111-172)/(62-88) 111/70     Weight: 88.2 kg (194 lb 5.4 oz)  Body mass index is 27.88 kg/m².    Intake/Output Summary (Last 24 hours) at 8/13/2019 1553  Last data filed at 8/13/2019 0840  Gross per 24 hour   Intake 2686.25 ml   Output --   Net 2686.25 ml      Physical Exam   Constitutional: He is oriented to person, place, and time. He appears well-developed. He has a sickly appearance.   HENT:   Head: Normocephalic and atraumatic.   Nose: Nose normal.   Eyes: Pupils are equal, round, and reactive to light. Conjunctivae are normal. No scleral icterus.   Neck: Normal range of motion. Neck supple.   Cardiovascular: Normal rate, regular rhythm and normal heart sounds. Exam reveals no gallop and no friction rub.   No murmur heard.  Pulmonary/Chest: Effort normal and  breath sounds normal.   Abdominal: Soft. Bowel sounds are increased. Tenderness: especially to RLQ.   Musculoskeletal: Normal range of motion. He exhibits no edema or tenderness.   Neurological: He is alert and oriented to person, place, and time.   Skin: Skin is warm and dry. There is pallor.   Psychiatric: He has a normal mood and affect. His behavior is normal.   Vitals reviewed.      Significant Labs:   CBC:   Recent Labs   Lab 08/12/19  1300 08/12/19  1458 08/13/19  0531   WBC 0.80* 1.01* 0.83*   HGB 11.5* 11.1* 9.9*   HCT 32.7* 31.3* 28.5*   PLT 92* 101* 83*     CMP:   Recent Labs   Lab 08/12/19  1300 08/13/19  0531   * 137   K 2.6* 3.2*   CL 97 105   CO2 26 25   * 92   BUN 7 4*   CREATININE 0.8 0.6   CALCIUM 9.1 8.1*   PROT 6.6 5.2*   ALBUMIN 3.2* 2.5*   BILITOT 1.5* 1.1*   ALKPHOS 90 75   AST 13 14   ALT 10 10   ANIONGAP 10 7*   EGFRNONAA >60 >60     All pertinent labs within the past 24 hours have been reviewed.    Significant Imaging: I have reviewed all pertinent imaging results/findings within the past 24 hours.

## 2019-08-13 NOTE — ASSESSMENT & PLAN NOTE
8/13/19 Patient with newly diagnosed pancytopenia WBC 0.83 (ANC 0.1), hemoglobin 8.8, platelets 83 K.  With diarrhea that started 2 days ago perfuse green watery.  C. Diff negative.  States had night sweats for 2 days.  Denies known fever.  Scheduled for BMB tomorrow at noon.  No need for transfusion at this time.  Splenomegaly on CT scan of abdomen/pelvis with diffuse mural thickening and inflammation of the adjacent fat of the rectosigmoid and descending colon consistent with colitis.  .8.  Blood cultures NGTD.    --Continue supportive care  --No GSCF support for now, until get results of BMB  --BMB tomorrow at noon

## 2019-08-13 NOTE — PLAN OF CARE
Problem: Adult Inpatient Plan of Care  Goal: Plan of Care Review  Outcome: Ongoing (interventions implemented as appropriate)  Pt complains of lower back pain. Pain medication is effective. Pt is stool having frequent loose stools. Contact precautions initiated. Pt refused accu checks. IV fluids are infusing. Pt denies n/v. No injuries. Will continue to monitor. 12 hour chart check is completed.

## 2019-08-13 NOTE — PLAN OF CARE
Problem: Adult Inpatient Plan of Care  Goal: Plan of Care Review  Outcome: Ongoing (interventions implemented as appropriate)  Fall precautions maintained. Pt free from injuries/falls.  Ambulates and repositions  C/o  POC and meds discussed w/ pt. Pt verbalized understanding.  Chart check done.   Will cont to monitor.generlaized pain. remains on Cdiff precauutions

## 2019-08-13 NOTE — ASSESSMENT & PLAN NOTE
Hold metformin  Clear liquid diet for now  accuchecks  Sliding scale insulin  Lab Results   Component Value Date    HGBA1C 4.1 08/12/2019

## 2019-08-13 NOTE — ASSESSMENT & PLAN NOTE
Criteria including WBC 0.80 & 1.01, tachycardia 113  CT of ABD/Pelvis notes Diffuse inflammatory changes of the descending colon and rectosigmoid consistent with colitis/proctitis.  No evidence of a pericolonic abscess can be seen.  The appendix cannot be identified.  Fluid-filled small bowel loops in the pelvis likely represent an ileus.  1.1 cm in diameter gallbladder calculus.  Splenomegaly with the spleen measuring 18 cm.  Initial 1000 ml saline given  Additional 1700 ml IV bolus given  Lactic acid pending, repeat in 4 hours and 8 hours  Blood cultures ordered  Platelet count 92, 000  INR pending  Cipro and Flagyl prescribed  8/13 - lactic acid normal, vital signs normal, IV fluids/antibiotics continued, blood cultures NgTD

## 2019-08-13 NOTE — HOSPITAL COURSE
"Admitted with Sepsis, Acute Colitis, electrolyte abnormalities, dehydration and pancytopenia. IV fluids given, lactic acid normal, Cipro/Flagyl in process, blood cultures NGTD and C diff negative. Hematology saw the patient with plans for bone marrow biopsy. 8/14 - had bone marrow today, results pending. Diarrhea decreasing, less abdominal pain. Describes sore throat pain and white appearing patches present to posterior pharynx - appears yeast in nature. 8/15  - Nystatin and Diflucan prescribed for thrush. Less abdominal pain, still with watery diarrhea - Questran and Imodium prescribed. Blood cultures from 8/12 find Gram positive cocci in clusters resembling Staph in one anaerobic bottle - Vanco started (may be contaminant - awaiting full ID).     As of 8/16 patient reports continued diarrhea, but "it has slowed down a lot".  Patient reports "going 5 times in 30 minutes, and now I'm going once every hour".  Cdiff negative.  Stool cx and ova/parasites pending.  Patient remains afebrile.  Continue Cipro/Flagyl for now along with Questran.  Case d/w GI.  Will monitor and if no improvement in diarrhea, patient may need a flex sig.  [facility] continues to follow for pancytopenia and recommended that ANC is >1000 prior to DC.  Awaiting bone marrow biopsy results. Awaiting final BC results to determine contaminate versus true infection, continue empiric Vanc for now.      As of 8/17 patient reports continued improvement in diarrhea, reporting only 3 diarrheal stools overnight with none this morning.  BC show probable contaminate.  Stool cx shows NGTD.  Pancytopenia continues to improve.  Case d/w Dr. Nowak, BM biopsy negative for leukemia, if counts continue to improve, anticipate DC home in AM.      As of 8/18 no change in pancytopenia overnight.  Per [facility], cannot DC until ANC is >1000.  Patient continues to report improvement in diarrhea.  He remains afebrile.  Stool cx shows NGTD.  Ecoli, ova/parasites pending.  " "Continue oral Cipro/flagyl for colitis.      As of 8/19 patient reports last diarrheal stool was last night.  ANC 0.5.  Continue ABX for colitis.     As of 8/20 ANC 0.7.  Continues to report daily improvement in diarrhea.  Stool cx and Ova/parasites negative.  Oral thrush has resolved.  Per [facility], cannot DC until >1.  Counts trending up.  Anticipate DC over the next 24-48 hours pending ANC count.    As of 8/21 per d/w lab this morning ANC is now 0.82.  Patient reports diarrhea has "stopped", and he has no complaints today.  Patient is anxious to leave as he "has a pain management appointment at 11 that I cannot miss".  VS remain stable and physical exam is benign.  Remains afebrile with no leukocytosis.  Case d/w [facility], who agrees with DC home today from their standpoint with outpatient f/u with them in clinic in 2-3 weeks for repeat CBC.  Patient also instructed to f/u with PCP within 3 days for post hospital evaluation, and with GI provider of his choice for routine colonoscopy, verbalized + understanding.  Case d/w Dr. Salazar.  Patient seen and examined and deemed medically stable to DC home today.  Patient will DC home to complete 5 more days of oral Cipro and Flagyl for Colitis for a total of 10 days of treatment.  Medications reconciled for DC home.    "

## 2019-08-13 NOTE — CONSULTS
[facility] -   Hematology/Oncology  Consult Note    Patient Name: Kodak Valentine  MRN: [id number]  Admission Date: 8/12/2019  Hospital Length of Stay: 1 days  Code Status: No Order   Attending Provider: Jorge Tijerina, *  Consulting Provider: Michelle Dietz NP  Primary Care Physician: Eliza Huddleston MD  Principal Problem:Sepsis    Consults  Subjective:     HPI:  Pt is a 48 yo male with PMHx of CAD with stenting x 2 LAD, COPD, DM II, HTN, JUDI and HPL who presents to the ED with reports of severe, constant cramping lower abdominal pain x 5 days. Associated symptoms include profuse, greenish watery diarrhea, diaphoresis, lightheadedness, dark urine (today) and vomiting (1-2 days now resolved). Pt had back surgery 6 weeks ago but his back pain is no worse than usual. Pt's wife was treated at [facility] approx 6 weeks ago. Pt denies URI symptoms, cough, chest pain, palpitations, bloody diarrhea and other symptoms. He last ate some yogurt yesterday. V/S on arrival: Temp 98.0, pulse 113, resp 18 and B/P 133/70. Labs find WBC 0.80, Hgb 11.5?Hct 32.7, plt 92, left shift 22 %, hyponatremia 133, hypokalemia 2.6, gluc 176, total bili 1.5, .8. U/A was altered due to dark color. A contrasted CT ABD/Pelvis was resulted at 16:17 which found diffuse inflammatory changes of the descending colon and rectosigmoid consistent with colitis/proctitis.  No evidence of a pericolonic abscess can be seen. Pt is admitted for Sepsis, Acute Colitis and electrolyte imbalances.     Hematology/oncology consulted for pancytopenia.       Oncology Treatment Plan:   [No treatment plan]    Medications:  Continuous Infusions:   sodium chloride 0.9% 125 mL/hr at 08/13/19 0544     Scheduled Meds:   arformoterol  15 mcg Nebulization Q12H    aspirin  81 mg Oral Daily    atorvastatin  20 mg Oral Daily    budesonide  0.5 mg Nebulization Q12H    ciprofloxacin  400 mg Intravenous Q12H    DULoxetine  60 mg Oral  Daily    famotidine  20 mg Oral BID    fenofibrate  160 mg Oral Daily    lisinopril  20 mg Oral Daily    metoprolol tartrate  50 mg Oral Q12H    metronidazole  500 mg Intravenous Q8H    potassium chloride  40 mEq Oral Q6H    ranolazine  1,000 mg Oral BID    traZODone  200 mg Oral QHS     PRN Meds:albuterol sulfate, ALPRAZolam, Dextrose 10% Bolus, Dextrose 10% Bolus, glucagon (human recombinant), glucose, glucose, insulin aspart U-100, ondansetron, oxyCODONE-acetaminophen, promethazine (PHENERGAN) IVPB, tiZANidine     Review of patient's allergies indicates:  No Known Allergies     Past Medical History:   Diagnosis Date    Anxiety     CAD (coronary artery disease)     PTCA x 2 LAD, restenosis and restent 7/12.    Chest pain syndrome 10/2/2015    COPD (chronic obstructive pulmonary disease)     CPAP (continuous positive airway pressure) dependence     @ night    Diabetes mellitus type 2, uncontrolled 4/13/2016    History of duodenal ulcer     with bleed    Hypertension     Mixed hyperlipidemia     JUDI (obstructive sleep apnea)     severe    S/P PTCA (percutaneous transluminal coronary angioplasty) 3/11/2015    Vitamin D deficiency      Past Surgical History:   Procedure Laterality Date    APPENDECTOMY      CARDIAC CATHETERIZATION      CATARACT EXTRACTION W/  INTRAOCULAR LENS IMPLANT Right 4-22-15    CORONARY STENT PLACEMENT  2012    ESOPHAGOGASTRODUODENOSCOPY (EGD) N/A 7/31/2014    Performed by [facility] at [facility] ENDO    EXCISION-SKIN Right 4/20/2015    Performed by Louis O. Jeansonne IV, [facility] at [facility] OR    HEART CATH WITH GRAFTS-LEFT Right 6/2/2014    Performed by Erma Green [facility] at [facility] CATH LAB    HEART CATH-LEFT Left 2/21/2017    Performed by [facility] at [facility] CATH LAB    HEMORRHOID SURGERY      INCISION AND DRAINAGE (I&D), BUTTOCK  3/26/2015    Performed [facility] at [facility] OR    INCISION AND DRAINAGE-THIGH Right 3/26/2015    Per[facility] by Jonathan  [facility]. [facility] OR    NISSEN FUNDOPLICATION      lap    SKIN LESION EXCISION Right     leg     Family History     Problem Relation (Age of Onset)    COPD Maternal Grandmother, Maternal Grandfather    Diabetes Maternal Grandfather    Heart block Father    Heart disease Mother, Sister        Tobacco Use    Smoking status: Former Smoker     Packs/day: 0.50     Years: 20.00     Pack years: 10.00     Types: Cigarettes     Last attempt to quit: 6/3/2014     Years since quittin.1    Smokeless tobacco: Never Used    Tobacco comment: currently vaping with nicotine since quit smoking in 2014   Substance and Sexual Activity    Alcohol use: Yes     Alcohol/week: 2.4 oz     Types: 4 Cans of beer per week    Drug use: No    Sexual activity: Not on file       Review of Systems   Constitutional: Positive for activity change, appetite change, chills, diaphoresis and fatigue. Negative for fever.   HENT: Negative.    Eyes: Negative.    Respiratory: Negative for cough and shortness of breath.    Cardiovascular: Negative for chest pain, palpitations and leg swelling.   Gastrointestinal: Positive for abdominal pain, diarrhea and nausea. Negative for vomiting.   Genitourinary: Positive for difficulty urinating.        Darkened urine   Musculoskeletal: Positive for back pain.   Skin: Negative for pallor, rash and wound.   Allergic/Immunologic: Positive for immunocompromised state.   Neurological: Positive for weakness and light-headedness. Negative for syncope.   Hematological: Negative for adenopathy. Does not bruise/bleed easily.   Psychiatric/Behavioral: Negative for confusion. The patient is nervous/anxious.      Objective:     Vital Signs (Most Recent):  Temp: 97.7 °F (36.5 °C) (19 1214)  Pulse: 61 (19 1214)  Resp: 18 (19 1214)  BP: 111/70 (19 1214)  SpO2: 98 % (19 121) Vital Signs (24h Range):  Temp:  [97.7 °F (36.5 °C)-98.6 °F (37 °C)] 97.7 °F (36.5 °C)  Pulse:  []  61  Resp:  [14-20] 18  SpO2:  [95 %-100 %] 98 %  BP: (111-172)/(62-88) 111/70     Weight: 88.2 kg (194 lb 5.4 oz)  Body mass index is 27.88 kg/m².  Body surface area is 2.09 meters squared.      Intake/Output Summary (Last 24 hours) at 8/13/2019 1344  Last data filed at 8/13/2019 0840  Gross per 24 hour   Intake 2686.25 ml   Output --   Net 2686.25 ml       Physical Exam   Constitutional: He is oriented to person, place, and time. He appears well-developed. He has a sickly appearance. No distress.   HENT:   Head: Normocephalic and atraumatic.   Nose: Nose normal.   Eyes: Pupils are equal, round, and reactive to light. Conjunctivae are normal. No scleral icterus.   Neck: Normal range of motion. Neck supple.   Cardiovascular: Normal rate, regular rhythm and normal heart sounds. Exam reveals no gallop and no friction rub.   No murmur heard.  Pulmonary/Chest: Effort normal and breath sounds normal.   Abdominal: Soft. Bowel sounds are increased.   Musculoskeletal: Normal range of motion. He exhibits no edema or tenderness.   Neurological: He is alert and oriented to person, place, and time.   Skin: Skin is warm and dry. He is not diaphoretic. There is pallor.   Psychiatric: His speech is normal and behavior is normal. Judgment and thought content normal. His mood appears anxious. He is not actively hallucinating. Cognition and memory are normal. He is attentive.   Vitals reviewed.      Significant Labs:   CBC:   Recent Labs   Lab 08/12/19  1300 08/12/19  1458 08/13/19  0531   WBC 0.80* 1.01* 0.83*   HGB 11.5* 11.1* 9.9*   HCT 32.7* 31.3* 28.5*   PLT 92* 101* 83*   , CMP:   Recent Labs   Lab 08/12/19  1300 08/13/19  0531   * 137   K 2.6* 3.2*   CL 97 105   CO2 26 25   * 92   BUN 7 4*   CREATININE 0.8 0.6   CALCIUM 9.1 8.1*   PROT 6.6 5.2*   ALBUMIN 3.2* 2.5*   BILITOT 1.5* 1.1*   ALKPHOS 90 75   AST 13 14   ALT 10 10   ANIONGAP 10 7*   EGFRNONAA >60 >60   , Coagulation:   Recent Labs   Lab 08/12/19  8104  08/13/19  0531   INR 1.0 1.1   , LDH: No results for input(s): LDHCSF, BFSOURCE in the last 48 hours., LFTs:   Recent Labs   Lab 08/12/19  1300 08/13/19  0531   ALT 10 10   AST 13 14   ALKPHOS 90 75   BILITOT 1.5* 1.1*   PROT 6.6 5.2*   ALBUMIN 3.2* 2.5*   , Urine Studies:   Recent Labs   Lab 08/12/19  1443   COLORU Brown*   APPEARANCEUA Hazy*   PHUR SEE COMMENT   SPECGRAV SEE COMMENT   PROTEINUA SEE COMMENT   GLUCUA SEE COMMENT   KETONESU SEE COMMENT   BILIRUBINUA SEE COMMENT   OCCULTUA SEE COMMENT   NITRITE SEE COMMENT   UROBILINOGEN SEE COMMENT   LEUKOCYTESUR SEE COMMENT   RBCUA 0   WBCUA 0   BACTERIA Occasional   SQUAMEPITHEL 2   HYALINECASTS 0    and All pertinent labs from the last 24 hours have been reviewed.    Diagnostic Results:  I have reviewed all pertinent imaging results/findings within the past 24 hours.    Assessment/Plan:     Pancytopenia  8/13/19 Patient with newly diagnosed pancytopenia WBC 0.83 (ANC 0.1), hemoglobin 8.8, platelets 83 K.  With diarrhea that started 2 days ago perfuse green watery.  C. Diff negative.  States had night sweats for 2 days.  Denies known fever.  Scheduled for BMB tomorrow at noon.  No need for transfusion at this time.  Splenomegaly on CT scan of abdomen/pelvis with diffuse mural thickening and inflammation of the adjacent fat of the rectosigmoid and descending colon consistent with colitis.  .8.  Blood cultures NGTD.    --Continue supportive care  --No [facility] support for now, until get results of BMB  --BMB tomorrow at noon        Thank you for your consult. I will follow-up with patient. Please contact us if you have any additional questions.    Michelle Dietz NP  Hematology/Oncology  [facility] [place]

## 2019-08-13 NOTE — SUBJECTIVE & OBJECTIVE
Oncology Treatment Plan:   [No treatment plan]    Medications:  Continuous Infusions:   sodium chloride 0.9% 125 mL/hr at 08/13/19 0544     Scheduled Meds:   arformoterol  15 mcg Nebulization Q12H    aspirin  81 mg Oral Daily    atorvastatin  20 mg Oral Daily    budesonide  0.5 mg Nebulization Q12H    ciprofloxacin  400 mg Intravenous Q12H    DULoxetine  60 mg Oral Daily    famotidine  20 mg Oral BID    fenofibrate  160 mg Oral Daily    lisinopril  20 mg Oral Daily    metoprolol tartrate  50 mg Oral Q12H    metronidazole  500 mg Intravenous Q8H    potassium chloride  40 mEq Oral Q6H    ranolazine  1,000 mg Oral BID    traZODone  200 mg Oral QHS     PRN Meds:albuterol sulfate, ALPRAZolam, Dextrose 10% Bolus, Dextrose 10% Bolus, glucagon (human recombinant), glucose, glucose, insulin aspart U-100, ondansetron, oxyCODONE-acetaminophen, promethazine (PHENERGAN) IVPB, tiZANidine     Review of patient's allergies indicates:  No Known Allergies     Past Medical History:   Diagnosis Date    Anxiety     CAD (coronary artery disease)     PTCA x 2 LAD, restenosis and restent 7/12.    Chest pain syndrome 10/2/2015    COPD (chronic obstructive pulmonary disease)     CPAP (continuous positive airway pressure) dependence     @ night    Diabetes mellitus type 2, uncontrolled 4/13/2016    History of duodenal ulcer     with bleed    Hypertension     Mixed hyperlipidemia     JUDI (obstructive sleep apnea)     severe    S/P PTCA (percutaneous transluminal coronary angioplasty) 3/11/2015    Vitamin D deficiency      Past Surgical History:   Procedure Laterality Date    APPENDECTOMY      CARDIAC CATHETERIZATION      CATARACT EXTRACTION W/  INTRAOCULAR LENS IMPLANT Right 4-22-15    CORONARY STENT PLACEMENT  2012    ESOPHAGOGASTRODUODENOSCOPY (EGD) N/A 7/31/2014    Per[facility] ENDO    EXCISION-SKIN Right 4/20/2015    Performed by [facility] O. [facility]ansonne IV, MD at [facility] OR    HEART  CATH WITH GRAFTS-LEFT Right 2014    Performed by [facility] at [facility] CATH LAB    HEART CATH-LEFT Left 2017    Performed by [facility] at [facility] CATH LAB    HEMORRHOID SURGERY      INCISION AND DRAINAGE (I&D), BUTTOCK  3/26/2015    Performed [facility] at [facility] OR    INCISION AND DRAINAGE-THIGH Right 3/26/2015    Performed by [facility] at [facility] OR    NISSEN FUNDOPLICATION      lap    SKIN LESION EXCISION Right     leg     Family History     Problem Relation (Age of Onset)    COPD Maternal Grandmother, Maternal Grandfather    Diabetes Maternal Grandfather    Heart block Father    Heart disease Mother, Sister        Tobacco Use    Smoking status: Former Smoker     Packs/day: 0.50     Years: 20.00     Pack years: 10.00     Types: Cigarettes     Last attempt to quit: 6/3/2014     Years since quittin.1    Smokeless tobacco: Never Used    Tobacco comment: currently vaping with nicotine since quit smoking in 2014   Substance and Sexual Activity    Alcohol use: Yes     Alcohol/week: 2.4 oz     Types: 4 Cans of beer per week    Drug use: No    Sexual activity: Not on file       Review of Systems   Constitutional: Positive for activity change, appetite change, chills, diaphoresis and fatigue. Negative for fever.   HENT: Negative.    Eyes: Negative.    Respiratory: Negative for cough and shortness of breath.    Cardiovascular: Negative for chest pain, palpitations and leg swelling.   Gastrointestinal: Positive for abdominal pain, diarrhea and nausea. Negative for vomiting.   Genitourinary: Positive for difficulty urinating.        Darkened urine   Musculoskeletal: Positive for back pain.   Skin: Negative for pallor, rash and wound.   Allergic/Immunologic: Positive for immunocompromised state.   Neurological: Positive for weakness and light-headedness. Negative for syncope.   Hematological: Negative for adenopathy. Does not bruise/bleed easily.    Psychiatric/Behavioral: Negative for confusion. The patient is nervous/anxious.      Objective:     Vital Signs (Most Recent):  Temp: 97.7 °F (36.5 °C) (08/13/19 1214)  Pulse: 61 (08/13/19 1214)  Resp: 18 (08/13/19 1214)  BP: 111/70 (08/13/19 1214)  SpO2: 98 % (08/13/19 1214) Vital Signs (24h Range):  Temp:  [97.7 °F (36.5 °C)-98.6 °F (37 °C)] 97.7 °F (36.5 °C)  Pulse:  [] 61  Resp:  [14-20] 18  SpO2:  [95 %-100 %] 98 %  BP: (111-172)/(62-88) 111/70     Weight: 88.2 kg (194 lb 5.4 oz)  Body mass index is 27.88 kg/m².  Body surface area is 2.09 meters squared.      Intake/Output Summary (Last 24 hours) at 8/13/2019 1344  Last data filed at 8/13/2019 0840  Gross per 24 hour   Intake 2686.25 ml   Output --   Net 2686.25 ml       Physical Exam   Constitutional: He is oriented to person, place, and time. He appears well-developed. He has a sickly appearance. No distress.   HENT:   Head: Normocephalic and atraumatic.   Nose: Nose normal.   Eyes: Pupils are equal, round, and reactive to light. Conjunctivae are normal. No scleral icterus.   Neck: Normal range of motion. Neck supple.   Cardiovascular: Normal rate, regular rhythm and normal heart sounds. Exam reveals no gallop and no friction rub.   No murmur heard.  Pulmonary/Chest: Effort normal and breath sounds normal.   Abdominal: Soft. Bowel sounds are increased.   Musculoskeletal: Normal range of motion. He exhibits no edema or tenderness.   Neurological: He is alert and oriented to person, place, and time.   Skin: Skin is warm and dry. He is not diaphoretic. There is pallor.   Psychiatric: His speech is normal and behavior is normal. Judgment and thought content normal. His mood appears anxious. He is not actively hallucinating. Cognition and memory are normal. He is attentive.   Vitals reviewed.      Significant Labs:   CBC:   Recent Labs   Lab 08/12/19  1300 08/12/19  1458 08/13/19  0531   WBC 0.80* 1.01* 0.83*   HGB 11.5* 11.1* 9.9*   HCT 32.7* 31.3* 28.5*    PLT 92* 101* 83*   , CMP:   Recent Labs   Lab 08/12/19  1300 08/13/19  0531   * 137   K 2.6* 3.2*   CL 97 105   CO2 26 25   * 92   BUN 7 4*   CREATININE 0.8 0.6   CALCIUM 9.1 8.1*   PROT 6.6 5.2*   ALBUMIN 3.2* 2.5*   BILITOT 1.5* 1.1*   ALKPHOS 90 75   AST 13 14   ALT 10 10   ANIONGAP 10 7*   EGFRNONAA >60 >60   , Coagulation:   Recent Labs   Lab 08/12/19  1841 08/13/19  0531   INR 1.0 1.1   , LDH: No results for input(s): LDHCSF, BFSOURCE in the last 48 hours., LFTs:   Recent Labs   Lab 08/12/19  1300 08/13/19  0531   ALT 10 10   AST 13 14   ALKPHOS 90 75   BILITOT 1.5* 1.1*   PROT 6.6 5.2*   ALBUMIN 3.2* 2.5*   , Urine Studies:   Recent Labs   Lab 08/12/19  1443   COLORU Brown*   APPEARANCEUA Hazy*   PHUR SEE COMMENT   SPECGRAV SEE COMMENT   PROTEINUA SEE COMMENT   GLUCUA SEE COMMENT   KETONESU SEE COMMENT   BILIRUBINUA SEE COMMENT   OCCULTUA SEE COMMENT   NITRITE SEE COMMENT   UROBILINOGEN SEE COMMENT   LEUKOCYTESUR SEE COMMENT   RBCUA 0   WBCUA 0   BACTERIA Occasional   SQUAMEPITHEL 2   HYALINECASTS 0    and All pertinent labs from the last 24 hours have been reviewed.    Diagnostic Results:  I have reviewed all pertinent imaging results/findings within the past 24 hours.

## 2019-08-14 PROBLEM — B37.0 THRUSH: Status: ACTIVE | Noted: 2019-08-14

## 2019-08-14 LAB
ALBUMIN SERPL BCP-MCNC: 2.6 G/DL (ref 3.5–5.2)
ALP SERPL-CCNC: 82 U/L (ref 55–135)
ALT SERPL W/O P-5'-P-CCNC: 12 U/L (ref 10–44)
ANION GAP SERPL CALC-SCNC: 8 MMOL/L (ref 8–16)
ANISOCYTOSIS BLD QL SMEAR: SLIGHT
AST SERPL-CCNC: 18 U/L (ref 10–40)
BASOPHILS # BLD AUTO: 0 K/UL (ref 0–0.2)
BASOPHILS NFR BLD: 0 % (ref 0–1.9)
BILIRUB SERPL-MCNC: 1 MG/DL (ref 0.1–1)
BUN SERPL-MCNC: 4 MG/DL (ref 6–20)
CALCIUM SERPL-MCNC: 8.2 MG/DL (ref 8.7–10.5)
CHLORIDE SERPL-SCNC: 105 MMOL/L (ref 95–110)
CO2 SERPL-SCNC: 24 MMOL/L (ref 23–29)
CREAT SERPL-MCNC: 0.6 MG/DL (ref 0.5–1.4)
DACRYOCYTES BLD QL SMEAR: ABNORMAL
DIFFERENTIAL METHOD: ABNORMAL
EOSINOPHIL # BLD AUTO: 0 K/UL (ref 0–0.5)
EOSINOPHIL NFR BLD: 2.3 % (ref 0–8)
ERYTHROCYTE [DISTWIDTH] IN BLOOD BY AUTOMATED COUNT: 13.9 % (ref 11.5–14.5)
EST. GFR  (AFRICAN AMERICAN): >60 ML/MIN/1.73 M^2
EST. GFR  (NON AFRICAN AMERICAN): >60 ML/MIN/1.73 M^2
GLUCOSE SERPL-MCNC: 94 MG/DL (ref 70–110)
HCT VFR BLD AUTO: 27.8 % (ref 40–54)
HGB BLD-MCNC: 9.7 G/DL (ref 14–18)
LYMPHOCYTES # BLD AUTO: 0.4 K/UL (ref 1–4.8)
LYMPHOCYTES NFR BLD: 44.8 % (ref 18–48)
MAGNESIUM SERPL-MCNC: 1 MG/DL (ref 1.6–2.6)
MCH RBC QN AUTO: 37 PG (ref 27–31)
MCHC RBC AUTO-ENTMCNC: 34.9 G/DL (ref 32–36)
MCV RBC AUTO: 106 FL (ref 82–98)
MONOCYTES # BLD AUTO: 0.3 K/UL (ref 0.3–1)
MONOCYTES NFR BLD: 31 % (ref 4–15)
NEUTROPHILS # BLD AUTO: 0.2 K/UL (ref 1.8–7.7)
NEUTROPHILS NFR BLD: 24.2 % (ref 38–73)
OVALOCYTES BLD QL SMEAR: ABNORMAL
PHOSPHATE SERPL-MCNC: 1.8 MG/DL (ref 2.7–4.5)
PLATELET # BLD AUTO: 86 K/UL (ref 150–350)
PLATELET BLD QL SMEAR: ABNORMAL
PMV BLD AUTO: 9.1 FL (ref 9.2–12.9)
POCT GLUCOSE: 160 MG/DL (ref 70–110)
POIKILOCYTOSIS BLD QL SMEAR: SLIGHT
POLYCHROMASIA BLD QL SMEAR: ABNORMAL
POTASSIUM SERPL-SCNC: 3.4 MMOL/L (ref 3.5–5.1)
PROT SERPL-MCNC: 5.3 G/DL (ref 6–8.4)
RBC # BLD AUTO: 2.62 M/UL (ref 4.6–6.2)
SODIUM SERPL-SCNC: 137 MMOL/L (ref 136–145)
SPHEROCYTES BLD QL SMEAR: ABNORMAL
STOMATOCYTES BLD QL SMEAR: PRESENT
TARGETS BLD QL SMEAR: ABNORMAL
WBC # BLD AUTO: 0.87 K/UL (ref 3.9–12.7)

## 2019-08-14 PROCEDURE — 63600175 PHARM REV CODE 636 W HCPCS: Performed by: RADIOLOGY

## 2019-08-14 PROCEDURE — 99232 SBSQ HOSP IP/OBS MODERATE 35: CPT | Mod: ,,, | Performed by: INTERNAL MEDICINE

## 2019-08-14 PROCEDURE — 88299 UNLISTED CYTOGENETIC STUDY: CPT

## 2019-08-14 PROCEDURE — 88311 DECALCIFY TISSUE: CPT | Mod: 26,,, | Performed by: PATHOLOGY

## 2019-08-14 PROCEDURE — 88305 TISSUE EXAM BY PATHOLOGIST: CPT | Performed by: PATHOLOGY

## 2019-08-14 PROCEDURE — 88305 TISSUE SPECIMEN TO PATHOLOGY, BONE MARROW ASPIRATION/BIOPSY PROCEDURE: ICD-10-PCS | Mod: 26,,, | Performed by: PATHOLOGY

## 2019-08-14 PROCEDURE — 25000003 PHARM REV CODE 250: Performed by: NURSE PRACTITIONER

## 2019-08-14 PROCEDURE — 88184 FLOWCYTOMETRY/ TC 1 MARKER: CPT | Performed by: PATHOLOGY

## 2019-08-14 PROCEDURE — 88311 TISSUE SPECIMEN TO PATHOLOGY, BONE MARROW ASPIRATION/BIOPSY PROCEDURE: ICD-10-PCS | Mod: 26,,, | Performed by: PATHOLOGY

## 2019-08-14 PROCEDURE — 88313 SPECIAL STAINS GROUP 2: CPT | Mod: 26,,, | Performed by: PATHOLOGY

## 2019-08-14 PROCEDURE — 88313 SPECIAL STAINS GROUP 2: CPT

## 2019-08-14 PROCEDURE — 88313 TISSUE SPECIMEN TO PATHOLOGY, BONE MARROW ASPIRATION/BIOPSY PROCEDURE: ICD-10-PCS | Mod: 26,,, | Performed by: PATHOLOGY

## 2019-08-14 PROCEDURE — 63700000 PHARM REV CODE 250 ALT 637 W/O HCPCS: Performed by: NURSE PRACTITIONER

## 2019-08-14 PROCEDURE — 85097 TISSUE SPECIMEN TO PATHOLOGY, BONE MARROW ASPIRATION/BIOPSY PROCEDURE: ICD-10-PCS | Mod: 59,,, | Performed by: PATHOLOGY

## 2019-08-14 PROCEDURE — 94761 N-INVAS EAR/PLS OXIMETRY MLT: CPT

## 2019-08-14 PROCEDURE — 88189 FLOWCYTOMETRY/READ 16 & >: CPT | Mod: ,,, | Performed by: PATHOLOGY

## 2019-08-14 PROCEDURE — 25000003 PHARM REV CODE 250: Performed by: INTERNAL MEDICINE

## 2019-08-14 PROCEDURE — 83735 ASSAY OF MAGNESIUM: CPT

## 2019-08-14 PROCEDURE — 25000242 PHARM REV CODE 250 ALT 637 W/ HCPCS: Performed by: NURSE PRACTITIONER

## 2019-08-14 PROCEDURE — 63600175 PHARM REV CODE 636 W HCPCS: Performed by: NURSE PRACTITIONER

## 2019-08-14 PROCEDURE — 99232 PR SUBSEQUENT HOSPITAL CARE,LEVL II: ICD-10-PCS | Mod: ,,, | Performed by: INTERNAL MEDICINE

## 2019-08-14 PROCEDURE — 94640 AIRWAY INHALATION TREATMENT: CPT

## 2019-08-14 PROCEDURE — 84100 ASSAY OF PHOSPHORUS: CPT

## 2019-08-14 PROCEDURE — 80053 COMPREHEN METABOLIC PANEL: CPT

## 2019-08-14 PROCEDURE — 88342 TISSUE SPECIMEN TO PATHOLOGY, BONE MARROW ASPIRATION/BIOPSY PROCEDURE: ICD-10-PCS | Mod: 26,,, | Performed by: PATHOLOGY

## 2019-08-14 PROCEDURE — 85097 BONE MARROW INTERPRETATION: CPT | Mod: 59,,, | Performed by: PATHOLOGY

## 2019-08-14 PROCEDURE — 85025 COMPLETE CBC W/AUTO DIFF WBC: CPT

## 2019-08-14 PROCEDURE — 25000003 PHARM REV CODE 250: Performed by: PHYSICIAN ASSISTANT

## 2019-08-14 PROCEDURE — 88341 TISSUE SPECIMEN TO PATHOLOGY, BONE MARROW ASPIRATION/BIOPSY PROCEDURE: ICD-10-PCS | Mod: 26,,, | Performed by: PATHOLOGY

## 2019-08-14 PROCEDURE — 88341 IMHCHEM/IMCYTCHM EA ADD ANTB: CPT | Performed by: PATHOLOGY

## 2019-08-14 PROCEDURE — 88342 IMHCHEM/IMCYTCHM 1ST ANTB: CPT | Performed by: PATHOLOGY

## 2019-08-14 PROCEDURE — 88237 TISSUE CULTURE BONE MARROW: CPT

## 2019-08-14 PROCEDURE — 21400001 HC TELEMETRY ROOM

## 2019-08-14 PROCEDURE — 99900035 HC TECH TIME PER 15 MIN (STAT)

## 2019-08-14 PROCEDURE — 88342 IMHCHEM/IMCYTCHM 1ST ANTB: CPT | Mod: 26,,, | Performed by: PATHOLOGY

## 2019-08-14 PROCEDURE — 88341 IMHCHEM/IMCYTCHM EA ADD ANTB: CPT | Mod: 26,,, | Performed by: PATHOLOGY

## 2019-08-14 PROCEDURE — S0030 INJECTION, METRONIDAZOLE: HCPCS | Performed by: NURSE PRACTITIONER

## 2019-08-14 PROCEDURE — 88305 TISSUE EXAM BY PATHOLOGIST: CPT | Mod: 26,,, | Performed by: PATHOLOGY

## 2019-08-14 PROCEDURE — 88185 FLOWCYTOMETRY/TC ADD-ON: CPT | Performed by: PATHOLOGY

## 2019-08-14 PROCEDURE — 88189 PR  FLOWCYTOMETRY/READ, 16 & > MARKERS: ICD-10-PCS | Mod: ,,, | Performed by: PATHOLOGY

## 2019-08-14 PROCEDURE — 88264 CHROMOSOME ANALYSIS 20-25: CPT

## 2019-08-14 PROCEDURE — 36415 COLL VENOUS BLD VENIPUNCTURE: CPT

## 2019-08-14 RX ORDER — OXYCODONE AND ACETAMINOPHEN 10; 325 MG/1; MG/1
1 TABLET ORAL EVERY 4 HOURS PRN
Status: DISCONTINUED | OUTPATIENT
Start: 2019-08-14 | End: 2019-08-21 | Stop reason: HOSPADM

## 2019-08-14 RX ORDER — HYDROMORPHONE HYDROCHLORIDE 1 MG/ML
INJECTION, SOLUTION INTRAMUSCULAR; INTRAVENOUS; SUBCUTANEOUS CODE/TRAUMA/SEDATION MEDICATION
Status: COMPLETED | OUTPATIENT
Start: 2019-08-14 | End: 2019-08-14

## 2019-08-14 RX ORDER — MIDAZOLAM HYDROCHLORIDE 1 MG/ML
INJECTION INTRAMUSCULAR; INTRAVENOUS CODE/TRAUMA/SEDATION MEDICATION
Status: COMPLETED | OUTPATIENT
Start: 2019-08-14 | End: 2019-08-14

## 2019-08-14 RX ORDER — NYSTATIN 100000 [USP'U]/ML
500000 SUSPENSION ORAL
Status: DISCONTINUED | OUTPATIENT
Start: 2019-08-14 | End: 2019-08-20

## 2019-08-14 RX ORDER — MAG HYDROX/ALUMINUM HYD/SIMETH 200-200-20
30 SUSPENSION, ORAL (FINAL DOSE FORM) ORAL EVERY 6 HOURS PRN
Status: DISCONTINUED | OUTPATIENT
Start: 2019-08-14 | End: 2019-08-18

## 2019-08-14 RX ORDER — MAGNESIUM SULFATE HEPTAHYDRATE 40 MG/ML
2 INJECTION, SOLUTION INTRAVENOUS ONCE
Status: COMPLETED | OUTPATIENT
Start: 2019-08-14 | End: 2019-08-14

## 2019-08-14 RX ORDER — FLUCONAZOLE 100 MG/1
200 TABLET ORAL DAILY
Status: COMPLETED | OUTPATIENT
Start: 2019-08-15 | End: 2019-08-21

## 2019-08-14 RX ORDER — FENTANYL CITRATE 50 UG/ML
INJECTION, SOLUTION INTRAMUSCULAR; INTRAVENOUS CODE/TRAUMA/SEDATION MEDICATION
Status: COMPLETED | OUTPATIENT
Start: 2019-08-14 | End: 2019-08-14

## 2019-08-14 RX ORDER — LORAZEPAM 2 MG/ML
INJECTION INTRAMUSCULAR CODE/TRAUMA/SEDATION MEDICATION
Status: COMPLETED | OUTPATIENT
Start: 2019-08-14 | End: 2019-08-14

## 2019-08-14 RX ORDER — FOLIC ACID 1 MG/1
1 TABLET ORAL DAILY
Status: DISCONTINUED | OUTPATIENT
Start: 2019-08-14 | End: 2019-08-21 | Stop reason: HOSPADM

## 2019-08-14 RX ADMIN — FENTANYL CITRATE 50 MCG: 50 INJECTION, SOLUTION INTRAMUSCULAR; INTRAVENOUS at 10:08

## 2019-08-14 RX ADMIN — CIPROFLOXACIN 400 MG: 2 INJECTION, SOLUTION INTRAVENOUS at 05:08

## 2019-08-14 RX ADMIN — ARFORMOTEROL TARTRATE 15 MCG: 15 SOLUTION RESPIRATORY (INHALATION) at 08:08

## 2019-08-14 RX ADMIN — LISINOPRIL 20 MG: 20 TABLET ORAL at 08:08

## 2019-08-14 RX ADMIN — ARFORMOTEROL TARTRATE 15 MCG: 15 SOLUTION RESPIRATORY (INHALATION) at 06:08

## 2019-08-14 RX ADMIN — METRONIDAZOLE 500 MG: 500 INJECTION, SOLUTION INTRAVENOUS at 05:08

## 2019-08-14 RX ADMIN — METRONIDAZOLE 500 MG: 500 INJECTION, SOLUTION INTRAVENOUS at 04:08

## 2019-08-14 RX ADMIN — FAMOTIDINE 20 MG: 20 TABLET ORAL at 08:08

## 2019-08-14 RX ADMIN — OXYCODONE HYDROCHLORIDE AND ACETAMINOPHEN 1 TABLET: 10; 325 TABLET ORAL at 08:08

## 2019-08-14 RX ADMIN — NYSTATIN 500000 UNITS: 100000 SUSPENSION ORAL at 08:08

## 2019-08-14 RX ADMIN — MAGNESIUM SULFATE IN WATER 2 G: 40 INJECTION, SOLUTION INTRAVENOUS at 11:08

## 2019-08-14 RX ADMIN — OXYCODONE HYDROCHLORIDE AND ACETAMINOPHEN 1 TABLET: 10; 325 TABLET ORAL at 11:08

## 2019-08-14 RX ADMIN — FOLIC ACID 1 MG: 1 TABLET ORAL at 11:08

## 2019-08-14 RX ADMIN — POTASSIUM CHLORIDE 40 MEQ: 1500 TABLET, EXTENDED RELEASE ORAL at 05:08

## 2019-08-14 RX ADMIN — TIZANIDINE 4 MG: 4 TABLET ORAL at 09:08

## 2019-08-14 RX ADMIN — POTASSIUM PHOSPHATE, MONOBASIC AND POTASSIUM PHOSPHATE, DIBASIC 20 MMOL: 224; 236 INJECTION, SOLUTION, CONCENTRATE INTRAVENOUS at 01:08

## 2019-08-14 RX ADMIN — BUDESONIDE 0.5 MG: 0.5 SUSPENSION RESPIRATORY (INHALATION) at 08:08

## 2019-08-14 RX ADMIN — RANOLAZINE 1000 MG: 500 TABLET, FILM COATED, EXTENDED RELEASE ORAL at 08:08

## 2019-08-14 RX ADMIN — MIDAZOLAM HYDROCHLORIDE 1 MG: 1 INJECTION, SOLUTION INTRAMUSCULAR; INTRAVENOUS at 10:08

## 2019-08-14 RX ADMIN — CHOLESTYRAMINE 4 G: 4 POWDER, FOR SUSPENSION ORAL at 08:08

## 2019-08-14 RX ADMIN — OXYCODONE HYDROCHLORIDE AND ACETAMINOPHEN 1 TABLET: 10; 325 TABLET ORAL at 03:08

## 2019-08-14 RX ADMIN — ASPIRIN 81 MG: 81 TABLET, COATED ORAL at 08:08

## 2019-08-14 RX ADMIN — POTASSIUM CHLORIDE 40 MEQ: 1500 TABLET, EXTENDED RELEASE ORAL at 11:08

## 2019-08-14 RX ADMIN — ALUMINUM HYDROXIDE, MAGNESIUM HYDROXIDE, AND SIMETHICONE 30 ML: 200; 200; 20 SUSPENSION ORAL at 09:08

## 2019-08-14 RX ADMIN — FENOFIBRATE 160 MG: 160 TABLET ORAL at 08:08

## 2019-08-14 RX ADMIN — NYSTATIN 500000 UNITS: 100000 SUSPENSION ORAL at 03:08

## 2019-08-14 RX ADMIN — METOPROLOL TARTRATE 50 MG: 50 TABLET ORAL at 08:08

## 2019-08-14 RX ADMIN — BUDESONIDE 0.5 MG: 0.5 SUSPENSION RESPIRATORY (INHALATION) at 07:08

## 2019-08-14 RX ADMIN — TRAZODONE HYDROCHLORIDE 200 MG: 100 TABLET ORAL at 08:08

## 2019-08-14 RX ADMIN — DULOXETINE 60 MG: 30 CAPSULE, DELAYED RELEASE ORAL at 08:08

## 2019-08-14 RX ADMIN — HYDROMORPHONE HYDROCHLORIDE 1 MG: 1 INJECTION, SOLUTION INTRAMUSCULAR; INTRAVENOUS; SUBCUTANEOUS at 10:08

## 2019-08-14 RX ADMIN — ATORVASTATIN CALCIUM 20 MG: 10 TABLET, FILM COATED ORAL at 08:08

## 2019-08-14 RX ADMIN — METRONIDAZOLE 500 MG: 500 INJECTION, SOLUTION INTRAVENOUS at 09:08

## 2019-08-14 RX ADMIN — OXYCODONE HYDROCHLORIDE AND ACETAMINOPHEN 1 TABLET: 10; 325 TABLET ORAL at 04:08

## 2019-08-14 NOTE — OP NOTE
Pre Op Diagnosis: pancytopenia     Post Op Diagnosis: same     Procedure:  Bone marrow aspiration and biopsy     Procedure performed by: Shante SAUNDERS, Chace MCCALL     Written Informed Consent Obtained: Yes     Specimen Removed:  yes     Estimated Blood Loss:  minimal     Findings: Local anesthesia and moderate sedation were used.     The patient tolerated the procedure well and there were no complications.      Sterile technique was performed in the lower back, lidocaine was used as a local anesthetic.  Multiple samples taken from right posterior iliac.  Pt tolerated the procedure well without immediate complications.  Please see radiologist report for details. F/u with PCP and/or ordering physician.

## 2019-08-14 NOTE — ASSESSMENT & PLAN NOTE
Hematology consult but likely secondary to sepsis and colitis  Hematology has scheduled a bone marrow biopsy - CT imaging Splenomegaly with the spleen measuring 18 cm.  8/14 - had bone marrow today

## 2019-08-14 NOTE — SUBJECTIVE & OBJECTIVE
Review of Systems   Constitutional: Positive for activity change and fatigue. Negative for appetite change, chills, diaphoresis and fever.   HENT: Negative.    Eyes: Negative.    Respiratory: Negative for cough and shortness of breath.    Cardiovascular: Negative for chest pain, palpitations and leg swelling.   Gastrointestinal: Positive for abdominal pain and diarrhea. Negative for nausea and vomiting.   Genitourinary: Negative for difficulty urinating.        Darkened urine   Musculoskeletal: Positive for back pain.   Skin: Negative for pallor, rash and wound.   Neurological: Positive for weakness. Negative for syncope and light-headedness.   Psychiatric/Behavioral: The patient is not nervous/anxious.      Objective:     Vital Signs (Most Recent):  Temp: 98.6 °F (37 °C) (08/14/19 1302)  Pulse: 63 (08/14/19 1302)  Resp: 18 (08/14/19 1302)  BP: 111/72 (08/14/19 1302)  SpO2: 98 % (08/14/19 1302) Vital Signs (24h Range):  Temp:  [97.7 °F (36.5 °C)-98.6 °F (37 °C)] 98.6 °F (37 °C)  Pulse:  [57-76] 63  Resp:  [15-19] 18  SpO2:  [98 %-100 %] 98 %  BP: (111-195)/(65-89) 111/72     Weight: 88.2 kg (194 lb 5.4 oz)  Body mass index is 27.88 kg/m².    Intake/Output Summary (Last 24 hours) at 8/14/2019 1558  Last data filed at 8/14/2019 0908  Gross per 24 hour   Intake 3683.33 ml   Output --   Net 3683.33 ml      Physical Exam   Constitutional: He is oriented to person, place, and time. He appears well-developed. He has a sickly appearance.   HENT:   Head: Normocephalic and atraumatic.   Nose: Nose normal.   Eyes: Pupils are equal, round, and reactive to light. Conjunctivae are normal. No scleral icterus.   Neck: Normal range of motion. Neck supple.   Cardiovascular: Normal rate, regular rhythm and normal heart sounds. Exam reveals no gallop and no friction rub.   No murmur heard.  Pulmonary/Chest: Effort normal and breath sounds normal.   Abdominal: Soft. Bowel sounds are increased. Tenderness: especially to RLQ.    Musculoskeletal: Normal range of motion. He exhibits no edema or tenderness.   Neurological: He is alert and oriented to person, place, and time.   Skin: Skin is warm and dry. There is pallor.   Psychiatric: He has a normal mood and affect. His behavior is normal.   Vitals reviewed.      Significant Labs:   CBC:   Recent Labs   Lab 08/13/19 0531 08/14/19  0557   WBC 0.83* 0.87*   HGB 9.9* 9.7*   HCT 28.5* 27.8*   PLT 83* 86*     CMP:   Recent Labs   Lab 08/13/19  0531 08/14/19  0557    137   K 3.2* 3.4*    105   CO2 25 24   GLU 92 94   BUN 4* 4*   CREATININE 0.6 0.6   CALCIUM 8.1* 8.2*   PROT 5.2* 5.3*   ALBUMIN 2.5* 2.6*   BILITOT 1.1* 1.0   ALKPHOS 75 82   AST 14 18   ALT 10 12   ANIONGAP 7* 8   EGFRNONAA >60 >60     All pertinent labs within the past 24 hours have been reviewed.    Significant Imaging: I have reviewed all pertinent imaging results/findings within the past 24 hours.

## 2019-08-14 NOTE — SEDATION DOCUMENTATION
Procedure complete.  Band aid placed to R posterior iliac crest puncture site, site WDL.  Pt awake, alert and able to follow simple commands.   Pt transferred to stretcher supine with hob elevated. Denied pain or discomfort at this time.

## 2019-08-14 NOTE — ASSESSMENT & PLAN NOTE
8/13/19 Patient with newly diagnosed pancytopenia WBC 0.83 (ANC 0.1), hemoglobin 8.8, platelets 83 K.  With diarrhea that started 2 days ago perfuse green watery.  C. Diff negative.  States had night sweats for 2 days.  Denies known fever.  Scheduled for BMB tomorrow at noon.  No need for transfusion at this time.  Splenomegaly on CT scan of abdomen/pelvis with diffuse mural thickening and inflammation of the adjacent fat of the rectosigmoid and descending colon consistent with colitis.  .8.  Blood cultures NGTD.    --Continue supportive care  [facility], until get results of BMB  --BMB tomorrow at noon    8/14/19 Continued pancytopenia WBC 0.87 (ANC 0.2), hemoglobin 9.7, platelets 86K.  Splenomegaly. Folic acid deficiency.  Not iron deficient or B12 deficient.  Continued diarrhea.  C. Diff negative.  Scheduled for BMB today.    --CBC daily  --Monitor for s/s of infection  --Start folate 1 mg Po dialy

## 2019-08-14 NOTE — SUBJECTIVE & OBJECTIVE
Interval History:  8/14/19 Patient seen at the bedside today.  States still with diarrhea.  C Diff negative. Continues to remain afebrile.  Awaiting BMB today.  Complains of back pain due to back surgery 6 weeks ago.     Oncology Treatment Plan:   [No treatment plan]    Medications:  Continuous Infusions:   sodium chloride 0.9% 125 mL/hr at 08/14/19 0518     Scheduled Meds:   arformoterol  15 mcg Nebulization Q12H    aspirin  81 mg Oral Daily    atorvastatin  20 mg Oral Daily    budesonide  0.5 mg Nebulization Q12H    cholestyramine  1 packet Oral BID    ciprofloxacin  400 mg Intravenous Q12H    DULoxetine  60 mg Oral Daily    famotidine  20 mg Oral BID    fenofibrate  160 mg Oral Daily    lisinopril  20 mg Oral Daily    metoprolol tartrate  50 mg Oral Q12H    metronidazole  500 mg Intravenous Q8H    potassium chloride  40 mEq Oral Q6H    ranolazine  1,000 mg Oral BID    traZODone  200 mg Oral QHS     PRN Meds:albuterol sulfate, ALPRAZolam, Dextrose 10% Bolus, Dextrose 10% Bolus, glucagon (human recombinant), glucose, glucose, insulin aspart U-100, ondansetron, oxyCODONE-acetaminophen, promethazine (PHENERGAN) IVPB, tiZANidine     Review of patient's allergies indicates:  No Known Allergies     Past Medical History:   Diagnosis Date    Anxiety     CAD (coronary artery disease)     PTCA x 2 LAD, restenosis and restent 7/12.    Chest pain syndrome 10/2/2015    COPD (chronic obstructive pulmonary disease)     CPAP (continuous positive airway pressure) dependence     @ night    Diabetes mellitus type 2, uncontrolled 4/13/2016    History of duodenal ulcer     with bleed    Hypertension     Mixed hyperlipidemia     JUDI (obstructive sleep apnea)     severe    S/P PTCA (percutaneous transluminal coronary angioplasty) 3/11/2015    Vitamin D deficiency      Past Surgical History:   Procedure Laterality Date    APPENDECTOMY      CARDIAC CATHETERIZATION      CATARACT EXTRACTION W/  INTRAOCULAR  LENS IMPLANT Right 4-22-15    CORONARY STENT PLACEMENT      ESOPHAGOGASTRODUODENOSCOPY (EGD) N/A 2014    Performed by [facility] at [facility] ENDO    EXCISION-SKIN Right 2015    Performed by Louis O. Jeansonne IV, MD at [facility] OR    HEART CATH WITH GRAFTS-LEFT Right 2014    Performed by Erma Green MD at [facility] CATH LAB    HEART CATH-LEFT Left 2017    Performed by Erma Green MD at [facility] CATH LAB    HEMORRHOID SURGERY      INCISION AND DRAINAGE (I&D), BUTTOCK  3/26/2015    Performed by [facility]. Jeansonne IV, MD at [facility] OR    INCISION AND DRAINAGE-THIGH Right 3/26/2015    Performed by [facility]. [facility]ne IV, MD at [facility] OR    NISSEN FUNDOPLICATION      lap    SKIN LESION EXCISION Right     leg     Family History     Problem Relation (Age of Onset)    COPD Maternal Grandmother, Maternal Grandfather    Diabetes Maternal Grandfather    Heart block Father    Heart disease Mother, Sister        Tobacco Use    Smoking status: Former Smoker     Packs/day: 0.50     Years: 20.00     Pack years: 10.00     Types: Cigarettes     Last attempt to quit: 6/3/2014     Years since quittin.2    Smokeless tobacco: Never Used    Tobacco comment: currently vaping with nicotine since quit smoking in 2014   Substance and Sexual Activity    Alcohol use: Yes     Alcohol/week: 2.4 oz     Types: 4 Cans of beer per week    Drug use: No    Sexual activity: Not on file       Review of Systems   Constitutional: Positive for activity change, appetite change, chills, diaphoresis and fatigue. Negative for fever.   HENT: Negative.    Eyes: Negative.    Respiratory: Negative for cough and shortness of breath.    Cardiovascular: Negative for chest pain, palpitations and leg swelling.   Gastrointestinal: Positive for abdominal pain, diarrhea and nausea. Negative for vomiting.   Genitourinary: Positive for difficulty urinating.        Darkened urine   Musculoskeletal: Positive for back pain.   Skin:  Negative for pallor, rash and wound.   Allergic/Immunologic: Positive for immunocompromised state.   Neurological: Positive for weakness and light-headedness. Negative for syncope.   Hematological: Negative for adenopathy. Does not bruise/bleed easily.   Psychiatric/Behavioral: Negative for confusion. The patient is nervous/anxious.      Objective:     Vital Signs (Most Recent):  Temp: 97.7 °F (36.5 °C) (08/14/19 0718)  Pulse: 70 (08/14/19 0718)  Resp: (!) 1 (08/14/19 0718)  BP: (!) 195/84 (08/14/19 0718)  SpO2: 100 % (08/14/19 0718) Vital Signs (24h Range):  Temp:  [97.7 °F (36.5 °C)-98.5 °F (36.9 °C)] 97.7 °F (36.5 °C)  Pulse:  [58-76] 70  Resp:  [1-18] 1  SpO2:  [98 %-100 %] 100 %  BP: (111-195)/(65-89) 195/84     Weight: 88.2 kg (194 lb 5.4 oz)  Body mass index is 27.88 kg/m².  Body surface area is 2.09 meters squared.      Intake/Output Summary (Last 24 hours) at 8/14/2019 0957  Last data filed at 8/14/2019 0600  Gross per 24 hour   Intake 3683.33 ml   Output --   Net 3683.33 ml       Physical Exam   Constitutional: He is oriented to person, place, and time. He appears well-developed. He has a sickly appearance. No distress.   HENT:   Head: Normocephalic and atraumatic.   Nose: Nose normal.   Eyes: Pupils are equal, round, and reactive to light. Conjunctivae are normal. No scleral icterus.   Neck: Normal range of motion. Neck supple.   Cardiovascular: Normal rate, regular rhythm and normal heart sounds. Exam reveals no gallop and no friction rub.   No murmur heard.  Pulmonary/Chest: Effort normal and breath sounds normal.   Abdominal: Soft. Bowel sounds are increased.   Musculoskeletal: Normal range of motion. He exhibits no edema or tenderness.   Neurological: He is alert and oriented to person, place, and time.   Skin: Skin is warm and dry. He is not diaphoretic. There is pallor.   Psychiatric: His speech is normal and behavior is normal. Judgment and thought content normal. His mood appears anxious. He is  not actively hallucinating. Cognition and memory are normal. He is attentive.   Vitals reviewed.      Significant Labs:   CBC:   Recent Labs   Lab 08/12/19  1458 08/13/19  0531 08/14/19  0557   WBC 1.01* 0.83* 0.87*   HGB 11.1* 9.9* 9.7*   HCT 31.3* 28.5* 27.8*   * 83* 86*   , CMP:   Recent Labs   Lab 08/12/19  1300 08/13/19  0531 08/14/19  0557   * 137 137   K 2.6* 3.2* 3.4*   CL 97 105 105   CO2 26 25 24   * 92 94   BUN 7 4* 4*   CREATININE 0.8 0.6 0.6   CALCIUM 9.1 8.1* 8.2*   PROT 6.6 5.2* 5.3*   ALBUMIN 3.2* 2.5* 2.6*   BILITOT 1.5* 1.1* 1.0   ALKPHOS 90 75 82   AST 13 14 18   ALT 10 10 12   ANIONGAP 10 7* 8   EGFRNONAA >60 >60 >60   , Coagulation:   Recent Labs   Lab 08/12/19  1841 08/13/19  0531   INR 1.0 1.1   , LDH: No results for input(s): LDHCSF, BFSOURCE in the last 48 hours., LFTs:   Recent Labs   Lab 08/12/19  1300 08/13/19  0531 08/14/19  0557   ALT 10 10 12   AST 13 14 18   ALKPHOS 90 75 82   BILITOT 1.5* 1.1* 1.0   PROT 6.6 5.2* 5.3*   ALBUMIN 3.2* 2.5* 2.6*   , Urine Studies:   Recent Labs   Lab 08/12/19  1443   COLORU Brown*   APPEARANCEUA Hazy*   PHUR SEE COMMENT   SPECGRAV SEE COMMENT   PROTEINUA SEE COMMENT   GLUCUA SEE COMMENT   KETONESU SEE COMMENT   BILIRUBINUA SEE COMMENT   OCCULTUA SEE COMMENT   NITRITE SEE COMMENT   UROBILINOGEN SEE COMMENT   LEUKOCYTESUR SEE COMMENT   RBCUA 0   WBCUA 0   BACTERIA Occasional   SQUAMEPITHEL 2   HYALINECASTS 0    and All pertinent labs from the last 24 hours have been reviewed.    Diagnostic Results:  I have reviewed all pertinent imaging results/findings within the past 24 hours.

## 2019-08-14 NOTE — SEDATION DOCUMENTATION
Report called to Ursula on M/S.  Pt transferred to pt room on stretcher supine with hob elevated.  Pt awake, alert and oriented x 3.  Denied pain or discomfort at this time.

## 2019-08-14 NOTE — SEDATION DOCUMENTATION
Pt in ct on table prone with bilateral arms above head. Vs monitor in place, vss.  Pt verbalized understanding of procedure.

## 2019-08-14 NOTE — PLAN OF CARE
Problem: Adult Inpatient Plan of Care  Goal: Plan of Care Review  Outcome: Ongoing (interventions implemented as appropriate)  Pt complains of lower back pain. Pain medication is effective. Pt is stool having frequent loose stools. Contact precautions initiated. Pt refused accu checks. IV fluids are infusing. IV ABX given per order. Pt denies n/v. No injuries.Will continue to monitor. 12 hour chart check is completed.

## 2019-08-14 NOTE — ASSESSMENT & PLAN NOTE
Criteria including WBC 0.80 & 1.01, tachycardia 113  CT of ABD/Pelvis notes Diffuse inflammatory changes of the descending colon and rectosigmoid consistent with colitis/proctitis.  No evidence of a pericolonic abscess can be seen.  The appendix cannot be identified.  Fluid-filled small bowel loops in the pelvis likely represent an ileus.  1.1 cm in diameter gallbladder calculus.  Splenomegaly with the spleen measuring 18 cm.  Initial 1000 ml saline given  Additional 1700 ml IV bolus given  Lactic acid pending, repeat in 4 hours and 8 hours  Blood cultures ordered  Platelet count 92, 000  INR pending  Cipro and Flagyl prescribed  8/14 - lactic acid normal, vital signs normal, IV fluids/antibiotics continued, blood cultures NgTD

## 2019-08-14 NOTE — PROGRESS NOTES
[facility] -   Hematology/Oncology  Progress Note    Patient Name: Kodak Valentine  Admission Date: 8/12/2019  Hospital Length of Stay: 2 days  Code Status: No Order     Subjective:     HPI:  Pt is a 50 yo male with PMHx of CAD with stenting x 2 LAD, COPD, DM II, HTN, JUDI and HPL who presents to the ED with reports of severe, constant cramping lower abdominal pain x 5 days. Associated symptoms include profuse, greenish watery diarrhea, diaphoresis, lightheadedness, dark urine (today) and vomiting (1-2 days now resolved). Pt had back surgery 6 weeks ago but his back pain is no worse than usual. Pt's wife was treated at [facility] approx 6 weeks ago. Pt denies URI symptoms, cough, chest pain, palpitations, bloody diarrhea and other symptoms. He last ate some yogurt yesterday. V/S on arrival: Temp 98.0, pulse 113, resp 18 and B/P 133/70. Labs find WBC 0.80, Hgb 11.5?Hct 32.7, plt 92, left shift 22 %, hyponatremia 133, hypokalemia 2.6, gluc 176, total bili 1.5, .8. U/A was altered due to dark color. A contrasted CT ABD/Pelvis was resulted at 16:17 which found diffuse inflammatory changes of the descending colon and rectosigmoid consistent with colitis/proctitis.  No evidence of a pericolonic abscess can be seen. Pt is admitted for Sepsis, Acute Colitis and electrolyte imbalances.     Hematology/oncology consulted for pancytopenia.       Interval History:  8/14/19 Patient seen at the bedside today.  States still with diarrhea.  C Diff negative. Continues to remain afebrile.  Awaiting BMB today.  Complains of back pain due to back surgery 6 weeks ago.     Oncology Treatment Plan:   [No treatment plan]    Medications:  Continuous Infusions:   sodium chloride 0.9% 125 mL/hr at 08/14/19 0518     Scheduled Meds:   arformoterol  15 mcg Nebulization Q12H    aspirin  81 mg Oral Daily    atorvastatin  20 mg Oral Daily    budesonide  0.5 mg Nebulization Q12H    cholestyramine  1 packet Oral  BID    ciprofloxacin  400 mg Intravenous Q12H    DULoxetine  60 mg Oral Daily    famotidine  20 mg Oral BID    fenofibrate  160 mg Oral Daily    lisinopril  20 mg Oral Daily    metoprolol tartrate  50 mg Oral Q12H    metronidazole  500 mg Intravenous Q8H    potassium chloride  40 mEq Oral Q6H    ranolazine  1,000 mg Oral BID    traZODone  200 mg Oral QHS     PRN Meds:albuterol sulfate, ALPRAZolam, Dextrose 10% Bolus, Dextrose 10% Bolus, glucagon (human recombinant), glucose, glucose, insulin aspart U-100, ondansetron, oxyCODONE-acetaminophen, promethazine (PHENERGAN) IVPB, tiZANidine     Review of patient's allergies indicates:  No Known Allergies     Past Medical History:   Diagnosis Date    Anxiety     CAD (coronary artery disease)     PTCA x 2 LAD, restenosis and restent 7/12.    Chest pain syndrome 10/2/2015    COPD (chronic obstructive pulmonary disease)     CPAP (continuous positive airway pressure) dependence     @ night    Diabetes mellitus type 2, uncontrolled 4/13/2016    History of duodenal ulcer     with bleed    Hypertension     Mixed hyperlipidemia     JUDI (obstructive sleep apnea)     severe    S/P PTCA (percutaneous transluminal coronary angioplasty) 3/11/2015    Vitamin D deficiency      Past Surgical History:   Procedure Laterality Date    APPENDECTOMY      CARDIAC CATHETERIZATION      CATARACT EXTRACTION W/  INTRAOCULAR LENS IMPLANT Right 4-22-15    CORONARY STENT PLACEMENT  2012    ESOPHAGOGASTRODUODENOSCOPY (EGD) N/A 7/31/2014    Performed by [facility] at [facility] ENDO    EXCISION-SKIN Right 4/20/2015    Performed by Louis O. Jeansonne IV, MD at [facility] OR    HEART CATH WITH GRAFTS-LEFT Right 6/2/2014    Performed by Erma Green MD at [facility] CATH LAB    HEART CATH-LEFT Left 2/21/2017    Performed by Erma Green MD at [facility] CATH LAB    HEMORRHOID SURGERY      INCISION AND DRAINAGE (I&D), BUTTOCK  3/26/2015    Performed by [facility] at  [facility] OR    INCISION AND DRAINAGE-THIGH Right 3/26/2015    Performed by [facility]. [facility] IV, MD at [facility] OR    NISSEN FUNDOPLICATION      lap    SKIN LESION EXCISION Right     leg     Family History     Problem Relation (Age of Onset)    COPD Maternal Grandmother, Maternal Grandfather    Diabetes Maternal Grandfather    Heart block Father    Heart disease Mother, Sister        Tobacco Use    Smoking status: Former Smoker     Packs/day: 0.50     Years: 20.00     Pack years: 10.00     Types: Cigarettes     Last attempt to quit: 6/3/2014     Years since quittin.2    Smokeless tobacco: Never Used    Tobacco comment: currently vaping with nicotine since quit smoking in 2014   Substance and Sexual Activity    Alcohol use: Yes     Alcohol/week: 2.4 oz     Types: 4 Cans of beer per week    Drug use: No    Sexual activity: Not on file       Review of Systems   Constitutional: Positive for activity change, appetite change, chills, diaphoresis and fatigue. Negative for fever.   HENT: Negative.    Eyes: Negative.    Respiratory: Negative for cough and shortness of breath.    Cardiovascular: Negative for chest pain, palpitations and leg swelling.   Gastrointestinal: Positive for abdominal pain, diarrhea and nausea. Negative for vomiting.   Genitourinary: Positive for difficulty urinating.        Darkened urine   Musculoskeletal: Positive for back pain.   Skin: Negative for pallor, rash and wound.   Allergic/Immunologic: Positive for immunocompromised state.   Neurological: Positive for weakness and light-headedness. Negative for syncope.   Hematological: Negative for adenopathy. Does not bruise/bleed easily.   Psychiatric/Behavioral: Negative for confusion. The patient is nervous/anxious.      Objective:     Vital Signs (Most Recent):  Temp: 97.7 °F (36.5 °C) (19)  Pulse: 70 (19)  Resp: (!) 1 (19)  BP: (!) 195/84 (19)  SpO2: 100 % (19) Vital Signs (24h  Range):  Temp:  [97.7 °F (36.5 °C)-98.5 °F (36.9 °C)] 97.7 °F (36.5 °C)  Pulse:  [58-76] 70  Resp:  [1-18] 1  SpO2:  [98 %-100 %] 100 %  BP: (111-195)/(65-89) 195/84     Weight: 88.2 kg (194 lb 5.4 oz)  Body mass index is 27.88 kg/m².  Body surface area is 2.09 meters squared.      Intake/Output Summary (Last 24 hours) at 8/14/2019 0957  Last data filed at 8/14/2019 0600  Gross per 24 hour   Intake 3683.33 ml   Output --   Net 3683.33 ml       Physical Exam   Constitutional: He is oriented to person, place, and time. He appears well-developed. He has a sickly appearance. No distress.   HENT:   Head: Normocephalic and atraumatic.   Nose: Nose normal.   Eyes: Pupils are equal, round, and reactive to light. Conjunctivae are normal. No scleral icterus.   Neck: Normal range of motion. Neck supple.   Cardiovascular: Normal rate, regular rhythm and normal heart sounds. Exam reveals no gallop and no friction rub.   No murmur heard.  Pulmonary/Chest: Effort normal and breath sounds normal.   Abdominal: Soft. Bowel sounds are increased.   Musculoskeletal: Normal range of motion. He exhibits no edema or tenderness.   Neurological: He is alert and oriented to person, place, and time.   Skin: Skin is warm and dry. He is not diaphoretic. There is pallor.   Psychiatric: His speech is normal and behavior is normal. Judgment and thought content normal. His mood appears anxious. He is not actively hallucinating. Cognition and memory are normal. He is attentive.   Vitals reviewed.      Significant Labs:   CBC:   Recent Labs   Lab 08/12/19  1458 08/13/19  0531 08/14/19  0557   WBC 1.01* 0.83* 0.87*   HGB 11.1* 9.9* 9.7*   HCT 31.3* 28.5* 27.8*   * 83* 86*   , CMP:   Recent Labs   Lab 08/12/19  1300 08/13/19  0531 08/14/19  0557   * 137 137   K 2.6* 3.2* 3.4*   CL 97 105 105   CO2 26 25 24   * 92 94   BUN 7 4* 4*   CREATININE 0.8 0.6 0.6   CALCIUM 9.1 8.1* 8.2*   PROT 6.6 5.2* 5.3*   ALBUMIN 3.2* 2.5* 2.6*   BILITOT  1.5* 1.1* 1.0   ALKPHOS 90 75 82   AST 13 14 18   ALT 10 10 12   ANIONGAP 10 7* 8   EGFRNONAA >60 >60 >60   , Coagulation:   Recent Labs   Lab 08/12/19  1841 08/13/19  0531   INR 1.0 1.1   , LDH: No results for input(s): LDHCSF, BFSOURCE in the last 48 hours., LFTs:   Recent Labs   Lab 08/12/19  1300 08/13/19  0531 08/14/19  0557   ALT 10 10 12   AST 13 14 18   ALKPHOS 90 75 82   BILITOT 1.5* 1.1* 1.0   PROT 6.6 5.2* 5.3*   ALBUMIN 3.2* 2.5* 2.6*   , Urine Studies:   Recent Labs   Lab 08/12/19  1443   COLORU Brown*   APPEARANCEUA Hazy*   PHUR SEE COMMENT   SPECGRAV SEE COMMENT   PROTEINUA SEE COMMENT   GLUCUA SEE COMMENT   KETONESU SEE COMMENT   BILIRUBINUA SEE COMMENT   OCCULTUA SEE COMMENT   NITRITE SEE COMMENT   UROBILINOGEN SEE COMMENT   LEUKOCYTESUR SEE COMMENT   RBCUA 0   WBCUA 0   BACTERIA Occasional   SQUAMEPITHEL 2   HYALINECASTS 0    and All pertinent labs from the last 24 hours have been reviewed.    Diagnostic Results:  I have reviewed all pertinent imaging results/findings within the past 24 hours.    Assessment/Plan:     Pancytopenia  8/13/19 Patient with newly diagnosed pancytopenia WBC 0.83 (ANC 0.1), hemoglobin 8.8, platelets 83 K.  With diarrhea that started 2 days ago perfuse green watery.  C. Diff negative.  States had night sweats for 2 days.  Denies known fever.  Scheduled for BMB tomorrow at noon.  No need for transfusion at this time.  Splenomegaly on CT scan of abdomen/pelvis with diffuse mural thickening and inflammation of the adjacent fat of the rectosigmoid and descending colon consistent with colitis.  .8.  Blood cultures NGTD.    --Continue supportive care  --No GSCF support for now, until get results of BMB  --BMB tomorrow at noon    8/14/19 Continued pancytopenia WBC 0.87 (ANC 0.2), hemoglobin 9.7, platelets 86K.  Splenomegaly. Folic acid deficiency.  Not iron deficient or B12 deficient.  Continued diarrhea.  C. Diff negative.  Scheduled for BMB today.    --CBC  daily  --Monitor for s/s of infection  --Start folate 1 mg Po dialy        Thank you for your consult. I will follow-up with patient. Please contact us if you have any additional questions.     Michelle Dietz NP  Hematology/Oncology  [facility]

## 2019-08-14 NOTE — PLAN OF CARE
Problem: Adult Inpatient Plan of Care  Goal: Plan of Care Review  Outcome: Ongoing (interventions implemented as appropriate)  Fall precautions maintained. Pt free from injuries/falls.  Ambulates and repositions  C/o back pain from previous sx. Continue Iv abx   POC and meds discussed w/ pt. Pt verbalized understanding.  Chart check done.   Will cont to monitor.

## 2019-08-14 NOTE — PROGRESS NOTES
[facility] Hospital Medicine  Progress Note    Patient Name: Kodak Valentine  MRN: [id number]  Patient Class: IP- Inpatient   Admission Date: 8/12/2019  Length of Stay: 2 days  Attending Physician: Jorge Tijerina, *  Primary Care Provider: Eliza Huddleston MD        Subjective:     Principal Problem:Sepsis        HPI:  Pt is a 50 yo male with PMHx of CAD with stenting x 2 LAD, COPD, DM II, HTN, JUDI and HPL who presents to the ED with reports of severe, constant cramping lower abdominal pain x 5 days. Associated symptoms include profuse, greenish watery diarrhea, diaphoresis, lightheadedness, dark urine (today) and vomiting (1-2 days now resolved). Pt had back surgery 6 weeks ago but his back pain is no worse than usual. Pt's wife was treated at [facility] approx 6 weeks ago. Pt denies URI symptoms, cough, chest pain, palpitations, bloody diarrhea and other symptoms. He last ate some yogurt yesterday. V/S on arrival: Temp 98.0, pulse 113, resp 18 and B/P 133/70. Labs find WBC 0.80, Hgb 11.5?Hct 32.7, plt 92, left shift 22 %, hyponatremia 133, hypokalemia 2.6, gluc 176, total bili 1.5, .8. U/A was altered due to dark color. A contrasted CT ABD/Pelvis was resulted at 16:17 which found diffuse inflammatory changes of the descending colon and rectosigmoid consistent with colitis/proctitis.  No evidence of a pericolonic abscess can be seen. Pt is admitted for Sepsis, Acute Colitis and electrolyte imbalances.     Overview/Hospital Course:  Admitted with Sepsis, Acute Colitis, electrolyte abnormalities, dehydration and pancytopenia. IV fluids given, lactic acid normal, Cipro/Flagyl in process, blood cultures NGTD and C diff negative. Hematology saw the patient with plans for bone marrow biopsy. 8/14 - had bone marrow today, results pending. Diarrhea decreasing, less abdominal pain. Describes sore throat pain and white appearing patches present to posterior pharynx - appears fungal  in nature.         Review of Systems   Constitutional: Positive for activity change and fatigue. Negative for appetite change, chills, diaphoresis and fever.   HENT: Negative.    Eyes: Negative.    Respiratory: Negative for cough and shortness of breath.    Cardiovascular: Negative for chest pain, palpitations and leg swelling.   Gastrointestinal: Positive for abdominal pain and diarrhea. Negative for nausea and vomiting.   Genitourinary: Negative for difficulty urinating.        Darkened urine   Musculoskeletal: Positive for back pain.   Skin: Negative for pallor, rash and wound.   Neurological: Positive for weakness. Negative for syncope and light-headedness.   Psychiatric/Behavioral: The patient is not nervous/anxious.      Objective:     Vital Signs (Most Recent):  Temp: 98.6 °F (37 °C) (08/14/19 1302)  Pulse: 63 (08/14/19 1302)  Resp: 18 (08/14/19 1302)  BP: 111/72 (08/14/19 1302)  SpO2: 98 % (08/14/19 1302) Vital Signs (24h Range):  Temp:  [97.7 °F (36.5 °C)-98.6 °F (37 °C)] 98.6 °F (37 °C)  Pulse:  [57-76] 63  Resp:  [15-19] 18  SpO2:  [98 %-100 %] 98 %  BP: (111-195)/(65-89) 111/72     Weight: 88.2 kg (194 lb 5.4 oz)  Body mass index is 27.88 kg/m².    Intake/Output Summary (Last 24 hours) at 8/14/2019 1558  Last data filed at 8/14/2019 0908  Gross per 24 hour   Intake 3683.33 ml   Output --   Net 3683.33 ml      Physical Exam   Constitutional: He is oriented to person, place, and time. He appears well-developed. He has a sickly appearance.   HENT:   Head: Normocephalic and atraumatic.   Nose: Nose normal.   Eyes: Pupils are equal, round, and reactive to light. Conjunctivae are normal. No scleral icterus.   Neck: Normal range of motion. Neck supple.   Cardiovascular: Normal rate, regular rhythm and normal heart sounds. Exam reveals no gallop and no friction rub.   No murmur heard.  Pulmonary/Chest: Effort normal and breath sounds normal.   Abdominal: Soft. Bowel sounds are increased. Tenderness: especially  to RLQ.   Musculoskeletal: Normal range of motion. He exhibits no edema or tenderness.   Neurological: He is alert and oriented to person, place, and time.   Skin: Skin is warm and dry. There is pallor.   Psychiatric: He has a normal mood and affect. His behavior is normal.   Vitals reviewed.      Significant Labs:   CBC:   Recent Labs   Lab 08/13/19 0531 08/14/19  0557   WBC 0.83* 0.87*   HGB 9.9* 9.7*   HCT 28.5* 27.8*   PLT 83* 86*     CMP:   Recent Labs   Lab 08/13/19  0531 08/14/19  0557    137   K 3.2* 3.4*    105   CO2 25 24   GLU 92 94   BUN 4* 4*   CREATININE 0.6 0.6   CALCIUM 8.1* 8.2*   PROT 5.2* 5.3*   ALBUMIN 2.5* 2.6*   BILITOT 1.1* 1.0   ALKPHOS 75 82   AST 14 18   ALT 10 12   ANIONGAP 7* 8   EGFRNONAA >60 >60     All pertinent labs within the past 24 hours have been reviewed.    Significant Imaging: I have reviewed all pertinent imaging results/findings within the past 24 hours.      Assessment/Plan:      * Sepsis  Criteria including WBC 0.80 & 1.01, tachycardia 113  CT of ABD/Pelvis notes Diffuse inflammatory changes of the descending colon and rectosigmoid consistent with colitis/proctitis.  No evidence of a pericolonic abscess can be seen.  The appendix cannot be identified.  Fluid-filled small bowel loops in the pelvis likely represent an ileus.  1.1 cm in diameter gallbladder calculus.  Splenomegaly with the spleen measuring 18 cm.  Initial 1000 ml saline given  Additional 1700 ml IV bolus given  Lactic acid pending, repeat in 4 hours and 8 hours  Blood cultures ordered  Platelet count 92, 000  INR pending  Cipro and Flagyl prescribed  8/14 - lactic acid normal, vital signs normal, IV fluids/antibiotics continued, blood cultures NgTD      Acute colitis  Negative C diff  IV Cipro and Flagyl  Prn analgesia and antiemetics  IV fluids      Thrush  Nystatin swish and swallow      Hypokalemia  Supplementation  Repeat chemistries in AM  Replace magnesium and  phosphorous      Pancytopenia  Hematology consult but likely secondary to sepsis and colitis  Hematology has scheduled a bone marrow biopsy - CT imaging Splenomegaly with the spleen measuring 18 cm.  8/14 - had bone marrow today      Hyperlipidemia associated with type 2 diabetes mellitus  Continue STATIN      Controlled type 2 diabetes mellitus with stage 2 chronic kidney disease, without long-term current use of insulin  Hold metformin  Clear liquid diet for now  accuchecks  Sliding scale insulin  Lab Results   Component Value Date    HGBA1C 4.1 08/12/2019         Hypertension associated with diabetes  Continue lopressor, lisinopril and amlodipine        CAD (coronary artery disease)  Denies any chest pain  Continue ASA, atorvastatin,fenofibrate, lopressor and lisinopril         VTE Risk Mitigation (From admission, onward)    None                Yandy Alas NP  Department of Hospital Medicine   [facility]

## 2019-08-15 PROBLEM — E83.42 HYPOMAGNESEMIA: Status: ACTIVE | Noted: 2019-08-12

## 2019-08-15 LAB
ALBUMIN SERPL BCP-MCNC: 2.5 G/DL (ref 3.5–5.2)
ALP SERPL-CCNC: 81 U/L (ref 55–135)
ALT SERPL W/O P-5'-P-CCNC: 11 U/L (ref 10–44)
ANION GAP SERPL CALC-SCNC: 7 MMOL/L (ref 8–16)
ANISOCYTOSIS BLD QL SMEAR: SLIGHT
AST SERPL-CCNC: 14 U/L (ref 10–40)
BASOPHILS # BLD AUTO: 0.01 K/UL (ref 0–0.2)
BASOPHILS NFR BLD: 1.1 % (ref 0–1.9)
BILIRUB SERPL-MCNC: 1.1 MG/DL (ref 0.1–1)
BODY SITE - BONE MARROW: NORMAL
BONE MARROW IRON STAIN COMMENT: NORMAL
BONE MARROW WRIGHT STAIN COMMENT: NORMAL
BUN SERPL-MCNC: 4 MG/DL (ref 6–20)
CALCIUM SERPL-MCNC: 8.4 MG/DL (ref 8.7–10.5)
CHLORIDE SERPL-SCNC: 105 MMOL/L (ref 95–110)
CLINICAL DIAGNOSIS - BONE MARROW: NORMAL
CO2 SERPL-SCNC: 23 MMOL/L (ref 23–29)
CREAT SERPL-MCNC: 0.6 MG/DL (ref 0.5–1.4)
DACRYOCYTES BLD QL SMEAR: ABNORMAL
DIFFERENTIAL METHOD: ABNORMAL
EOSINOPHIL # BLD AUTO: 0 K/UL (ref 0–0.5)
EOSINOPHIL NFR BLD: 1.1 % (ref 0–8)
ERYTHROCYTE [DISTWIDTH] IN BLOOD BY AUTOMATED COUNT: 13.7 % (ref 11.5–14.5)
EST. GFR  (AFRICAN AMERICAN): >60 ML/MIN/1.73 M^2
EST. GFR  (NON AFRICAN AMERICAN): >60 ML/MIN/1.73 M^2
FLOW CYTOMETRY ANTIBODIES ANALYZED - BONE MARROW: NORMAL
FLOW CYTOMETRY COMMENT - BONE MARROW: NORMAL
FLOW CYTOMETRY INTERPRETATION - BONE MARROW: NORMAL
GLUCOSE SERPL-MCNC: 150 MG/DL (ref 70–110)
HCT VFR BLD AUTO: 25.9 % (ref 40–54)
HGB BLD-MCNC: 9.2 G/DL (ref 14–18)
LYMPHOCYTES # BLD AUTO: 0.5 K/UL (ref 1–4.8)
LYMPHOCYTES NFR BLD: 52.7 % (ref 18–48)
MAGNESIUM SERPL-MCNC: 1.2 MG/DL (ref 1.6–2.6)
MCH RBC QN AUTO: 37.2 PG (ref 27–31)
MCHC RBC AUTO-ENTMCNC: 35.5 G/DL (ref 32–36)
MCV RBC AUTO: 105 FL (ref 82–98)
MONOCYTES # BLD AUTO: 0.2 K/UL (ref 0.3–1)
MONOCYTES NFR BLD: 22.6 % (ref 4–15)
NEUTROPHILS # BLD AUTO: 0.2 K/UL (ref 1.8–7.7)
NEUTROPHILS NFR BLD: 23.6 % (ref 38–73)
OVALOCYTES BLD QL SMEAR: ABNORMAL
PHOSPHATE SERPL-MCNC: 2.7 MG/DL (ref 2.7–4.5)
PLATELET # BLD AUTO: 87 K/UL (ref 150–350)
PLATELET BLD QL SMEAR: ABNORMAL
PMV BLD AUTO: 8.8 FL (ref 9.2–12.9)
POIKILOCYTOSIS BLD QL SMEAR: SLIGHT
POLYCHROMASIA BLD QL SMEAR: ABNORMAL
POTASSIUM SERPL-SCNC: 3.5 MMOL/L (ref 3.5–5.1)
PROT SERPL-MCNC: 5.2 G/DL (ref 6–8.4)
RBC # BLD AUTO: 2.47 M/UL (ref 4.6–6.2)
SODIUM SERPL-SCNC: 135 MMOL/L (ref 136–145)
SPHEROCYTES BLD QL SMEAR: ABNORMAL
STOMATOCYTES BLD QL SMEAR: PRESENT
TARGETS BLD QL SMEAR: ABNORMAL
VIT B12 USB SERPL-MCNC: 3787 PG/ML (ref 800–2600)
WBC # BLD AUTO: 0.93 K/UL (ref 3.9–12.7)

## 2019-08-15 PROCEDURE — 21400001 HC TELEMETRY ROOM

## 2019-08-15 PROCEDURE — 25000003 PHARM REV CODE 250: Performed by: NURSE PRACTITIONER

## 2019-08-15 PROCEDURE — 63600175 PHARM REV CODE 636 W HCPCS: Performed by: NURSE PRACTITIONER

## 2019-08-15 PROCEDURE — 99232 SBSQ HOSP IP/OBS MODERATE 35: CPT | Mod: ,,, | Performed by: INTERNAL MEDICINE

## 2019-08-15 PROCEDURE — 25000003 PHARM REV CODE 250: Performed by: INTERNAL MEDICINE

## 2019-08-15 PROCEDURE — 83735 ASSAY OF MAGNESIUM: CPT

## 2019-08-15 PROCEDURE — 94761 N-INVAS EAR/PLS OXIMETRY MLT: CPT

## 2019-08-15 PROCEDURE — 99232 PR SUBSEQUENT HOSPITAL CARE,LEVL II: ICD-10-PCS | Mod: ,,, | Performed by: INTERNAL MEDICINE

## 2019-08-15 PROCEDURE — S0030 INJECTION, METRONIDAZOLE: HCPCS | Performed by: NURSE PRACTITIONER

## 2019-08-15 PROCEDURE — 36415 COLL VENOUS BLD VENIPUNCTURE: CPT

## 2019-08-15 PROCEDURE — 63700000 PHARM REV CODE 250 ALT 637 W/O HCPCS: Performed by: NURSE PRACTITIONER

## 2019-08-15 PROCEDURE — 25000003 PHARM REV CODE 250: Performed by: PHYSICIAN ASSISTANT

## 2019-08-15 PROCEDURE — 80053 COMPREHEN METABOLIC PANEL: CPT

## 2019-08-15 PROCEDURE — 85025 COMPLETE CBC W/AUTO DIFF WBC: CPT

## 2019-08-15 PROCEDURE — 25000242 PHARM REV CODE 250 ALT 637 W/ HCPCS: Performed by: NURSE PRACTITIONER

## 2019-08-15 PROCEDURE — 94640 AIRWAY INHALATION TREATMENT: CPT

## 2019-08-15 PROCEDURE — 63600175 PHARM REV CODE 636 W HCPCS: Performed by: INTERNAL MEDICINE

## 2019-08-15 PROCEDURE — 99900035 HC TECH TIME PER 15 MIN (STAT)

## 2019-08-15 PROCEDURE — 84100 ASSAY OF PHOSPHORUS: CPT

## 2019-08-15 RX ORDER — LOPERAMIDE HYDROCHLORIDE 2 MG/1
4 CAPSULE ORAL 4 TIMES DAILY PRN
Status: DISCONTINUED | OUTPATIENT
Start: 2019-08-15 | End: 2019-08-15

## 2019-08-15 RX ORDER — VANCOMYCIN HCL IN 5 % DEXTROSE 1.5G/250ML
1500 PLASTIC BAG, INJECTION (ML) INTRAVENOUS
Status: DISCONTINUED | OUTPATIENT
Start: 2019-08-15 | End: 2019-08-17

## 2019-08-15 RX ORDER — DIPHENOXYLATE HYDROCHLORIDE AND ATROPINE SULFATE 2.5; .025 MG/1; MG/1
1 TABLET ORAL 4 TIMES DAILY PRN
Status: DISCONTINUED | OUTPATIENT
Start: 2019-08-15 | End: 2019-08-21 | Stop reason: HOSPADM

## 2019-08-15 RX ORDER — TIZANIDINE 4 MG/1
4 TABLET ORAL EVERY 12 HOURS PRN
Status: DISCONTINUED | OUTPATIENT
Start: 2019-08-15 | End: 2019-08-17

## 2019-08-15 RX ORDER — CIPROFLOXACIN 500 MG/1
500 TABLET ORAL EVERY 12 HOURS
Status: DISCONTINUED | OUTPATIENT
Start: 2019-08-15 | End: 2019-08-21 | Stop reason: HOSPADM

## 2019-08-15 RX ORDER — METRONIDAZOLE 500 MG/1
500 TABLET ORAL EVERY 8 HOURS
Status: DISCONTINUED | OUTPATIENT
Start: 2019-08-15 | End: 2019-08-21 | Stop reason: HOSPADM

## 2019-08-15 RX ORDER — MAGNESIUM SULFATE 1 G/100ML
1 INJECTION INTRAVENOUS
Status: COMPLETED | OUTPATIENT
Start: 2019-08-15 | End: 2019-08-15

## 2019-08-15 RX ADMIN — FAMOTIDINE 20 MG: 20 TABLET ORAL at 08:08

## 2019-08-15 RX ADMIN — RANOLAZINE 1000 MG: 500 TABLET, FILM COATED, EXTENDED RELEASE ORAL at 09:08

## 2019-08-15 RX ADMIN — FOLIC ACID 1 MG: 1 TABLET ORAL at 08:08

## 2019-08-15 RX ADMIN — NYSTATIN 500000 UNITS: 100000 SUSPENSION ORAL at 07:08

## 2019-08-15 RX ADMIN — CHOLESTYRAMINE 4 G: 4 POWDER, FOR SUSPENSION ORAL at 09:08

## 2019-08-15 RX ADMIN — CIPROFLOXACIN 400 MG: 2 INJECTION, SOLUTION INTRAVENOUS at 05:08

## 2019-08-15 RX ADMIN — METOPROLOL TARTRATE 50 MG: 50 TABLET ORAL at 08:08

## 2019-08-15 RX ADMIN — ASPIRIN 81 MG: 81 TABLET, COATED ORAL at 08:08

## 2019-08-15 RX ADMIN — NYSTATIN 500000 UNITS: 100000 SUSPENSION ORAL at 05:08

## 2019-08-15 RX ADMIN — OXYCODONE HYDROCHLORIDE AND ACETAMINOPHEN 1 TABLET: 10; 325 TABLET ORAL at 04:08

## 2019-08-15 RX ADMIN — OXYCODONE HYDROCHLORIDE AND ACETAMINOPHEN 1 TABLET: 10; 325 TABLET ORAL at 08:08

## 2019-08-15 RX ADMIN — MAGNESIUM SULFATE IN DEXTROSE 1 G: 10 INJECTION, SOLUTION INTRAVENOUS at 11:08

## 2019-08-15 RX ADMIN — NYSTATIN 500000 UNITS: 100000 SUSPENSION ORAL at 09:08

## 2019-08-15 RX ADMIN — OXYCODONE HYDROCHLORIDE AND ACETAMINOPHEN 1 TABLET: 10; 325 TABLET ORAL at 12:08

## 2019-08-15 RX ADMIN — ARFORMOTEROL TARTRATE 15 MCG: 15 SOLUTION RESPIRATORY (INHALATION) at 07:08

## 2019-08-15 RX ADMIN — POTASSIUM CHLORIDE 40 MEQ: 1500 TABLET, EXTENDED RELEASE ORAL at 11:08

## 2019-08-15 RX ADMIN — METRONIDAZOLE 500 MG: 500 TABLET ORAL at 09:08

## 2019-08-15 RX ADMIN — METRONIDAZOLE 500 MG: 500 INJECTION, SOLUTION INTRAVENOUS at 10:08

## 2019-08-15 RX ADMIN — NYSTATIN 500000 UNITS: 100000 SUSPENSION ORAL at 11:08

## 2019-08-15 RX ADMIN — METOPROLOL TARTRATE 50 MG: 50 TABLET ORAL at 09:08

## 2019-08-15 RX ADMIN — OXYCODONE HYDROCHLORIDE AND ACETAMINOPHEN 1 TABLET: 10; 325 TABLET ORAL at 09:08

## 2019-08-15 RX ADMIN — CIPROFLOXACIN HYDROCHLORIDE 500 MG: 500 TABLET, FILM COATED ORAL at 09:08

## 2019-08-15 RX ADMIN — MAGNESIUM SULFATE IN DEXTROSE 1 G: 10 INJECTION, SOLUTION INTRAVENOUS at 12:08

## 2019-08-15 RX ADMIN — POTASSIUM CHLORIDE 40 MEQ: 1500 TABLET, EXTENDED RELEASE ORAL at 05:08

## 2019-08-15 RX ADMIN — VANCOMYCIN HYDROCHLORIDE 2500 MG: 1 INJECTION, POWDER, LYOPHILIZED, FOR SOLUTION INTRAVENOUS at 05:08

## 2019-08-15 RX ADMIN — ALPRAZOLAM 0.25 MG: 0.25 TABLET ORAL at 06:08

## 2019-08-15 RX ADMIN — ATORVASTATIN CALCIUM 20 MG: 10 TABLET, FILM COATED ORAL at 08:08

## 2019-08-15 RX ADMIN — FAMOTIDINE 20 MG: 20 TABLET ORAL at 09:08

## 2019-08-15 RX ADMIN — TIZANIDINE 4 MG: 4 TABLET ORAL at 06:08

## 2019-08-15 RX ADMIN — FENOFIBRATE 160 MG: 160 TABLET ORAL at 08:08

## 2019-08-15 RX ADMIN — LOPERAMIDE HYDROCHLORIDE 4 MG: 2 CAPSULE ORAL at 09:08

## 2019-08-15 RX ADMIN — OXYCODONE HYDROCHLORIDE AND ACETAMINOPHEN 1 TABLET: 10; 325 TABLET ORAL at 03:08

## 2019-08-15 RX ADMIN — SODIUM CHLORIDE: 0.9 INJECTION, SOLUTION INTRAVENOUS at 05:08

## 2019-08-15 RX ADMIN — POTASSIUM CHLORIDE 40 MEQ: 1500 TABLET, EXTENDED RELEASE ORAL at 12:08

## 2019-08-15 RX ADMIN — RANOLAZINE 1000 MG: 500 TABLET, FILM COATED, EXTENDED RELEASE ORAL at 08:08

## 2019-08-15 RX ADMIN — CIPROFLOXACIN HYDROCHLORIDE 500 MG: 500 TABLET, FILM COATED ORAL at 11:08

## 2019-08-15 RX ADMIN — BUDESONIDE 0.5 MG: 0.5 SUSPENSION RESPIRATORY (INHALATION) at 07:08

## 2019-08-15 RX ADMIN — LISINOPRIL 20 MG: 20 TABLET ORAL at 08:08

## 2019-08-15 RX ADMIN — VANCOMYCIN HYDROCHLORIDE 1500 MG: 100 INJECTION, POWDER, LYOPHILIZED, FOR SOLUTION INTRAVENOUS at 04:08

## 2019-08-15 RX ADMIN — SODIUM CHLORIDE: 0.9 INJECTION, SOLUTION INTRAVENOUS at 09:08

## 2019-08-15 RX ADMIN — TRAZODONE HYDROCHLORIDE 200 MG: 100 TABLET ORAL at 09:08

## 2019-08-15 RX ADMIN — FLUCONAZOLE 200 MG: 100 TABLET ORAL at 08:08

## 2019-08-15 RX ADMIN — METRONIDAZOLE 500 MG: 500 TABLET ORAL at 01:08

## 2019-08-15 RX ADMIN — TIZANIDINE 4 MG: 4 TABLET ORAL at 07:08

## 2019-08-15 RX ADMIN — CHOLESTYRAMINE 4 G: 4 POWDER, FOR SUSPENSION ORAL at 08:08

## 2019-08-15 RX ADMIN — MAGNESIUM SULFATE IN DEXTROSE 1 G: 10 INJECTION, SOLUTION INTRAVENOUS at 01:08

## 2019-08-15 RX ADMIN — DULOXETINE 60 MG: 30 CAPSULE, DELAYED RELEASE ORAL at 08:08

## 2019-08-15 RX ADMIN — METRONIDAZOLE 500 MG: 500 INJECTION, SOLUTION INTRAVENOUS at 01:08

## 2019-08-15 NOTE — SUBJECTIVE & OBJECTIVE
Interval History:  8/14/19 Patient seen at the bedside today.  States still with diarrhea.  C Diff negative. Continues to remain afebrile.  Awaiting BMB today.  Complains of back pain due to back surgery 6 weeks ago.    8/15/19 Patient assessed at the bedside this morning.  Has BMB yesterday - tolerated well.  Complains of continued watery diarrhea - states increased in frequency.  States pain better controled.      Oncology Treatment Plan:   [No treatment plan]    Medications:  Continuous Infusions:   sodium chloride 0.9% 125 mL/hr at 08/15/19 0508     Scheduled Meds:   arformoterol  15 mcg Nebulization Q12H    aspirin  81 mg Oral Daily    atorvastatin  20 mg Oral Daily    budesonide  0.5 mg Nebulization Q12H    cholestyramine  1 packet Oral BID    ciprofloxacin HCl  500 mg Oral Q12H    DULoxetine  60 mg Oral Daily    famotidine  20 mg Oral BID    fenofibrate  160 mg Oral Daily    fluconazole  200 mg Oral Daily    folic acid  1 mg Oral Daily    lisinopril  20 mg Oral Daily    magnesium sulfate IVPB  1 g Intravenous Q1H    metoprolol tartrate  50 mg Oral Q12H    metroNIDAZOLE  500 mg Oral Q8H    nystatin  500,000 Units Oral QID (WM & HS)    potassium chloride  40 mEq Oral Q6H    ranolazine  1,000 mg Oral BID    traZODone  200 mg Oral QHS    vancomycin (VANCOCIN) IVPB  1,500 mg Intravenous Q12H     PRN Meds:albuterol sulfate, ALPRAZolam, aluminum-magnesium hydroxide-simethicone, Dextrose 10% Bolus, Dextrose 10% Bolus, loperamide, ondansetron, oxyCODONE-acetaminophen, promethazine (PHENERGAN) IVPB, tiZANidine     Review of patient's allergies indicates:  No Known Allergies     Past Medical History:   Diagnosis Date    Anxiety     CAD (coronary artery disease)     PTCA x 2 LAD, restenosis and restent 7/12.    Chest pain syndrome 10/2/2015    COPD (chronic obstructive pulmonary disease)     CPAP (continuous positive airway pressure) dependence     @ night    Diabetes mellitus type 2,  uncontrolled 2016    History of duodenal ulcer     with bleed    Hypertension     Mixed hyperlipidemia     JUDI (obstructive sleep apnea)     severe    S/P PTCA (percutaneous transluminal coronary angioplasty) 3/11/2015    Vitamin D deficiency      Past Surgical History:   Procedure Laterality Date    APPENDECTOMY      CARDIAC CATHETERIZATION      CATARACT EXTRACTION W/  INTRAOCULAR LENS IMPLANT Right 4-22-15    CORONARY STENT PLACEMENT      ESOPHAGOGASTRODUODENOSCOPY (EGD) N/A 2014    Performed by [facility] ENDO    EXCISION-SKIN Right 2015    Performed by Louis O. Jeansonne IV, MD at [facility] OR    HEART CATH WITH GRAFTS-LEFT Right 2014    Performed by Erma Green MD at [facility] CATH LAB    HEART CATH-LEFT Left 2017    Performed by Erma Green MD at [facility] CATH LAB    HEMORRHOID SURGERY      INCISION AND DRAINAGE (I&D), BUTTOCK  3/26/2015    Performed by [facility]. [facility]ansonne IV, MD at [facility] OR    INCISION AND DRAINAGE-THIGH Right 3/26/2015    Performed by [facility]ne IV, [facility] at [facility] OR    NISSEN FUNDOPLICATION      lap    SKIN LESION EXCISION Right     leg     Family History     Problem Relation (Age of Onset)    COPD Maternal Grandmother, Maternal Grandfather    Diabetes Maternal Grandfather    Heart block Father    Heart disease Mother, Sister        Tobacco Use    Smoking status: Former Smoker     Packs/day: 0.50     Years: 20.00     Pack years: 10.00     Types: Cigarettes     Last attempt to quit: 6/3/2014     Years since quittin.2    Smokeless tobacco: Never Used    Tobacco comment: currently vaping with nicotine since quit smoking in 2014   Substance and Sexual Activity    Alcohol use: Yes     Alcohol/week: 2.4 oz     Types: 4 Cans of beer per week    Drug use: No    Sexual activity: Not on file       Review of Systems   Constitutional: Positive for activity change, appetite change and fatigue. Negative for chills, diaphoresis  and fever.   HENT: Negative.    Eyes: Negative.    Respiratory: Negative for cough and shortness of breath.    Cardiovascular: Negative for chest pain, palpitations and leg swelling.   Gastrointestinal: Positive for abdominal pain and diarrhea. Negative for nausea and vomiting.   Genitourinary: Positive for difficulty urinating.        Darkened urine   Musculoskeletal: Positive for back pain.   Skin: Negative for pallor, rash and wound.   Allergic/Immunologic: Positive for immunocompromised state.   Neurological: Positive for weakness and light-headedness. Negative for syncope.   Hematological: Negative for adenopathy. Does not bruise/bleed easily.   Psychiatric/Behavioral: Negative for confusion. The patient is nervous/anxious.      Objective:     Vital Signs (Most Recent):  Temp: 98.7 °F (37.1 °C) (08/15/19 0704)  Pulse: 74 (08/15/19 0706)  Resp: 16 (08/15/19 0706)  BP: (!) 162/67 (08/15/19 0704)  SpO2: 99 % (08/15/19 0706) Vital Signs (24h Range):  Temp:  [97.5 °F (36.4 °C)-98.7 °F (37.1 °C)] 98.7 °F (37.1 °C)  Pulse:  [59-80] 74  Resp:  [16-18] 16  SpO2:  [98 %-100 %] 99 %  BP: (111-167)/(67-81) 162/67     Weight: 88.2 kg (194 lb 5.4 oz)  Body mass index is 27.88 kg/m².  Body surface area is 2.09 meters squared.      Intake/Output Summary (Last 24 hours) at 8/15/2019 1214  Last data filed at 8/15/2019 0800  Gross per 24 hour   Intake 4574.58 ml   Output --   Net 4574.58 ml       Physical Exam   Constitutional: He is oriented to person, place, and time. He appears well-developed. He has a sickly appearance. No distress.   HENT:   Head: Normocephalic and atraumatic.   Nose: Nose normal.   Eyes: Pupils are equal, round, and reactive to light. Conjunctivae are normal. No scleral icterus.   Neck: Normal range of motion. Neck supple.   Cardiovascular: Normal rate, regular rhythm and normal heart sounds. Exam reveals no gallop and no friction rub.   No murmur heard.  Pulmonary/Chest: Effort normal and breath sounds  normal.   Abdominal: Soft. Bowel sounds are increased.   Musculoskeletal: Normal range of motion. He exhibits no edema or tenderness.   Neurological: He is alert and oriented to person, place, and time.   Skin: Skin is warm and dry. He is not diaphoretic. There is pallor.   Psychiatric: His speech is normal and behavior is normal. Judgment and thought content normal. His mood appears anxious. He is not actively hallucinating. Cognition and memory are normal. He is attentive.   Vitals reviewed.      Significant Labs:   CBC:   Recent Labs   Lab 08/14/19  0557 08/15/19  0545   WBC 0.87* 0.93*   HGB 9.7* 9.2*   HCT 27.8* 25.9*   PLT 86* 87*   , CMP:   Recent Labs   Lab 08/14/19  0557 08/15/19  0545    135*   K 3.4* 3.5    105   CO2 24 23   GLU 94 150*   BUN 4* 4*   CREATININE 0.6 0.6   CALCIUM 8.2* 8.4*   PROT 5.3* 5.2*   ALBUMIN 2.6* 2.5*   BILITOT 1.0 1.1*   ALKPHOS 82 81   AST 18 14   ALT 12 11   ANIONGAP 8 7*   EGFRNONAA >60 >60   , Coagulation:   No results for input(s): PT, INR, APTT in the last 48 hours., LDH: No results for input(s): LDHCSF, BFSOURCE in the last 48 hours., LFTs:   Recent Labs   Lab 08/14/19  0557 08/15/19  0545   ALT 12 11   AST 18 14   ALKPHOS 82 81   BILITOT 1.0 1.1*   PROT 5.3* 5.2*   ALBUMIN 2.6* 2.5*   , Urine Studies:   No results for input(s): COLORU, APPEARANCEUA, PHUR, SPECGRAV, PROTEINUA, GLUCUA, KETONESU, BILIRUBINUA, OCCULTUA, NITRITE, UROBILINOGEN, LEUKOCYTESUR, RBCUA, WBCUA, BACTERIA, SQUAMEPITHEL, HYALINECASTS in the last 48 hours.    Invalid input(s): WRIGHTSUR and All pertinent labs from the last 24 hours have been reviewed.    Diagnostic Results:  I have reviewed all pertinent imaging results/findings within the past 24 hours.

## 2019-08-15 NOTE — ASSESSMENT & PLAN NOTE
Hematology consult but likely secondary to sepsis and colitis  Hematology has scheduled a bone marrow biopsy - CT imaging Splenomegaly with the spleen measuring 18 cm.  8/14 - had bone marrow  - results pending

## 2019-08-15 NOTE — PLAN OF CARE
Problem: Adult Inpatient Plan of Care  Goal: Plan of Care Review  Fall precautions maintained. Pt free from injuries/falls.  Ambulates and repositions  C/o back pain from previous back sx. Requiring po meds . Continue IV ABX  POC and meds discussed w/ pt. Pt verbalized understanding.  Chart check done.   Will cont to monitor.

## 2019-08-15 NOTE — NURSING
Patient lying on r side sleeping. No s/s of distress noted. Call light in reach. Bed in low locked position.

## 2019-08-15 NOTE — ASSESSMENT & PLAN NOTE
8/15/19 states continued watery diarrhea.  States frequency improved.  Started on Questran yesterday.   ordered Immodium today prn.  C. Diff negative.  Has remained on cipro and flagyl IV for treatment of colitis.  Continue IV fluid - replace electrolytes - Magnesium 1.2 today, potassium WNL.  --CMP daily  --questran per   --Imodium prn per

## 2019-08-15 NOTE — PROGRESS NOTES
Pharmacokinetic Initial Assessment: IV Vancomycin    Assessment/Plan:    Initiate intravenous vancomycin with loading dose of 2500 mg once followed by a maintenance dose of vancomycin 1500 mg IV every 12 hours  Desired empiric serum trough concentration is 15 to 20 mcg/mL  Draw vancomycin trough level 30 min prior to third dose on 8/16 at approximately 16:30   Pharmacy will continue to follow and monitor vancomycin.      Please contact pharmacy at extension [phone] with any questions regarding this assessment.     Thank you for the consult,   Porfirio Benitez       Patient brief summary:  Kodak Valentine is a 49 y.o. male initiated on antimicrobial therapy with IV Vancomycin for treatment of suspected bacteremia    Drug Allergies:   Review of patient's allergies indicates:  No Known Allergies    Actual Body Weight:   88.2kg    Renal Function:   Estimated Creatinine Clearance: 163 mL/min (based on SCr of 0.6 mg/dL).,     Dialysis Method (if applicable):  N/A    CBC (last 72 hours):  Recent Labs   Lab Result Units 08/12/19  1300 08/12/19  1458 08/13/19  0531 08/14/19  0557   WBC K/uL 0.80* 1.01* 0.83* 0.87*   Hemoglobin g/dL 11.5* 11.1* 9.9* 9.7*   Hemoglobin A1C %  --  4.1  --   --    Hematocrit % 32.7* 31.3* 28.5* 27.8*   Platelets K/uL 92* 101* 83* 86*   Gran% % 22.0* 12.8* 12.1* 24.2*   Lymph% % 57.0* 51.5* 53.0* 44.8   Mono% % 18.0* 30.7* 31.3* 31.0*   Eosinophil% % 3.0 4.0 3.6 2.3   Basophil% % 0.0 1.0 1.2 0.0   Differential Method  Manual Automated Automated Automated       Metabolic Panel (last 72 hours):  Recent Labs   Lab Result Units 08/12/19  1300 08/12/19  1443 08/12/19  1814 08/13/19  0531 08/14/19  0557   Sodium mmol/L 133*  --   --  137 137   Potassium mmol/L 2.6*  --   --  3.2* 3.4*   Chloride mmol/L 97  --   --  105 105   CO2 mmol/L 26  --   --  25 24   Glucose mg/dL 176*  --   --  92 94   Glucose, UA   --  SEE COMMENT  --   --   --    BUN, Bld mg/dL 7  --   --  4* 4*   Creatinine mg/dL 0.8  --   --   0.6 0.6   Albumin g/dL 3.2*  --   --  2.5* 2.6*   Total Bilirubin mg/dL 1.5*  --   --  1.1* 1.0   Alkaline Phosphatase U/L 90  --   --  75 82   AST U/L 13  --   --  14 18   ALT U/L 10  --   --  10 12   Magnesium mg/dL  --   --  1.0*  --  1.0*   Phosphorus mg/dL  --   --  2.8  --  1.8*       Drug levels (last 3 results):  No results for input(s): VANCOMYCINRA, VANCOMYCINPE, VANCOMYCINTR in the last 72 hours.    Microbiologic Results:  Microbiology Results (last 7 days)       Procedure Component Value Units Date/Time    Blood culture [[id number]] Collected:  08/12/19 1839    Order Status:  Completed Specimen:  Blood from Peripheral, Antecubital, Left Updated:  08/14/19 2000     Blood Culture, Routine Gram stain seema bottle: Gram positive cocci in clusters resembling Staph       Results called to and read back by: Roderick Kowalski RN  08/14/2019        20:00    Narrative:       Aerobic and anaerobic    Blood culture [[id number]] Collected:  08/12/19 1814    Order Status:  Completed Specimen:  Blood from Peripheral, Antecubital, Right Updated:  08/14/19 0613     Blood Culture, Routine No Growth to date      No Growth to date    Narrative:       Aerobic and anaerobic    Clostridium difficile EIA [[id number]] Collected:  08/12/19 1520    Order Status:  Completed Specimen:  Stool Updated:  08/13/19 1312     C. diff Antigen Negative     C difficile Toxins A+B, EIA Negative     Comment: Testing not recommended for children <24 months old.

## 2019-08-15 NOTE — PROGRESS NOTES
[facility] -   Hematology/Oncology  Progress Note    Patient Name: Kodak Valentine  Admission Date: 8/12/2019  Hospital Length of Stay: 3 days  Code Status: No Order     Subjective:     HPI:  Pt is a 48 yo male with PMHx of CAD with stenting x 2 LAD, COPD, DM II, HTN, JUDI and HPL who presents to the ED with reports of severe, constant cramping lower abdominal pain x 5 days. Associated symptoms include profuse, greenish watery diarrhea, diaphoresis, lightheadedness, dark urine (today) and vomiting (1-2 days now resolved). Pt had back surgery 6 weeks ago but his back pain is no worse than usual. Pt's wife was treated at [facility] approx 6 weeks ago. Pt denies URI symptoms, cough, chest pain, palpitations, bloody diarrhea and other symptoms. He last ate some yogurt yesterday. V/S on arrival: Temp 98.0, pulse 113, resp 18 and B/P 133/70. Labs find WBC 0.80, Hgb 11.5?Hct 32.7, plt 92, left shift 22 %, hyponatremia 133, hypokalemia 2.6, gluc 176, total bili 1.5, .8. U/A was altered due to dark color. A contrasted CT ABD/Pelvis was resulted at 16:17 which found diffuse inflammatory changes of the descending colon and rectosigmoid consistent with colitis/proctitis.  No evidence of a pericolonic abscess can be seen. Pt is admitted for Sepsis, Acute Colitis and electrolyte imbalances.     Hematology/oncology consulted for pancytopenia.       Interval History:  8/14/19 Patient seen at the bedside today.  States still with diarrhea.  C Diff negative. Continues to remain afebrile.  Awaiting BMB today.  Complains of back pain due to back surgery 6 weeks ago.    8/15/19 Patient assessed at the bedside this morning.  Has BMB yesterday - tolerated well.  Complains of continued watery diarrhea - states increased in frequency.  States pain better controled.      Oncology Treatment Plan:   [No treatment plan]    Medications:  Continuous Infusions:   sodium chloride 0.9% 125 mL/hr at 08/15/19 0501      Scheduled Meds:   arformoterol  15 mcg Nebulization Q12H    aspirin  81 mg Oral Daily    atorvastatin  20 mg Oral Daily    budesonide  0.5 mg Nebulization Q12H    cholestyramine  1 packet Oral BID    ciprofloxacin HCl  500 mg Oral Q12H    DULoxetine  60 mg Oral Daily    famotidine  20 mg Oral BID    fenofibrate  160 mg Oral Daily    fluconazole  200 mg Oral Daily    folic acid  1 mg Oral Daily    lisinopril  20 mg Oral Daily    magnesium sulfate IVPB  1 g Intravenous Q1H    metoprolol tartrate  50 mg Oral Q12H    metroNIDAZOLE  500 mg Oral Q8H    nystatin  500,000 Units Oral QID (WM & HS)    potassium chloride  40 mEq Oral Q6H    ranolazine  1,000 mg Oral BID    traZODone  200 mg Oral QHS    vancomycin (VANCOCIN) IVPB  1,500 mg Intravenous Q12H     PRN Meds:albuterol sulfate, ALPRAZolam, aluminum-magnesium hydroxide-simethicone, Dextrose 10% Bolus, Dextrose 10% Bolus, loperamide, ondansetron, oxyCODONE-acetaminophen, promethazine (PHENERGAN) IVPB, tiZANidine     Review of patient's allergies indicates:  No Known Allergies     Past Medical History:   Diagnosis Date    Anxiety     CAD (coronary artery disease)     PTCA x 2 LAD, restenosis and restent 7/12.    Chest pain syndrome 10/2/2015    COPD (chronic obstructive pulmonary disease)     CPAP (continuous positive airway pressure) dependence     @ night    Diabetes mellitus type 2, uncontrolled 4/13/2016    History of duodenal ulcer     with bleed    Hypertension     Mixed hyperlipidemia     JUDI (obstructive sleep apnea)     severe    S/P PTCA (percutaneous transluminal coronary angioplasty) 3/11/2015    Vitamin D deficiency      Past Surgical History:   Procedure Laterality Date    APPENDECTOMY      CARDIAC CATHETERIZATION      CATARACT EXTRACTION W/  INTRAOCULAR LENS IMPLANT Right 4-22-15    CORONARY STENT PLACEMENT  2012    ESOPHAGOGASTRODUODENOSCOPY (EGD) N/A 7/31/2014    Performed by Jose Dela Cruz [facility] at [facility] ENDO     EXCISION-SKIN Right 2015    Performed by Louis O. Jeansonne IV, [facility] at [facility] OR    HEART CATH WITH GRAFTS-LEFT Right 2014    Performed by Erma Green MD at [facility] CATH LAB    HEART CATH-LEFT Left 2017    Performed by Erma Green MD at [facility] CATH LAB    HEMORRHOID SURGERY      INCISION AND DRAINAGE (I&D), BUTTOCK  3/26/2015    Performed by Louis O. Jeansonne IV, MD at [facility] OR    INCISION AND DRAINAGE-THIGH Right 3/26/2015    Performed by Louis O. Jeansonne IV, MD at [facility] OR    NISSEN FUNDOPLICATION      lap    SKIN LESION EXCISION Right     leg     Family History     Problem Relation (Age of Onset)    COPD Maternal Grandmother, Maternal Grandfather    Diabetes Maternal Grandfather    Heart block Father    Heart disease Mother, Sister        Tobacco Use    Smoking status: Former Smoker     Packs/day: 0.50     Years: 20.00     Pack years: 10.00     Types: Cigarettes     Last attempt to quit: 6/3/2014     Years since quittin.2    Smokeless tobacco: Never Used    Tobacco comment: currently vaping with nicotine since quit smoking in 2014   Substance and Sexual Activity    Alcohol use: Yes     Alcohol/week: 2.4 oz     Types: 4 Cans of beer per week    Drug use: No    Sexual activity: Not on file       Review of Systems   Constitutional: Positive for activity change, appetite change and fatigue. Negative for chills, diaphoresis and fever.   HENT: Negative.    Eyes: Negative.    Respiratory: Negative for cough and shortness of breath.    Cardiovascular: Negative for chest pain, palpitations and leg swelling.   Gastrointestinal: Positive for abdominal pain and diarrhea. Negative for nausea and vomiting.   Genitourinary: Positive for difficulty urinating.        Darkened urine   Musculoskeletal: Positive for back pain.   Skin: Negative for pallor, rash and wound.   Allergic/Immunologic: Positive for immunocompromised state.   Neurological: Positive for weakness and light-headedness.  Negative for syncope.   Hematological: Negative for adenopathy. Does not bruise/bleed easily.   Psychiatric/Behavioral: Negative for confusion. The patient is nervous/anxious.      Objective:     Vital Signs (Most Recent):  Temp: 98.7 °F (37.1 °C) (08/15/19 0704)  Pulse: 74 (08/15/19 0706)  Resp: 16 (08/15/19 0706)  BP: (!) 162/67 (08/15/19 0704)  SpO2: 99 % (08/15/19 0706) Vital Signs (24h Range):  Temp:  [97.5 °F (36.4 °C)-98.7 °F (37.1 °C)] 98.7 °F (37.1 °C)  Pulse:  [59-80] 74  Resp:  [16-18] 16  SpO2:  [98 %-100 %] 99 %  BP: (111-167)/(67-81) 162/67     Weight: 88.2 kg (194 lb 5.4 oz)  Body mass index is 27.88 kg/m².  Body surface area is 2.09 meters squared.      Intake/Output Summary (Last 24 hours) at 8/15/2019 1214  Last data filed at 8/15/2019 0800  Gross per 24 hour   Intake 4574.58 ml   Output --   Net 4574.58 ml       Physical Exam   Constitutional: He is oriented to person, place, and time. He appears well-developed. He has a sickly appearance. No distress.   HENT:   Head: Normocephalic and atraumatic.   Nose: Nose normal.   Eyes: Pupils are equal, round, and reactive to light. Conjunctivae are normal. No scleral icterus.   Neck: Normal range of motion. Neck supple.   Cardiovascular: Normal rate, regular rhythm and normal heart sounds. Exam reveals no gallop and no friction rub.   No murmur heard.  Pulmonary/Chest: Effort normal and breath sounds normal.   Abdominal: Soft. Bowel sounds are increased.   Musculoskeletal: Normal range of motion. He exhibits no edema or tenderness.   Neurological: He is alert and oriented to person, place, and time.   Skin: Skin is warm and dry. He is not diaphoretic. There is pallor.   Psychiatric: His speech is normal and behavior is normal. Judgment and thought content normal. His mood appears anxious. He is not actively hallucinating. Cognition and memory are normal. He is attentive.   Vitals reviewed.      Significant Labs:   CBC:   Recent Labs   Lab 08/14/19  0557  08/15/19  0545   WBC 0.87* 0.93*   HGB 9.7* 9.2*   HCT 27.8* 25.9*   PLT 86* 87*   , CMP:   Recent Labs   Lab 08/14/19  0557 08/15/19  0545    135*   K 3.4* 3.5    105   CO2 24 23   GLU 94 150*   BUN 4* 4*   CREATININE 0.6 0.6   CALCIUM 8.2* 8.4*   PROT 5.3* 5.2*   ALBUMIN 2.6* 2.5*   BILITOT 1.0 1.1*   ALKPHOS 82 81   AST 18 14   ALT 12 11   ANIONGAP 8 7*   EGFRNONAA >60 >60   , Coagulation:   No results for input(s): PT, INR, APTT in the last 48 hours., LDH: No results for input(s): LDHCSF, BFSOURCE in the last 48 hours., LFTs:   Recent Labs   Lab 08/14/19  0557 08/15/19  0545   ALT 12 11   AST 18 14   ALKPHOS 82 81   BILITOT 1.0 1.1*   PROT 5.3* 5.2*   ALBUMIN 2.6* 2.5*   , Urine Studies:   No results for input(s): COLORU, APPEARANCEUA, PHUR, SPECGRAV, PROTEINUA, GLUCUA, KETONESU, BILIRUBINUA, OCCULTUA, NITRITE, UROBILINOGEN, LEUKOCYTESUR, RBCUA, WBCUA, BACTERIA, SQUAMEPITHEL, HYALINECASTS in the last 48 hours.    Invalid input(s): WRIGHTSUR and All pertinent labs from the last 24 hours have been reviewed.    Diagnostic Results:  I have reviewed all pertinent imaging results/findings within the past 24 hours.    Assessment/Plan:     Acute colitis  8/15/19 states continued watery diarrhea.  States frequency improved.  Started on Questran yesterday.   ordered Immodium today prn.  C. Diff negative.  Has remained on cipro and flagyl IV for treatment of colitis.  Continue IV fluid - replace electrolytes - Magnesium 1.2 today, potassium WNL.  --CMP daily  --questran per   --Imodium prn per     Pancytopenia  8/13/19 Patient with newly diagnosed pancytopenia WBC 0.83 (ANC 0.1), hemoglobin 8.8, platelets 83 K.  With diarrhea that started 2 days ago perfuse green watery.  C. Diff negative.  States had night sweats for 2 days.  Denies known fever.  Scheduled for BMB tomorrow at noon.  No need for transfusion at this time.  Splenomegaly on CT scan of abdomen/pelvis with diffuse mural thickening and  inflammation of the adjacent fat of the rectosigmoid and descending colon consistent with colitis.  .8.  Blood cultures NGTD.    --Continue supportive care  --No [facility] support for now, until get results of BMB  --BMB tomorrow at noon    8/14/19 Continued pancytopenia WBC 0.87 (ANC 0.2), hemoglobin 9.7, platelets 86K.  Splenomegaly. Folic acid deficiency.  Not iron deficient or B12 deficient.  Continued diarrhea.  C. Diff negative.  Scheduled for BMB today.    --CBC daily  --Monitor for s/s of infection  --Start folate 1 mg Po dialy    8/15/19 Continued stable pancytopenia WBC 0.93, hemoglobin 8.2, platelets 87.  No need for platelets or prbc's at this time.  Patient has remained afebrile.  Started on folic acid 1 mg PO daily for folic acid deficiency.  Had BMB yesterday.  Tolerated well.    --CBC daily  --Monitor for s/s of infection  --continue folate 1 mg PO dialy  --Await BMB results          Thank you for your consult. I will follow-up with patient. Please contact us if you have any additional questions.     Michelle Dietz NP  Hematology/Oncology  [facility]

## 2019-08-15 NOTE — ASSESSMENT & PLAN NOTE
8/13/19 Patient with newly diagnosed pancytopenia WBC 0.83 (ANC 0.1), hemoglobin 8.8, platelets 83 K.  With diarrhea that started 2 days ago perfuse green watery.  C. Diff negative.  States had night sweats for 2 days.  Denies known fever.  Scheduled for BMB tomorrow at noon.  No need for transfusion at this time.  Splenomegaly on CT scan of abdomen/pelvis with diffuse mural thickening and inflammation of the adjacent fat of the rectosigmoid and descending colon consistent with colitis.  .8.  Blood cultures NGTD.    --Continue supportive care  [facility], until get results of BMB  --BMB tomorrow at noon    8/14/19 Continued pancytopenia WBC 0.87 (ANC 0.2), hemoglobin 9.7, platelets 86K.  Splenomegaly. Folic acid deficiency.  Not iron deficient or B12 deficient.  Continued diarrhea.  C. Diff negative.  Scheduled for BMB today.    --CBC daily  --Monitor for s/s of infection  --Start folate 1 mg Po dialy    8/15/19 Continued stable pancytopenia WBC 0.93, hemoglobin 8.2, platelets 87.  No need for platelets or prbc's at this time.  Patient has remained afebrile.  Started on folic acid 1 mg PO daily for folic acid deficiency.  Had BMB yesterday.  Tolerated well.    --CBC daily  --Monitor for s/s of infection  --continue folate 1 mg PO dialy  --Await BMB results

## 2019-08-15 NOTE — ASSESSMENT & PLAN NOTE
Criteria including WBC 0.80 & 1.01, tachycardia 113  CT of ABD/Pelvis notes Diffuse inflammatory changes of the descending colon and rectosigmoid consistent with colitis/proctitis.  No evidence of a pericolonic abscess can be seen.  The appendix cannot be identified.  Fluid-filled small bowel loops in the pelvis likely represent an ileus.  1.1 cm in diameter gallbladder calculus.  Splenomegaly with the spleen measuring 18 cm.  Initial 1000 ml saline given  Additional 1700 ml IV bolus given  Lactic acid pending, repeat in 4 hours and 8 hours  Blood cultures ordered  Platelet count 92, 000  INR pending  Cipro and Flagyl prescribed  8/14 - lactic acid normal, vital signs normal, IV fluids/antibiotics continued, blood cultures gram positive cocci resembling STAPH - vanco in progress

## 2019-08-15 NOTE — SUBJECTIVE & OBJECTIVE
Review of Systems   Constitutional: Positive for activity change and fatigue. Negative for appetite change, chills, diaphoresis and fever.   HENT: Negative.    Eyes: Negative.    Respiratory: Negative for cough and shortness of breath.    Cardiovascular: Negative for chest pain, palpitations and leg swelling.   Gastrointestinal: Positive for abdominal pain and diarrhea. Negative for nausea and vomiting.   Genitourinary: Negative for difficulty urinating.   Musculoskeletal: Positive for back pain.   Skin: Negative for pallor, rash and wound.   Neurological: Positive for weakness. Negative for syncope and light-headedness.   Psychiatric/Behavioral: The patient is not nervous/anxious.      Objective:     Vital Signs (Most Recent):  Temp: 98.7 °F (37.1 °C) (08/15/19 1252)  Pulse: 60 (08/15/19 1252)  Resp: 18 (08/15/19 1252)  BP: 134/64 (08/15/19 1252)  SpO2: 99 % (08/15/19 1252) Vital Signs (24h Range):  Temp:  [97.5 °F (36.4 °C)-98.7 °F (37.1 °C)] 98.7 °F (37.1 °C)  Pulse:  [59-80] 60  Resp:  [16-18] 18  SpO2:  [98 %-100 %] 99 %  BP: (134-167)/(64-81) 134/64     Weight: 88.2 kg (194 lb 5.4 oz)  Body mass index is 27.88 kg/m².    Intake/Output Summary (Last 24 hours) at 8/15/2019 1534  Last data filed at 8/15/2019 1343  Gross per 24 hour   Intake 4934.58 ml   Output --   Net 4934.58 ml      Physical Exam   Constitutional: He is oriented to person, place, and time. He appears well-developed. He has a sickly appearance.   HENT:   Head: Normocephalic and atraumatic.   Nose: Nose normal.   Eyes: Pupils are equal, round, and reactive to light. Conjunctivae are normal. No scleral icterus.   Neck: Normal range of motion. Neck supple.   Cardiovascular: Normal rate, regular rhythm and normal heart sounds. Exam reveals no gallop and no friction rub.   No murmur heard.  Pulmonary/Chest: Effort normal and breath sounds normal.   Abdominal: Soft. Bowel sounds are increased. Tenderness: especially to RLQ.   Musculoskeletal:  Normal range of motion. He exhibits no edema or tenderness.   Neurological: He is alert and oriented to person, place, and time.   Skin: Skin is warm and dry. There is pallor.   Psychiatric: He has a normal mood and affect. His behavior is normal.   Vitals reviewed.      Significant Labs:   CBC:   Recent Labs   Lab 08/14/19  0557 08/15/19  0545   WBC 0.87* 0.93*   HGB 9.7* 9.2*   HCT 27.8* 25.9*   PLT 86* 87*     CMP:   Recent Labs   Lab 08/14/19  0557 08/15/19  0545    135*   K 3.4* 3.5    105   CO2 24 23   GLU 94 150*   BUN 4* 4*   CREATININE 0.6 0.6   CALCIUM 8.2* 8.4*   PROT 5.3* 5.2*   ALBUMIN 2.6* 2.5*   BILITOT 1.0 1.1*   ALKPHOS 82 81   AST 18 14   ALT 12 11   ANIONGAP 8 7*   EGFRNONAA >60 >60     All pertinent labs within the past 24 hours have been reviewed.    Significant Imaging: I have reviewed all pertinent imaging results/findings within the past 24 hours.

## 2019-08-16 LAB
ALBUMIN SERPL BCP-MCNC: 2.4 G/DL (ref 3.5–5.2)
ALP SERPL-CCNC: 74 U/L (ref 55–135)
ALT SERPL W/O P-5'-P-CCNC: 9 U/L (ref 10–44)
ANION GAP SERPL CALC-SCNC: 6 MMOL/L (ref 8–16)
ANISOCYTOSIS BLD QL SMEAR: SLIGHT
AST SERPL-CCNC: 19 U/L (ref 10–40)
BASOPHILS # BLD AUTO: 0.01 K/UL (ref 0–0.2)
BASOPHILS NFR BLD: 1 % (ref 0–1.9)
BILIRUB SERPL-MCNC: 1.1 MG/DL (ref 0.1–1)
BUN SERPL-MCNC: 2 MG/DL (ref 6–20)
CALCIUM SERPL-MCNC: 8.2 MG/DL (ref 8.7–10.5)
CHLORIDE SERPL-SCNC: 108 MMOL/L (ref 95–110)
CO2 SERPL-SCNC: 24 MMOL/L (ref 23–29)
CREAT SERPL-MCNC: 0.6 MG/DL (ref 0.5–1.4)
DIFFERENTIAL METHOD: ABNORMAL
EOSINOPHIL # BLD AUTO: 0 K/UL (ref 0–0.5)
EOSINOPHIL NFR BLD: 1 % (ref 0–8)
ERYTHROCYTE [DISTWIDTH] IN BLOOD BY AUTOMATED COUNT: 13.9 % (ref 11.5–14.5)
EST. GFR  (AFRICAN AMERICAN): >60 ML/MIN/1.73 M^2
EST. GFR  (NON AFRICAN AMERICAN): >60 ML/MIN/1.73 M^2
GLUCOSE SERPL-MCNC: 145 MG/DL (ref 70–110)
HCT VFR BLD AUTO: 24.5 % (ref 40–54)
HGB BLD-MCNC: 8.5 G/DL (ref 14–18)
LYMPHOCYTES # BLD AUTO: 0.5 K/UL (ref 1–4.8)
LYMPHOCYTES NFR BLD: 49 % (ref 18–48)
MCH RBC QN AUTO: 36.3 PG (ref 27–31)
MCHC RBC AUTO-ENTMCNC: 34.7 G/DL (ref 32–36)
MCV RBC AUTO: 105 FL (ref 82–98)
MONOCYTES # BLD AUTO: 0.3 K/UL (ref 0.3–1)
MONOCYTES NFR BLD: 26.5 % (ref 4–15)
NEUTROPHILS # BLD AUTO: 0.2 K/UL (ref 1.8–7.7)
NEUTROPHILS NFR BLD: 24.5 % (ref 38–73)
OVALOCYTES BLD QL SMEAR: ABNORMAL
PLATELET # BLD AUTO: 84 K/UL (ref 150–350)
PMV BLD AUTO: 8.9 FL (ref 9.2–12.9)
POCT GLUCOSE: 100 MG/DL (ref 70–110)
POIKILOCYTOSIS BLD QL SMEAR: SLIGHT
POTASSIUM SERPL-SCNC: 3.9 MMOL/L (ref 3.5–5.1)
PROT SERPL-MCNC: 4.7 G/DL (ref 6–8.4)
RBC # BLD AUTO: 2.34 M/UL (ref 4.6–6.2)
SCHISTOCYTES BLD QL SMEAR: PRESENT
SODIUM SERPL-SCNC: 138 MMOL/L (ref 136–145)
SPHEROCYTES BLD QL SMEAR: ABNORMAL
VANCOMYCIN TROUGH SERPL-MCNC: 10.2 UG/ML (ref 10–22)
WBC # BLD AUTO: 0.98 K/UL (ref 3.9–12.7)

## 2019-08-16 PROCEDURE — 63600175 PHARM REV CODE 636 W HCPCS: Performed by: INTERNAL MEDICINE

## 2019-08-16 PROCEDURE — 25000003 PHARM REV CODE 250: Performed by: PHYSICIAN ASSISTANT

## 2019-08-16 PROCEDURE — 80053 COMPREHEN METABOLIC PANEL: CPT

## 2019-08-16 PROCEDURE — 25000003 PHARM REV CODE 250: Performed by: NURSE PRACTITIONER

## 2019-08-16 PROCEDURE — 94761 N-INVAS EAR/PLS OXIMETRY MLT: CPT

## 2019-08-16 PROCEDURE — 87209 SMEAR COMPLEX STAIN: CPT

## 2019-08-16 PROCEDURE — 94640 AIRWAY INHALATION TREATMENT: CPT

## 2019-08-16 PROCEDURE — 63600175 PHARM REV CODE 636 W HCPCS: Performed by: NURSE PRACTITIONER

## 2019-08-16 PROCEDURE — 87046 STOOL CULTR AEROBIC BACT EA: CPT | Mod: 59

## 2019-08-16 PROCEDURE — 36415 COLL VENOUS BLD VENIPUNCTURE: CPT

## 2019-08-16 PROCEDURE — 63700000 PHARM REV CODE 250 ALT 637 W/O HCPCS: Performed by: NURSE PRACTITIONER

## 2019-08-16 PROCEDURE — 87427 SHIGA-LIKE TOXIN AG IA: CPT

## 2019-08-16 PROCEDURE — 80202 ASSAY OF VANCOMYCIN: CPT

## 2019-08-16 PROCEDURE — 87045 FECES CULTURE AEROBIC BACT: CPT

## 2019-08-16 PROCEDURE — 93010 ELECTROCARDIOGRAM REPORT: CPT | Mod: ,,, | Performed by: INTERNAL MEDICINE

## 2019-08-16 PROCEDURE — 25000242 PHARM REV CODE 250 ALT 637 W/ HCPCS: Performed by: NURSE PRACTITIONER

## 2019-08-16 PROCEDURE — 99232 PR SUBSEQUENT HOSPITAL CARE,LEVL II: ICD-10-PCS | Mod: ,,, | Performed by: INTERNAL MEDICINE

## 2019-08-16 PROCEDURE — 99232 SBSQ HOSP IP/OBS MODERATE 35: CPT | Mod: ,,, | Performed by: INTERNAL MEDICINE

## 2019-08-16 PROCEDURE — 93005 ELECTROCARDIOGRAM TRACING: CPT

## 2019-08-16 PROCEDURE — 93010 EKG 12-LEAD: ICD-10-PCS | Mod: ,,, | Performed by: INTERNAL MEDICINE

## 2019-08-16 PROCEDURE — 21400001 HC TELEMETRY ROOM

## 2019-08-16 PROCEDURE — 25000003 PHARM REV CODE 250: Performed by: INTERNAL MEDICINE

## 2019-08-16 PROCEDURE — 85025 COMPLETE CBC W/AUTO DIFF WBC: CPT

## 2019-08-16 RX ORDER — LANOLIN ALCOHOL/MO/W.PET/CERES
400 CREAM (GRAM) TOPICAL 2 TIMES DAILY
Status: DISCONTINUED | OUTPATIENT
Start: 2019-08-16 | End: 2019-08-21 | Stop reason: HOSPADM

## 2019-08-16 RX ORDER — GLUCAGON 1 MG
1 KIT INJECTION
Status: DISCONTINUED | OUTPATIENT
Start: 2019-08-16 | End: 2019-08-21 | Stop reason: HOSPADM

## 2019-08-16 RX ORDER — PANTOPRAZOLE SODIUM 40 MG/1
40 TABLET, DELAYED RELEASE ORAL DAILY
Status: DISCONTINUED | OUTPATIENT
Start: 2019-08-16 | End: 2019-08-21 | Stop reason: HOSPADM

## 2019-08-16 RX ORDER — AMLODIPINE BESYLATE 2.5 MG/1
2.5 TABLET ORAL DAILY
Status: DISCONTINUED | OUTPATIENT
Start: 2019-08-16 | End: 2019-08-21 | Stop reason: HOSPADM

## 2019-08-16 RX ORDER — IBUPROFEN 200 MG
24 TABLET ORAL
Status: DISCONTINUED | OUTPATIENT
Start: 2019-08-16 | End: 2019-08-21 | Stop reason: HOSPADM

## 2019-08-16 RX ORDER — IBUPROFEN 200 MG
16 TABLET ORAL
Status: DISCONTINUED | OUTPATIENT
Start: 2019-08-16 | End: 2019-08-21 | Stop reason: HOSPADM

## 2019-08-16 RX ORDER — POTASSIUM CHLORIDE 20 MEQ/15ML
40 SOLUTION ORAL 3 TIMES DAILY
Status: DISCONTINUED | OUTPATIENT
Start: 2019-08-16 | End: 2019-08-18

## 2019-08-16 RX ORDER — INSULIN ASPART 100 [IU]/ML
0-5 INJECTION, SOLUTION INTRAVENOUS; SUBCUTANEOUS
Status: DISCONTINUED | OUTPATIENT
Start: 2019-08-16 | End: 2019-08-21 | Stop reason: HOSPADM

## 2019-08-16 RX ADMIN — FLUCONAZOLE 200 MG: 100 TABLET ORAL at 09:08

## 2019-08-16 RX ADMIN — SODIUM CHLORIDE: 0.9 INJECTION, SOLUTION INTRAVENOUS at 05:08

## 2019-08-16 RX ADMIN — METOPROLOL TARTRATE 50 MG: 50 TABLET ORAL at 09:08

## 2019-08-16 RX ADMIN — ALPRAZOLAM 0.25 MG: 0.25 TABLET ORAL at 03:08

## 2019-08-16 RX ADMIN — ASPIRIN 81 MG: 81 TABLET, COATED ORAL at 09:08

## 2019-08-16 RX ADMIN — VANCOMYCIN HYDROCHLORIDE 1500 MG: 100 INJECTION, POWDER, LYOPHILIZED, FOR SOLUTION INTRAVENOUS at 05:08

## 2019-08-16 RX ADMIN — TIZANIDINE 4 MG: 4 TABLET ORAL at 06:08

## 2019-08-16 RX ADMIN — ATORVASTATIN CALCIUM 20 MG: 10 TABLET, FILM COATED ORAL at 09:08

## 2019-08-16 RX ADMIN — CHOLESTYRAMINE 4 G: 4 POWDER, FOR SUSPENSION ORAL at 09:08

## 2019-08-16 RX ADMIN — DULOXETINE 60 MG: 30 CAPSULE, DELAYED RELEASE ORAL at 09:08

## 2019-08-16 RX ADMIN — POTASSIUM CHLORIDE 40 MEQ: 20 SOLUTION ORAL at 03:08

## 2019-08-16 RX ADMIN — BUDESONIDE 0.5 MG: 0.5 SUSPENSION RESPIRATORY (INHALATION) at 07:08

## 2019-08-16 RX ADMIN — ARFORMOTEROL TARTRATE 15 MCG: 15 SOLUTION RESPIRATORY (INHALATION) at 07:08

## 2019-08-16 RX ADMIN — DIPHENOXYLATE HYDROCHLORIDE AND ATROPINE SULFATE 1 TABLET: 2.5; .025 TABLET ORAL at 06:08

## 2019-08-16 RX ADMIN — METRONIDAZOLE 500 MG: 500 TABLET ORAL at 09:08

## 2019-08-16 RX ADMIN — OXYCODONE HYDROCHLORIDE AND ACETAMINOPHEN 1 TABLET: 10; 325 TABLET ORAL at 06:08

## 2019-08-16 RX ADMIN — AMLODIPINE BESYLATE 2.5 MG: 2.5 TABLET ORAL at 03:08

## 2019-08-16 RX ADMIN — OXYCODONE HYDROCHLORIDE AND ACETAMINOPHEN 1 TABLET: 10; 325 TABLET ORAL at 01:08

## 2019-08-16 RX ADMIN — TRAZODONE HYDROCHLORIDE 200 MG: 100 TABLET ORAL at 09:08

## 2019-08-16 RX ADMIN — OXYCODONE HYDROCHLORIDE AND ACETAMINOPHEN 1 TABLET: 10; 325 TABLET ORAL at 03:08

## 2019-08-16 RX ADMIN — NYSTATIN 500000 UNITS: 100000 SUSPENSION ORAL at 11:08

## 2019-08-16 RX ADMIN — METRONIDAZOLE 500 MG: 500 TABLET ORAL at 01:08

## 2019-08-16 RX ADMIN — MAGNESIUM OXIDE TAB 400 MG (241.3 MG ELEMENTAL MG) 400 MG: 400 (241.3 MG) TAB at 09:08

## 2019-08-16 RX ADMIN — PANTOPRAZOLE SODIUM 40 MG: 40 TABLET, DELAYED RELEASE ORAL at 12:08

## 2019-08-16 RX ADMIN — OXYCODONE HYDROCHLORIDE AND ACETAMINOPHEN 1 TABLET: 10; 325 TABLET ORAL at 11:08

## 2019-08-16 RX ADMIN — FOLIC ACID 1 MG: 1 TABLET ORAL at 09:08

## 2019-08-16 RX ADMIN — RANOLAZINE 1000 MG: 500 TABLET, FILM COATED, EXTENDED RELEASE ORAL at 09:08

## 2019-08-16 RX ADMIN — TIZANIDINE 4 MG: 4 TABLET ORAL at 07:08

## 2019-08-16 RX ADMIN — ALPRAZOLAM 0.25 MG: 0.25 TABLET ORAL at 11:08

## 2019-08-16 RX ADMIN — CIPROFLOXACIN HYDROCHLORIDE 500 MG: 500 TABLET, FILM COATED ORAL at 09:08

## 2019-08-16 RX ADMIN — METRONIDAZOLE 500 MG: 500 TABLET ORAL at 05:08

## 2019-08-16 RX ADMIN — SODIUM CHLORIDE: 0.9 INJECTION, SOLUTION INTRAVENOUS at 09:08

## 2019-08-16 RX ADMIN — NYSTATIN 500000 UNITS: 100000 SUSPENSION ORAL at 09:08

## 2019-08-16 RX ADMIN — DIPHENOXYLATE HYDROCHLORIDE AND ATROPINE SULFATE 1 TABLET: 2.5; .025 TABLET ORAL at 07:08

## 2019-08-16 RX ADMIN — OXYCODONE HYDROCHLORIDE AND ACETAMINOPHEN 1 TABLET: 10; 325 TABLET ORAL at 07:08

## 2019-08-16 RX ADMIN — FENOFIBRATE 160 MG: 160 TABLET ORAL at 09:08

## 2019-08-16 RX ADMIN — ALPRAZOLAM 0.25 MG: 0.25 TABLET ORAL at 07:08

## 2019-08-16 RX ADMIN — POTASSIUM CHLORIDE 40 MEQ: 20 SOLUTION ORAL at 09:08

## 2019-08-16 RX ADMIN — LISINOPRIL 20 MG: 20 TABLET ORAL at 09:08

## 2019-08-16 RX ADMIN — NYSTATIN 500000 UNITS: 100000 SUSPENSION ORAL at 04:08

## 2019-08-16 RX ADMIN — DIPHENOXYLATE HYDROCHLORIDE AND ATROPINE SULFATE 1 TABLET: 2.5; .025 TABLET ORAL at 01:08

## 2019-08-16 RX ADMIN — FAMOTIDINE 20 MG: 20 TABLET ORAL at 09:08

## 2019-08-16 RX ADMIN — POTASSIUM CHLORIDE 40 MEQ: 1500 TABLET, EXTENDED RELEASE ORAL at 05:08

## 2019-08-16 RX ADMIN — NYSTATIN 500000 UNITS: 100000 SUSPENSION ORAL at 07:08

## 2019-08-16 NOTE — PLAN OF CARE
Problem: Adult Inpatient Plan of Care  Goal: Plan of Care Review  Outcome: Ongoing (interventions implemented as appropriate)  POC reviewed with patient. Pt verbalized understanding  Pt remains free of injuries and falls; fall precaution in place.   Frequent complaints of pain on this shift.  IV maintained intact.  IVF and IV abx administered per MAR.  Continuous heart monitor. Normal sinus   Bed low, side rails x2, call light in reach, personal belongings at bedside.  Reminded to call for assistance.  Chart check complete. Will continue to monitor.       Problem: Diarrhea (Bowel Disease, Inflammatory)  Goal: Diarrhea Symptom Relief    Intervention: Manage Diarrhea  Patient receiving lomotil PRN. Mild relief of symptoms

## 2019-08-16 NOTE — ASSESSMENT & PLAN NOTE
8/15/19 states continued watery diarrhea.  States frequency improved.  Started on Questran yesterday.   ordered Immodium today prn.  C. Diff negative.  Has remained on cipro and flagyl IV for treatment of colitis.  Continue IV fluid - replace electrolytes - Magnesium 1.2 today, potassium WNL.  --CMP daily  --questran per   --Imodium prn per     8/16/19 Continued watery diarrhea despite adding questran and lomotil to treatment regime.  C. Diff negative.  On flagyl and cipro for treatment of colitis.    --CMP daily  --Consult GI for further recommendations  --Replace electrolytes as needed per

## 2019-08-16 NOTE — PLAN OF CARE
Problem: Adult Inpatient Plan of Care  Goal: Plan of Care Review  Outcome: Ongoing (interventions implemented as appropriate)  Fall prevention precautions maintained, pt remained free of falls throughout shift, call bell and personal items within reach, pt's pain adequately controlled by prn pain medication, pt turns self in bed, IV fluids and antibiotics continued as ordered. 24 hour chart check completed. Will continue to monitor

## 2019-08-16 NOTE — SUBJECTIVE & OBJECTIVE
Interval History:  8/14/19 Patient seen at the bedside today.  States still with diarrhea.  C Diff negative. Continues to remain afebrile.  Awaiting BMB today.  Complains of back pain due to back surgery 6 weeks ago.    8/15/19 Patient assessed at the bedside this morning.  Has BMB yesterday - tolerated well.  Complains of continued watery diarrhea - states increased in frequency.  States pain better controled.      8/16/19 Patient assessed at the bedside today.  States continued diarrhea despite adding questran and prn lomotil to treatment regimen.  BMB results pending.  Pain controlled.  ANC 0.2     Oncology Treatment Plan:   [No treatment plan]    Medications:  Continuous Infusions:   sodium chloride 0.9% 125 mL/hr at 08/16/19 0519     Scheduled Meds:   arformoterol  15 mcg Nebulization Q12H    aspirin  81 mg Oral Daily    atorvastatin  20 mg Oral Daily    budesonide  0.5 mg Nebulization Q12H    cholestyramine  1 packet Oral BID    ciprofloxacin HCl  500 mg Oral Q12H    DULoxetine  60 mg Oral Daily    famotidine  20 mg Oral BID    fenofibrate  160 mg Oral Daily    fluconazole  200 mg Oral Daily    folic acid  1 mg Oral Daily    lisinopril  20 mg Oral Daily    metoprolol tartrate  50 mg Oral Q12H    metroNIDAZOLE  500 mg Oral Q8H    nystatin  500,000 Units Oral QID (WM & HS)    potassium chloride  40 mEq Oral Q6H    ranolazine  1,000 mg Oral BID    traZODone  200 mg Oral QHS    vancomycin (VANCOCIN) IVPB  1,500 mg Intravenous Q12H     PRN Meds:albuterol sulfate, ALPRAZolam, aluminum-magnesium hydroxide-simethicone, Dextrose 10% Bolus, Dextrose 10% Bolus, diphenoxylate-atropine 2.5-0.025 mg, ondansetron, oxyCODONE-acetaminophen, promethazine (PHENERGAN) IVPB, tiZANidine     Review of patient's allergies indicates:  No Known Allergies     Past Medical History:   Diagnosis Date    Anxiety     CAD (coronary artery disease)     PTCA x 2 LAD, restenosis and restent 7/12.    Chest pain syndrome  10/2/2015    COPD (chronic obstructive pulmonary disease)     CPAP (continuous positive airway pressure) dependence     @ night    Diabetes mellitus type 2, uncontrolled 2016    History of duodenal ulcer     with bleed    Hypertension     Mixed hyperlipidemia     JUDI (obstructive sleep apnea)     severe    S/P PTCA (percutaneous transluminal coronary angioplasty) 3/11/2015    Vitamin D deficiency      Past Surgical History:   Procedure Laterality Date    APPENDECTOMY      CARDIAC CATHETERIZATION      CATARACT EXTRACTION W/  INTRAOCULAR LENS IMPLANT Right 4-22-15    CORONARY STENT PLACEMENT      ESOPHAGOGASTRODUODENOSCOPY (EGD) N/A 2014    Performed by Jose Dela Cruz [facility] at [facility] ENDO    EXCISION-SKIN Right 2015    Performed by Louis O. Jeansonne IV, MD at [facility] OR    HEART CATH WITH GRAFTS-LEFT Right 2014    Performed by Erma Green MD at [facility] CATH LAB    HEART CATH-LEFT Left 2017    Performed by Erma Green MD at [facility] CATH LAB    HEMORRHOID SURGERY      INCISION AND DRAINAGE (I&D), BUTTOCK  3/26/2015    Performed by Louis O. Jeansonne IV, MD at [facility] OR    INCISION AND DRAINAGE-THIGH Right 3/26/2015    Performed by Louis O. Jeansonne IV, MD at [facility] OR    NISSEN FUNDOPLICATION      lap    SKIN LESION EXCISION Right     leg     Family History     Problem Relation (Age of Onset)    COPD Maternal Grandmother, Maternal Grandfather    Diabetes Maternal Grandfather    Heart block Father    Heart disease Mother, Sister        Tobacco Use    Smoking status: Former Smoker     Packs/day: 0.50     Years: 20.00     Pack years: 10.00     Types: Cigarettes     Last attempt to quit: 6/3/2014     Years since quittin.2    Smokeless tobacco: Never Used    Tobacco comment: currently vaping with nicotine since quit smoking in 2014   Substance and Sexual Activity    Alcohol use: Yes     Alcohol/week: 2.4 oz     Types: 4 Cans of beer per week    Drug use: No     Sexual activity: Not on file       Review of Systems   Constitutional: Positive for activity change, appetite change and fatigue. Negative for chills, diaphoresis and fever.   HENT: Negative.    Eyes: Negative.    Respiratory: Negative for cough and shortness of breath.    Cardiovascular: Negative for chest pain, palpitations and leg swelling.   Gastrointestinal: Positive for abdominal pain and diarrhea. Negative for nausea and vomiting.   Genitourinary: Positive for difficulty urinating.        Darkened urine   Musculoskeletal: Positive for back pain.   Skin: Negative for pallor, rash and wound.   Allergic/Immunologic: Positive for immunocompromised state.   Neurological: Positive for weakness and light-headedness. Negative for syncope.   Hematological: Negative for adenopathy. Does not bruise/bleed easily.   Psychiatric/Behavioral: Negative for confusion. The patient is nervous/anxious.      Objective:     Vital Signs (Most Recent):  Temp: 97.6 °F (36.4 °C) (08/16/19 0731)  Pulse: 88 (08/16/19 0920)  Resp: 15 (08/16/19 0731)  BP: (!) 165/84 (08/16/19 0731)  SpO2: 99 % (08/16/19 0731) Vital Signs (24h Range):  Temp:  [97.6 °F (36.4 °C)-98.7 °F (37.1 °C)] 97.6 °F (36.4 °C)  Pulse:  [56-88] 88  Resp:  [15-18] 15  SpO2:  [97 %-100 %] 99 %  BP: (129-174)/(64-84) 165/84     Weight: 88.2 kg (194 lb 5.4 oz)  Body mass index is 27.88 kg/m².  Body surface area is 2.09 meters squared.      Intake/Output Summary (Last 24 hours) at 8/16/2019 1027  Last data filed at 8/16/2019 0800  Gross per 24 hour   Intake 5103.75 ml   Output 2 ml   Net 5101.75 ml       Physical Exam   Constitutional: He is oriented to person, place, and time. He appears well-developed. He has a sickly appearance. No distress.   HENT:   Head: Normocephalic and atraumatic.   Nose: Nose normal.   Eyes: Pupils are equal, round, and reactive to light. Conjunctivae are normal. No scleral icterus.   Neck: Normal range of motion. Neck supple.   Cardiovascular:  Normal rate, regular rhythm and normal heart sounds. Exam reveals no gallop and no friction rub.   No murmur heard.  Pulmonary/Chest: Effort normal and breath sounds normal.   Abdominal: Soft. Bowel sounds are increased.   Musculoskeletal: Normal range of motion. He exhibits no edema or tenderness.   Neurological: He is alert and oriented to person, place, and time.   Skin: Skin is warm and dry. He is not diaphoretic. There is pallor.   Psychiatric: His speech is normal and behavior is normal. Judgment and thought content normal. His mood appears anxious. He is not actively hallucinating. Cognition and memory are normal. He is attentive.   Vitals reviewed.      Significant Labs:   CBC:   Recent Labs   Lab 08/15/19  0545   WBC 0.93*   HGB 9.2*   HCT 25.9*   PLT 87*   , CMP:   Recent Labs   Lab 08/15/19  0545   *   K 3.5      CO2 23   *   BUN 4*   CREATININE 0.6   CALCIUM 8.4*   PROT 5.2*   ALBUMIN 2.5*   BILITOT 1.1*   ALKPHOS 81   AST 14   ALT 11   ANIONGAP 7*   EGFRNONAA >60   , Coagulation:   No results for input(s): PT, INR, APTT in the last 48 hours., LDH: No results for input(s): LDHCSF, BFSOURCE in the last 48 hours., LFTs:   Recent Labs   Lab 08/15/19  0545   ALT 11   AST 14   ALKPHOS 81   BILITOT 1.1*   PROT 5.2*   ALBUMIN 2.5*   , Urine Studies:   No results for input(s): COLORU, APPEARANCEUA, PHUR, SPECGRAV, PROTEINUA, GLUCUA, KETONESU, BILIRUBINUA, OCCULTUA, NITRITE, UROBILINOGEN, LEUKOCYTESUR, RBCUA, WBCUA, BACTERIA, SQUAMEPITHEL, HYALINECASTS in the last 48 hours.    Invalid input(s): WRIGHTSUR and All pertinent labs from the last 24 hours have been reviewed.    Diagnostic Results:  I have reviewed all pertinent imaging results/findings within the past 24 hours.

## 2019-08-16 NOTE — ASSESSMENT & PLAN NOTE
- BP under fair control  - Continue Mopressor, and Lisinopril   - Resume home Amlodipine today  - monitor and adjust meds as needed

## 2019-08-16 NOTE — PROGRESS NOTES
[facility] Hospital Medicine  Progress Note    Patient Name: Kodak Valentine  MRN: [id number]  Patient Class: IP- Inpatient   Admission Date: 8/12/2019  Length of Stay: 4 days  Attending Physician: Jorge Tijerina, *  Primary Care Provider: Eliza Huddleston MD        Subjective:     Principal Problem:Sepsis        HPI:  Pt is a 50 yo male with PMHx of CAD with stenting x 2 LAD, COPD, DM II, HTN, JUDI and HPL who presents to the ED with reports of severe, constant cramping lower abdominal pain x 5 days. Associated symptoms include profuse, greenish watery diarrhea, diaphoresis, lightheadedness, dark urine (today) and vomiting (1-2 days now resolved). Pt had back surgery 6 weeks ago but his back pain is no worse than usual. Pt's wife was treated at [facility] approx 6 weeks ago. Pt denies URI symptoms, cough, chest pain, palpitations, bloody diarrhea and other symptoms. He last ate some yogurt yesterday. V/S on arrival: Temp 98.0, pulse 113, resp 18 and B/P 133/70. Labs find WBC 0.80, Hgb 11.5?Hct 32.7, plt 92, left shift 22 %, hyponatremia 133, hypokalemia 2.6, gluc 176, total bili 1.5, .8. U/A was altered due to dark color. A contrasted CT ABD/Pelvis was resulted at 16:17 which found diffuse inflammatory changes of the descending colon and rectosigmoid consistent with colitis/proctitis.  No evidence of a pericolonic abscess can be seen. Pt is admitted for Sepsis, Acute Colitis and electrolyte imbalances.     Overview/Hospital Course:  Admitted with Sepsis, Acute Colitis, electrolyte abnormalities, dehydration and pancytopenia. IV fluids given, lactic acid normal, Cipro/Flagyl in process, blood cultures NGTD and C diff negative. Hematology saw the patient with plans for bone marrow biopsy. 8/14 - had bone marrow today, results pending. Diarrhea decreasing, less abdominal pain. Describes sore throat pain and white appearing patches present to posterior pharynx - appears yeast in  "nature. 8/15  - Nystatin and Diflucan prescribed for thrush. Less abdominal pain, still with watery diarrhea - Questran and Imodium prescribed. Blood cultures from 8/12 find Gram positive cocci in clusters resembling Staph in one anaerobic bottle - Vanco started (may be contaminant - awaiting full ID).     As of 8/16 patient reports continued diarrhea, but "it has slowed down a lot".  Patient reports "going 5 times in 30 minutes, and now I'm going once every hour".  Cdiff negative.  Stool cx and ova/parasites pending.  Patient remains afebrile.  Continue Cipro/Flagyl for now along with Questran.  Case d/w GI.  Will monitor and if no improvement in diarrhea, patient may need a flex sig.  Hemoc continues to follow for pancytopenia and recommended that ANC is >1000 prior to DC.  Awaiting bone marrow biopsy results. Awaiting final BC results to determine contaminate versus true infection, continue empiric Vanc for now.      Interval History:  No acute events overnight.  Currently resting comfortably in bed in [facility].  Patient reports continued diarrhea, but "it has slowed down a lot".  Patient reports "going 5 times in 30 minutes, and now I'm going once every hour".  Cdiff negative.  Stool cx and ova/parasites pending.  Patient remains afebrile.  Continue Cipro/Flagyl for now along with Questran.  Case d/w GI.  Will monitor and if no improvement in diarrhea, patient may need a flex sig.  Hemoc continues to follow for pancytopenia and recommended that ANC is >1000 prior to DC.  Awaiting bone marrow biopsy results. Awaiting final BC results to determine contaminate versus true infection, continue empiric Vanc for now.     Review of Systems   Constitutional: Positive for activity change and fatigue. Negative for appetite change, chills, diaphoresis and fever.   HENT: Negative.    Eyes: Negative.    Respiratory: Negative for cough and shortness of breath.    Cardiovascular: Negative for chest pain, palpitations and leg " swelling.   Gastrointestinal: Positive for abdominal pain and diarrhea. Negative for nausea and vomiting.   Genitourinary: Negative for difficulty urinating.   Musculoskeletal: Positive for back pain.   Skin: Negative for pallor, rash and wound.   Neurological: Positive for weakness. Negative for syncope and light-headedness.   Psychiatric/Behavioral: The patient is not nervous/anxious.      Objective:     Vital Signs (Most Recent):  Temp: 98.3 °F (36.8 °C) (08/16/19 1244)  Pulse: 60 (08/16/19 1310)  Resp: 18 (08/16/19 1244)  BP: (!) 152/78 (08/16/19 1244)  SpO2: 98 % (08/16/19 1244) Vital Signs (24h Range):  Temp:  [97.6 °F (36.4 °C)-98.3 °F (36.8 °C)] 98.3 °F (36.8 °C)  Pulse:  [56-88] 60  Resp:  [15-18] 18  SpO2:  [97 %-100 %] 98 %  BP: (129-174)/(75-84) 152/78     Weight: 88.2 kg (194 lb 5.4 oz)  Body mass index is 27.88 kg/m².    Intake/Output Summary (Last 24 hours) at 8/16/2019 1414  Last data filed at 8/16/2019 1310  Gross per 24 hour   Intake 5821.25 ml   Output 2 ml   Net 5819.25 ml      Physical Exam   Constitutional: He is oriented to person, place, and time. He appears well-developed and well-nourished. He has a sickly appearance. No distress.   HENT:   Head: Normocephalic and atraumatic.   Nose: Nose normal.   Mouth/Throat: Oropharynx is clear and moist.   Eyes: Pupils are equal, round, and reactive to light. Conjunctivae and EOM are normal. No scleral icterus.   Neck: Normal range of motion. Neck supple. No JVD present.   Cardiovascular: Normal rate, regular rhythm, normal heart sounds and intact distal pulses. Exam reveals no gallop and no friction rub.   No murmur heard.  Pulmonary/Chest: Effort normal and breath sounds normal. No stridor. No respiratory distress. He has no wheezes. He has no rales. He exhibits no tenderness.   Abdominal: Soft. He exhibits no distension and no mass. Bowel sounds are increased. Tenderness: especially to RLQ. There is no rebound and no guarding.   Musculoskeletal:  Normal range of motion. He exhibits no edema or tenderness.   Neurological: He is alert and oriented to person, place, and time. No cranial nerve deficit.   Skin: Skin is warm and dry. No rash noted. He is not diaphoretic. No erythema. There is pallor.   Incisions to back are healed with no s/s of infection.   Psychiatric: He has a normal mood and affect. His behavior is normal. Judgment and thought content normal.   Nursing note and vitals reviewed.      Significant Labs:   Blood Culture: No results for input(s): LABBLOO in the last 48 hours.  CBC:   Recent Labs   Lab 08/15/19  0545 08/16/19  0910   WBC 0.93* 0.98*   HGB 9.2* 8.5*   HCT 25.9* 24.5*   PLT 87* 84*     CMP:   Recent Labs   Lab 08/15/19  0545 08/16/19  0910   * 138   K 3.5 3.9    108   CO2 23 24   * 145*   BUN 4* 2*   CREATININE 0.6 0.6   CALCIUM 8.4* 8.2*   PROT 5.2* 4.7*   ALBUMIN 2.5* 2.4*   BILITOT 1.1* 1.1*   ALKPHOS 81 74   AST 14 19   ALT 11 9*   ANIONGAP 7* 6*   EGFRNONAA >60 >60       Significant Imaging: I have reviewed all pertinent imaging results/findings within the past 24 hours.      Assessment/Plan:      * Sepsis  - Criteria including WBC 0.80 & 1.01, tachycardia 113  - CT of ABD/Pelvis notes Diffuse inflammatory changes of the descending colon and rectosigmoid consistent with colitis/proctitis.  No evidence of a pericolonic abscess can be seen.  The appendix cannot be identified.  Fluid-filled small bowel loops in the pelvis likely represent an ileus.  1.1 cm in diameter gallbladder calculus.  Splenomegaly with the spleen measuring 18 cm.  - BC x 1 on 8/12 show gram + cocci/clusters resembling staph in aerobic and anaerobic bottle, awaiting final ID  - Received a total of 1.7L of IVFs in the ER  - Continue IVFs  - Continue Cipro/flagyl for Colitis  - Continue empiric Vanc for now pending final BC result  - Remains hemodynamically stable  - Monitor    Acute colitis  - Cdiff negative  - Stool cx, ova/parasites  pending  - Continue Cipro and Flagyl  - IV fluids  - Mech soft diet as tolerated   - Questran added on Tuesday, with some improvement in diarrhea, will continue  - Prn analgesia and antiemetics  - Consider GI consult pending clinical course      Pancytopenia  - CT imaging Splenomegaly with the spleen measuring 18 cm.  - Hematology consulted; could be r/t sepsis and colitis  - Underwent Bone Marrow biopsy on 8/14 with results pending  - Hemoc recommends ANC is >1000 prior to DC  - Daily CBC  - Monitor      Hypomagnesemia  - Mag 1.2 yesterday  - Started on Mag Ox BID  - Repeat chemistries in AM  - Replace magnesium and phosphorous as needed      Thrush  - Continue Nystatin swish and swallow      CAD (coronary artery disease)  - Denies any chest pain  - Continue ASA, Atorvastatin, Fenofibrate, Ranexa, Lopressor and Lisinopril   - Monitor      Hypertension associated with diabetes  - BP under fair control  - Continue Mopressor, and Lisinopril   - Resume home Amlodipine today  - monitor and adjust meds as needed        Controlled type 2 diabetes mellitus with stage 2 chronic kidney disease, without long-term current use of insulin  - BG under fair control  - A1c 4.1  - Hold home Metformin  - Accu checks ACHS with SSI prn  - Monitor      Hyperlipidemia associated with type 2 diabetes mellitus  Continue Statin and Fenofibrate      Moderate COPD (chronic obstructive pulmonary disease)  - Currently appears compensated  - Continue nebs  - Supplemental O2 prn  - monitor      Anxiety  - Continue home meds      GERD (gastroesophageal reflux disease)  - Resume home Protonix      JUDI treated with BiPAP  - Cpap q hs      VTE Risk Mitigation (From admission, onward)        Ordered     Place sequential compression device  Until discontinued      08/16/19 1430                Marti Bhatia, SPENCER, ACNP-BC  Department of Hospital Medicine   [facility]

## 2019-08-16 NOTE — PROGRESS NOTES
[facility] -   Hematology/Oncology  Progress Note    Patient Name: Kodak Valentine  Admission Date: 8/12/2019  Hospital Length of Stay: 4 days  Code Status: No Order     Subjective:     HPI:  Pt is a 48 yo male with PMHx of CAD with stenting x 2 LAD, COPD, DM II, HTN, JUDI and HPL who presents to the ED with reports of severe, constant cramping lower abdominal pain x 5 days. Associated symptoms include profuse, greenish watery diarrhea, diaphoresis, lightheadedness, dark urine (today) and vomiting (1-2 days now resolved). Pt had back surgery 6 weeks ago but his back pain is no worse than usual. Pt's wife was treated at [facility] approx 6 weeks ago. Pt denies URI symptoms, cough, chest pain, palpitations, bloody diarrhea and other symptoms. He last ate some yogurt yesterday. V/S on arrival: Temp 98.0, pulse 113, resp 18 and B/P 133/70. Labs find WBC 0.80, Hgb 11.5?Hct 32.7, plt 92, left shift 22 %, hyponatremia 133, hypokalemia 2.6, gluc 176, total bili 1.5, .8. U/A was altered due to dark color. A contrasted CT ABD/Pelvis was resulted at 16:17 which found diffuse inflammatory changes of the descending colon and rectosigmoid consistent with colitis/proctitis.  No evidence of a pericolonic abscess can be seen. Pt is admitted for Sepsis, Acute Colitis and electrolyte imbalances.     Hematology/oncology consulted for pancytopenia.       Interval History:  8/14/19 Patient seen at the bedside today.  States still with diarrhea.  C Diff negative. Continues to remain afebrile.  Awaiting BMB today.  Complains of back pain due to back surgery 6 weeks ago.    8/15/19 Patient assessed at the bedside this morning.  Has BMB yesterday - tolerated well.  Complains of continued watery diarrhea - states increased in frequency.  States pain better controled.      8/16/19 Patient assessed at the bedside today.  States continued diarrhea despite adding questran and prn lomotil to treatment regimen.  BMB  results pending.  Pain controlled.  ANC 0.2     Oncology Treatment Plan:   [No treatment plan]    Medications:  Continuous Infusions:   sodium chloride 0.9% 125 mL/hr at 08/16/19 0519     Scheduled Meds:   arformoterol  15 mcg Nebulization Q12H    aspirin  81 mg Oral Daily    atorvastatin  20 mg Oral Daily    budesonide  0.5 mg Nebulization Q12H    cholestyramine  1 packet Oral BID    ciprofloxacin HCl  500 mg Oral Q12H    DULoxetine  60 mg Oral Daily    famotidine  20 mg Oral BID    fenofibrate  160 mg Oral Daily    fluconazole  200 mg Oral Daily    folic acid  1 mg Oral Daily    lisinopril  20 mg Oral Daily    metoprolol tartrate  50 mg Oral Q12H    metroNIDAZOLE  500 mg Oral Q8H    nystatin  500,000 Units Oral QID (WM & HS)    potassium chloride  40 mEq Oral Q6H    ranolazine  1,000 mg Oral BID    traZODone  200 mg Oral QHS    vancomycin (VANCOCIN) IVPB  1,500 mg Intravenous Q12H     PRN Meds:albuterol sulfate, ALPRAZolam, aluminum-magnesium hydroxide-simethicone, Dextrose 10% Bolus, Dextrose 10% Bolus, diphenoxylate-atropine 2.5-0.025 mg, ondansetron, oxyCODONE-acetaminophen, promethazine (PHENERGAN) IVPB, tiZANidine     Review of patient's allergies indicates:  No Known Allergies     Past Medical History:   Diagnosis Date    Anxiety     CAD (coronary artery disease)     PTCA x 2 LAD, restenosis and restent 7/12.    Chest pain syndrome 10/2/2015    COPD (chronic obstructive pulmonary disease)     CPAP (continuous positive airway pressure) dependence     @ night    Diabetes mellitus type 2, uncontrolled 4/13/2016    History of duodenal ulcer     with bleed    Hypertension     Mixed hyperlipidemia     JUDI (obstructive sleep apnea)     severe    S/P PTCA (percutaneous transluminal coronary angioplasty) 3/11/2015    Vitamin D deficiency      Past Surgical History:   Procedure Laterality Date    APPENDECTOMY      CARDIAC CATHETERIZATION      CATARACT EXTRACTION W/  INTRAOCULAR LENS  IMPLANT Right 4-22-15    CORONARY STENT PLACEMENT      ESOPHAGOGASTRODUODENOSCOPY (EGD) N/A 2014    Performed by Jose Dela Cruz [facility] at [facility] ENDO    EXCISION-SKIN Right 2015    Performed by Louis O. Jeansonne IV, MD at [facility] OR    HEART CATH WITH GRAFTS-LEFT Right 2014    Performed by Erma Green MD at [facility] CATH LAB    HEART CATH-LEFT Left 2017    Performed by Erma Green MD at [facility] CATH LAB    HEMORRHOID SURGERY      INCISION AND DRAINAGE (I&D), BUTTOCK  3/26/2015    Performed by Louis O. Jeansonne IV, MD at [facility] OR    INCISION AND DRAINAGE-THIGH Right 3/26/2015    Performed by Louis [facility]. Jeansonne IV, MD at [facility] OR    NISSEN FUNDOPLICATION      lap    SKIN LESION EXCISION Right     leg     Family History     Problem Relation (Age of Onset)    COPD Maternal Grandmother, Maternal Grandfather    Diabetes Maternal Grandfather    Heart block Father    Heart disease Mother, Sister        Tobacco Use    Smoking status: Former Smoker     Packs/day: 0.50     Years: 20.00     Pack years: 10.00     Types: Cigarettes     Last attempt to quit: 6/3/2014     Years since quittin.2    Smokeless tobacco: Never Used    Tobacco comment: currently vaping with nicotine since quit smoking in 2014   Substance and Sexual Activity    Alcohol use: Yes     Alcohol/week: 2.4 oz     Types: 4 Cans of beer per week    Drug use: No    Sexual activity: Not on file       Review of Systems   Constitutional: Positive for activity change, appetite change and fatigue. Negative for chills, diaphoresis and fever.   HENT: Negative.    Eyes: Negative.    Respiratory: Negative for cough and shortness of breath.    Cardiovascular: Negative for chest pain, palpitations and leg swelling.   Gastrointestinal: Positive for abdominal pain and diarrhea. Negative for nausea and vomiting.   Genitourinary: Positive for difficulty urinating.        Darkened urine   Musculoskeletal: Positive for back pain.   Skin:  Negative for pallor, rash and wound.   Allergic/Immunologic: Positive for immunocompromised state.   Neurological: Positive for weakness and light-headedness. Negative for syncope.   Hematological: Negative for adenopathy. Does not bruise/bleed easily.   Psychiatric/Behavioral: Negative for confusion. The patient is nervous/anxious.      Objective:     Vital Signs (Most Recent):  Temp: 97.6 °F (36.4 °C) (08/16/19 0731)  Pulse: 88 (08/16/19 0920)  Resp: 15 (08/16/19 0731)  BP: (!) 165/84 (08/16/19 0731)  SpO2: 99 % (08/16/19 0731) Vital Signs (24h Range):  Temp:  [97.6 °F (36.4 °C)-98.7 °F (37.1 °C)] 97.6 °F (36.4 °C)  Pulse:  [56-88] 88  Resp:  [15-18] 15  SpO2:  [97 %-100 %] 99 %  BP: (129-174)/(64-84) 165/84     Weight: 88.2 kg (194 lb 5.4 oz)  Body mass index is 27.88 kg/m².  Body surface area is 2.09 meters squared.      Intake/Output Summary (Last 24 hours) at 8/16/2019 1027  Last data filed at 8/16/2019 0800  Gross per 24 hour   Intake 5103.75 ml   Output 2 ml   Net 5101.75 ml       Physical Exam   Constitutional: He is oriented to person, place, and time. He appears well-developed. He has a sickly appearance. No distress.   HENT:   Head: Normocephalic and atraumatic.   Nose: Nose normal.   Eyes: Pupils are equal, round, and reactive to light. Conjunctivae are normal. No scleral icterus.   Neck: Normal range of motion. Neck supple.   Cardiovascular: Normal rate, regular rhythm and normal heart sounds. Exam reveals no gallop and no friction rub.   No murmur heard.  Pulmonary/Chest: Effort normal and breath sounds normal.   Abdominal: Soft. Bowel sounds are increased.   Musculoskeletal: Normal range of motion. He exhibits no edema or tenderness.   Neurological: He is alert and oriented to person, place, and time.   Skin: Skin is warm and dry. He is not diaphoretic. There is pallor.   Psychiatric: His speech is normal and behavior is normal. Judgment and thought content normal. His mood appears anxious. He is not  actively hallucinating. Cognition and memory are normal. He is attentive.   Vitals reviewed.      Significant Labs:   CBC:   Recent Labs   Lab 08/15/19  0545   WBC 0.93*   HGB 9.2*   HCT 25.9*   PLT 87*   , CMP:   Recent Labs   Lab 08/15/19  0545   *   K 3.5      CO2 23   *   BUN 4*   CREATININE 0.6   CALCIUM 8.4*   PROT 5.2*   ALBUMIN 2.5*   BILITOT 1.1*   ALKPHOS 81   AST 14   ALT 11   ANIONGAP 7*   EGFRNONAA >60   , Coagulation:   No results for input(s): PT, INR, APTT in the last 48 hours., LDH: No results for input(s): LDHCSF, BFSOURCE in the last 48 hours., LFTs:   Recent Labs   Lab 08/15/19  0545   ALT 11   AST 14   ALKPHOS 81   BILITOT 1.1*   PROT 5.2*   ALBUMIN 2.5*   , Urine Studies:   No results for input(s): COLORU, APPEARANCEUA, PHUR, SPECGRAV, PROTEINUA, GLUCUA, KETONESU, BILIRUBINUA, OCCULTUA, NITRITE, UROBILINOGEN, LEUKOCYTESUR, RBCUA, WBCUA, BACTERIA, SQUAMEPITHEL, HYALINECASTS in the last 48 hours.    Invalid input(s): WRIGHTSUR and All pertinent labs from the last 24 hours have been reviewed.    Diagnostic Results:  I have reviewed all pertinent imaging results/findings within the past 24 hours.    Assessment/Plan:     Acute colitis  8/15/19 states continued watery diarrhea.  States frequency improved.  Started on Questran yesterday.   ordered Immodium today prn.  C. Diff negative.  Has remained on cipro and flagyl IV for treatment of colitis.  Continue IV fluid - replace electrolytes - Magnesium 1.2 today, potassium WNL.  --CMP daily  --questran per   --Imodium prn per     8/16/19 Continued watery diarrhea despite adding questran and lomotil to treatment regime.  C. Diff negative.  On flagyl and cipro for treatment of colitis.    --CMP daily  --Consult GI for further recommendations  --Replace electrolytes as needed per     Pancytopenia  8/13/19 Patient with newly diagnosed pancytopenia WBC 0.83 (ANC 0.1), hemoglobin 8.8, platelets 83 K.  With diarrhea that started 2  days ago perfuse green watery.  C. Diff negative.  States had night sweats for 2 days.  Denies known fever.  Scheduled for BMB tomorrow at noon.  No need for transfusion at this time.  Splenomegaly on CT scan of abdomen/pelvis with diffuse mural thickening and inflammation of the adjacent fat of the rectosigmoid and descending colon consistent with colitis.  .8.  Blood culture #2 NGTD other grew Gram positive cocci in clusters resembling Staph - probable contaminant; however being treated with vancomycin.      --Continue supportive care  --No GSCF support for now, until get results of BMB  --BMB tomorrow at noon    8/14/19 Continued pancytopenia WBC 0.87 (ANC 0.2), hemoglobin 9.7, platelets 86K.  Splenomegaly. Folic acid deficiency.  Not iron deficient or B12 deficient.  Continued diarrhea.  C. Diff negative.  Scheduled for BMB today.    --CBC daily  --Monitor for s/s of infection  --Start folate 1 mg Po dialy    8/15/19 Continued stable pancytopenia WBC 0.93, hemoglobin 8.2, platelets 87.  No need for platelets or prbc's at this time.  Patient has remained afebrile.  Started on folic acid 1 mg PO daily for folic acid deficiency.  Had BMB yesterday.  Tolerated well.    --CBC daily  --Monitor for s/s of infection  --continue folate 1 mg PO dialy  --Await BMB results    8/16/19 Continued - WBC 0.93 (ANC 0.2), hemoglobin 9.2, platelets 87 K.  No need for blood or platelet transfusion at this time.    --Awaiting BMB results; cannot give GCS-F support until know results of BMB  --Will not be safe for discharge until ANC >1000  --CBC daily  --Monitor for s/s of infection        Thank you for your consult. I will follow-up with patient. Please contact us if you have any additional questions.     Michelle Dietz NP  Hematology/Oncology  [facility] [place]

## 2019-08-16 NOTE — ASSESSMENT & PLAN NOTE
- CT imaging Splenomegaly with the spleen measuring 18 cm.  - Hematology consulted; could be r/t sepsis and colitis  - Underwent Bone Marrow biopsy on 8/14 with results pending  - Hemoc recommends ANC is >1000 prior to DC  - Daily CBC  - Monitor

## 2019-08-16 NOTE — PLAN OF CARE
CM met with patient at the bedside to discuss discharge plan.  Patient has no home health or equipment needs.  He did request to use [facility] bedside delivery and CM updated this information to reflect patient request.       08/16/19 1028   Discharge Reassessment   Assessment Type Discharge Planning Reassessment   Provided patient/caregiver education on the expected discharge date and the discharge plan Yes   Do you have any problems affording any of your prescribed medications? No   Discharge Plan A Home with family   DME Needed Upon Discharge  none   Patient choice form signed by patient/caregiver N/A   Anticipated Discharge Disposition Home   Can the patient answer the patient profile reliably? Yes, cognitively intact

## 2019-08-16 NOTE — ASSESSMENT & PLAN NOTE
- Cdiff negative  - Stool cx, ova/parasites pending  - Continue Cipro and Flagyl  - IV fluids  - Mech soft diet as tolerated   - Questran added on Tuesday, with some improvement in diarrhea, will continue  - Prn analgesia and antiemetics  - Consider GI consult pending clinical course

## 2019-08-16 NOTE — ASSESSMENT & PLAN NOTE
- Criteria including WBC 0.80 & 1.01, tachycardia 113  - CT of ABD/Pelvis notes Diffuse inflammatory changes of the descending colon and rectosigmoid consistent with colitis/proctitis.  No evidence of a pericolonic abscess can be seen.  The appendix cannot be identified.  Fluid-filled small bowel loops in the pelvis likely represent an ileus.  1.1 cm in diameter gallbladder calculus.  Splenomegaly with the spleen measuring 18 cm.  - BC x 1 on 8/12 show gram + cocci/clusters resembling staph in aerobic and anaerobic bottle, awaiting final ID  - Received a total of 1.7L of IVFs in the ER  - Continue IVFs  - Continue Cipro/flagyl for Colitis  - Continue empiric Vanc for now pending final BC result  - Remains hemodynamically stable  - Monitor

## 2019-08-16 NOTE — CLINICAL REVIEW
Pharmacokinetic Assessment Follow Up: IV Vancomycin    Vancomycin serum concentration assessment(s):    The trough level was drawn correctly and can be used to guide therapy at this time. The measurement is within the desired definitive target range of 10 to 20 mcg/mL.    Vancomycin Regimen Plan:    Continue regimen to Vancomycin 1,500 mg IV every 12 hours with next serum trough concentration measured at 1700 prior to 4th dose on 8/18    Drug levels (last 3 results):  Recent Labs   Lab Result Units 08/16/19  1649   Vancomycin-Trough ug/mL 10.2       Pharmacy will continue to follow and monitor vancomycin.    Please contact pharmacy at extension [phone] for questions regarding this assessment.    Thank you for the consult,   Flori Maki       Patient brief summary:  Kodak Valentine is a 49 y.o. male initiated on antimicrobial therapy with IV Vancomycin for treatment of sepsis    Drug Allergies:   Review of patient's allergies indicates:  No Known Allergies    Actual Body Weight:   88.2 kg    Renal Function:   Estimated Creatinine Clearance: 163 mL/min (based on SCr of 0.6 mg/dL).,     Dialysis Method (if applicable):  None      CBC (last 72 hours):  Recent Labs   Lab Result Units 08/14/19  0557 08/15/19  0545 08/16/19  0910   WBC K/uL 0.87* 0.93* 0.98*   Hemoglobin g/dL 9.7* 9.2* 8.5*   Hematocrit % 27.8* 25.9* 24.5*   Platelets K/uL 86* 87* 84*   Gran% % 24.2* 23.6* 24.5*   Lymph% % 44.8 52.7* 49.0*   Mono% % 31.0* 22.6* 26.5*   Eosinophil% % 2.3 1.1 1.0   Basophil% % 0.0 1.1 1.0   Differential Method  Automated Automated Automated       Metabolic Panel (last 72 hours):  Recent Labs   Lab Result Units 08/14/19  0557 08/15/19  0545 08/16/19  0910   Sodium mmol/L 137 135* 138   Potassium mmol/L 3.4* 3.5 3.9   Chloride mmol/L 105 105 108   CO2 mmol/L 24 23 24   Glucose mg/dL 94 150* 145*   BUN, Bld mg/dL 4* 4* 2*   Creatinine mg/dL 0.6 0.6 0.6   Albumin g/dL 2.6* 2.5* 2.4*   Total Bilirubin mg/dL 1.0 1.1* 1.1*    Alkaline Phosphatase U/L 82 81 74   AST U/L 18 14 19   ALT U/L 12 11 9*   Magnesium mg/dL 1.0* 1.2*  --    Phosphorus mg/dL 1.8* 2.7  --        Vancomycin Administrations:  vancomycin given in the last 96 hours                   vancomycin 1.5 g in 5 % dextrose 250 mL IVPB (mg) 1,500 mg New Bag 08/16/19 1731     1,500 mg New Bag  0519     1,500 mg New Bag 08/15/19 1656    vancomycin (VANCOCIN) 2,500 mg in dextrose 5 % 500 mL IVPB (mg) 2,500 mg New Bag 08/15/19 0508                Microbiologic Results:  Microbiology Results (last 7 days)     Procedure Component Value Units Date/Time    E. coli 0157 antigen [[id number]] Collected:  08/16/19 1659    Order Status:  No result Specimen:  Stool Updated:  08/16/19 1659    Stool culture [[id number]] Collected:  08/16/19 1659    Order Status:  Sent Specimen:  Stool Updated:  08/16/19 1659    Blood culture [[id number]] Collected:  08/12/19 1839    Order Status:  Completed Specimen:  Blood from Peripheral, Antecubital, Left Updated:  08/16/19 1222     Blood Culture, Routine Gram stain seema bottle: Gram positive cocci in clusters resembling Staph       Results called to and read back by: Roderick Kowalski RN  08/14/2019        20:00    Narrative:       Aerobic and anaerobic    Blood culture [[id number]] Collected:  08/12/19 1814    Order Status:  Completed Specimen:  Blood from Peripheral, Antecubital, Right Updated:  08/16/19 0612     Blood Culture, Routine No Growth to date      No Growth to date      No Growth to date      No Growth to date    Narrative:       Aerobic and anaerobic    Clostridium difficile EIA [[id number]] Collected:  08/12/19 1520    Order Status:  Completed Specimen:  Stool Updated:  08/13/19 1312     C. diff Antigen Negative     C difficile Toxins A+B, EIA Negative     Comment: Testing not recommended for children <24 months old.

## 2019-08-16 NOTE — ASSESSMENT & PLAN NOTE
- Mag 1.2 yesterday  - Started on Mag Ox BID  - Repeat chemistries in AM  - Replace magnesium and phosphorous as needed

## 2019-08-16 NOTE — ASSESSMENT & PLAN NOTE
- Denies any chest pain  - Continue ASA, Atorvastatin, Fenofibrate, Ranexa, Lopressor and Lisinopril   - Monitor

## 2019-08-16 NOTE — SUBJECTIVE & OBJECTIVE
"Interval History:  No acute events overnight.  Currently resting comfortably in bed in NAD.  Patient reports continued diarrhea, but "it has slowed down a lot".  Patient reports "going 5 times in 30 minutes, and now I'm going once every hour".  Cdiff negative.  Stool cx and ova/parasites pending.  Patient remains afebrile.  Continue Cipro/Flagyl for now along with Questran.  Case d/w GI.  Will monitor and if no improvement in diarrhea, patient may need a flex sig.  Hemoc continues to follow for pancytopenia and recommended that ANC is >1000 prior to DC.  Awaiting bone marrow biopsy results. Awaiting final BC results to determine contaminate versus true infection, continue empiric Vanc for now.     Review of Systems   Constitutional: Positive for activity change and fatigue. Negative for appetite change, chills, diaphoresis and fever.   HENT: Negative.    Eyes: Negative.    Respiratory: Negative for cough and shortness of breath.    Cardiovascular: Negative for chest pain, palpitations and leg swelling.   Gastrointestinal: Positive for abdominal pain and diarrhea. Negative for nausea and vomiting.   Genitourinary: Negative for difficulty urinating.   Musculoskeletal: Positive for back pain.   Skin: Negative for pallor, rash and wound.   Neurological: Positive for weakness. Negative for syncope and light-headedness.   Psychiatric/Behavioral: The patient is not nervous/anxious.      Objective:     Vital Signs (Most Recent):  Temp: 98.3 °F (36.8 °C) (08/16/19 1244)  Pulse: 60 (08/16/19 1310)  Resp: 18 (08/16/19 1244)  BP: (!) 152/78 (08/16/19 1244)  SpO2: 98 % (08/16/19 1244) Vital Signs (24h Range):  Temp:  [97.6 °F (36.4 °C)-98.3 °F (36.8 °C)] 98.3 °F (36.8 °C)  Pulse:  [56-88] 60  Resp:  [15-18] 18  SpO2:  [97 %-100 %] 98 %  BP: (129-174)/(75-84) 152/78     Weight: 88.2 kg (194 lb 5.4 oz)  Body mass index is 27.88 kg/m².    Intake/Output Summary (Last 24 hours) at 8/16/2019 1414  Last data filed at 8/16/2019 " 1310  Gross per 24 hour   Intake 5821.25 ml   Output 2 ml   Net 5819.25 ml      Physical Exam   Constitutional: He is oriented to person, place, and time. He appears well-developed and well-nourished. He has a sickly appearance. No distress.   HENT:   Head: Normocephalic and atraumatic.   Nose: Nose normal.   Mouth/Throat: Oropharynx is clear and moist.   Eyes: Pupils are equal, round, and reactive to light. Conjunctivae and EOM are normal. No scleral icterus.   Neck: Normal range of motion. Neck supple. No JVD present.   Cardiovascular: Normal rate, regular rhythm, normal heart sounds and intact distal pulses. Exam reveals no gallop and no friction rub.   No murmur heard.  Pulmonary/Chest: Effort normal and breath sounds normal. No stridor. No respiratory distress. He has no wheezes. He has no rales. He exhibits no tenderness.   Abdominal: Soft. He exhibits no distension and no mass. Bowel sounds are increased. Tenderness: especially to RLQ. There is no rebound and no guarding.   Musculoskeletal: Normal range of motion. He exhibits no edema or tenderness.   Neurological: He is alert and oriented to person, place, and time. No cranial nerve deficit.   Skin: Skin is warm and dry. No rash noted. He is not diaphoretic. No erythema. There is pallor.   Incisions to back are healed with no s/s of infection.   Psychiatric: He has a normal mood and affect. His behavior is normal. Judgment and thought content normal.   Nursing note and vitals reviewed.      Significant Labs:   Blood Culture: No results for input(s): LABBLOO in the last 48 hours.  CBC:   Recent Labs   Lab 08/15/19  0545 08/16/19  0910   WBC 0.93* 0.98*   HGB 9.2* 8.5*   HCT 25.9* 24.5*   PLT 87* 84*     CMP:   Recent Labs   Lab 08/15/19  0545 08/16/19  0910   * 138   K 3.5 3.9    108   CO2 23 24   * 145*   BUN 4* 2*   CREATININE 0.6 0.6   CALCIUM 8.4* 8.2*   PROT 5.2* 4.7*   ALBUMIN 2.5* 2.4*   BILITOT 1.1* 1.1*   ALKPHOS 81 74   AST 14 19    ALT 11 9*   ANIONGAP 7* 6*   EGFRNONAA >60 >60       Significant Imaging: I have reviewed all pertinent imaging results/findings within the past 24 hours.

## 2019-08-17 LAB
ALBUMIN SERPL BCP-MCNC: 2.5 G/DL (ref 3.5–5.2)
ALP SERPL-CCNC: 74 U/L (ref 55–135)
ALT SERPL W/O P-5'-P-CCNC: 10 U/L (ref 10–44)
ANION GAP SERPL CALC-SCNC: 7 MMOL/L (ref 8–16)
AST SERPL-CCNC: 15 U/L (ref 10–40)
BACTERIA BLD CULT: ABNORMAL
BASOPHILS # BLD AUTO: 0.01 K/UL (ref 0–0.2)
BASOPHILS NFR BLD: 0.8 % (ref 0–1.9)
BILIRUB SERPL-MCNC: 1.1 MG/DL (ref 0.1–1)
BUN SERPL-MCNC: 2 MG/DL (ref 6–20)
CALCIUM SERPL-MCNC: 8.5 MG/DL (ref 8.7–10.5)
CHLORIDE SERPL-SCNC: 108 MMOL/L (ref 95–110)
CO2 SERPL-SCNC: 24 MMOL/L (ref 23–29)
CREAT SERPL-MCNC: 0.6 MG/DL (ref 0.5–1.4)
DIFFERENTIAL METHOD: ABNORMAL
EOSINOPHIL # BLD AUTO: 0 K/UL (ref 0–0.5)
EOSINOPHIL NFR BLD: 1.5 % (ref 0–8)
ERYTHROCYTE [DISTWIDTH] IN BLOOD BY AUTOMATED COUNT: 13.9 % (ref 11.5–14.5)
EST. GFR  (AFRICAN AMERICAN): >60 ML/MIN/1.73 M^2
EST. GFR  (NON AFRICAN AMERICAN): >60 ML/MIN/1.73 M^2
GLUCOSE SERPL-MCNC: 88 MG/DL (ref 70–110)
HCT VFR BLD AUTO: 24.3 % (ref 40–54)
HGB BLD-MCNC: 8.5 G/DL (ref 14–18)
LYMPHOCYTES # BLD AUTO: 0.8 K/UL (ref 1–4.8)
LYMPHOCYTES NFR BLD: 58 % (ref 18–48)
MAGNESIUM SERPL-MCNC: 1.4 MG/DL (ref 1.6–2.6)
MCH RBC QN AUTO: 36.8 PG (ref 27–31)
MCHC RBC AUTO-ENTMCNC: 35 G/DL (ref 32–36)
MCV RBC AUTO: 105 FL (ref 82–98)
MONOCYTES # BLD AUTO: 0.3 K/UL (ref 0.3–1)
MONOCYTES NFR BLD: 22.9 % (ref 4–15)
NEUTROPHILS # BLD AUTO: 0.2 K/UL (ref 1.8–7.7)
NEUTROPHILS NFR BLD: 20.6 % (ref 38–73)
PLATELET # BLD AUTO: 87 K/UL (ref 150–350)
PMV BLD AUTO: 8.9 FL (ref 9.2–12.9)
POCT GLUCOSE: 104 MG/DL (ref 70–110)
POCT GLUCOSE: 135 MG/DL (ref 70–110)
POCT GLUCOSE: 148 MG/DL (ref 70–110)
POTASSIUM SERPL-SCNC: 4.3 MMOL/L (ref 3.5–5.1)
PROT SERPL-MCNC: 4.9 G/DL (ref 6–8.4)
RBC # BLD AUTO: 2.31 M/UL (ref 4.6–6.2)
SODIUM SERPL-SCNC: 139 MMOL/L (ref 136–145)
WBC # BLD AUTO: 1.31 K/UL (ref 3.9–12.7)

## 2019-08-17 PROCEDURE — 63700000 PHARM REV CODE 250 ALT 637 W/O HCPCS: Performed by: NURSE PRACTITIONER

## 2019-08-17 PROCEDURE — 63600175 PHARM REV CODE 636 W HCPCS: Performed by: INTERNAL MEDICINE

## 2019-08-17 PROCEDURE — 25000003 PHARM REV CODE 250: Performed by: NURSE PRACTITIONER

## 2019-08-17 PROCEDURE — 94640 AIRWAY INHALATION TREATMENT: CPT

## 2019-08-17 PROCEDURE — 21400001 HC TELEMETRY ROOM

## 2019-08-17 PROCEDURE — 36415 COLL VENOUS BLD VENIPUNCTURE: CPT

## 2019-08-17 PROCEDURE — 85025 COMPLETE CBC W/AUTO DIFF WBC: CPT

## 2019-08-17 PROCEDURE — 99232 PR SUBSEQUENT HOSPITAL CARE,LEVL II: ICD-10-PCS | Mod: ,,, | Performed by: INTERNAL MEDICINE

## 2019-08-17 PROCEDURE — 27000221 HC OXYGEN, UP TO 24 HOURS

## 2019-08-17 PROCEDURE — 25000242 PHARM REV CODE 250 ALT 637 W/ HCPCS: Performed by: NURSE PRACTITIONER

## 2019-08-17 PROCEDURE — 63600175 PHARM REV CODE 636 W HCPCS: Performed by: NURSE PRACTITIONER

## 2019-08-17 PROCEDURE — 25000003 PHARM REV CODE 250: Performed by: INTERNAL MEDICINE

## 2019-08-17 PROCEDURE — 83735 ASSAY OF MAGNESIUM: CPT

## 2019-08-17 PROCEDURE — 99232 SBSQ HOSP IP/OBS MODERATE 35: CPT | Mod: ,,, | Performed by: INTERNAL MEDICINE

## 2019-08-17 PROCEDURE — 80053 COMPREHEN METABOLIC PANEL: CPT

## 2019-08-17 PROCEDURE — 94761 N-INVAS EAR/PLS OXIMETRY MLT: CPT

## 2019-08-17 RX ORDER — TIZANIDINE 4 MG/1
4 TABLET ORAL EVERY 8 HOURS
Status: DISCONTINUED | OUTPATIENT
Start: 2019-08-17 | End: 2019-08-21 | Stop reason: HOSPADM

## 2019-08-17 RX ADMIN — POTASSIUM CHLORIDE 40 MEQ: 20 SOLUTION ORAL at 09:08

## 2019-08-17 RX ADMIN — NYSTATIN 500000 UNITS: 100000 SUSPENSION ORAL at 08:08

## 2019-08-17 RX ADMIN — METRONIDAZOLE 500 MG: 500 TABLET ORAL at 09:08

## 2019-08-17 RX ADMIN — OXYCODONE HYDROCHLORIDE AND ACETAMINOPHEN 1 TABLET: 10; 325 TABLET ORAL at 07:08

## 2019-08-17 RX ADMIN — OXYCODONE HYDROCHLORIDE AND ACETAMINOPHEN 1 TABLET: 10; 325 TABLET ORAL at 03:08

## 2019-08-17 RX ADMIN — OXYCODONE HYDROCHLORIDE AND ACETAMINOPHEN 1 TABLET: 10; 325 TABLET ORAL at 04:08

## 2019-08-17 RX ADMIN — OXYCODONE HYDROCHLORIDE AND ACETAMINOPHEN 1 TABLET: 10; 325 TABLET ORAL at 08:08

## 2019-08-17 RX ADMIN — ALPRAZOLAM 0.25 MG: 0.25 TABLET ORAL at 05:08

## 2019-08-17 RX ADMIN — OXYCODONE HYDROCHLORIDE AND ACETAMINOPHEN 1 TABLET: 10; 325 TABLET ORAL at 12:08

## 2019-08-17 RX ADMIN — TRAZODONE HYDROCHLORIDE 200 MG: 100 TABLET ORAL at 08:08

## 2019-08-17 RX ADMIN — CHOLESTYRAMINE 4 G: 4 POWDER, FOR SUSPENSION ORAL at 09:08

## 2019-08-17 RX ADMIN — ARFORMOTEROL TARTRATE 15 MCG: 15 SOLUTION RESPIRATORY (INHALATION) at 08:08

## 2019-08-17 RX ADMIN — METRONIDAZOLE 500 MG: 500 TABLET ORAL at 01:08

## 2019-08-17 RX ADMIN — POTASSIUM CHLORIDE 40 MEQ: 20 SOLUTION ORAL at 08:08

## 2019-08-17 RX ADMIN — METOPROLOL TARTRATE 50 MG: 50 TABLET ORAL at 08:08

## 2019-08-17 RX ADMIN — ATORVASTATIN CALCIUM 20 MG: 10 TABLET, FILM COATED ORAL at 09:08

## 2019-08-17 RX ADMIN — FENOFIBRATE 160 MG: 160 TABLET ORAL at 09:08

## 2019-08-17 RX ADMIN — DIPHENOXYLATE HYDROCHLORIDE AND ATROPINE SULFATE 1 TABLET: 2.5; .025 TABLET ORAL at 05:08

## 2019-08-17 RX ADMIN — RANOLAZINE 1000 MG: 500 TABLET, FILM COATED, EXTENDED RELEASE ORAL at 08:08

## 2019-08-17 RX ADMIN — DIPHENOXYLATE HYDROCHLORIDE AND ATROPINE SULFATE 1 TABLET: 2.5; .025 TABLET ORAL at 06:08

## 2019-08-17 RX ADMIN — LISINOPRIL 20 MG: 20 TABLET ORAL at 09:08

## 2019-08-17 RX ADMIN — AMLODIPINE BESYLATE 2.5 MG: 2.5 TABLET ORAL at 09:08

## 2019-08-17 RX ADMIN — BUDESONIDE 0.5 MG: 0.5 SUSPENSION RESPIRATORY (INHALATION) at 07:08

## 2019-08-17 RX ADMIN — NYSTATIN 500000 UNITS: 100000 SUSPENSION ORAL at 07:08

## 2019-08-17 RX ADMIN — DIPHENOXYLATE HYDROCHLORIDE AND ATROPINE SULFATE 1 TABLET: 2.5; .025 TABLET ORAL at 12:08

## 2019-08-17 RX ADMIN — NYSTATIN 500000 UNITS: 100000 SUSPENSION ORAL at 04:08

## 2019-08-17 RX ADMIN — SODIUM CHLORIDE: 0.9 INJECTION, SOLUTION INTRAVENOUS at 03:08

## 2019-08-17 RX ADMIN — TIZANIDINE 4 MG: 4 TABLET ORAL at 09:08

## 2019-08-17 RX ADMIN — METRONIDAZOLE 500 MG: 500 TABLET ORAL at 05:08

## 2019-08-17 RX ADMIN — VANCOMYCIN HYDROCHLORIDE 1500 MG: 100 INJECTION, POWDER, LYOPHILIZED, FOR SOLUTION INTRAVENOUS at 05:08

## 2019-08-17 RX ADMIN — CIPROFLOXACIN HYDROCHLORIDE 500 MG: 500 TABLET, FILM COATED ORAL at 09:08

## 2019-08-17 RX ADMIN — RANOLAZINE 1000 MG: 500 TABLET, FILM COATED, EXTENDED RELEASE ORAL at 09:08

## 2019-08-17 RX ADMIN — ALPRAZOLAM 0.25 MG: 0.25 TABLET ORAL at 09:08

## 2019-08-17 RX ADMIN — POTASSIUM CHLORIDE 40 MEQ: 20 SOLUTION ORAL at 03:08

## 2019-08-17 RX ADMIN — ASPIRIN 81 MG: 81 TABLET, COATED ORAL at 09:08

## 2019-08-17 RX ADMIN — PANTOPRAZOLE SODIUM 40 MG: 40 TABLET, DELAYED RELEASE ORAL at 09:08

## 2019-08-17 RX ADMIN — BUDESONIDE 0.5 MG: 0.5 SUSPENSION RESPIRATORY (INHALATION) at 08:08

## 2019-08-17 RX ADMIN — TIZANIDINE 4 MG: 4 TABLET ORAL at 01:08

## 2019-08-17 RX ADMIN — CHOLESTYRAMINE 4 G: 4 POWDER, FOR SUSPENSION ORAL at 08:08

## 2019-08-17 RX ADMIN — TIZANIDINE 4 MG: 4 TABLET ORAL at 07:08

## 2019-08-17 RX ADMIN — FLUCONAZOLE 200 MG: 100 TABLET ORAL at 09:08

## 2019-08-17 RX ADMIN — MAGNESIUM OXIDE TAB 400 MG (241.3 MG ELEMENTAL MG) 400 MG: 400 (241.3 MG) TAB at 09:08

## 2019-08-17 RX ADMIN — ALPRAZOLAM 0.25 MG: 0.25 TABLET ORAL at 01:08

## 2019-08-17 RX ADMIN — FOLIC ACID 1 MG: 1 TABLET ORAL at 09:08

## 2019-08-17 RX ADMIN — ARFORMOTEROL TARTRATE 15 MCG: 15 SOLUTION RESPIRATORY (INHALATION) at 07:08

## 2019-08-17 RX ADMIN — DULOXETINE 60 MG: 30 CAPSULE, DELAYED RELEASE ORAL at 09:08

## 2019-08-17 RX ADMIN — NYSTATIN 500000 UNITS: 100000 SUSPENSION ORAL at 11:08

## 2019-08-17 RX ADMIN — MAGNESIUM OXIDE TAB 400 MG (241.3 MG ELEMENTAL MG) 400 MG: 400 (241.3 MG) TAB at 08:08

## 2019-08-17 RX ADMIN — METOPROLOL TARTRATE 50 MG: 50 TABLET ORAL at 09:08

## 2019-08-17 RX ADMIN — CIPROFLOXACIN HYDROCHLORIDE 500 MG: 500 TABLET, FILM COATED ORAL at 08:08

## 2019-08-17 NOTE — PROGRESS NOTES
Pt showing A. Fib on monitor, notified Daniellanehemiah Weaver, NP, stat EKG done and shows sinus rhythm with sinus arhythmias. Daniella notified, pt stable with no symptoms

## 2019-08-17 NOTE — SUBJECTIVE & OBJECTIVE
Interval History:  8/14/19 Patient seen at the bedside today.  States still with diarrhea.  C Diff negative. Continues to remain afebrile.  Awaiting BMB today.  Complains of back pain due to back surgery 6 weeks ago.    8/15/19 Patient assessed at the bedside this morning.  Has BMB yesterday - tolerated well.  Complains of continued watery diarrhea - states increased in frequency.  States pain better controled.      8/16/19 Patient assessed at the bedside today.  States continued diarrhea despite adding questran and prn lomotil to treatment regimen.  BMB results pending.  Pain controlled.  ANC 0.2     August 17, 2019-the patient reports that overall his loose stools are slowly improving.  He is also beginning to feel stronger at this time.    Oncology Treatment Plan:   [No treatment plan]    Medications:  Continuous Infusions:   sodium chloride 0.9% 125 mL/hr at 08/17/19 0348     Scheduled Meds:   amLODIPine  2.5 mg Oral Daily    arformoterol  15 mcg Nebulization Q12H    aspirin  81 mg Oral Daily    atorvastatin  20 mg Oral Daily    budesonide  0.5 mg Nebulization Q12H    cholestyramine  1 packet Oral BID    ciprofloxacin HCl  500 mg Oral Q12H    DULoxetine  60 mg Oral Daily    fenofibrate  160 mg Oral Daily    fluconazole  200 mg Oral Daily    folic acid  1 mg Oral Daily    lisinopril  20 mg Oral Daily    magnesium oxide  400 mg Oral BID    metoprolol tartrate  50 mg Oral Q12H    metroNIDAZOLE  500 mg Oral Q8H    nystatin  500,000 Units Oral QID (WM & HS)    pantoprazole  40 mg Oral Daily    potassium chloride 10%  40 mEq Oral TID    ranolazine  1,000 mg Oral BID    tiZANidine  4 mg Oral Q8H    traZODone  200 mg Oral QHS    vancomycin (VANCOCIN) IVPB  1,500 mg Intravenous Q12H     PRN Meds:albuterol sulfate, ALPRAZolam, aluminum-magnesium hydroxide-simethicone, Dextrose 10% Bolus, Dextrose 10% Bolus, diphenoxylate-atropine 2.5-0.025 mg, glucagon (human recombinant), glucose, glucose, insulin  aspart U-100, ondansetron, oxyCODONE-acetaminophen, promethazine (PHENERGAN) IVPB     Review of patient's allergies indicates:  No Known Allergies     Past Medical History:   Diagnosis Date    Anxiety     CAD (coronary artery disease)     PTCA x 2 LAD, restenosis and restent 7/12.    Chest pain syndrome 10/2/2015    COPD (chronic obstructive pulmonary disease)     CPAP (continuous positive airway pressure) dependence     @ night    Diabetes mellitus type 2, uncontrolled 4/13/2016    History of duodenal ulcer     with bleed    Hypertension     Mixed hyperlipidemia     JUDI (obstructive sleep apnea)     severe    S/P PTCA (percutaneous transluminal coronary angioplasty) 3/11/2015    Vitamin D deficiency      Past Surgical History:   Procedure Laterality Date    APPENDECTOMY      CARDIAC CATHETERIZATION      CATARACT EXTRACTION W/  INTRAOCULAR LENS IMPLANT Right 4-22-15    CORONARY STENT PLACEMENT  2012    ESOPHAGOGASTRODUODENOSCOPY (EGD) N/A 7/31/2014    Performed [facility] ENDO    EXCISION-SKIN Right 4/20/2015    Performed by Louis O. Jeansonne IV, MD at [facility] OR    HEART CATH WITH GRAFTS-LEFT Right 6/2/2014    Performed by Erma Green [facility] at [facility] CATH LAB    HEART CATH-LEFT Left 2/21/2017    Performed by Er[facility] Green [facility] at [facility] CATH LAB    HEMORRHOID SURGERY      INCISION AND DRAINAGE (I&D), BUTTOCK  3/26/2015    Performed [facility], [facility] at [facility] OR    INCISION AND DRAINAGE-THIGH Right 3/26/2015    Performed by [facility], [facility] at [facility] OR    NISSEN FUNDOPLICATION      lap    SKIN LESION EXCISION Right     leg     Family History     Problem Relation (Age of Onset)    COPD Maternal Grandmother, Maternal Grandfather    Diabetes Maternal Grandfather    Heart block Father    Heart disease Mother, Sister        Tobacco Use    Smoking status: Former Smoker     Packs/day: 0.50     Years: 20.00     Pack years: 10.00     Types: Cigarettes     Last  attempt to quit: 6/3/2014     Years since quittin.2    Smokeless tobacco: Never Used    Tobacco comment: currently vaping with nicotine since quit smoking in 2014   Substance and Sexual Activity    Alcohol use: Yes     Alcohol/week: 2.4 oz     Types: 4 Cans of beer per week    Drug use: No    Sexual activity: Not on file       Review of Systems   Constitutional: Positive for activity change, appetite change and fatigue. Negative for chills, diaphoresis and fever.   HENT: Negative.    Eyes: Negative.    Respiratory: Negative for cough and shortness of breath.    Cardiovascular: Negative for chest pain, palpitations and leg swelling.   Gastrointestinal: Positive for abdominal pain and diarrhea. Negative for nausea and vomiting.   Genitourinary: Negative for difficulty urinating.        Darkened urine   Musculoskeletal: Negative for back pain.   Skin: Negative for pallor, rash and wound.   Allergic/Immunologic: Positive for immunocompromised state.   Neurological: Positive for weakness. Negative for syncope and light-headedness.   Hematological: Negative for adenopathy. Does not bruise/bleed easily.   Psychiatric/Behavioral: Negative for confusion. The patient is nervous/anxious.      Objective:     Vital Signs (Most Recent):  Temp: 98.3 °F (36.8 °C) (19 1253)  Pulse: 60 (19 1302)  Resp: 18 (19 1253)  BP: (!) 141/75 (19 1253)  SpO2: 100 % (19 1253) Vital Signs (24h Range):  Temp:  [97.8 °F (36.6 °C)-98.6 °F (37 °C)] 98.3 °F (36.8 °C)  Pulse:  [50-72] 60  Resp:  [15-18] 18  SpO2:  [97 %-100 %] 100 %  BP: (141-163)/(75-89) 141/75     Weight: 88.2 kg (194 lb 5.4 oz)  Body mass index is 27.88 kg/m².  Body surface area is 2.09 meters squared.      Intake/Output Summary (Last 24 hours) at 2019 1303  Last data filed at 2019 0749  Gross per 24 hour   Intake 4761.67 ml   Output --   Net 4761.67 ml       Physical Exam   Constitutional: He is oriented to person, place, and time.  He appears well-developed. He has a sickly appearance. No distress.   HENT:   Head: Normocephalic and atraumatic.   Nose: Nose normal.   Eyes: Pupils are equal, round, and reactive to light. Conjunctivae are normal. No scleral icterus.   Neck: Normal range of motion. Neck supple.   Cardiovascular: Normal rate, regular rhythm and normal heart sounds. Exam reveals no gallop and no friction rub.   No murmur heard.  Pulmonary/Chest: Effort normal and breath sounds normal.   Abdominal: Soft. Bowel sounds are increased.   Musculoskeletal: Normal range of motion. He exhibits no edema or tenderness.   Neurological: He is alert and oriented to person, place, and time.   Skin: Skin is warm and dry. He is not diaphoretic. There is pallor.   Psychiatric: His speech is normal and behavior is normal. Judgment and thought content normal. His mood appears anxious. He is not actively hallucinating. Cognition and memory are normal. He is attentive.   Vitals reviewed.      Significant Labs:   CBC:   Recent Labs   Lab 08/16/19  0910 08/17/19 0457   WBC 0.98* 1.31*   HGB 8.5* 8.5*   HCT 24.5* 24.3*   PLT 84* 87*   , CMP:   Recent Labs   Lab 08/16/19  0910 08/17/19 0457    139   K 3.9 4.3    108   CO2 24 24   * 88   BUN 2* 2*   CREATININE 0.6 0.6   CALCIUM 8.2* 8.5*   PROT 4.7* 4.9*   ALBUMIN 2.4* 2.5*   BILITOT 1.1* 1.1*   ALKPHOS 74 74   AST 19 15   ALT 9* 10   ANIONGAP 6* 7*   EGFRNONAA >60 >60   , Coagulation:   No results for input(s): PT, INR, APTT in the last 48 hours., LDH: No results for input(s): LDHCSF, BFSOURCE in the last 48 hours., LFTs:   Recent Labs   Lab 08/16/19  0910 08/17/19 0457   ALT 9* 10   AST 19 15   ALKPHOS 74 74   BILITOT 1.1* 1.1*   PROT 4.7* 4.9*   ALBUMIN 2.4* 2.5*   , Urine Studies:   No results for input(s): COLORU, APPEARANCEUA, PHUR, SPECGRAV, PROTEINUA, GLUCUA, KETONESU, BILIRUBINUA, OCCULTUA, NITRITE, UROBILINOGEN, LEUKOCYTESUR, RBCUA, WBCUA, BACTERIA, SQUAMEPITHEL, HYALINECASTS  in the last 48 hours.    Invalid input(s): WRIGHTSUR and All pertinent labs from the last 24 hours have been reviewed.    Diagnostic Results:  I have reviewed all pertinent imaging results/findings within the past 24 hours.

## 2019-08-17 NOTE — ASSESSMENT & PLAN NOTE
8/13/19 Patient with newly diagnosed pancytopenia WBC 0.83 (ANC 0.1), hemoglobin 8.8, platelets 83 K.  With diarrhea that started 2 days ago perfuse green watery.  C. Diff negative.  States had night sweats for 2 days.  Denies known fever.  Scheduled for BMB tomorrow at noon.  No need for transfusion at this time.  Splenomegaly on CT scan of abdomen/pelvis with diffuse mural thickening and inflammation of the adjacent fat of the rectosigmoid and descending colon consistent with colitis.  .8.  Blood cultures NGTD.    --Continue supportive care  --No GSCF support for now, until get results of BMB  --BMB tomorrow at noon    8/14/19 Continued pancytopenia WBC 0.87 (ANC 0.2), hemoglobin 9.7, platelets 86K.  Splenomegaly. Folic acid deficiency.  Not iron deficient or B12 deficient.  Continued diarrhea.  C. Diff negative.  Scheduled for BMB today.    --CBC daily  --Monitor for s/s of infection  --Start folate 1 mg Po dialy    8/15/19 Continued stable pancytopenia WBC 0.93, hemoglobin 8.2, platelets 87.  No need for platelets or prbc's at this time.  Patient has remained afebrile.  Started on folic acid 1 mg PO daily for folic acid deficiency.  Had BMB yesterday.  Tolerated well.    --CBC daily  --Monitor for s/s of infection  --continue folate 1 mg PO dialy  --Await BMB results    August 17, 2019-I had a detailed discussion with the patient today with regard to his current clinical situation.  His pancytopenia appears to be slowly improving.  Preliminary results of bone marrow biopsy were reviewed with hematopathology and discussed with patient today.  No evidence of acute leukemia noted.  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  I would expect to see continued improvement in his blood counts over the next 24-48 hours.  I have discussed this with Hospital Medicine today.

## 2019-08-17 NOTE — ASSESSMENT & PLAN NOTE
- Cdiff negative  - Stool cx shows NGTD  - Stool for ova/parasites pending  - Continue Cipro and Flagyl  - IV fluids  - Mech soft diet as tolerated   - Questran added on Tuesday, with improvement in diarrhea, will continue  - Prn analgesia and antiemetics  - Anticipate DC home in AM if he continues to improve

## 2019-08-17 NOTE — PROGRESS NOTES
[facility] -   Hematology/Oncology  Progress Note    Patient Name: Kodak Valentine  Admission Date: 8/12/2019  Hospital Length of Stay: 5 days  Code Status: No Order     Subjective:     HPI:  Pt is a 48 yo male with PMHx of CAD with stenting x 2 LAD, COPD, DM II, HTN, JUDI and HPL who presents to the ED with reports of severe, constant cramping lower abdominal pain x 5 days. Associated symptoms include profuse, greenish watery diarrhea, diaphoresis, lightheadedness, dark urine (today) and vomiting (1-2 days now resolved). Pt had back surgery 6 weeks ago but his back pain is no worse than usual. Pt's wife was treated at [facility] approx 6 weeks ago. Pt denies URI symptoms, cough, chest pain, palpitations, bloody diarrhea and other symptoms. He last ate some yogurt yesterday. V/S on arrival: Temp 98.0, pulse 113, resp 18 and B/P 133/70. Labs find WBC 0.80, Hgb 11.5?Hct 32.7, plt 92, left shift 22 %, hyponatremia 133, hypokalemia 2.6, gluc 176, total bili 1.5, .8. U/A was altered due to dark color. A contrasted CT ABD/Pelvis was resulted at 16:17 which found diffuse inflammatory changes of the descending colon and rectosigmoid consistent with colitis/proctitis.  No evidence of a pericolonic abscess can be seen. Pt is admitted for Sepsis, Acute Colitis and electrolyte imbalances.     Hematology/oncology consulted for pancytopenia.       Interval History:  8/14/19 Patient seen at the bedside today.  States still with diarrhea.  C Diff negative. Continues to remain afebrile.  Awaiting BMB today.  Complains of back pain due to back surgery 6 weeks ago.    8/15/19 Patient assessed at the bedside this morning.  Has BMB yesterday - tolerated well.  Complains of continued watery diarrhea - states increased in frequency.  States pain better controled.      8/16/19 Patient assessed at the bedside today.  States continued diarrhea despite adding questran and prn lomotil to treatment regimen.  BMB  results pending.  Pain controlled.  ANC 0.2     August 17, 2019-the patient reports that overall his loose stools are slowly improving.  He is also beginning to feel stronger at this time.    Oncology Treatment Plan:   [No treatment plan]    Medications:  Continuous Infusions:   sodium chloride 0.9% 125 mL/hr at 08/17/19 0348     Scheduled Meds:   amLODIPine  2.5 mg Oral Daily    arformoterol  15 mcg Nebulization Q12H    aspirin  81 mg Oral Daily    atorvastatin  20 mg Oral Daily    budesonide  0.5 mg Nebulization Q12H    cholestyramine  1 packet Oral BID    ciprofloxacin HCl  500 mg Oral Q12H    DULoxetine  60 mg Oral Daily    fenofibrate  160 mg Oral Daily    fluconazole  200 mg Oral Daily    folic acid  1 mg Oral Daily    lisinopril  20 mg Oral Daily    magnesium oxide  400 mg Oral BID    metoprolol tartrate  50 mg Oral Q12H    metroNIDAZOLE  500 mg Oral Q8H    nystatin  500,000 Units Oral QID (WM & HS)    pantoprazole  40 mg Oral Daily    potassium chloride 10%  40 mEq Oral TID    ranolazine  1,000 mg Oral BID    tiZANidine  4 mg Oral Q8H    traZODone  200 mg Oral QHS    vancomycin (VANCOCIN) IVPB  1,500 mg Intravenous Q12H     PRN Meds:albuterol sulfate, ALPRAZolam, aluminum-magnesium hydroxide-simethicone, Dextrose 10% Bolus, Dextrose 10% Bolus, diphenoxylate-atropine 2.5-0.025 mg, glucagon (human recombinant), glucose, glucose, insulin aspart U-100, ondansetron, oxyCODONE-acetaminophen, promethazine (PHENERGAN) IVPB     Review of patient's allergies indicates:  No Known Allergies     Past Medical History:   Diagnosis Date    Anxiety     CAD (coronary artery disease)     PTCA x 2 LAD, restenosis and restent 7/12.    Chest pain syndrome 10/2/2015    COPD (chronic obstructive pulmonary disease)     CPAP (continuous positive airway pressure) dependence     @ night    Diabetes mellitus type 2, uncontrolled 4/13/2016    History of duodenal ulcer     with bleed    Hypertension      Mixed hyperlipidemia     JUDI (obstructive sleep apnea)     severe    S/P PTCA (percutaneous transluminal coronary angioplasty) 3/11/2015    Vitamin D deficiency      Past Surgical History:   Procedure Laterality Date    APPENDECTOMY      CARDIAC CATHETERIZATION      CATARACT EXTRACTION W/  INTRAOCULAR LENS IMPLANT Right 4-22-15    CORONARY STENT PLACEMENT      ESOPHAGOGASTRODUODENOSCOPY (EGD) N/A 2014    Performed by [facility] at [facility] ENDO    EXCISION-SKIN Right 2015    Performed by Louis O. Jeansonne IV, MD at [facility] OR    HEART CATH WITH GRAFTS-LEFT Right 2014    Performed by Erma Green MD at [facility] CATH LAB    HEART CATH-LEFT Left 2017    Performed by Erma Green [facility] at [facility] CATH LAB    HEMORRHOID SURGERY      INCISION AND DRAINAGE (I&D), BUTTOCK  3/26/2015    Performed by [facility] IV, [facility] at [facility] OR    INCISION AND DRAINAGE-THIGH Right 3/26/2015    Performed by [facility] IV, [facility] at [facility] OR    NISSEN FUNDOPLICATION      lap    SKIN LESION EXCISION Right     leg     Family History     Problem Relation (Age of Onset)    COPD Maternal Grandmother, Maternal Grandfather    Diabetes Maternal Grandfather    Heart block Father    Heart disease Mother, Sister        Tobacco Use    Smoking status: Former Smoker     Packs/day: 0.50     Years: 20.00     Pack years: 10.00     Types: Cigarettes     Last attempt to quit: 6/3/2014     Years since quittin.2    Smokeless tobacco: Never Used    Tobacco comment: currently vaping with nicotine since quit smoking in 2014   Substance and Sexual Activity    Alcohol use: Yes     Alcohol/week: 2.4 oz     Types: 4 Cans of beer per week    Drug use: No    Sexual activity: Not on file       Review of Systems   Constitutional: Positive for activity change, appetite change and fatigue. Negative for chills, diaphoresis and fever.   HENT: Negative.    Eyes: Negative.    Respiratory: Negative for cough and  shortness of breath.    Cardiovascular: Negative for chest pain, palpitations and leg swelling.   Gastrointestinal: Positive for abdominal pain and diarrhea. Negative for nausea and vomiting.   Genitourinary: Negative for difficulty urinating.        Darkened urine   Musculoskeletal: Negative for back pain.   Skin: Negative for pallor, rash and wound.   Allergic/Immunologic: Positive for immunocompromised state.   Neurological: Positive for weakness. Negative for syncope and light-headedness.   Hematological: Negative for adenopathy. Does not bruise/bleed easily.   Psychiatric/Behavioral: Negative for confusion. The patient is nervous/anxious.      Objective:     Vital Signs (Most Recent):  Temp: 98.3 °F (36.8 °C) (08/17/19 1253)  Pulse: 60 (08/17/19 1302)  Resp: 18 (08/17/19 1253)  BP: (!) 141/75 (08/17/19 1253)  SpO2: 100 % (08/17/19 1253) Vital Signs (24h Range):  Temp:  [97.8 °F (36.6 °C)-98.6 °F (37 °C)] 98.3 °F (36.8 °C)  Pulse:  [50-72] 60  Resp:  [15-18] 18  SpO2:  [97 %-100 %] 100 %  BP: (141-163)/(75-89) 141/75     Weight: 88.2 kg (194 lb 5.4 oz)  Body mass index is 27.88 kg/m².  Body surface area is 2.09 meters squared.      Intake/Output Summary (Last 24 hours) at 8/17/2019 1303  Last data filed at 8/17/2019 0749  Gross per 24 hour   Intake 4761.67 ml   Output --   Net 4761.67 ml       Physical Exam   Constitutional: He is oriented to person, place, and time. He appears well-developed. He has a sickly appearance. No distress.   HENT:   Head: Normocephalic and atraumatic.   Nose: Nose normal.   Eyes: Pupils are equal, round, and reactive to light. Conjunctivae are normal. No scleral icterus.   Neck: Normal range of motion. Neck supple.   Cardiovascular: Normal rate, regular rhythm and normal heart sounds. Exam reveals no gallop and no friction rub.   No murmur heard.  Pulmonary/Chest: Effort normal and breath sounds normal.   Abdominal: Soft. Bowel sounds are increased.   Musculoskeletal: Normal range of  motion. He exhibits no edema or tenderness.   Neurological: He is alert and oriented to person, place, and time.   Skin: Skin is warm and dry. He is not diaphoretic. There is pallor.   Psychiatric: His speech is normal and behavior is normal. Judgment and thought content normal. His mood appears anxious. He is not actively hallucinating. Cognition and memory are normal. He is attentive.   Vitals reviewed.      Significant Labs:   CBC:   Recent Labs   Lab 08/16/19  0910 08/17/19 0457   WBC 0.98* 1.31*   HGB 8.5* 8.5*   HCT 24.5* 24.3*   PLT 84* 87*   , CMP:   Recent Labs   Lab 08/16/19  0910 08/17/19 0457    139   K 3.9 4.3    108   CO2 24 24   * 88   BUN 2* 2*   CREATININE 0.6 0.6   CALCIUM 8.2* 8.5*   PROT 4.7* 4.9*   ALBUMIN 2.4* 2.5*   BILITOT 1.1* 1.1*   ALKPHOS 74 74   AST 19 15   ALT 9* 10   ANIONGAP 6* 7*   EGFRNONAA >60 >60   , Coagulation:   No results for input(s): PT, INR, APTT in the last 48 hours., LDH: No results for input(s): LDHCSF, BFSOURCE in the last 48 hours., LFTs:   Recent Labs   Lab 08/16/19  0910 08/17/19 0457   ALT 9* 10   AST 19 15   ALKPHOS 74 74   BILITOT 1.1* 1.1*   PROT 4.7* 4.9*   ALBUMIN 2.4* 2.5*   , Urine Studies:   No results for input(s): COLORU, APPEARANCEUA, PHUR, SPECGRAV, PROTEINUA, GLUCUA, KETONESU, BILIRUBINUA, OCCULTUA, NITRITE, UROBILINOGEN, LEUKOCYTESUR, RBCUA, WBCUA, BACTERIA, SQUAMEPITHEL, HYALINECASTS in the last 48 hours.    Invalid input(s): WRIGHTSUR and All pertinent labs from the last 24 hours have been reviewed.    Diagnostic Results:  I have reviewed all pertinent imaging results/findings within the past 24 hours.    Assessment/Plan:     Acute colitis  8/15/19 states continued watery diarrhea.  States frequency improved.  Started on Questran yesterday.   ordered Immodium today prn.  C. Diff negative.  Has remained on cipro and flagyl IV for treatment of colitis.  Continue IV fluid - replace electrolytes - Magnesium 1.2 today, potassium  WNL.  --CMP daily  --questran per   --Imodium prn per     8/16/19 Continued watery diarrhea despite adding questran and lomotil to treatment regime.  C. Diff negative.  On flagyl and cipro for treatment of colitis.    --CMP daily  --Consult GI for further recommendations  --Replace electrolytes as needed per     August 17, 2019-I had a detailed discussion with the patient today with regard to his current clinical situation.  Overall this appears to be gradually improving.  Management will be as per Hospital Medicine.  Stool culture is pending at this time.    Pancytopenia  8/13/19 Patient with newly diagnosed pancytopenia WBC 0.83 (ANC 0.1), hemoglobin 8.8, platelets 83 K.  With diarrhea that started 2 days ago perfuse green watery.  C. Diff negative.  States had night sweats for 2 days.  Denies known fever.  Scheduled for BMB tomorrow at noon.  No need for transfusion at this time.  Splenomegaly on CT scan of abdomen/pelvis with diffuse mural thickening and inflammation of the adjacent fat of the rectosigmoid and descending colon consistent with colitis.  .8.  Blood cultures NGTD.    --Continue supportive care  --No GSCF support for now, until get results of BMB  --BMB tomorrow at noon    8/14/19 Continued pancytopenia WBC 0.87 (ANC 0.2), hemoglobin 9.7, platelets 86K.  Splenomegaly. Folic acid deficiency.  Not iron deficient or B12 deficient.  Continued diarrhea.  C. Diff negative.  Scheduled for BMB today.    --CBC daily  --Monitor for s/s of infection  --Start folate 1 mg Po dialy    8/15/19 Continued stable pancytopenia WBC 0.93, hemoglobin 8.2, platelets 87.  No need for platelets or prbc's at this time.  Patient has remained afebrile.  Started on folic acid 1 mg PO daily for folic acid deficiency.  Had BMB yesterday.  Tolerated well.    --CBC daily  --Monitor for s/s of infection  --continue folate 1 mg PO dialy  --Await BMB results    August 17, 2019-I had a detailed discussion with the patient  today with regard to his current clinical situation.  His pancytopenia appears to be slowly improving.  Preliminary results of bone marrow biopsy were reviewed with hematopathology and discussed with patient today.  No evidence of acute leukemia noted.  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  I would expect to see continued improvement in his blood counts over the next 24-48 hours.  I have discussed this with Hospital Medicine today.          Thank you for your consult.      Cash Nowak MD  Hematology/Oncology  [facility] -

## 2019-08-17 NOTE — PLAN OF CARE
Problem: Adult Inpatient Plan of Care  Goal: Plan of Care Review  Outcome: Ongoing (interventions implemented as appropriate)  Fall prevention precautions maintained, pt remained free of falls throughout shift, call bell and personal items within reach, pt's pain adequately controlled by prn pain medication, IV fluids and antibiotics continued as ordered. 24 hour chart check completed. Will continue to monitor

## 2019-08-17 NOTE — ASSESSMENT & PLAN NOTE
- BP under fair control  - Continue Mopressor, Norvasc, and Lisinopril   - monitor and adjust meds as needed

## 2019-08-17 NOTE — PROGRESS NOTES
[facility] Hospital Medicine  Progress Note    Patient Name: Kodak Valentine  MRN: [id number]  Patient Class: IP- Inpatient   Admission Date: 8/12/2019  Length of Stay: 5 days  Attending Physician: Redd Salazar MD  Primary Care Provider: Eliza Huddleston MD        Subjective:     Principal Problem:Sepsis        HPI:  Pt is a 50 yo male with PMHx of CAD with stenting x 2 LAD, COPD, DM II, HTN, JUDI and HPL who presents to the ED with reports of severe, constant cramping lower abdominal pain x 5 days. Associated symptoms include profuse, greenish watery diarrhea, diaphoresis, lightheadedness, dark urine (today) and vomiting (1-2 days now resolved). Pt had back surgery 6 weeks ago but his back pain is no worse than usual. Pt's wife was treated at [facility] approx 6 weeks ago. Pt denies URI symptoms, cough, chest pain, palpitations, bloody diarrhea and other symptoms. He last ate some yogurt yesterday. V/S on arrival: Temp 98.0, pulse 113, resp 18 and B/P 133/70. Labs find WBC 0.80, Hgb 11.5?Hct 32.7, plt 92, left shift 22 %, hyponatremia 133, hypokalemia 2.6, gluc 176, total bili 1.5, .8. U/A was altered due to dark color. A contrasted CT ABD/Pelvis was resulted at 16:17 which found diffuse inflammatory changes of the descending colon and rectosigmoid consistent with colitis/proctitis.  No evidence of a pericolonic abscess can be seen. Pt is admitted for Sepsis, Acute Colitis and electrolyte imbalances.     Overview/Hospital Course:  Admitted with Sepsis, Acute Colitis, electrolyte abnormalities, dehydration and pancytopenia. IV fluids given, lactic acid normal, Cipro/Flagyl in process, blood cultures NGTD and C diff negative. Hematology saw the patient with plans for bone marrow biopsy. 8/14 - had bone marrow today, results pending. Diarrhea decreasing, less abdominal pain. Describes sore throat pain and white appearing patches present to posterior pharynx - appears yeast in  "nature. 8/15  - Nystatin and Diflucan prescribed for thrush. Less abdominal pain, still with watery diarrhea - Questran and Imodium prescribed. Blood cultures from 8/12 find Gram positive cocci in clusters resembling Staph in one anaerobic bottle - Vanco started (may be contaminant - awaiting full ID).     As of 8/16 patient reports continued diarrhea, but "it has slowed down a lot".  Patient reports "going 5 times in 30 minutes, and now I'm going once every hour".  Cdiff negative.  Stool cx and ova/parasites pending.  Patient remains afebrile.  Continue Cipro/Flagyl for now along with Questran.  Case d/w GI.  Will monitor and if no improvement in diarrhea, patient may need a flex sig.  Hemoc continues to follow for pancytopenia and recommended that ANC is >1000 prior to DC.  Awaiting bone marrow biopsy results. Awaiting final BC results to determine contaminate versus true infection, continue empiric Vanc for now.      As of 8/17 patient reports continued improvement in diarrhea, reporting only 3 diarrheal stools overnight with none this morning.  BC show probable contaminate.  Stool cx shows NGTD.  Pancytopenia continues to improve.  Case d/w Dr. Nowak, BM biopsy negative for leukemia, if counts continue to improve, anticipate DC home in AM.      Interval History:  No acute events overnight.  Resting comfortably in bed with no new complaints and in NAD.  Patient reports continued improvement in diarrhea, reporting only 3 diarrheal stools overnight with none this morning.  BC show probable contaminate.  Stool cx shows NGTD.  Pancytopenia continues to improve.  Case d/w Dr. Nowak, BM biopsy negative for leukemia, if counts continue to improve, anticipate DC home in AM.      Review of Systems   Constitutional: Positive for activity change and fatigue. Negative for appetite change, chills, diaphoresis and fever.   HENT: Negative.    Eyes: Negative.    Respiratory: Negative for cough and shortness of breath.  "   Cardiovascular: Negative for chest pain, palpitations and leg swelling.   Gastrointestinal: Positive for abdominal pain and diarrhea. Negative for nausea and vomiting.        Reports diarrhea is improving   Genitourinary: Negative for difficulty urinating.   Musculoskeletal: Positive for back pain.   Skin: Negative for pallor, rash and wound.   Neurological: Positive for weakness. Negative for syncope and light-headedness.   Psychiatric/Behavioral: The patient is not nervous/anxious.      Objective:     Vital Signs (Most Recent):  Temp: 98.3 °F (36.8 °C) (08/17/19 1253)  Pulse: 60 (08/17/19 1302)  Resp: 18 (08/17/19 1253)  BP: (!) 141/75 (08/17/19 1253)  SpO2: 100 % (08/17/19 1253) Vital Signs (24h Range):  Temp:  [97.8 °F (36.6 °C)-98.6 °F (37 °C)] 98.3 °F (36.8 °C)  Pulse:  [50-72] 60  Resp:  [15-18] 18  SpO2:  [97 %-100 %] 100 %  BP: (141-163)/(75-89) 141/75     Weight: 88.2 kg (194 lb 5.4 oz)  Body mass index is 27.88 kg/m².    Intake/Output Summary (Last 24 hours) at 8/17/2019 1307  Last data filed at 8/17/2019 0749  Gross per 24 hour   Intake 4761.67 ml   Output --   Net 4761.67 ml      Physical Exam   Constitutional: He is oriented to person, place, and time. He appears well-developed and well-nourished. He has a sickly appearance. No distress.   HENT:   Head: Normocephalic and atraumatic.   Nose: Nose normal.   Mouth/Throat: Oropharynx is clear and moist.   Eyes: Pupils are equal, round, and reactive to light. Conjunctivae and EOM are normal. No scleral icterus.   Neck: Normal range of motion. Neck supple. No JVD present.   Cardiovascular: Normal rate, regular rhythm, normal heart sounds and intact distal pulses. Exam reveals no gallop and no friction rub.   No murmur heard.  Pulmonary/Chest: Effort normal and breath sounds normal. No stridor. No respiratory distress. He has no wheezes. He has no rales. He exhibits no tenderness.   Abdominal: Soft. He exhibits no distension and no mass. Bowel sounds are  increased. There is tenderness. There is no rebound and no guarding.   Musculoskeletal: Normal range of motion. He exhibits no edema or tenderness.   Neurological: He is alert and oriented to person, place, and time. No cranial nerve deficit.   Skin: Skin is warm and dry. No rash noted. He is not diaphoretic. No erythema. There is pallor.   Incisions to back are healed with no s/s of infection.   Psychiatric: He has a normal mood and affect. His behavior is normal. Judgment and thought content normal.   Nursing note and vitals reviewed.      Significant Labs:   Blood Culture: No results for input(s): LABBLOO in the last 48 hours.  CBC:   Recent Labs   Lab 08/16/19  0910 08/17/19  0457   WBC 0.98* 1.31*   HGB 8.5* 8.5*   HCT 24.5* 24.3*   PLT 84* 87*     CMP:   Recent Labs   Lab 08/16/19  0910 08/17/19  0457    139   K 3.9 4.3    108   CO2 24 24   * 88   BUN 2* 2*   CREATININE 0.6 0.6   CALCIUM 8.2* 8.5*   PROT 4.7* 4.9*   ALBUMIN 2.4* 2.5*   BILITOT 1.1* 1.1*   ALKPHOS 74 74   AST 19 15   ALT 9* 10   ANIONGAP 6* 7*   EGFRNONAA >60 >60       Significant Imaging: I have reviewed all pertinent imaging results/findings within the past 24 hours.      Assessment/Plan:      * Sepsis  - Criteria including WBC 0.80 & 1.01, tachycardia 113  - CT of ABD/Pelvis notes Diffuse inflammatory changes of the descending colon and rectosigmoid consistent with colitis/proctitis.  No evidence of a pericolonic abscess can be seen.  The appendix cannot be identified.  Fluid-filled small bowel loops in the pelvis likely represent an ileus.  1.1 cm in diameter gallbladder calculus.  Splenomegaly with the spleen measuring 18 cm.  - BC x 1 on 8/12 show gram + cocci/clusters resembling staph in aerobic and anaerobic bottle, organism is a probable contaminate  - Will DC Vanc   - Received a total of 1.7L of IVFs in the ER  - Continue IVFs  - Continue Cipro/flagyl for Colitis  - Remains hemodynamically stable  - Monitor    Acute  colitis  - Cdiff negative  - Stool cx shows NGTD  - Stool for ova/parasites pending  - Continue Cipro and Flagyl  - IV fluids  - Mech soft diet as tolerated   - Questran added on Tuesday, with improvement in diarrhea, will continue  - Prn analgesia and antiemetics  - Anticipate DC home in AM if he continues to improve    Pancytopenia  - CT imaging Splenomegaly with the spleen measuring 18 cm.  - Hematology consulted; likely r/t sepsis and colitis  - Counts continue to improve  - Underwent Bone Marrow biopsy on 8/14 and per Dr. Nowak, BM negative for leukemia  - Hemoc recommends ANC is >1000 prior to DC  - Daily CBC  - Monitor      Hypomagnesemia  - Mag 1.2 yesterday  - Started on Mag Ox BID  - Repeat chemistries in AM  - Replace magnesium and phosphorous as needed      Thrush  - Continue Nystatin swish and swallow      CAD (coronary artery disease)  - Denies any chest pain  - Continue ASA, Atorvastatin, Fenofibrate, Ranexa, Lopressor and Lisinopril   - Monitor      Hypertension associated with diabetes  - BP under fair control  - Continue Mopressor, Norvasc, and Lisinopril   - monitor and adjust meds as needed        Controlled type 2 diabetes mellitus with stage 2 chronic kidney disease, without long-term current use of insulin  - BG under fair control  - A1c 4.1  - Hold home Metformin  - Accu checks ACHS with SSI prn  - Monitor      Hyperlipidemia associated with type 2 diabetes mellitus  Continue Statin and Fenofibrate      Moderate COPD (chronic obstructive pulmonary disease)  - Currently appears compensated  - Continue nebs  - Supplemental O2 prn  - monitor      Anxiety  - Continue home meds      GERD (gastroesophageal reflux disease)  - Resume home Protonix      JUDI treated with BiPAP  - Cpap q hs        VTE Risk Mitigation (From admission, onward)        Ordered     Place sequential compression device  Until discontinued      08/16/19 1430                Marti Bhatia, SPENCER, ACNP-BC  Department of  [facility] Medicine   [facility]

## 2019-08-17 NOTE — ASSESSMENT & PLAN NOTE
8/15/19 states continued watery diarrhea.  States frequency improved.  Started on Questran yesterday.   ordered Immodium today prn.  C. Diff negative.  Has remained on cipro and flagyl IV for treatment of colitis.  Continue IV fluid - replace electrolytes - Magnesium 1.2 today, potassium WNL.  --CMP daily  --questran per   --Imodium prn per     8/16/19 Continued watery diarrhea despite adding questran and lomotil to treatment regime.  C. Diff negative.  On flagyl and cipro for treatment of colitis.    --CMP daily  --Consult GI for further recommendations  --Replace electrolytes as needed per     August 17, 2019-I had a detailed discussion with the patient today with regard to his current clinical situation.  Overall this appears to be gradually improving.  Management will be as per Hospital Medicine.  Stool culture is pending at this time.

## 2019-08-17 NOTE — ASSESSMENT & PLAN NOTE
- Criteria including WBC 0.80 & 1.01, tachycardia 113  - CT of ABD/Pelvis notes Diffuse inflammatory changes of the descending colon and rectosigmoid consistent with colitis/proctitis.  No evidence of a pericolonic abscess can be seen.  The appendix cannot be identified.  Fluid-filled small bowel loops in the pelvis likely represent an ileus.  1.1 cm in diameter gallbladder calculus.  Splenomegaly with the spleen measuring 18 cm.  - BC x 1 on 8/12 show gram + cocci/clusters resembling staph in aerobic and anaerobic bottle, organism is a probable contaminate  - Will DC Vanc   - Received a total of 1.7L of IVFs in the ER  - Continue IVFs  - Continue Cipro/flagyl for Colitis  - Remains hemodynamically stable  - Monitor

## 2019-08-17 NOTE — PLAN OF CARE
Problem: Adult Inpatient Plan of Care  Goal: Plan of Care Review  Outcome: Ongoing (interventions implemented as appropriate)  POC reviewed with patient. Pt verbalized understanding  Pt remains free of injuries and falls; fall precaution in place.   Frequent complaints of pain on this shift.  IV maintained intact.  IVF and IV abx administered per MAR.  Continuous heart monitor. Normal sinus   Bed low, side rails x2, call light in reach, personal belongings at bedside.  Reminded to call for assistance.  Chart check complete. Will continue to monitor.

## 2019-08-17 NOTE — ASSESSMENT & PLAN NOTE
- BG under fair control  - A1c 4.1  - Hold home Metformin  - Accu checks ACHS with SSI prn  - Monitor

## 2019-08-17 NOTE — SUBJECTIVE & OBJECTIVE
Interval History:  No acute events overnight.  Resting comfortably in bed with no new complaints and in NAD.  Patient reports continued improvement in diarrhea, reporting only 3 diarrheal stools overnight with none this morning.  BC show probable contaminate.  Stool cx shows NGTD.  Pancytopenia continues to improve.  Case d/w Dr. Nowak, BM biopsy negative for leukemia, if counts continue to improve, anticipate DC home in AM.      Review of Systems   Constitutional: Positive for activity change and fatigue. Negative for appetite change, chills, diaphoresis and fever.   HENT: Negative.    Eyes: Negative.    Respiratory: Negative for cough and shortness of breath.    Cardiovascular: Negative for chest pain, palpitations and leg swelling.   Gastrointestinal: Positive for abdominal pain and diarrhea. Negative for nausea and vomiting.        Reports diarrhea is improving   Genitourinary: Negative for difficulty urinating.   Musculoskeletal: Positive for back pain.   Skin: Negative for pallor, rash and wound.   Neurological: Positive for weakness. Negative for syncope and light-headedness.   Psychiatric/Behavioral: The patient is not nervous/anxious.      Objective:     Vital Signs (Most Recent):  Temp: 98.3 °F (36.8 °C) (08/17/19 1253)  Pulse: 60 (08/17/19 1302)  Resp: 18 (08/17/19 1253)  BP: (!) 141/75 (08/17/19 1253)  SpO2: 100 % (08/17/19 1253) Vital Signs (24h Range):  Temp:  [97.8 °F (36.6 °C)-98.6 °F (37 °C)] 98.3 °F (36.8 °C)  Pulse:  [50-72] 60  Resp:  [15-18] 18  SpO2:  [97 %-100 %] 100 %  BP: (141-163)/(75-89) 141/75     Weight: 88.2 kg (194 lb 5.4 oz)  Body mass index is 27.88 kg/m².    Intake/Output Summary (Last 24 hours) at 8/17/2019 1307  Last data filed at 8/17/2019 0749  Gross per 24 hour   Intake 4761.67 ml   Output --   Net 4761.67 ml      Physical Exam   Constitutional: He is oriented to person, place, and time. He appears well-developed and well-nourished. He has a sickly appearance. No distress.    HENT:   Head: Normocephalic and atraumatic.   Nose: Nose normal.   Mouth/Throat: Oropharynx is clear and moist.   Eyes: Pupils are equal, round, and reactive to light. Conjunctivae and EOM are normal. No scleral icterus.   Neck: Normal range of motion. Neck supple. No JVD present.   Cardiovascular: Normal rate, regular rhythm, normal heart sounds and intact distal pulses. Exam reveals no gallop and no friction rub.   No murmur heard.  Pulmonary/Chest: Effort normal and breath sounds normal. No stridor. No respiratory distress. He has no wheezes. He has no rales. He exhibits no tenderness.   Abdominal: Soft. He exhibits no distension and no mass. Bowel sounds are increased. There is tenderness. There is no rebound and no guarding.   Musculoskeletal: Normal range of motion. He exhibits no edema or tenderness.   Neurological: He is alert and oriented to person, place, and time. No cranial nerve deficit.   Skin: Skin is warm and dry. No rash noted. He is not diaphoretic. No erythema. There is pallor.   Incisions to back are healed with no s/s of infection.   Psychiatric: He has a normal mood and affect. His behavior is normal. Judgment and thought content normal.   Nursing note and vitals reviewed.      Significant Labs:   Blood Culture: No results for input(s): LABBLOO in the last 48 hours.  CBC:   Recent Labs   Lab 08/16/19  0910 08/17/19  0457   WBC 0.98* 1.31*   HGB 8.5* 8.5*   HCT 24.5* 24.3*   PLT 84* 87*     CMP:   Recent Labs   Lab 08/16/19  0910 08/17/19  0457    139   K 3.9 4.3    108   CO2 24 24   * 88   BUN 2* 2*   CREATININE 0.6 0.6   CALCIUM 8.2* 8.5*   PROT 4.7* 4.9*   ALBUMIN 2.4* 2.5*   BILITOT 1.1* 1.1*   ALKPHOS 74 74   AST 19 15   ALT 9* 10   ANIONGAP 6* 7*   EGFRNONAA >60 >60       Significant Imaging: I have reviewed all pertinent imaging results/findings within the past 24 hours.

## 2019-08-17 NOTE — ASSESSMENT & PLAN NOTE
- CT imaging Splenomegaly with the spleen measuring 18 cm.  - Hematology consulted; likely r/t sepsis and colitis  - Counts continue to improve  - Underwent Bone Marrow biopsy on 8/14 and per Dr. Nowak, BM negative for leukemia  - Hemoc recommends ANC is >1000 prior to DC  - Daily CBC  - Monitor

## 2019-08-18 LAB
ALBUMIN SERPL BCP-MCNC: 2.6 G/DL (ref 3.5–5.2)
ALP SERPL-CCNC: 74 U/L (ref 55–135)
ALT SERPL W/O P-5'-P-CCNC: 11 U/L (ref 10–44)
ANION GAP SERPL CALC-SCNC: 6 MMOL/L (ref 8–16)
ANISOCYTOSIS BLD QL SMEAR: SLIGHT
AST SERPL-CCNC: 16 U/L (ref 10–40)
BACTERIA BLD CULT: NORMAL
BASOPHILS # BLD AUTO: 0.01 K/UL (ref 0–0.2)
BASOPHILS NFR BLD: 0.7 % (ref 0–1.9)
BILIRUB SERPL-MCNC: 1.2 MG/DL (ref 0.1–1)
BUN SERPL-MCNC: 2 MG/DL (ref 6–20)
CALCIUM SERPL-MCNC: 8.9 MG/DL (ref 8.7–10.5)
CHLORIDE SERPL-SCNC: 108 MMOL/L (ref 95–110)
CO2 SERPL-SCNC: 26 MMOL/L (ref 23–29)
CREAT SERPL-MCNC: 0.7 MG/DL (ref 0.5–1.4)
DIFFERENTIAL METHOD: ABNORMAL
EOSINOPHIL # BLD AUTO: 0 K/UL (ref 0–0.5)
EOSINOPHIL NFR BLD: 1.5 % (ref 0–8)
ERYTHROCYTE [DISTWIDTH] IN BLOOD BY AUTOMATED COUNT: 14.1 % (ref 11.5–14.5)
EST. GFR  (AFRICAN AMERICAN): >60 ML/MIN/1.73 M^2
EST. GFR  (NON AFRICAN AMERICAN): >60 ML/MIN/1.73 M^2
GLUCOSE SERPL-MCNC: 87 MG/DL (ref 70–110)
HCT VFR BLD AUTO: 24 % (ref 40–54)
HGB BLD-MCNC: 8.4 G/DL (ref 14–18)
LYMPHOCYTES # BLD AUTO: 0.7 K/UL (ref 1–4.8)
LYMPHOCYTES NFR BLD: 53.7 % (ref 18–48)
MAGNESIUM SERPL-MCNC: 1.4 MG/DL (ref 1.6–2.6)
MCH RBC QN AUTO: 36.7 PG (ref 27–31)
MCHC RBC AUTO-ENTMCNC: 35 G/DL (ref 32–36)
MCV RBC AUTO: 105 FL (ref 82–98)
MONOCYTES # BLD AUTO: 0.3 K/UL (ref 0.3–1)
MONOCYTES NFR BLD: 20.6 % (ref 4–15)
NEUTROPHILS # BLD AUTO: 0.3 K/UL (ref 1.8–7.7)
NEUTROPHILS NFR BLD: 27.9 % (ref 38–73)
OVALOCYTES BLD QL SMEAR: ABNORMAL
PLATELET # BLD AUTO: 86 K/UL (ref 150–350)
PMV BLD AUTO: 8.6 FL (ref 9.2–12.9)
POCT GLUCOSE: 111 MG/DL (ref 70–110)
POCT GLUCOSE: 92 MG/DL (ref 70–110)
POCT GLUCOSE: 98 MG/DL (ref 70–110)
POIKILOCYTOSIS BLD QL SMEAR: SLIGHT
POLYCHROMASIA BLD QL SMEAR: ABNORMAL
POTASSIUM SERPL-SCNC: 5.3 MMOL/L (ref 3.5–5.1)
PROT SERPL-MCNC: 5 G/DL (ref 6–8.4)
RBC # BLD AUTO: 2.29 M/UL (ref 4.6–6.2)
SODIUM SERPL-SCNC: 140 MMOL/L (ref 136–145)
SPHEROCYTES BLD QL SMEAR: ABNORMAL
WBC # BLD AUTO: 1.36 K/UL (ref 3.9–12.7)

## 2019-08-18 PROCEDURE — 63600175 PHARM REV CODE 636 W HCPCS: Performed by: NURSE PRACTITIONER

## 2019-08-18 PROCEDURE — 21400001 HC TELEMETRY ROOM

## 2019-08-18 PROCEDURE — 25000242 PHARM REV CODE 250 ALT 637 W/ HCPCS: Performed by: NURSE PRACTITIONER

## 2019-08-18 PROCEDURE — 99232 PR SUBSEQUENT HOSPITAL CARE,LEVL II: ICD-10-PCS | Mod: ,,, | Performed by: INTERNAL MEDICINE

## 2019-08-18 PROCEDURE — 63700000 PHARM REV CODE 250 ALT 637 W/O HCPCS: Performed by: NURSE PRACTITIONER

## 2019-08-18 PROCEDURE — 25000003 PHARM REV CODE 250: Performed by: INTERNAL MEDICINE

## 2019-08-18 PROCEDURE — 85025 COMPLETE CBC W/AUTO DIFF WBC: CPT

## 2019-08-18 PROCEDURE — 94640 AIRWAY INHALATION TREATMENT: CPT

## 2019-08-18 PROCEDURE — 80053 COMPREHEN METABOLIC PANEL: CPT

## 2019-08-18 PROCEDURE — 25000003 PHARM REV CODE 250: Performed by: NURSE PRACTITIONER

## 2019-08-18 PROCEDURE — 36415 COLL VENOUS BLD VENIPUNCTURE: CPT

## 2019-08-18 PROCEDURE — 99232 SBSQ HOSP IP/OBS MODERATE 35: CPT | Mod: ,,, | Performed by: INTERNAL MEDICINE

## 2019-08-18 PROCEDURE — 83735 ASSAY OF MAGNESIUM: CPT

## 2019-08-18 RX ORDER — MAGNESIUM SULFATE HEPTAHYDRATE 40 MG/ML
2 INJECTION, SOLUTION INTRAVENOUS ONCE
Status: COMPLETED | OUTPATIENT
Start: 2019-08-18 | End: 2019-08-18

## 2019-08-18 RX ADMIN — OXYCODONE HYDROCHLORIDE AND ACETAMINOPHEN 1 TABLET: 10; 325 TABLET ORAL at 03:08

## 2019-08-18 RX ADMIN — TIZANIDINE 4 MG: 4 TABLET ORAL at 01:08

## 2019-08-18 RX ADMIN — MAGNESIUM SULFATE IN WATER 2 G: 40 INJECTION, SOLUTION INTRAVENOUS at 11:08

## 2019-08-18 RX ADMIN — NYSTATIN 500000 UNITS: 100000 SUSPENSION ORAL at 05:08

## 2019-08-18 RX ADMIN — RANOLAZINE 1000 MG: 500 TABLET, FILM COATED, EXTENDED RELEASE ORAL at 08:08

## 2019-08-18 RX ADMIN — SODIUM CHLORIDE: 0.9 INJECTION, SOLUTION INTRAVENOUS at 02:08

## 2019-08-18 RX ADMIN — CIPROFLOXACIN HYDROCHLORIDE 500 MG: 500 TABLET, FILM COATED ORAL at 08:08

## 2019-08-18 RX ADMIN — OXYCODONE HYDROCHLORIDE AND ACETAMINOPHEN 1 TABLET: 10; 325 TABLET ORAL at 07:08

## 2019-08-18 RX ADMIN — SODIUM CHLORIDE: 0.9 INJECTION, SOLUTION INTRAVENOUS at 07:08

## 2019-08-18 RX ADMIN — OXYCODONE HYDROCHLORIDE AND ACETAMINOPHEN 1 TABLET: 10; 325 TABLET ORAL at 08:08

## 2019-08-18 RX ADMIN — CHOLESTYRAMINE 4 G: 4 POWDER, FOR SUSPENSION ORAL at 09:08

## 2019-08-18 RX ADMIN — CIPROFLOXACIN HYDROCHLORIDE 500 MG: 500 TABLET, FILM COATED ORAL at 09:08

## 2019-08-18 RX ADMIN — DIPHENOXYLATE HYDROCHLORIDE AND ATROPINE SULFATE 1 TABLET: 2.5; .025 TABLET ORAL at 03:08

## 2019-08-18 RX ADMIN — TIZANIDINE 4 MG: 4 TABLET ORAL at 09:08

## 2019-08-18 RX ADMIN — TIZANIDINE 4 MG: 4 TABLET ORAL at 05:08

## 2019-08-18 RX ADMIN — MAGNESIUM OXIDE TAB 400 MG (241.3 MG ELEMENTAL MG) 400 MG: 400 (241.3 MG) TAB at 08:08

## 2019-08-18 RX ADMIN — ATORVASTATIN CALCIUM 20 MG: 10 TABLET, FILM COATED ORAL at 09:08

## 2019-08-18 RX ADMIN — LISINOPRIL 20 MG: 20 TABLET ORAL at 09:08

## 2019-08-18 RX ADMIN — MAGNESIUM OXIDE TAB 400 MG (241.3 MG ELEMENTAL MG) 400 MG: 400 (241.3 MG) TAB at 09:08

## 2019-08-18 RX ADMIN — ALPRAZOLAM 0.25 MG: 0.25 TABLET ORAL at 05:08

## 2019-08-18 RX ADMIN — TRAZODONE HYDROCHLORIDE 200 MG: 100 TABLET ORAL at 08:08

## 2019-08-18 RX ADMIN — METRONIDAZOLE 500 MG: 500 TABLET ORAL at 09:08

## 2019-08-18 RX ADMIN — NYSTATIN 500000 UNITS: 100000 SUSPENSION ORAL at 07:08

## 2019-08-18 RX ADMIN — DULOXETINE 60 MG: 30 CAPSULE, DELAYED RELEASE ORAL at 09:08

## 2019-08-18 RX ADMIN — FENOFIBRATE 160 MG: 160 TABLET ORAL at 09:08

## 2019-08-18 RX ADMIN — ASPIRIN 81 MG: 81 TABLET, COATED ORAL at 09:08

## 2019-08-18 RX ADMIN — AMLODIPINE BESYLATE 2.5 MG: 2.5 TABLET ORAL at 09:08

## 2019-08-18 RX ADMIN — METRONIDAZOLE 500 MG: 500 TABLET ORAL at 01:08

## 2019-08-18 RX ADMIN — NYSTATIN 500000 UNITS: 100000 SUSPENSION ORAL at 08:08

## 2019-08-18 RX ADMIN — METOPROLOL TARTRATE 50 MG: 50 TABLET ORAL at 09:08

## 2019-08-18 RX ADMIN — METRONIDAZOLE 500 MG: 500 TABLET ORAL at 05:08

## 2019-08-18 RX ADMIN — DIPHENOXYLATE HYDROCHLORIDE AND ATROPINE SULFATE 1 TABLET: 2.5; .025 TABLET ORAL at 09:08

## 2019-08-18 RX ADMIN — PANTOPRAZOLE SODIUM 40 MG: 40 TABLET, DELAYED RELEASE ORAL at 09:08

## 2019-08-18 RX ADMIN — CHOLESTYRAMINE 4 G: 4 POWDER, FOR SUSPENSION ORAL at 08:08

## 2019-08-18 RX ADMIN — ALBUTEROL SULFATE 2.5 MG: 2.5 SOLUTION RESPIRATORY (INHALATION) at 07:08

## 2019-08-18 RX ADMIN — FLUCONAZOLE 200 MG: 100 TABLET ORAL at 09:08

## 2019-08-18 RX ADMIN — OXYCODONE HYDROCHLORIDE AND ACETAMINOPHEN 1 TABLET: 10; 325 TABLET ORAL at 02:08

## 2019-08-18 RX ADMIN — METOPROLOL TARTRATE 50 MG: 50 TABLET ORAL at 08:08

## 2019-08-18 RX ADMIN — RANOLAZINE 1000 MG: 500 TABLET, FILM COATED, EXTENDED RELEASE ORAL at 09:08

## 2019-08-18 RX ADMIN — NYSTATIN 500000 UNITS: 100000 SUSPENSION ORAL at 11:08

## 2019-08-18 RX ADMIN — FOLIC ACID 1 MG: 1 TABLET ORAL at 09:08

## 2019-08-18 RX ADMIN — ARFORMOTEROL TARTRATE 15 MCG: 15 SOLUTION RESPIRATORY (INHALATION) at 07:08

## 2019-08-18 RX ADMIN — OXYCODONE HYDROCHLORIDE AND ACETAMINOPHEN 1 TABLET: 10; 325 TABLET ORAL at 11:08

## 2019-08-18 RX ADMIN — BUDESONIDE 0.5 MG: 0.5 SUSPENSION RESPIRATORY (INHALATION) at 07:08

## 2019-08-18 RX ADMIN — DIPHENOXYLATE HYDROCHLORIDE AND ATROPINE SULFATE 1 TABLET: 2.5; .025 TABLET ORAL at 02:08

## 2019-08-18 NOTE — PROGRESS NOTES
[facility] Hospital Medicine  Progress Note    Patient Name: Kodak Valentine  MRN: [id number]  Patient Class: IP- Inpatient   Admission Date: 8/12/2019  Length of Stay: 6 days  Attending Physician: Redd Salazar MD  Primary Care Provider: Eliza Huddleston MD        Subjective:     Principal Problem:Sepsis        HPI:  Pt is a 48 yo male with PMHx of CAD with stenting x 2 LAD, COPD, DM II, HTN, JUDI and HPL who presents to the ED with reports of severe, constant cramping lower abdominal pain x 5 days. Associated symptoms include profuse, greenish watery diarrhea, diaphoresis, lightheadedness, dark urine (today) and vomiting (1-2 days now resolved). Pt had back surgery 6 weeks ago but his back pain is no worse than usual. Pt's wife was treated at [facility] approx 6 weeks ago. Pt denies URI symptoms, cough, chest pain, palpitations, bloody diarrhea and other symptoms. He last ate some yogurt yesterday. V/S on arrival: Temp 98.0, pulse 113, resp 18 and B/P 133/70. Labs find WBC 0.80, Hgb 11.5?Hct 32.7, plt 92, left shift 22 %, hyponatremia 133, hypokalemia 2.6, gluc 176, total bili 1.5, .8. U/A was altered due to dark color. A contrasted CT ABD/Pelvis was resulted at 16:17 which found diffuse inflammatory changes of the descending colon and rectosigmoid consistent with colitis/proctitis.  No evidence of a pericolonic abscess can be seen. Pt is admitted for Sepsis, Acute Colitis and electrolyte imbalances.     Overview/Hospital Course:  Admitted with Sepsis, Acute Colitis, electrolyte abnormalities, dehydration and pancytopenia. IV fluids given, lactic acid normal, Cipro/Flagyl in process, blood cultures NGTD and C diff negative. Hematology saw the patient with plans for bone marrow biopsy. 8/14 - had bone marrow today, results pending. Diarrhea decreasing, less abdominal pain. Describes sore throat pain and white appearing patches present to posterior pharynx - appears yeast in  "nature. 8/15  - Nystatin and Diflucan prescribed for thrush. Less abdominal pain, still with watery diarrhea - Questran and Imodium prescribed. Blood cultures from 8/12 find Gram positive cocci in clusters resembling Staph in one anaerobic bottle - Vanco started (may be contaminant - awaiting full ID).     As of 8/16 patient reports continued diarrhea, but "it has slowed down a lot".  Patient reports "going 5 times in 30 minutes, and now I'm going once every hour".  Cdiff negative.  Stool cx and ova/parasites pending.  Patient remains afebrile.  Continue Cipro/Flagyl for now along with Questran.  Case d/w GI.  Will monitor and if no improvement in diarrhea, patient may need a flex sig.  Hemoc continues to follow for pancytopenia and recommended that ANC is >1000 prior to DC.  Awaiting bone marrow biopsy results. Awaiting final BC results to determine contaminate versus true infection, continue empiric Vanc for now.      As of 8/17 patient reports continued improvement in diarrhea, reporting only 3 diarrheal stools overnight with none this morning.  BC show probable contaminate.  Stool cx shows NGTD.  Pancytopenia continues to improve.  Case d/w Dr. Nowak, BM biopsy negative for leukemia, if counts continue to improve, anticipate DC home in AM.      As of 8/18 no change in pancytopenia overnight.  Per Hemoc, cannot DC until ANC is >1000.  Patient continues to report improvement in diarrhea.  He remains afebrile.  Stool cx shows NGTD.  Ecoli, ova/parasites pending.  Continue oral Cipro/flagyl for colitis.      Interval History:  No acute events overnight.  Currently resting comfortably in bed in NAD.  No change in pancytopenia overnight.  Per Hemoc, cannot DC until ANC is >1000.  Patient continues to report improvement in diarrhea.  He remains afebrile.  Stool cx shows NGTD.  Ecoli, ova/parasites pending.  Continue oral Cipro/flagyl for colitis.      Review of Systems   Constitutional: Positive for activity change " and fatigue. Negative for appetite change, chills, diaphoresis and fever.   HENT: Negative.    Eyes: Negative.    Respiratory: Negative for cough and shortness of breath.    Cardiovascular: Negative for chest pain, palpitations and leg swelling.   Gastrointestinal: Positive for diarrhea. Negative for abdominal pain, nausea and vomiting.        Reports diarrhea is improving   Genitourinary: Negative for difficulty urinating.   Musculoskeletal: Positive for back pain.   Skin: Negative for pallor, rash and wound.   Neurological: Positive for weakness. Negative for syncope and light-headedness.   Psychiatric/Behavioral: The patient is not nervous/anxious.      Objective:     Vital Signs (Most Recent):  Temp: 97.9 °F (36.6 °C) (08/18/19 0737)  Pulse: (!) 57 (08/18/19 0737)  Resp: 13 (08/18/19 0737)  BP: (!) 153/81 (08/18/19 0737)  SpO2: 96 % (08/18/19 0737) Vital Signs (24h Range):  Temp:  [97.9 °F (36.6 °C)-98.3 °F (36.8 °C)] 97.9 °F (36.6 °C)  Pulse:  [54-69] 57  Resp:  [13-18] 13  SpO2:  [96 %-100 %] 96 %  BP: (120-153)/(59-81) 153/81     Weight: 88.2 kg (194 lb 5.4 oz)  Body mass index is 27.88 kg/m².    Intake/Output Summary (Last 24 hours) at 8/18/2019 1036  Last data filed at 8/18/2019 0626  Gross per 24 hour   Intake 3894.17 ml   Output --   Net 3894.17 ml      Physical Exam   Constitutional: He is oriented to person, place, and time. He appears well-developed and well-nourished. He has a sickly appearance. No distress.   HENT:   Head: Normocephalic and atraumatic.   Nose: Nose normal.   Mouth/Throat: Oropharynx is clear and moist.   Eyes: Pupils are equal, round, and reactive to light. Conjunctivae and EOM are normal. No scleral icterus.   Neck: Normal range of motion. Neck supple. No JVD present.   Cardiovascular: Normal rate, regular rhythm, normal heart sounds and intact distal pulses. Exam reveals no gallop and no friction rub.   No murmur heard.  Pulmonary/Chest: Effort normal and breath sounds normal. No  stridor. No respiratory distress. He has no wheezes. He has no rales. He exhibits no tenderness.   Abdominal: Soft. He exhibits no distension and no mass. Bowel sounds are increased. There is no tenderness. There is no rebound and no guarding.   Musculoskeletal: Normal range of motion. He exhibits no edema or tenderness.   Neurological: He is alert and oriented to person, place, and time. No cranial nerve deficit.   Skin: Skin is warm and dry. No rash noted. He is not diaphoretic. No erythema. There is pallor.   Incisions to back are healed with no s/s of infection.   Psychiatric: He has a normal mood and affect. His behavior is normal. Judgment and thought content normal.   Nursing note and vitals reviewed.      Significant Labs:   Blood Culture: No results for input(s): LABBLOO in the last 48 hours.  CBC:   Recent Labs   Lab 08/17/19  0457 08/18/19  0509   WBC 1.31* 1.36*   HGB 8.5* 8.4*   HCT 24.3* 24.0*   PLT 87* 86*     CMP:   Recent Labs   Lab 08/17/19  0457 08/18/19  0509    140   K 4.3 5.3*    108   CO2 24 26   GLU 88 87   BUN 2* 2*   CREATININE 0.6 0.7   CALCIUM 8.5* 8.9   PROT 4.9* 5.0*   ALBUMIN 2.5* 2.6*   BILITOT 1.1* 1.2*   ALKPHOS 74 74   AST 15 16   ALT 10 11   ANIONGAP 7* 6*   EGFRNONAA >60 >60       Significant Imaging: I have reviewed all pertinent imaging results/findings within the past 24 hours.      Assessment/Plan:      * Sepsis  - Criteria including WBC 0.80 & 1.01, tachycardia 113  - CT of ABD/Pelvis notes Diffuse inflammatory changes of the descending colon and rectosigmoid consistent with colitis/proctitis.  No evidence of a pericolonic abscess can be seen.  The appendix cannot be identified.  Fluid-filled small bowel loops in the pelvis likely represent an ileus.  1.1 cm in diameter gallbladder calculus.  Splenomegaly with the spleen measuring 18 cm.  - BC x 1 on 8/12 show gram + cocci/clusters resembling staph in aerobic and anaerobic bottle, organism is a probable  contaminate  - Received a total of 1.7L of IVFs in the ER  - Continue IVFs  - Continue Cipro/flagyl for Colitis  - Remains hemodynamically stable  - Monitor    Acute colitis  - Cdiff negative  - Stool cx shows NGTD  - Stool for ova/parasites pending  - Continue Cipro and Flagyl  - IV fluids  - Mech soft diet as tolerated   - Questran added on Tuesday, with improvement in diarrhea, will continue  - Prn analgesia and antiemetics      Pancytopenia  - CT imaging Splenomegaly with the spleen measuring 18 cm.  - Hematology consulted; likely r/t sepsis and colitis  - Counts continue to improve  - Underwent Bone Marrow biopsy on 8/14 and per Dr. Nowak, BM negative for leukemia  - Hemoc recommends ANC is >1000 prior to DC  - Daily CBC  - Monitor      Hypomagnesemia  - Mag 1.4  - Started on Mag Ox BID  - Will give 2 g IVPB x 1 today  - Repeat chemistries in AM  - Replace magnesium and phosphorous as needed      Thrush  - Continue Nystatin swish and swallow      CAD (coronary artery disease)  - Denies any chest pain  - Continue ASA, Atorvastatin, Fenofibrate, Ranexa, Lopressor and Lisinopril   - Monitor      Hypertension associated with diabetes  - BP under fair control  - Continue Lopressor, Norvasc, and Lisinopril   - monitor and adjust meds as needed        Controlled type 2 diabetes mellitus with stage 2 chronic kidney disease, without long-term current use of insulin  - BG under fair control  - A1c 4.1  - Hold home Metformin  - Accu checks ACHS with SSI prn  - Monitor      Hyperlipidemia associated with type 2 diabetes mellitus  - Continue Statin and Fenofibrate      Moderate COPD (chronic obstructive pulmonary disease)  - Currently appears compensated  - Continue nebs  - Supplemental O2 prn  - monitor      Anxiety  - Continue home meds      GERD (gastroesophageal reflux disease)  - Continue home Protonix      JUDI treated with BiPAP  - Cpap q hs        VTE Risk Mitigation (From admission, onward)        Ordered      Place sequential compression device  Until discontinued     Hold pharmacological AC given thrombocytopenia. 08/16/19 1430                Marti Bhatia DNP, ACNP-BC  Department of Hospital Medicine   [facility]

## 2019-08-18 NOTE — PROGRESS NOTES
[facility]   Hematology/Oncology  Progress Note    Patient Name: Kodak Valentine  Admission Date: 8/12/2019  Hospital Length of Stay: 6 days  Code Status: No Order     Subjective:     HPI:  Pt is a 50 yo male with PMHx of CAD with stenting x 2 LAD, COPD, DM II, HTN, JUDI and HPL who presents to the ED with reports of severe, constant cramping lower abdominal pain x 5 days. Associated symptoms include profuse, greenish watery diarrhea, diaphoresis, lightheadedness, dark urine (today) and vomiting (1-2 days now resolved). Pt had back surgery 6 weeks ago but his back pain is no worse than usual. Pt's wife was treated at [facility] approx 6 weeks ago. Pt denies URI symptoms, cough, chest pain, palpitations, bloody diarrhea and other symptoms. He last ate some yogurt yesterday. V/S on arrival: Temp 98.0, pulse 113, resp 18 and B/P 133/70. Labs find WBC 0.80, Hgb 11.5?Hct 32.7, plt 92, left shift 22 %, hyponatremia 133, hypokalemia 2.6, gluc 176, total bili 1.5, .8. U/A was altered due to dark color. A contrasted CT ABD/Pelvis was resulted at 16:17 which found diffuse inflammatory changes of the descending colon and rectosigmoid consistent with colitis/proctitis.  No evidence of a pericolonic abscess can be seen. Pt is admitted for Sepsis, Acute Colitis and electrolyte imbalances.     Hematology/oncology consulted for pancytopenia.       Interval History:  8/14/19 Patient seen at the bedside today.  States still with diarrhea.  C Diff negative. Continues to remain afebrile.  Awaiting BMB today.  Complains of back pain due to back surgery 6 weeks ago.    8/15/19 Patient assessed at the bedside this morning.  Has BMB yesterday - tolerated well.  Complains of continued watery diarrhea - states increased in frequency.  States pain better controled.      8/16/19 Patient assessed at the bedside today.  States continued diarrhea despite adding questran and prn lomotil to treatment regimen.  BMB  results pending.  Pain controlled.  ANC 0.2     August 17, 2019-the patient reports that overall his loose stools are slowly improving.  He is also beginning to feel stronger at this time.    August 18, 2019-the patient is loose stools continue to improve.  Overnight clinical events reviewed.    Oncology Treatment Plan:   [No treatment plan]    Medications:  Continuous Infusions:   sodium chloride 0.9% 125 mL/hr at 08/18/19 0212     Scheduled Meds:   amLODIPine  2.5 mg Oral Daily    arformoterol  15 mcg Nebulization Q12H    aspirin  81 mg Oral Daily    atorvastatin  20 mg Oral Daily    budesonide  0.5 mg Nebulization Q12H    cholestyramine  1 packet Oral BID    ciprofloxacin HCl  500 mg Oral Q12H    DULoxetine  60 mg Oral Daily    fenofibrate  160 mg Oral Daily    fluconazole  200 mg Oral Daily    folic acid  1 mg Oral Daily    lisinopril  20 mg Oral Daily    magnesium oxide  400 mg Oral BID    magnesium sulfate IVPB  2 g Intravenous Once    metoprolol tartrate  50 mg Oral Q12H    metroNIDAZOLE  500 mg Oral Q8H    nystatin  500,000 Units Oral QID (WM & HS)    pantoprazole  40 mg Oral Daily    ranolazine  1,000 mg Oral BID    tiZANidine  4 mg Oral Q8H    traZODone  200 mg Oral QHS     PRN Meds:albuterol sulfate, ALPRAZolam, Dextrose 10% Bolus, Dextrose 10% Bolus, diphenoxylate-atropine 2.5-0.025 mg, glucagon (human recombinant), glucose, glucose, insulin aspart U-100, ondansetron, oxyCODONE-acetaminophen, promethazine (PHENERGAN) IVPB     Review of patient's allergies indicates:  No Known Allergies     Past Medical History:   Diagnosis Date    Anxiety     CAD (coronary artery disease)     PTCA x 2 LAD, restenosis and restent 7/12.    Chest pain syndrome 10/2/2015    COPD (chronic obstructive pulmonary disease)     CPAP (continuous positive airway pressure) dependence     @ night    Diabetes mellitus type 2, uncontrolled 4/13/2016    History of duodenal ulcer     with bleed     Hypertension     Mixed hyperlipidemia     UJDI (obstructive sleep apnea)     severe    S/P PTCA (percutaneous transluminal coronary angioplasty) 3/11/2015    Vitamin D deficiency      Past Surgical History:   Procedure Laterality Date    APPENDECTOMY      CARDIAC CATHETERIZATION      CATARACT EXTRACTION W/  INTRAOCULAR LENS IMPLANT Right 4-22-15    CORONARY STENT PLACEMENT      ESOPHAGOGASTRODUODENOSCOPY (EGD) N/A 2014    Performed [facility]    EXCISION-SKIN Right 2015    Performed by Louis O. Jeansonne IV, MD at [facility]    HEART CATH WITH GRAFTS-LEFT Right 2014    Performed [facility] Erma Green [facility] at [facility] CATH LAB    HEART CATH-LEFT Left 2017    Per[facility] by [facility] Green [facility] at [facility] CATH [facility]    HEMORRHOID SURGERY      INCISION AND DRAINAGE (I&D), BUTTOCK  3/26/2015    Performed [facility], [facility] at [facility] OR    INCISION AND DRAINAGE-THIGH Right 3/26/2015    Performed [facility] at [facility]    NISSEN FUNDOPLICATION      lap    SKIN LESION EXCISION Right     leg     Family History     Problem Relation (Age of Onset)    COPD Maternal Grandmother, Maternal Grandfather    Diabetes Maternal Grandfather    Heart block Father    Heart disease Mother, Sister        Tobacco Use    Smoking status: Former Smoker     Packs/day: 0.50     Years: 20.00     Pack years: 10.00     Types: Cigarettes     Last attempt to quit: 6/3/2014     Years since quittin.2    Smokeless tobacco: Never Used    Tobacco comment: currently vaping with nicotine since quit smoking in 2014   Substance and Sexual Activity    Alcohol use: Yes     Alcohol/week: 2.4 oz     Types: 4 Cans of beer per week    Drug use: No    Sexual activity: Not on file       Review of Systems   Constitutional: Positive for activity change, appetite change and fatigue. Negative for chills, diaphoresis and fever.   HENT: Negative.    Eyes: Negative.    Respiratory: Negative  for cough and shortness of breath.    Cardiovascular: Negative for chest pain, palpitations and leg swelling.   Gastrointestinal: Positive for abdominal pain and diarrhea. Negative for nausea and vomiting.   Genitourinary: Negative for difficulty urinating.        Darkened urine   Musculoskeletal: Negative for back pain.   Skin: Negative for pallor, rash and wound.   Allergic/Immunologic: Positive for immunocompromised state.   Neurological: Positive for weakness. Negative for syncope and light-headedness.   Hematological: Negative for adenopathy. Does not bruise/bleed easily.   Psychiatric/Behavioral: Negative for confusion. The patient is nervous/anxious.      Objective:     Vital Signs (Most Recent):  Temp: 98 °F (36.7 °C) (08/18/19 1237)  Pulse: 74 (08/18/19 1237)  Resp: 18 (08/18/19 1237)  BP: 122/67 (08/18/19 1237)  SpO2: 100 % (08/18/19 1237) Vital Signs (24h Range):  Temp:  [97.9 °F (36.6 °C)-98.3 °F (36.8 °C)] 98 °F (36.7 °C)  Pulse:  [54-74] 74  Resp:  [13-18] 18  SpO2:  [96 %-100 %] 100 %  BP: (120-153)/(59-81) 122/67     Weight: 88.2 kg (194 lb 5.4 oz)  Body mass index is 27.88 kg/m².  Body surface area is 2.09 meters squared.      Intake/Output Summary (Last 24 hours) at 8/18/2019 1310  Last data filed at 8/18/2019 1218  Gross per 24 hour   Intake 4677.5 ml   Output --   Net 4677.5 ml       Physical Exam   Constitutional: He is oriented to person, place, and time. He appears well-developed. He has a sickly appearance. No distress.   HENT:   Head: Normocephalic and atraumatic.   Nose: Nose normal.   Eyes: Pupils are equal, round, and reactive to light. Conjunctivae are normal. No scleral icterus.   Neck: Normal range of motion. Neck supple.   Cardiovascular: Normal rate, regular rhythm and normal heart sounds. Exam reveals no gallop and no friction rub.   No murmur heard.  Pulmonary/Chest: Effort normal and breath sounds normal.   Abdominal: Soft. Bowel sounds are increased.   Musculoskeletal: Normal  range of motion. He exhibits no edema or tenderness.   Neurological: He is alert and oriented to person, place, and time.   Skin: Skin is warm and dry. He is not diaphoretic. There is pallor.   Psychiatric: His speech is normal and behavior is normal. Judgment and thought content normal. His mood appears anxious. He is not actively hallucinating. Cognition and memory are normal. He is attentive.   Vitals reviewed.      Significant Labs:   CBC:   Recent Labs   Lab 08/17/19 0457 08/18/19  0509   WBC 1.31* 1.36*   HGB 8.5* 8.4*   HCT 24.3* 24.0*   PLT 87* 86*   , CMP:   Recent Labs   Lab 08/17/19 0457 08/18/19  0509    140   K 4.3 5.3*    108   CO2 24 26   GLU 88 87   BUN 2* 2*   CREATININE 0.6 0.7   CALCIUM 8.5* 8.9   PROT 4.9* 5.0*   ALBUMIN 2.5* 2.6*   BILITOT 1.1* 1.2*   ALKPHOS 74 74   AST 15 16   ALT 10 11   ANIONGAP 7* 6*   EGFRNONAA >60 >60   , Coagulation:   No results for input(s): PT, INR, APTT in the last 48 hours., LDH: No results for input(s): LDHCSF, BFSOURCE in the last 48 hours., LFTs:   Recent Labs   Lab 08/17/19 0457 08/18/19  0509   ALT 10 11   AST 15 16   ALKPHOS 74 74   BILITOT 1.1* 1.2*   PROT 4.9* 5.0*   ALBUMIN 2.5* 2.6*   , Urine Studies:   No results for input(s): COLORU, APPEARANCEUA, PHUR, SPECGRAV, PROTEINUA, GLUCUA, KETONESU, BILIRUBINUA, OCCULTUA, NITRITE, UROBILINOGEN, LEUKOCYTESUR, RBCUA, WBCUA, BACTERIA, SQUAMEPITHEL, HYALINECASTS in the last 48 hours.    Invalid input(s): WRIGHTSUR and All pertinent labs from the last 24 hours have been reviewed.    Diagnostic Results:  I have reviewed all pertinent imaging results/findings within the past 24 hours.    Assessment/Plan:     Acute colitis  8/15/19 states continued watery diarrhea.  States frequency improved.  Started on Questran yesterday.   ordered Immodium today prn.  C. Diff negative.  Has remained on cipro and flagyl IV for treatment of colitis.  Continue IV fluid - replace electrolytes - Magnesium 1.2 today,  potassium WNL.  --CMP daily  --questran per   --Imodium prn per     8/16/19 Continued watery diarrhea despite adding questran and lomotil to treatment regime.  C. Diff negative.  On flagyl and cipro for treatment of colitis.    --CMP daily  --Consult GI for further recommendations  --Replace electrolytes as needed per     August 17, 2019-I had a detailed discussion with the patient today with regard to his current clinical situation.  Overall this appears to be gradually improving.  Management will be as per Hospital Medicine.  Stool culture is pending at this time.    Pancytopenia  8/13/19 Patient with newly diagnosed pancytopenia WBC 0.83 (ANC 0.1), hemoglobin 8.8, platelets 83 K.  With diarrhea that started 2 days ago perfuse green watery.  C. Diff negative.  States had night sweats for 2 days.  Denies known fever.  Scheduled for BMB tomorrow at noon.  No need for transfusion at this time.  Splenomegaly on CT scan of abdomen/pelvis with diffuse mural thickening and inflammation of the adjacent fat of the rectosigmoid and descending colon consistent with colitis.  .8.  Blood cultures NGTD.    --Continue supportive care  --No GSCF support for now, until get results of BMB  --BMB tomorrow at noon    8/14/19 Continued pancytopenia WBC 0.87 (ANC 0.2), hemoglobin 9.7, platelets 86K.  Splenomegaly. Folic acid deficiency.  Not iron deficient or B12 deficient.  Continued diarrhea.  C. Diff negative.  Scheduled for BMB today.    --CBC daily  --Monitor for s/s of infection  --Start folate 1 mg Po dialy    8/15/19 Continued stable pancytopenia WBC 0.93, hemoglobin 8.2, platelets 87.  No need for platelets or prbc's at this time.  Patient has remained afebrile.  Started on folic acid 1 mg PO daily for folic acid deficiency.  Had BMB yesterday.  Tolerated well.    --CBC daily  --Monitor for s/s of infection  --continue folate 1 mg PO dialy  --Await BMB results    August 17, 2019-I had a detailed discussion with  the patient today with regard to his current clinical situation.  His pancytopenia appears to be slowly improving.  Preliminary results of bone marrow biopsy were reviewed with hematopathology and discussed with patient today.  No evidence of acute leukemia noted.  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  I would expect to see continued improvement in his blood counts over the next 24-48 hours.  I have discussed this with Hospital Medicine today.    August 18, 2019-I had a detailed discussion with the patient today with regard to his current clinical situation.  Overall his clinical symptoms are slowly improving.  I would recommend that the patient be discharged home once ANC greater than 1000.  Please call with any questions.          Thank you for your consult.      Cash Nowak MD  Hematology/Oncology  [facility]

## 2019-08-18 NOTE — ASSESSMENT & PLAN NOTE
- Criteria including WBC 0.80 & 1.01, tachycardia 113  - CT of ABD/Pelvis notes Diffuse inflammatory changes of the descending colon and rectosigmoid consistent with colitis/proctitis.  No evidence of a pericolonic abscess can be seen.  The appendix cannot be identified.  Fluid-filled small bowel loops in the pelvis likely represent an ileus.  1.1 cm in diameter gallbladder calculus.  Splenomegaly with the spleen measuring 18 cm.  - BC x 1 on 8/12 show gram + cocci/clusters resembling staph in aerobic and anaerobic bottle, organism is a probable contaminate  - Received a total of 1.7L of IVFs in the ER  - Continue IVFs  - Continue Cipro/flagyl for Colitis  - Remains hemodynamically stable  - Monitor

## 2019-08-18 NOTE — ASSESSMENT & PLAN NOTE
- BP under fair control  - Continue Lopressor, Norvasc, and Lisinopril   - monitor and adjust meds as needed

## 2019-08-18 NOTE — PLAN OF CARE
Problem: Adult Inpatient Plan of Care  Goal: Plan of Care Review  Outcome: Ongoing (interventions implemented as appropriate)  POC reviewed with patient. Pt verbalized understanding  Pt remains free of injuries and falls; fall precaution in place.   Frequent complaints of pain on this shift.  Continuous heart monitor. Normal sinus   Bed low, side rails x2, call light in reach, personal belongings at bedside.  Reminded to call for assistance.  Chart check complete. Will continue to monitor.

## 2019-08-18 NOTE — ASSESSMENT & PLAN NOTE
8/13/19 Patient with newly diagnosed pancytopenia WBC 0.83 (ANC 0.1), hemoglobin 8.8, platelets 83 K.  With diarrhea that started 2 days ago perfuse green watery.  C. Diff negative.  States had night sweats for 2 days.  Denies known fever.  Scheduled for BMB tomorrow at noon.  No need for transfusion at this time.  Splenomegaly on CT scan of abdomen/pelvis with diffuse mural thickening and inflammation of the adjacent fat of the rectosigmoid and descending colon consistent with colitis.  .8.  Blood cultures NGTD.    --Continue supportive care  --No GSCF support for now, until get results of BMB  --BMB tomorrow at noon    8/14/19 Continued pancytopenia WBC 0.87 (ANC 0.2), hemoglobin 9.7, platelets 86K.  Splenomegaly. Folic acid deficiency.  Not iron deficient or B12 deficient.  Continued diarrhea.  C. Diff negative.  Scheduled for BMB today.    --CBC daily  --Monitor for s/s of infection  --Start folate 1 mg Po dialy    8/15/19 Continued stable pancytopenia WBC 0.93, hemoglobin 8.2, platelets 87.  No need for platelets or prbc's at this time.  Patient has remained afebrile.  Started on folic acid 1 mg PO daily for folic acid deficiency.  Had BMB yesterday.  Tolerated well.    --CBC daily  --Monitor for s/s of infection  --continue folate 1 mg PO dialy  --Await BMB results    August 17, 2019-I had a detailed discussion with the patient today with regard to his current clinical situation.  His pancytopenia appears to be slowly improving.  Preliminary results of bone marrow biopsy were reviewed with hematopathology and discussed with patient today.  No evidence of acute leukemia noted.  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  I would expect to see continued improvement in his blood counts over the next 24-48 hours.  I have discussed this with Hospital Medicine today.    August 18, 2019-I had a detailed discussion with the patient today with regard to his current  clinical situation.  Overall his clinical symptoms are slowly improving.  I would recommend that the patient be discharged home once ANC greater than 1000.  Please call with any questions.

## 2019-08-18 NOTE — PLAN OF CARE
Problem: Adult Inpatient Plan of Care  Goal: Plan of Care Review  Outcome: Ongoing (interventions implemented as appropriate)  Fall prevention precautions maintained, pt remained free of falls throughout shift, call bell and personal items within reach, pt's pain adequately controlled by prn pain medication, IV fluids and antibiotics continued per order. 24 hour chart check completed. Will continue to monitor

## 2019-08-18 NOTE — SUBJECTIVE & OBJECTIVE
Interval History:  No acute events overnight.  Currently resting comfortably in bed in NAD.  No change in pancytopenia overnight.  Per Hemoc, cannot DC until ANC is >1000.  Patient continues to report improvement in diarrhea.  He remains afebrile.  Stool cx shows NGTD.  Ecoli, ova/parasites pending.  Continue oral Cipro/flagyl for colitis.      Review of Systems   Constitutional: Positive for activity change and fatigue. Negative for appetite change, chills, diaphoresis and fever.   HENT: Negative.    Eyes: Negative.    Respiratory: Negative for cough and shortness of breath.    Cardiovascular: Negative for chest pain, palpitations and leg swelling.   Gastrointestinal: Positive for diarrhea. Negative for abdominal pain, nausea and vomiting.        Reports diarrhea is improving   Genitourinary: Negative for difficulty urinating.   Musculoskeletal: Positive for back pain.   Skin: Negative for pallor, rash and wound.   Neurological: Positive for weakness. Negative for syncope and light-headedness.   Psychiatric/Behavioral: The patient is not nervous/anxious.      Objective:     Vital Signs (Most Recent):  Temp: 97.9 °F (36.6 °C) (08/18/19 0737)  Pulse: (!) 57 (08/18/19 0737)  Resp: 13 (08/18/19 0737)  BP: (!) 153/81 (08/18/19 0737)  SpO2: 96 % (08/18/19 0737) Vital Signs (24h Range):  Temp:  [97.9 °F (36.6 °C)-98.3 °F (36.8 °C)] 97.9 °F (36.6 °C)  Pulse:  [54-69] 57  Resp:  [13-18] 13  SpO2:  [96 %-100 %] 96 %  BP: (120-153)/(59-81) 153/81     Weight: 88.2 kg (194 lb 5.4 oz)  Body mass index is 27.88 kg/m².    Intake/Output Summary (Last 24 hours) at 8/18/2019 1036  Last data filed at 8/18/2019 0626  Gross per 24 hour   Intake 3894.17 ml   Output --   Net 3894.17 ml      Physical Exam   Constitutional: He is oriented to person, place, and time. He appears well-developed and well-nourished. He has a sickly appearance. No distress.   HENT:   Head: Normocephalic and atraumatic.   Nose: Nose normal.   Mouth/Throat:  Oropharynx is clear and moist.   Eyes: Pupils are equal, round, and reactive to light. Conjunctivae and EOM are normal. No scleral icterus.   Neck: Normal range of motion. Neck supple. No JVD present.   Cardiovascular: Normal rate, regular rhythm, normal heart sounds and intact distal pulses. Exam reveals no gallop and no friction rub.   No murmur heard.  Pulmonary/Chest: Effort normal and breath sounds normal. No stridor. No respiratory distress. He has no wheezes. He has no rales. He exhibits no tenderness.   Abdominal: Soft. He exhibits no distension and no mass. Bowel sounds are increased. There is no tenderness. There is no rebound and no guarding.   Musculoskeletal: Normal range of motion. He exhibits no edema or tenderness.   Neurological: He is alert and oriented to person, place, and time. No cranial nerve deficit.   Skin: Skin is warm and dry. No rash noted. He is not diaphoretic. No erythema. There is pallor.   Incisions to back are healed with no s/s of infection.   Psychiatric: He has a normal mood and affect. His behavior is normal. Judgment and thought content normal.   Nursing note and vitals reviewed.      Significant Labs:   Blood Culture: No results for input(s): LABBLOO in the last 48 hours.  CBC:   Recent Labs   Lab 08/17/19  0457 08/18/19  0509   WBC 1.31* 1.36*   HGB 8.5* 8.4*   HCT 24.3* 24.0*   PLT 87* 86*     CMP:   Recent Labs   Lab 08/17/19  0457 08/18/19  0509    140   K 4.3 5.3*    108   CO2 24 26   GLU 88 87   BUN 2* 2*   CREATININE 0.6 0.7   CALCIUM 8.5* 8.9   PROT 4.9* 5.0*   ALBUMIN 2.5* 2.6*   BILITOT 1.1* 1.2*   ALKPHOS 74 74   AST 15 16   ALT 10 11   ANIONGAP 7* 6*   EGFRNONAA >60 >60       Significant Imaging: I have reviewed all pertinent imaging results/findings within the past 24 hours.

## 2019-08-18 NOTE — SUBJECTIVE & OBJECTIVE
Interval History:  8/14/19 Patient seen at the bedside today.  States still with diarrhea.  C Diff negative. Continues to remain afebrile.  Awaiting BMB today.  Complains of back pain due to back surgery 6 weeks ago.    8/15/19 Patient assessed at the bedside this morning.  Has BMB yesterday - tolerated well.  Complains of continued watery diarrhea - states increased in frequency.  States pain better controled.      8/16/19 Patient assessed at the bedside today.  States continued diarrhea despite adding questran and prn lomotil to treatment regimen.  BMB results pending.  Pain controlled.  ANC 0.2     August 17, 2019-the patient reports that overall his loose stools are slowly improving.  He is also beginning to feel stronger at this time.    August 18, 2019-the patient is loose stools continue to improve.  Overnight clinical events reviewed.    Oncology Treatment Plan:   [No treatment plan]    Medications:  Continuous Infusions:   sodium chloride 0.9% 125 mL/hr at 08/18/19 0212     Scheduled Meds:   amLODIPine  2.5 mg Oral Daily    arformoterol  15 mcg Nebulization Q12H    aspirin  81 mg Oral Daily    atorvastatin  20 mg Oral Daily    budesonide  0.5 mg Nebulization Q12H    cholestyramine  1 packet Oral BID    ciprofloxacin HCl  500 mg Oral Q12H    DULoxetine  60 mg Oral Daily    fenofibrate  160 mg Oral Daily    fluconazole  200 mg Oral Daily    folic acid  1 mg Oral Daily    lisinopril  20 mg Oral Daily    magnesium oxide  400 mg Oral BID    magnesium sulfate IVPB  2 g Intravenous Once    metoprolol tartrate  50 mg Oral Q12H    metroNIDAZOLE  500 mg Oral Q8H    nystatin  500,000 Units Oral QID (WM & HS)    pantoprazole  40 mg Oral Daily    ranolazine  1,000 mg Oral BID    tiZANidine  4 mg Oral Q8H    traZODone  200 mg Oral QHS     PRN Meds:albuterol sulfate, ALPRAZolam, Dextrose 10% Bolus, Dextrose 10% Bolus, diphenoxylate-atropine 2.5-0.025 mg, glucagon (human recombinant), glucose,  glucose, insulin aspart U-100, ondansetron, oxyCODONE-acetaminophen, promethazine (PHENERGAN) IVPB     Review of patient's allergies indicates:  No Known Allergies     Past Medical History:   Diagnosis Date    Anxiety     CAD (coronary artery disease)     PTCA x 2 LAD, restenosis and restent 7/12.    Chest pain syndrome 10/2/2015    COPD (chronic obstructive pulmonary disease)     CPAP (continuous positive airway pressure) dependence     @ night    Diabetes mellitus type 2, uncontrolled 4/13/2016    History of duodenal ulcer     with bleed    Hypertension     Mixed hyperlipidemia     JUDI (obstructive sleep apnea)     severe    S/P PTCA (percutaneous transluminal coronary angioplasty) 3/11/2015    Vitamin D deficiency      Past Surgical History:   Procedure Laterality Date    APPENDECTOMY      CARDIAC CATHETERIZATION      CATARACT EXTRACTION W/  INTRAOCULAR LENS IMPLANT Right 4-22-15    CORONARY STENT PLACEMENT  2012    ESOPHAGOGASTRODUODENOSCOPY (EGD) N/A 7/31/2014    Performed by [facility] Cruz [facility] ENDO    EXCISION-SKIN Right 4/20/2015    Performed by Louis O. Jeansonne IV, MD at [facility] OR    HEART CATH WITH GRAFTS-LEFT Right 6/2/2014    Performed by Erma Green [facility] at [facility] CATH LAB    HEART CATH-LEFT Left 2/21/2017    Performed by Erma Green [facility] at [facility] CATH LAB    HEMORRHOID SURGERY      INCISION AND DRAINAGE (I&D), BUTTOCK  3/26/2015    Performed by [facility], [facility] OR    INCISION AND DRAINAGE-THIGH Right 3/26/2015    Performed by [facility], [facility] at [facility] OR    NISSEN FUNDOPLICATION      lap    SKIN LESION EXCISION Right     leg     Family History     Problem Relation (Age of Onset)    COPD Maternal Grandmother, Maternal Grandfather    Diabetes Maternal Grandfather    Heart block Father    Heart disease Mother, Sister        Tobacco Use    Smoking status: Former Smoker     Packs/day: 0.50     Years: 20.00     Pack years: 10.00     Types:  Cigarettes     Last attempt to quit: 6/3/2014     Years since quittin.2    Smokeless tobacco: Never Used    Tobacco comment: currently vaping with nicotine since quit smoking in 2014   Substance and Sexual Activity    Alcohol use: Yes     Alcohol/week: 2.4 oz     Types: 4 Cans of beer per week    Drug use: No    Sexual activity: Not on file       Review of Systems   Constitutional: Positive for activity change, appetite change and fatigue. Negative for chills, diaphoresis and fever.   HENT: Negative.    Eyes: Negative.    Respiratory: Negative for cough and shortness of breath.    Cardiovascular: Negative for chest pain, palpitations and leg swelling.   Gastrointestinal: Positive for abdominal pain and diarrhea. Negative for nausea and vomiting.   Genitourinary: Negative for difficulty urinating.        Darkened urine   Musculoskeletal: Negative for back pain.   Skin: Negative for pallor, rash and wound.   Allergic/Immunologic: Positive for immunocompromised state.   Neurological: Positive for weakness. Negative for syncope and light-headedness.   Hematological: Negative for adenopathy. Does not bruise/bleed easily.   Psychiatric/Behavioral: Negative for confusion. The patient is nervous/anxious.      Objective:     Vital Signs (Most Recent):  Temp: 98 °F (36.7 °C) (19 1237)  Pulse: 74 (19 1237)  Resp: 18 (19 1237)  BP: 122/67 (19 1237)  SpO2: 100 % (19 1237) Vital Signs (24h Range):  Temp:  [97.9 °F (36.6 °C)-98.3 °F (36.8 °C)] 98 °F (36.7 °C)  Pulse:  [54-74] 74  Resp:  [13-18] 18  SpO2:  [96 %-100 %] 100 %  BP: (120-153)/(59-81) 122/67     Weight: 88.2 kg (194 lb 5.4 oz)  Body mass index is 27.88 kg/m².  Body surface area is 2.09 meters squared.      Intake/Output Summary (Last 24 hours) at 2019 1310  Last data filed at 2019 1218  Gross per 24 hour   Intake 4677.5 ml   Output --   Net 4677.5 ml       Physical Exam   Constitutional: He is oriented to person,  place, and time. He appears well-developed. He has a sickly appearance. No distress.   HENT:   Head: Normocephalic and atraumatic.   Nose: Nose normal.   Eyes: Pupils are equal, round, and reactive to light. Conjunctivae are normal. No scleral icterus.   Neck: Normal range of motion. Neck supple.   Cardiovascular: Normal rate, regular rhythm and normal heart sounds. Exam reveals no gallop and no friction rub.   No murmur heard.  Pulmonary/Chest: Effort normal and breath sounds normal.   Abdominal: Soft. Bowel sounds are increased.   Musculoskeletal: Normal range of motion. He exhibits no edema or tenderness.   Neurological: He is alert and oriented to person, place, and time.   Skin: Skin is warm and dry. He is not diaphoretic. There is pallor.   Psychiatric: His speech is normal and behavior is normal. Judgment and thought content normal. His mood appears anxious. He is not actively hallucinating. Cognition and memory are normal. He is attentive.   Vitals reviewed.      Significant Labs:   CBC:   Recent Labs   Lab 08/17/19 0457 08/18/19  0509   WBC 1.31* 1.36*   HGB 8.5* 8.4*   HCT 24.3* 24.0*   PLT 87* 86*   , CMP:   Recent Labs   Lab 08/17/19 0457 08/18/19  0509    140   K 4.3 5.3*    108   CO2 24 26   GLU 88 87   BUN 2* 2*   CREATININE 0.6 0.7   CALCIUM 8.5* 8.9   PROT 4.9* 5.0*   ALBUMIN 2.5* 2.6*   BILITOT 1.1* 1.2*   ALKPHOS 74 74   AST 15 16   ALT 10 11   ANIONGAP 7* 6*   EGFRNONAA >60 >60   , Coagulation:   No results for input(s): PT, INR, APTT in the last 48 hours., LDH: No results for input(s): LDHCSF, BFSOURCE in the last 48 hours., LFTs:   Recent Labs   Lab 08/17/19 0457 08/18/19  0509   ALT 10 11   AST 15 16   ALKPHOS 74 74   BILITOT 1.1* 1.2*   PROT 4.9* 5.0*   ALBUMIN 2.5* 2.6*   , Urine Studies:   No results for input(s): COLORU, APPEARANCEUA, PHUR, SPECGRAV, PROTEINUA, GLUCUA, KETONESU, BILIRUBINUA, OCCULTUA, NITRITE, UROBILINOGEN, LEUKOCYTESUR, RBCUA, WBCUA, BACTERIA, SQUAMEPITHEL,  HYALINECASTS in the last 48 hours.    Invalid input(s): WRIGHTSUR and All pertinent labs from the last 24 hours have been reviewed.    Diagnostic Results:  I have reviewed all pertinent imaging results/findings within the past 24 hours.

## 2019-08-18 NOTE — ASSESSMENT & PLAN NOTE
- Mag 1.4  - Started on Mag Ox BID  - Will give 2 g IVPB x 1 today  - Repeat chemistries in AM  - Replace magnesium and phosphorous as needed

## 2019-08-18 NOTE — ASSESSMENT & PLAN NOTE
- Cdiff negative  - Stool cx shows NGTD  - Stool for ova/parasites pending  - Continue Cipro and Flagyl  - IV fluids  - Mech soft diet as tolerated   - Questran added on Tuesday, with improvement in diarrhea, will continue  - Prn analgesia and antiemetics

## 2019-08-18 NOTE — NURSING
AVS reviewed with patient and daughter. Follow up appointment and d/c medication discussed, D/C medication sent to verified pharmacy. Patient verbalized understanding with teach back. No complaints at this time. No further needs. IV removed per order. Patient tolerated well. Patient pain well controlled with PO pain medication, tolerating ordered diet, and voided prior to d/c. To be transported home per family.

## 2019-08-19 LAB
ALBUMIN SERPL BCP-MCNC: 2.6 G/DL (ref 3.5–5.2)
ALP SERPL-CCNC: 70 U/L (ref 55–135)
ALT SERPL W/O P-5'-P-CCNC: 9 U/L (ref 10–44)
ANION GAP SERPL CALC-SCNC: 6 MMOL/L (ref 8–16)
ANISOCYTOSIS BLD QL SMEAR: SLIGHT
AST SERPL-CCNC: 14 U/L (ref 10–40)
BACTERIA STL CULT: NORMAL
BASOPHILS NFR BLD: 0 % (ref 0–1.9)
BILIRUB SERPL-MCNC: 1.2 MG/DL (ref 0.1–1)
BUN SERPL-MCNC: 3 MG/DL (ref 6–20)
CALCIUM SERPL-MCNC: 8.4 MG/DL (ref 8.7–10.5)
CHLORIDE SERPL-SCNC: 105 MMOL/L (ref 95–110)
CO2 SERPL-SCNC: 26 MMOL/L (ref 23–29)
CREAT SERPL-MCNC: 0.8 MG/DL (ref 0.5–1.4)
DACRYOCYTES BLD QL SMEAR: ABNORMAL
DIFFERENTIAL METHOD: ABNORMAL
DNA/RNA EXTRACT AND HOLD RESULT: NORMAL
DNA/RNA EXTRACTION: NORMAL
E COLI SXT1 STL QL IA: NEGATIVE
E COLI SXT2 STL QL IA: NEGATIVE
EOSINOPHIL NFR BLD: 0 % (ref 0–8)
ERYTHROCYTE [DISTWIDTH] IN BLOOD BY AUTOMATED COUNT: 14 % (ref 11.5–14.5)
EST. GFR  (AFRICAN AMERICAN): >60 ML/MIN/1.73 M^2
EST. GFR  (NON AFRICAN AMERICAN): >60 ML/MIN/1.73 M^2
EXHR SPECIMEN TYPE: NORMAL
GLUCOSE SERPL-MCNC: 90 MG/DL (ref 70–110)
HCT VFR BLD AUTO: 22.8 % (ref 40–54)
HGB BLD-MCNC: 7.9 G/DL (ref 14–18)
LYMPHOCYTES NFR BLD: 40 % (ref 18–48)
MAGNESIUM SERPL-MCNC: 1.6 MG/DL (ref 1.6–2.6)
MCH RBC QN AUTO: 36.2 PG (ref 27–31)
MCHC RBC AUTO-ENTMCNC: 34.6 G/DL (ref 32–36)
MCV RBC AUTO: 105 FL (ref 82–98)
METAMYELOCYTES NFR BLD MANUAL: 6 %
MONOCYTES NFR BLD: 24 % (ref 4–15)
NEUTROPHILS NFR BLD: 30 % (ref 38–73)
O+P STL TRI STN: NORMAL
OVALOCYTES BLD QL SMEAR: ABNORMAL
PLATELET # BLD AUTO: 87 K/UL (ref 150–350)
PLATELET BLD QL SMEAR: ABNORMAL
PMV BLD AUTO: 8.5 FL (ref 9.2–12.9)
POCT GLUCOSE: 134 MG/DL (ref 70–110)
POCT GLUCOSE: 136 MG/DL (ref 70–110)
POCT GLUCOSE: 177 MG/DL (ref 70–110)
POCT GLUCOSE: 89 MG/DL (ref 70–110)
POIKILOCYTOSIS BLD QL SMEAR: SLIGHT
POLYCHROMASIA BLD QL SMEAR: ABNORMAL
POTASSIUM SERPL-SCNC: 4.6 MMOL/L (ref 3.5–5.1)
PROT SERPL-MCNC: 4.8 G/DL (ref 6–8.4)
RBC # BLD AUTO: 2.18 M/UL (ref 4.6–6.2)
SCHISTOCYTES BLD QL SMEAR: PRESENT
SODIUM SERPL-SCNC: 137 MMOL/L (ref 136–145)
SPHEROCYTES BLD QL SMEAR: ABNORMAL
STOMATOCYTES BLD QL SMEAR: PRESENT
TARGETS BLD QL SMEAR: ABNORMAL
WBC # BLD AUTO: 1.69 K/UL (ref 3.9–12.7)

## 2019-08-19 PROCEDURE — 25000003 PHARM REV CODE 250: Performed by: NURSE PRACTITIONER

## 2019-08-19 PROCEDURE — 99232 PR SUBSEQUENT HOSPITAL CARE,LEVL II: ICD-10-PCS | Mod: ,,, | Performed by: INTERNAL MEDICINE

## 2019-08-19 PROCEDURE — 36415 COLL VENOUS BLD VENIPUNCTURE: CPT

## 2019-08-19 PROCEDURE — 94761 N-INVAS EAR/PLS OXIMETRY MLT: CPT

## 2019-08-19 PROCEDURE — 83735 ASSAY OF MAGNESIUM: CPT

## 2019-08-19 PROCEDURE — 94640 AIRWAY INHALATION TREATMENT: CPT

## 2019-08-19 PROCEDURE — 63600175 PHARM REV CODE 636 W HCPCS: Performed by: NURSE PRACTITIONER

## 2019-08-19 PROCEDURE — 94644 CONT INHLJ TX 1ST HOUR: CPT

## 2019-08-19 PROCEDURE — 25000242 PHARM REV CODE 250 ALT 637 W/ HCPCS: Performed by: NURSE PRACTITIONER

## 2019-08-19 PROCEDURE — 25000003 PHARM REV CODE 250: Performed by: INTERNAL MEDICINE

## 2019-08-19 PROCEDURE — 21400001 HC TELEMETRY ROOM

## 2019-08-19 PROCEDURE — 85027 COMPLETE CBC AUTOMATED: CPT

## 2019-08-19 PROCEDURE — 99232 SBSQ HOSP IP/OBS MODERATE 35: CPT | Mod: ,,, | Performed by: INTERNAL MEDICINE

## 2019-08-19 PROCEDURE — 80053 COMPREHEN METABOLIC PANEL: CPT

## 2019-08-19 PROCEDURE — 63700000 PHARM REV CODE 250 ALT 637 W/O HCPCS: Performed by: NURSE PRACTITIONER

## 2019-08-19 PROCEDURE — 85007 BL SMEAR W/DIFF WBC COUNT: CPT

## 2019-08-19 RX ADMIN — METRONIDAZOLE 500 MG: 500 TABLET ORAL at 01:08

## 2019-08-19 RX ADMIN — METOPROLOL TARTRATE 50 MG: 50 TABLET ORAL at 08:08

## 2019-08-19 RX ADMIN — CHOLESTYRAMINE 4 G: 4 POWDER, FOR SUSPENSION ORAL at 08:08

## 2019-08-19 RX ADMIN — SODIUM CHLORIDE: 0.9 INJECTION, SOLUTION INTRAVENOUS at 07:08

## 2019-08-19 RX ADMIN — FLUCONAZOLE 200 MG: 100 TABLET ORAL at 08:08

## 2019-08-19 RX ADMIN — OXYCODONE HYDROCHLORIDE AND ACETAMINOPHEN 1 TABLET: 10; 325 TABLET ORAL at 08:08

## 2019-08-19 RX ADMIN — OXYCODONE HYDROCHLORIDE AND ACETAMINOPHEN 1 TABLET: 10; 325 TABLET ORAL at 12:08

## 2019-08-19 RX ADMIN — LISINOPRIL 20 MG: 20 TABLET ORAL at 08:08

## 2019-08-19 RX ADMIN — FENOFIBRATE 160 MG: 160 TABLET ORAL at 08:08

## 2019-08-19 RX ADMIN — MAGNESIUM OXIDE TAB 400 MG (241.3 MG ELEMENTAL MG) 400 MG: 400 (241.3 MG) TAB at 08:08

## 2019-08-19 RX ADMIN — TIZANIDINE 4 MG: 4 TABLET ORAL at 05:08

## 2019-08-19 RX ADMIN — CIPROFLOXACIN HYDROCHLORIDE 500 MG: 500 TABLET, FILM COATED ORAL at 08:08

## 2019-08-19 RX ADMIN — DULOXETINE 60 MG: 30 CAPSULE, DELAYED RELEASE ORAL at 08:08

## 2019-08-19 RX ADMIN — TIZANIDINE 4 MG: 4 TABLET ORAL at 09:08

## 2019-08-19 RX ADMIN — OXYCODONE HYDROCHLORIDE AND ACETAMINOPHEN 1 TABLET: 10; 325 TABLET ORAL at 01:08

## 2019-08-19 RX ADMIN — NYSTATIN 500000 UNITS: 100000 SUSPENSION ORAL at 08:08

## 2019-08-19 RX ADMIN — AMLODIPINE BESYLATE 2.5 MG: 2.5 TABLET ORAL at 08:08

## 2019-08-19 RX ADMIN — SODIUM CHLORIDE: 0.9 INJECTION, SOLUTION INTRAVENOUS at 11:08

## 2019-08-19 RX ADMIN — METRONIDAZOLE 500 MG: 500 TABLET ORAL at 09:08

## 2019-08-19 RX ADMIN — FOLIC ACID 1 MG: 1 TABLET ORAL at 08:08

## 2019-08-19 RX ADMIN — OXYCODONE HYDROCHLORIDE AND ACETAMINOPHEN 1 TABLET: 10; 325 TABLET ORAL at 04:08

## 2019-08-19 RX ADMIN — ALPRAZOLAM 0.25 MG: 0.25 TABLET ORAL at 07:08

## 2019-08-19 RX ADMIN — BUDESONIDE 0.5 MG: 0.5 SUSPENSION RESPIRATORY (INHALATION) at 07:08

## 2019-08-19 RX ADMIN — OXYCODONE HYDROCHLORIDE AND ACETAMINOPHEN 1 TABLET: 10; 325 TABLET ORAL at 09:08

## 2019-08-19 RX ADMIN — NYSTATIN 500000 UNITS: 100000 SUSPENSION ORAL at 04:08

## 2019-08-19 RX ADMIN — ARFORMOTEROL TARTRATE 15 MCG: 15 SOLUTION RESPIRATORY (INHALATION) at 07:08

## 2019-08-19 RX ADMIN — DIPHENOXYLATE HYDROCHLORIDE AND ATROPINE SULFATE 1 TABLET: 2.5; .025 TABLET ORAL at 04:08

## 2019-08-19 RX ADMIN — RANOLAZINE 1000 MG: 500 TABLET, FILM COATED, EXTENDED RELEASE ORAL at 08:08

## 2019-08-19 RX ADMIN — ALPRAZOLAM 0.25 MG: 0.25 TABLET ORAL at 02:08

## 2019-08-19 RX ADMIN — PANTOPRAZOLE SODIUM 40 MG: 40 TABLET, DELAYED RELEASE ORAL at 08:08

## 2019-08-19 RX ADMIN — OXYCODONE HYDROCHLORIDE AND ACETAMINOPHEN 1 TABLET: 10; 325 TABLET ORAL at 05:08

## 2019-08-19 RX ADMIN — ALPRAZOLAM 0.25 MG: 0.25 TABLET ORAL at 11:08

## 2019-08-19 RX ADMIN — ASPIRIN 81 MG: 81 TABLET, COATED ORAL at 08:08

## 2019-08-19 RX ADMIN — TIZANIDINE 4 MG: 4 TABLET ORAL at 01:08

## 2019-08-19 RX ADMIN — SODIUM CHLORIDE: 0.9 INJECTION, SOLUTION INTRAVENOUS at 02:08

## 2019-08-19 RX ADMIN — METRONIDAZOLE 500 MG: 500 TABLET ORAL at 05:08

## 2019-08-19 RX ADMIN — TRAZODONE HYDROCHLORIDE 200 MG: 100 TABLET ORAL at 08:08

## 2019-08-19 RX ADMIN — ATORVASTATIN CALCIUM 20 MG: 10 TABLET, FILM COATED ORAL at 08:08

## 2019-08-19 RX ADMIN — DIPHENOXYLATE HYDROCHLORIDE AND ATROPINE SULFATE 1 TABLET: 2.5; .025 TABLET ORAL at 11:08

## 2019-08-19 RX ADMIN — DIPHENOXYLATE HYDROCHLORIDE AND ATROPINE SULFATE 1 TABLET: 2.5; .025 TABLET ORAL at 05:08

## 2019-08-19 RX ADMIN — NYSTATIN 500000 UNITS: 100000 SUSPENSION ORAL at 11:08

## 2019-08-19 NOTE — PLAN OF CARE
CM met with patient at the bedside to discuss the discharge plan.  Patient denies any discharge needs at this time.     08/19/19 1209   Discharge Reassessment   Assessment Type Discharge Planning Reassessment   Provided patient/caregiver education on the expected discharge date and the discharge plan Yes   Do you have any problems affording any of your prescribed medications? No   Discharge Plan A Home   DME Needed Upon Discharge  none   Patient choice form signed by patient/caregiver N/A   Anticipated Discharge Disposition Home   Can the patient answer the patient profile reliably? Yes, cognitively intact

## 2019-08-19 NOTE — PLAN OF CARE
Problem: Adult Inpatient Plan of Care  Goal: Plan of Care Review  Pt states that he uses inhalers at home.  Pt tolerating well with no distress noted.

## 2019-08-19 NOTE — ASSESSMENT & PLAN NOTE
8/15/19 states continued watery diarrhea.  States frequency improved.  Started on Questran yesterday.   ordered Immodium today prn.  C. Diff negative.  Has remained on cipro and flagyl IV for treatment of colitis.  Continue IV fluid - replace electrolytes - Magnesium 1.2 today, potassium WNL.  --CMP daily  --questran per   --Imodium prn per     8/16/19 Continued watery diarrhea despite adding questran and lomotil to treatment regime.  C. Diff negative.  On flagyl and cipro for treatment of colitis.    --CMP daily  --Consult GI for further recommendations  --Replace electrolytes as needed per     August 17, 2019-I had a detailed discussion with the patient today with regard to his current clinical situation.  Overall this appears to be gradually improving.  Management will be as per Hospital Medicine.  Stool culture is pending at this time.    August 18, 2019  --Diarrhea resolving per patient. Management will continue per hospital medicine team. Stool studies unremarkable thus far. Stool culture prelim:NGTD, follow cultures. Continue IV hydration. Encourage PO hydration/nutrition. Supportive care

## 2019-08-19 NOTE — ASSESSMENT & PLAN NOTE
- Mag 1.6  - Started on Mag Ox BID  - Repeat chemistries in AM  - Replace magnesium and phosphorous as needed

## 2019-08-19 NOTE — SUBJECTIVE & OBJECTIVE
Interval History: August 19, 2019-- Patient reports continued improvement in diarrhea. Reports last loose stool was last night near 8pm. Stool studies thus far have been negative. Pancytopenia slowly improving. ANC 0.5.  Awaiting final BMBx results.     Oncology Treatment Plan:   [No treatment plan]    Medications:  Continuous Infusions:   sodium chloride 0.9% 125 mL/hr at 08/19/19 1111     Scheduled Meds:   amLODIPine  2.5 mg Oral Daily    arformoterol  15 mcg Nebulization Q12H    aspirin  81 mg Oral Daily    atorvastatin  20 mg Oral Daily    budesonide  0.5 mg Nebulization Q12H    cholestyramine  1 packet Oral BID    ciprofloxacin HCl  500 mg Oral Q12H    DULoxetine  60 mg Oral Daily    fenofibrate  160 mg Oral Daily    fluconazole  200 mg Oral Daily    folic acid  1 mg Oral Daily    lisinopril  20 mg Oral Daily    magnesium oxide  400 mg Oral BID    metoprolol tartrate  50 mg Oral Q12H    metroNIDAZOLE  500 mg Oral Q8H    nystatin  500,000 Units Oral QID (WM & HS)    pantoprazole  40 mg Oral Daily    ranolazine  1,000 mg Oral BID    tiZANidine  4 mg Oral Q8H    traZODone  200 mg Oral QHS     PRN Meds:albuterol sulfate, ALPRAZolam, Dextrose 10% Bolus, Dextrose 10% Bolus, diphenoxylate-atropine 2.5-0.025 mg, glucagon (human recombinant), glucose, glucose, insulin aspart U-100, ondansetron, oxyCODONE-acetaminophen, promethazine (PHENERGAN) IVPB     Review of patient's allergies indicates:  No Known Allergies     Past Medical History:   Diagnosis Date    Anxiety     CAD (coronary artery disease)     PTCA x 2 LAD, restenosis and restent 7/12.    Chest pain syndrome 10/2/2015    COPD (chronic obstructive pulmonary disease)     CPAP (continuous positive airway pressure) dependence     @ night    Diabetes mellitus type 2, uncontrolled 4/13/2016    History of duodenal ulcer     with bleed    Hypertension     Mixed hyperlipidemia     JUDI (obstructive sleep apnea)     severe    S/P PTCA  (percutaneous transluminal coronary angioplasty) 3/11/2015    Vitamin D deficiency      Past Surgical History:   Procedure Laterality Date    APPENDECTOMY      CARDIAC CATHETERIZATION      CATARACT EXTRACTION W/  INTRAOCULAR LENS IMPLANT Right 4-22-15    CORONARY STENT PLACEMENT      ESOPHAGOGASTRODUODENOSCOPY (EGD) N/A 2014    Performed by Jose Dela Cruz [facility] at [facility] ENDO    EXCISION-SKIN Right 2015    Performed by Louis O. Jeansonne IV, MD at [facility] OR    HEART CATH WITH GRAFTS-LEFT Right 2014    Performed by Erma Green MD at [facility] CATH LAB    HEART CATH-LEFT Left 2017    Performed by Erma Green MD at [facility] CATH LAB    HEMORRHOID SURGERY      INCISION AND DRAINAGE (I&D), BUTTOCK  3/26/2015    Performed by Louis O. Jeansonne IV, MD at [facility] OR    INCISION AND DRAINAGE-THIGH Right 3/26/2015    Performed by Louis O. Jeansonne IV, MD at [facility] OR    NISSEN FUNDOPLICATION      lap    SKIN LESION EXCISION Right     leg     Family History     Problem Relation (Age of Onset)    COPD Maternal Grandmother, Maternal Grandfather    Diabetes Maternal Grandfather    Heart block Father    Heart disease Mother, Sister        Tobacco Use    Smoking status: Former Smoker     Packs/day: 0.50     Years: 20.00     Pack years: 10.00     Types: Cigarettes     Last attempt to quit: 6/3/2014     Years since quittin.2    Smokeless tobacco: Never Used    Tobacco comment: currently vaping with nicotine since quit smoking in 2014   Substance and Sexual Activity    Alcohol use: Yes     Alcohol/week: 2.4 oz     Types: 4 Cans of beer per week    Drug use: No    Sexual activity: Not on file       Review of Systems   Constitutional: Positive for activity change, appetite change and fatigue. Negative for chills, diaphoresis and fever.   HENT: Negative.  Negative for trouble swallowing and voice change.    Eyes: Negative.    Respiratory: Negative for cough and shortness of breath.     Cardiovascular: Negative for chest pain, palpitations and leg swelling.   Gastrointestinal: Positive for diarrhea. Negative for abdominal distention, abdominal pain, anal bleeding, blood in stool, constipation, nausea, rectal pain and vomiting.   Genitourinary: Negative for difficulty urinating, flank pain, frequency and hematuria.   Musculoskeletal: Negative for back pain.   Skin: Negative for pallor, rash and wound.   Allergic/Immunologic: Positive for immunocompromised state.   Neurological: Positive for weakness. Negative for syncope and light-headedness.   Hematological: Negative for adenopathy. Does not bruise/bleed easily.   Psychiatric/Behavioral: Negative for confusion. The patient is nervous/anxious.      Objective:     Vital Signs (Most Recent):  Temp: 97.9 °F (36.6 °C) (08/19/19 0738)  Pulse: 61 (08/19/19 0743)  Resp: 18 (08/19/19 0743)  BP: (!) 140/74 (08/19/19 0738)  SpO2: 98 % (08/19/19 0743) Vital Signs (24h Range):  Temp:  [97.7 °F (36.5 °C)-98 °F (36.7 °C)] 97.9 °F (36.6 °C)  Pulse:  [54-76] 61  Resp:  [16-18] 18  SpO2:  [96 %-100 %] 98 %  BP: (122-140)/(67-74) 140/74     Weight: 88.2 kg (194 lb 5.4 oz)  Body mass index is 27.88 kg/m².  Body surface area is 2.09 meters squared.      Intake/Output Summary (Last 24 hours) at 8/19/2019 1138  Last data filed at 8/19/2019 0556  Gross per 24 hour   Intake 4067.49 ml   Output 240 ml   Net 3827.49 ml       Physical Exam   Constitutional: He is oriented to person, place, and time. He appears well-developed. He has a sickly appearance. No distress.   HENT:   Head: Normocephalic and atraumatic.   Nose: Nose normal.   Eyes: Pupils are equal, round, and reactive to light. Conjunctivae are normal. No scleral icterus.   Neck: Normal range of motion. Neck supple.   Cardiovascular: Normal rate, regular rhythm and normal heart sounds. Exam reveals no gallop and no friction rub.   No murmur heard.  Pulmonary/Chest: Effort normal and breath sounds normal.    Abdominal: Soft. Bowel sounds are normal. He exhibits no distension. There is no tenderness.   Musculoskeletal: Normal range of motion. He exhibits no edema or tenderness.   Neurological: He is alert and oriented to person, place, and time.   Skin: Skin is warm and dry. He is not diaphoretic. There is pallor.   Psychiatric: His speech is normal and behavior is normal. Judgment and thought content normal. His mood appears anxious. He is not actively hallucinating. Cognition and memory are normal. He is attentive.   Vitals reviewed.      Significant Labs:   CBC:   Recent Labs   Lab 08/18/19  0509 08/19/19 0517   WBC 1.36* 1.69*   HGB 8.4* 7.9*   HCT 24.0* 22.8*   PLT 86* 87*   , CMP:   Recent Labs   Lab 08/18/19 0509 08/19/19 0517    137   K 5.3* 4.6    105   CO2 26 26   GLU 87 90   BUN 2* 3*   CREATININE 0.7 0.8   CALCIUM 8.9 8.4*   PROT 5.0* 4.8*   ALBUMIN 2.6* 2.6*   BILITOT 1.2* 1.2*   ALKPHOS 74 70   AST 16 14   ALT 11 9*   ANIONGAP 6* 6*   EGFRNONAA >60 >60   , Coagulation:   No results for input(s): PT, INR, APTT in the last 48 hours., LDH: No results for input(s): LDHCSF, BFSOURCE in the last 48 hours., LFTs:   Recent Labs   Lab 08/18/19 0509 08/19/19 0517   ALT 11 9*   AST 16 14   ALKPHOS 74 70   BILITOT 1.2* 1.2*   PROT 5.0* 4.8*   ALBUMIN 2.6* 2.6*   , Urine Studies:   No results for input(s): COLORU, APPEARANCEUA, PHUR, SPECGRAV, PROTEINUA, GLUCUA, KETONESU, BILIRUBINUA, OCCULTUA, NITRITE, UROBILINOGEN, LEUKOCYTESUR, RBCUA, WBCUA, BACTERIA, SQUAMEPITHEL, HYALINECASTS in the last 48 hours.    Invalid input(s): WRIGHTSUR and All pertinent labs from the last 24 hours have been reviewed.    Diagnostic Results:  I have reviewed all pertinent imaging results/findings within the past 24 hours.

## 2019-08-19 NOTE — ASSESSMENT & PLAN NOTE
8/13/19 Patient with newly diagnosed pancytopenia WBC 0.83 (ANC 0.1), hemoglobin 8.8, platelets 83 K.  With diarrhea that started 2 days ago perfuse green watery.  C. Diff negative.  States had night sweats for 2 days.  Denies known fever.  Scheduled for BMB tomorrow at noon.  No need for transfusion at this time.  Splenomegaly on CT scan of abdomen/pelvis with diffuse mural thickening and inflammation of the adjacent fat of the rectosigmoid and descending colon consistent with colitis.  .8.  Blood cultures NGTD.    --Continue supportive care  --No GSCF support for now, until get results of BMB  --BMB tomorrow at noon    8/14/19 Continued pancytopenia WBC 0.87 (ANC 0.2), hemoglobin 9.7, platelets 86K.  Splenomegaly. Folic acid deficiency.  Not iron deficient or B12 deficient.  Continued diarrhea.  C. Diff negative.  Scheduled for BMB today.    --CBC daily  --Monitor for s/s of infection  --Start folate 1 mg Po dialy    8/15/19 Continued stable pancytopenia WBC 0.93, hemoglobin 8.2, platelets 87.  No need for platelets or prbc's at this time.  Patient has remained afebrile.  Started on folic acid 1 mg PO daily for folic acid deficiency.  Had BMB yesterday.  Tolerated well.    --CBC daily  --Monitor for s/s of infection  --continue folate 1 mg PO dialy  --Await BMB results    August 17, 2019-I had a detailed discussion with the patient today with regard to his current clinical situation.  His pancytopenia appears to be slowly improving.  Preliminary results of bone marrow biopsy were reviewed with hematopathology and discussed with patient today.  No evidence of acute leukemia noted.  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  I would expect to see continued improvement in his blood counts over the next 24-48 hours.  I have discussed this with Hospital Medicine today.    August 18, 2019-I had a detailed discussion with the patient today with regard to his current  clinical situation.  Overall his clinical symptoms are slowly improving.  I would recommend that the patient be discharged home once ANC greater than 1000.  Please call with any questions.    August 19, 2019  --Pancytopenia slowly improving. ANC 0.5, awaiting ANC >1000 prior to discharge. Monitor S&S infections. Fever precautions. Hg 7.9. No urgent indication for blood transfusion. Monitor. Platelet count 87. No urgent indication for platelet transfusion. Monitor. Transfuse if < 10. Awaiting final BMBx results. Supportive care

## 2019-08-19 NOTE — PROGRESS NOTES
[facility] Hospital Medicine  Progress Note    Patient Name: Kodak Valentine  MRN: [id number]  Patient Class: IP- Inpatient   Admission Date: 8/12/2019  Length of Stay: 7 days  Attending Physician: Redd Salazar MD  Primary Care Provider: Eliza Huddleston MD        Subjective:     Principal Problem:Sepsis        HPI:  Pt is a 50 yo male with PMHx of CAD with stenting x 2 LAD, COPD, DM II, HTN, JUDI and HPL who presents to the ED with reports of severe, constant cramping lower abdominal pain x 5 days. Associated symptoms include profuse, greenish watery diarrhea, diaphoresis, lightheadedness, dark urine (today) and vomiting (1-2 days now resolved). Pt had back surgery 6 weeks ago but his back pain is no worse than usual. Pt's wife was treated at [facility] approx 6 weeks ago. Pt denies URI symptoms, cough, chest pain, palpitations, bloody diarrhea and other symptoms. He last ate some yogurt yesterday. V/S on arrival: Temp 98.0, pulse 113, resp 18 and B/P 133/70. Labs find WBC 0.80, Hgb 11.5?Hct 32.7, plt 92, left shift 22 %, hyponatremia 133, hypokalemia 2.6, gluc 176, total bili 1.5, .8. U/A was altered due to dark color. A contrasted CT ABD/Pelvis was resulted at 16:17 which found diffuse inflammatory changes of the descending colon and rectosigmoid consistent with colitis/proctitis.  No evidence of a pericolonic abscess can be seen. Pt is admitted for Sepsis, Acute Colitis and electrolyte imbalances.     Overview/Hospital Course:  Admitted with Sepsis, Acute Colitis, electrolyte abnormalities, dehydration and pancytopenia. IV fluids given, lactic acid normal, Cipro/Flagyl in process, blood cultures NGTD and C diff negative. Hematology saw the patient with plans for bone marrow biopsy. 8/14 - had bone marrow today, results pending. Diarrhea decreasing, less abdominal pain. Describes sore throat pain and white appearing patches present to posterior pharynx - appears yeast in  "nature. 8/15  - Nystatin and Diflucan prescribed for thrush. Less abdominal pain, still with watery diarrhea - Questran and Imodium prescribed. Blood cultures from 8/12 find Gram positive cocci in clusters resembling Staph in one anaerobic bottle - Vanco started (may be contaminant - awaiting full ID).     As of 8/16 patient reports continued diarrhea, but "it has slowed down a lot".  Patient reports "going 5 times in 30 minutes, and now I'm going once every hour".  Cdiff negative.  Stool cx and ova/parasites pending.  Patient remains afebrile.  Continue Cipro/Flagyl for now along with Questran.  Case d/w GI.  Will monitor and if no improvement in diarrhea, patient may need a flex sig.  Hemoc continues to follow for pancytopenia and recommended that ANC is >1000 prior to DC.  Awaiting bone marrow biopsy results. Awaiting final BC results to determine contaminate versus true infection, continue empiric Vanc for now.      As of 8/17 patient reports continued improvement in diarrhea, reporting only 3 diarrheal stools overnight with none this morning.  BC show probable contaminate.  Stool cx shows NGTD.  Pancytopenia continues to improve.  Case d/w Dr. Nowak, BM biopsy negative for leukemia, if counts continue to improve, anticipate DC home in AM.      As of 8/18 no change in pancytopenia overnight.  Per Hemoc, cannot DC until ANC is >1000.  Patient continues to report improvement in diarrhea.  He remains afebrile.  Stool cx shows NGTD.  Ecoli, ova/parasites pending.  Continue oral Cipro/flagyl for colitis.      As of 8/19 patient reports last diarrheal stool was last night.  ANC 0.5.  Per Hemoc, cannot DC until >1.  Counts trending up.  Continue ABX for colitis.     Interval History:  No acute events overnight.  Resting comfortably in bed in NAD.  Patient reports last diarrheal stool was last night.  ANC 0.5.  Per Hemoc, cannot DC until >1.  Counts trending up.  Continue ABX for colitis.     Review of Systems "   Constitutional: Positive for activity change and fatigue. Negative for appetite change, chills, diaphoresis and fever.   HENT: Negative.    Eyes: Negative.    Respiratory: Negative for cough and shortness of breath.    Cardiovascular: Negative for chest pain, palpitations and leg swelling.   Gastrointestinal: Positive for diarrhea. Negative for abdominal pain, nausea and vomiting.        Reports diarrhea is improving   Genitourinary: Negative for difficulty urinating.   Musculoskeletal: Positive for back pain.   Skin: Negative for pallor, rash and wound.   Neurological: Positive for weakness. Negative for syncope and light-headedness.   Psychiatric/Behavioral: The patient is not nervous/anxious.      Objective:     Vital Signs (Most Recent):  Temp: 97.9 °F (36.6 °C) (08/19/19 0738)  Pulse: 61 (08/19/19 0743)  Resp: 18 (08/19/19 0743)  BP: (!) 140/74 (08/19/19 0738)  SpO2: 98 % (08/19/19 0743) Vital Signs (24h Range):  Temp:  [97.7 °F (36.5 °C)-98 °F (36.7 °C)] 97.9 °F (36.6 °C)  Pulse:  [54-76] 61  Resp:  [16-18] 18  SpO2:  [96 %-100 %] 98 %  BP: (122-140)/(67-74) 140/74     Weight: 88.2 kg (194 lb 5.4 oz)  Body mass index is 27.88 kg/m².    Intake/Output Summary (Last 24 hours) at 8/19/2019 1150  Last data filed at 8/19/2019 0556  Gross per 24 hour   Intake 4017.49 ml   Output 240 ml   Net 3777.49 ml      Physical Exam   Constitutional: He is oriented to person, place, and time. He appears well-developed and well-nourished. He has a sickly appearance. No distress.   HENT:   Head: Normocephalic and atraumatic.   Nose: Nose normal.   Mouth/Throat: Oropharynx is clear and moist.   Eyes: Pupils are equal, round, and reactive to light. Conjunctivae and EOM are normal. No scleral icterus.   Neck: Normal range of motion. Neck supple. No JVD present.   Cardiovascular: Normal rate, regular rhythm, normal heart sounds and intact distal pulses. Exam reveals no gallop and no friction rub.   No murmur heard.  Pulmonary/Chest:  Effort normal and breath sounds normal. No stridor. No respiratory distress. He has no wheezes. He has no rales. He exhibits no tenderness.   Abdominal: Soft. Bowel sounds are normal. He exhibits no distension and no mass. There is no tenderness. There is no rebound and no guarding.   Musculoskeletal: Normal range of motion. He exhibits no edema or tenderness.   Neurological: He is alert and oriented to person, place, and time. No cranial nerve deficit.   Skin: Skin is warm and dry. No rash noted. He is not diaphoretic. No erythema. There is pallor.   Incisions to back are healed with no s/s of infection.   Psychiatric: He has a normal mood and affect. His behavior is normal. Judgment and thought content normal.   Nursing note and vitals reviewed.      Significant Labs:   CBC:   Recent Labs   Lab 08/18/19  0509 08/19/19  0517   WBC 1.36* 1.69*   HGB 8.4* 7.9*   HCT 24.0* 22.8*   PLT 86* 87*     CMP:   Recent Labs   Lab 08/18/19  0509 08/19/19  0517    137   K 5.3* 4.6    105   CO2 26 26   GLU 87 90   BUN 2* 3*   CREATININE 0.7 0.8   CALCIUM 8.9 8.4*   PROT 5.0* 4.8*   ALBUMIN 2.6* 2.6*   BILITOT 1.2* 1.2*   ALKPHOS 74 70   AST 16 14   ALT 11 9*   ANIONGAP 6* 6*   EGFRNONAA >60 >60       Significant Imaging: I have reviewed all pertinent imaging results/findings within the past 24 hours.      Assessment/Plan:      * Sepsis  - Criteria including WBC 0.80 & 1.01, tachycardia 113  - CT of ABD/Pelvis notes Diffuse inflammatory changes of the descending colon and rectosigmoid consistent with colitis/proctitis.  No evidence of a pericolonic abscess can be seen.  The appendix cannot be identified.  Fluid-filled small bowel loops in the pelvis likely represent an ileus.  1.1 cm in diameter gallbladder calculus.  Splenomegaly with the spleen measuring 18 cm.  - BC x 1 on 8/12 show gram + cocci/clusters resembling staph in aerobic and anaerobic bottle, organism is a probable contaminate  - Received a total of 1.7L  of IVFs in the ER  - Continue IVFs  - Continue Cipro/flagyl for Colitis  - Remains hemodynamically stable  - Monitor    Acute colitis  - Cdiff negative  - Stool cx shows NGTD  - Stool for ova/parasites pending  - Continue Cipro and Flagyl  - IV fluids  - Mech soft diet as tolerated   - Questran added on Tuesday, with improvement in diarrhea, will continue  - Prn analgesia and antiemetics      Pancytopenia  - CT imaging Splenomegaly with the spleen measuring 18 cm.  - Hematology consulted; likely r/t sepsis and colitis  - Counts continue to improve  - Underwent Bone Marrow biopsy on 8/14 and per Dr. Nowak, BM negative for leukemia  - Hemoc recommends ANC is >1000 prior to DC  - Daily CBC  - Monitor      Hypomagnesemia  - Mag 1.6  - Started on Mag Ox BID  - Repeat chemistries in AM  - Replace magnesium and phosphorous as needed      Thrush  - Continue Nystatin swish and swallow      CAD (coronary artery disease)  - Denies any chest pain  - Continue ASA, Atorvastatin, Fenofibrate, Ranexa, Lopressor and Lisinopril   - Monitor      Hypertension associated with diabetes  - BP under fair control  - Continue Lopressor, Norvasc, and Lisinopril   - monitor and adjust meds as needed        Controlled type 2 diabetes mellitus with stage 2 chronic kidney disease, without long-term current use of insulin  - BG under fair control  - A1c 4.1  - Hold home Metformin  - Accu checks ACHS with SSI prn  - Monitor      Hyperlipidemia associated with type 2 diabetes mellitus  - Continue Statin and Fenofibrate      Moderate COPD (chronic obstructive pulmonary disease)  - Currently appears compensated  - Continue nebs  - Supplemental O2 prn  - monitor      Anxiety  - Continue home meds      GERD (gastroesophageal reflux disease)  - Continue home Protonix      JUDI treated with BiPAP  - Cpap q hs        VTE Risk Mitigation (From admission, onward)        Ordered     Place sequential compression device  Until discontinued      08/16/19 1430                 Marti Bhatia, DNP, ACNP-BC  Department of Hospital Medicine   [facility]

## 2019-08-19 NOTE — PLAN OF CARE
Problem: Adult Inpatient Plan of Care  Goal: Plan of Care Review  Outcome: Ongoing (interventions implemented as appropriate)  Fluids promoted. Pain controlled. Free from injury. Glucose control.Telemetry. Aseptic techniqued maintained. NADN. Chart check completed.

## 2019-08-19 NOTE — PROGRESS NOTES
[facility] -   Hematology/Oncology  Progress Note    Patient Name: Kodak Valentine  Admission Date: 8/12/2019  Hospital Length of Stay: 7 days  Code Status: No Order     Subjective:     HPI:  Pt is a 50 yo male with PMHx of CAD with stenting x 2 LAD, COPD, DM II, HTN, JUDI and HPL who presents to the ED with reports of severe, constant cramping lower abdominal pain x 5 days. Associated symptoms include profuse, greenish watery diarrhea, diaphoresis, lightheadedness, dark urine (today) and vomiting (1-2 days now resolved). Pt had back surgery 6 weeks ago but his back pain is no worse than usual. Pt's wife was treated at [facility] approx 6 weeks ago. Pt denies URI symptoms, cough, chest pain, palpitations, bloody diarrhea and other symptoms. He last ate some yogurt yesterday. V/S on arrival: Temp 98.0, pulse 113, resp 18 and B/P 133/70. Labs find WBC 0.80, Hgb 11.5?Hct 32.7, plt 92, left shift 22 %, hyponatremia 133, hypokalemia 2.6, gluc 176, total bili 1.5, .8. U/A was altered due to dark color. A contrasted CT ABD/Pelvis was resulted at 16:17 which found diffuse inflammatory changes of the descending colon and rectosigmoid consistent with colitis/proctitis.  No evidence of a pericolonic abscess can be seen. Pt is admitted for Sepsis, Acute Colitis and electrolyte imbalances.     Hematology/oncology consulted for pancytopenia.       Interval History: August 19, 2019-- Patient reports continued improvement in diarrhea. Reports last loose stool was last night near 8pm. Stool studies thus far have been negative. Pancytopenia slowly improving. ANC 0.5.  Awaiting final BMBx results.     Oncology Treatment Plan:   [No treatment plan]    Medications:  Continuous Infusions:   sodium chloride 0.9% 125 mL/hr at 08/19/19 1111     Scheduled Meds:   amLODIPine  2.5 mg Oral Daily    arformoterol  15 mcg Nebulization Q12H    aspirin  81 mg Oral Daily    atorvastatin  20 mg Oral Daily     budesonide  0.5 mg Nebulization Q12H    cholestyramine  1 packet Oral BID    ciprofloxacin HCl  500 mg Oral Q12H    DULoxetine  60 mg Oral Daily    fenofibrate  160 mg Oral Daily    fluconazole  200 mg Oral Daily    folic acid  1 mg Oral Daily    lisinopril  20 mg Oral Daily    magnesium oxide  400 mg Oral BID    metoprolol tartrate  50 mg Oral Q12H    metroNIDAZOLE  500 mg Oral Q8H    nystatin  500,000 Units Oral QID (WM & HS)    pantoprazole  40 mg Oral Daily    ranolazine  1,000 mg Oral BID    tiZANidine  4 mg Oral Q8H    traZODone  200 mg Oral QHS     PRN Meds:albuterol sulfate, ALPRAZolam, Dextrose 10% Bolus, Dextrose 10% Bolus, diphenoxylate-atropine 2.5-0.025 mg, glucagon (human recombinant), glucose, glucose, insulin aspart U-100, ondansetron, oxyCODONE-acetaminophen, promethazine (PHENERGAN) IVPB     Review of patient's allergies indicates:  No Known Allergies     Past Medical History:   Diagnosis Date    Anxiety     CAD (coronary artery disease)     PTCA x 2 LAD, restenosis and restent 7/12.    Chest pain syndrome 10/2/2015    COPD (chronic obstructive pulmonary disease)     CPAP (continuous positive airway pressure) dependence     @ night    Diabetes mellitus type 2, uncontrolled 4/13/2016    History of duodenal ulcer     with bleed    Hypertension     Mixed hyperlipidemia     JUDI (obstructive sleep apnea)     severe    S/P PTCA (percutaneous transluminal coronary angioplasty) 3/11/2015    Vitamin D deficiency      Past Surgical History:   Procedure Laterality Date    APPENDECTOMY      CARDIAC CATHETERIZATION      CATARACT EXTRACTION W/  INTRAOCULAR LENS IMPLANT Right 4-22-15    CORONARY STENT PLACEMENT  2012    ESOPHAGOGASTRODUODENOSCOPY (EGD) N/A 7/31/2014    Performed by Jose Dela Cruz MD at [facility] [place] ENDO    EXCISION-SKIN Right 4/20/2015    Performed by Louis O. Jeansonne IV, MD at [facility] [place] OR    HEART CATH WITH GRAFTS-LEFT Right 6/2/2014    Performed by Erma Green,  MD at [facility] CATH LAB    HEART CATH-LEFT Left 2017    Performed by Erma Green MD at [facility] CATH LAB    HEMORRHOID SURGERY      INCISION AND DRAINAGE (I&D), BUTTOCK  3/26/2015    Performed by Louis O. Jeansonne IV, MD at [facility] OR    INCISION AND DRAINAGE-THIGH Right 3/26/2015    Performed by [facility] Jeansonne IV, MD at [facility] OR    NISSEN FUNDOPLICATION      lap    SKIN LESION EXCISION Right     leg     Family History     Problem Relation (Age of Onset)    COPD Maternal Grandmother, Maternal Grandfather    Diabetes Maternal Grandfather    Heart block Father    Heart disease Mother, Sister        Tobacco Use    Smoking status: Former Smoker     Packs/day: 0.50     Years: 20.00     Pack years: 10.00     Types: Cigarettes     Last attempt to quit: 6/3/2014     Years since quittin.2    Smokeless tobacco: Never Used    Tobacco comment: currently vaping with nicotine since quit smoking in 2014   Substance and Sexual Activity    Alcohol use: Yes     Alcohol/week: 2.4 oz     Types: 4 Cans of beer per week    Drug use: No    Sexual activity: Not on file       Review of Systems   Constitutional: Positive for activity change, appetite change and fatigue. Negative for chills, diaphoresis and fever.   HENT: Negative.  Negative for trouble swallowing and voice change.    Eyes: Negative.    Respiratory: Negative for cough and shortness of breath.    Cardiovascular: Negative for chest pain, palpitations and leg swelling.   Gastrointestinal: Positive for diarrhea. Negative for abdominal distention, abdominal pain, anal bleeding, blood in stool, constipation, nausea, rectal pain and vomiting.   Genitourinary: Negative for difficulty urinating, flank pain, frequency and hematuria.   Musculoskeletal: Negative for back pain.   Skin: Negative for pallor, rash and wound.   Allergic/Immunologic: Positive for immunocompromised state.   Neurological: Positive for weakness. Negative for syncope and light-headedness.    Hematological: Negative for adenopathy. Does not bruise/bleed easily.   Psychiatric/Behavioral: Negative for confusion. The patient is nervous/anxious.      Objective:     Vital Signs (Most Recent):  Temp: 97.9 °F (36.6 °C) (08/19/19 0738)  Pulse: 61 (08/19/19 0743)  Resp: 18 (08/19/19 0743)  BP: (!) 140/74 (08/19/19 0738)  SpO2: 98 % (08/19/19 0743) Vital Signs (24h Range):  Temp:  [97.7 °F (36.5 °C)-98 °F (36.7 °C)] 97.9 °F (36.6 °C)  Pulse:  [54-76] 61  Resp:  [16-18] 18  SpO2:  [96 %-100 %] 98 %  BP: (122-140)/(67-74) 140/74     Weight: 88.2 kg (194 lb 5.4 oz)  Body mass index is 27.88 kg/m².  Body surface area is 2.09 meters squared.      Intake/Output Summary (Last 24 hours) at 8/19/2019 1138  Last data filed at 8/19/2019 0556  Gross per 24 hour   Intake 4067.49 ml   Output 240 ml   Net 3827.49 ml       Physical Exam   Constitutional: He is oriented to person, place, and time. He appears well-developed. He has a sickly appearance. No distress.   HENT:   Head: Normocephalic and atraumatic.   Nose: Nose normal.   Eyes: Pupils are equal, round, and reactive to light. Conjunctivae are normal. No scleral icterus.   Neck: Normal range of motion. Neck supple.   Cardiovascular: Normal rate, regular rhythm and normal heart sounds. Exam reveals no gallop and no friction rub.   No murmur heard.  Pulmonary/Chest: Effort normal and breath sounds normal.   Abdominal: Soft. Bowel sounds are normal. He exhibits no distension. There is no tenderness.   Musculoskeletal: Normal range of motion. He exhibits no edema or tenderness.   Neurological: He is alert and oriented to person, place, and time.   Skin: Skin is warm and dry. He is not diaphoretic. There is pallor.   Psychiatric: His speech is normal and behavior is normal. Judgment and thought content normal. His mood appears anxious. He is not actively hallucinating. Cognition and memory are normal. He is attentive.   Vitals reviewed.      Significant Labs:   CBC:   Recent  Labs   Lab 08/18/19  0509 08/19/19  0517   WBC 1.36* 1.69*   HGB 8.4* 7.9*   HCT 24.0* 22.8*   PLT 86* 87*   , CMP:   Recent Labs   Lab 08/18/19  0509 08/19/19  0517    137   K 5.3* 4.6    105   CO2 26 26   GLU 87 90   BUN 2* 3*   CREATININE 0.7 0.8   CALCIUM 8.9 8.4*   PROT 5.0* 4.8*   ALBUMIN 2.6* 2.6*   BILITOT 1.2* 1.2*   ALKPHOS 74 70   AST 16 14   ALT 11 9*   ANIONGAP 6* 6*   EGFRNONAA >60 >60   , Coagulation:   No results for input(s): PT, INR, APTT in the last 48 hours., LDH: No results for input(s): LDHCSF, BFSOURCE in the last 48 hours., LFTs:   Recent Labs   Lab 08/18/19  0509 08/19/19 0517   ALT 11 9*   AST 16 14   ALKPHOS 74 70   BILITOT 1.2* 1.2*   PROT 5.0* 4.8*   ALBUMIN 2.6* 2.6*   , Urine Studies:   No results for input(s): COLORU, APPEARANCEUA, PHUR, SPECGRAV, PROTEINUA, GLUCUA, KETONESU, BILIRUBINUA, OCCULTUA, NITRITE, UROBILINOGEN, LEUKOCYTESUR, RBCUA, WBCUA, BACTERIA, SQUAMEPITHEL, HYALINECASTS in the last 48 hours.    Invalid input(s): WRIGHTSUR and All pertinent labs from the last 24 hours have been reviewed.    Diagnostic Results:  I have reviewed all pertinent imaging results/findings within the past 24 hours.    Assessment/Plan:     Acute colitis  8/15/19 states continued watery diarrhea.  States frequency improved.  Started on Questran yesterday.   ordered Immodium today prn.  C. Diff negative.  Has remained on cipro and flagyl IV for treatment of colitis.  Continue IV fluid - replace electrolytes - Magnesium 1.2 today, potassium WNL.  --CMP daily  --questran per   --Imodium prn per     8/16/19 Continued watery diarrhea despite adding questran and lomotil to treatment regime.  C. Diff negative.  On flagyl and cipro for treatment of colitis.    --CMP daily  --Consult GI for further recommendations  --Replace electrolytes as needed per     August 17, 2019-I had a detailed discussion with the patient today with regard to his current clinical situation.  Overall this  appears to be gradually improving.  Management will be as per Hospital Medicine.  Stool culture is pending at this time.    August 18, 2019  --Diarrhea resolving per patient. Management will continue per hospital medicine team. Stool studies unremarkable thus far. Stool culture prelim:NGTD, follow cultures. Continue IV hydration. Encourage PO hydration/nutrition. Supportive care    Pancytopenia  8/13/19 Patient with newly diagnosed pancytopenia WBC 0.83 (ANC 0.1), hemoglobin 8.8, platelets 83 K.  With diarrhea that started 2 days ago perfuse green watery.  C. Diff negative.  States had night sweats for 2 days.  Denies known fever.  Scheduled for BMB tomorrow at noon.  No need for transfusion at this time.  Splenomegaly on CT scan of abdomen/pelvis with diffuse mural thickening and inflammation of the adjacent fat of the rectosigmoid and descending colon consistent with colitis.  .8.  Blood cultures NGTD.    --Continue supportive care  --[facility], until get results of BMB  --BMB tomorrow at noon    8/14/19 Continued pancytopenia WBC 0.87 (ANC 0.2), hemoglobin 9.7, platelets 86K.  Splenomegaly. Folic acid deficiency.  Not iron deficient or B12 deficient.  Continued diarrhea.  C. Diff negative.  Scheduled for BMB today.    --CBC daily  --Monitor for s/s of infection  --Start folate 1 mg Po dialy    8/15/19 Continued stable pancytopenia WBC 0.93, hemoglobin 8.2, platelets 87.  No need for platelets or prbc's at this time.  Patient has remained afebrile.  Started on folic acid 1 mg PO daily for folic acid deficiency.  Had BMB yesterday.  Tolerated well.    --CBC daily  --Monitor for s/s of infection  --continue folate 1 mg PO dialy  --Await BMB results    August 17, 2019-I had a detailed discussion with the patient today with regard to his current clinical situation.  His pancytopenia appears to be slowly improving.  Preliminary results of bone marrow biopsy were reviewed with hematopathology and  discussed with patient today.  No evidence of acute leukemia noted.  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  I would expect to see continued improvement in his blood counts over the next 24-48 hours.  I have discussed this with Hospital Medicine today.    August 18, 2019-I had a detailed discussion with the patient today with regard to his current clinical situation.  Overall his clinical symptoms are slowly improving.  I would recommend that the patient be discharged home once ANC greater than 1000.  Please call with any questions.    August 19, 2019  --Pancytopenia slowly improving. ANC 0.5, awaiting ANC >1000 prior to discharge. Monitor S&S infections. Fever precautions. Hg 7.9. No urgent indication for blood transfusion. Monitor. Platelet count 87. No urgent indication for platelet transfusion. Monitor. Transfuse if < 10. Awaiting final BMBx results. Supportive care          Thank you for your consult. I will follow-up with patient. Please contact us if you have any additional questions.     Tyra Perales NP  Hematology/Oncology  [facility] [place]

## 2019-08-19 NOTE — PLAN OF CARE
Problem: Adult Inpatient Plan of Care  Goal: Plan of Care Review  Chart reviewed, pt resting in bed with call light and personal belongings within reach. Oriented x4, fall/safety precautions maintained. Pt remained injury free throughout shift. No apparent distress. NS infusing @ 125ml/hr. Heart monitor, pt ran normal sinus/uriel sinus throughout shift, other vitals remained stable. Pain controlled with prn pain medication per MAR. Diarrhea controlled with PRN medication per MAR. Blood sugars monitored per order, no coverage needed. Will continue to monitor.

## 2019-08-19 NOTE — SUBJECTIVE & OBJECTIVE
Interval History:  No acute events overnight.  Resting comfortably in bed in [facility].  Patient reports last diarrheal stool was last night.  ANC 0.5.  Per Hemoc, cannot DC until >1.  Counts trending up.  Continue ABX for colitis.     Review of Systems   Constitutional: Positive for activity change and fatigue. Negative for appetite change, chills, diaphoresis and fever.   HENT: Negative.    Eyes: Negative.    Respiratory: Negative for cough and shortness of breath.    Cardiovascular: Negative for chest pain, palpitations and leg swelling.   Gastrointestinal: Positive for diarrhea. Negative for abdominal pain, nausea and vomiting.        Reports diarrhea is improving   Genitourinary: Negative for difficulty urinating.   Musculoskeletal: Positive for back pain.   Skin: Negative for pallor, rash and wound.   Neurological: Positive for weakness. Negative for syncope and light-headedness.   Psychiatric/Behavioral: The patient is not nervous/anxious.      Objective:     Vital Signs (Most Recent):  Temp: 97.9 °F (36.6 °C) (08/19/19 0738)  Pulse: 61 (08/19/19 0743)  Resp: 18 (08/19/19 0743)  BP: (!) 140/74 (08/19/19 0738)  SpO2: 98 % (08/19/19 0743) Vital Signs (24h Range):  Temp:  [97.7 °F (36.5 °C)-98 °F (36.7 °C)] 97.9 °F (36.6 °C)  Pulse:  [54-76] 61  Resp:  [16-18] 18  SpO2:  [96 %-100 %] 98 %  BP: (122-140)/(67-74) 140/74     Weight: 88.2 kg (194 lb 5.4 oz)  Body mass index is 27.88 kg/m².    Intake/Output Summary (Last 24 hours) at 8/19/2019 1150  Last data filed at 8/19/2019 0556  Gross per 24 hour   Intake 4017.49 ml   Output 240 ml   Net 3777.49 ml      Physical Exam   Constitutional: He is oriented to person, place, and time. He appears well-developed and well-nourished. He has a sickly appearance. No distress.   HENT:   Head: Normocephalic and atraumatic.   Nose: Nose normal.   Mouth/Throat: Oropharynx is clear and moist.   Eyes: Pupils are equal, round, and reactive to light. Conjunctivae and EOM are normal. No  scleral icterus.   Neck: Normal range of motion. Neck supple. No JVD present.   Cardiovascular: Normal rate, regular rhythm, normal heart sounds and intact distal pulses. Exam reveals no gallop and no friction rub.   No murmur heard.  Pulmonary/Chest: Effort normal and breath sounds normal. No stridor. No respiratory distress. He has no wheezes. He has no rales. He exhibits no tenderness.   Abdominal: Soft. Bowel sounds are normal. He exhibits no distension and no mass. There is no tenderness. There is no rebound and no guarding.   Musculoskeletal: Normal range of motion. He exhibits no edema or tenderness.   Neurological: He is alert and oriented to person, place, and time. No cranial nerve deficit.   Skin: Skin is warm and dry. No rash noted. He is not diaphoretic. No erythema. There is pallor.   Incisions to back are healed with no s/s of infection.   Psychiatric: He has a normal mood and affect. His behavior is normal. Judgment and thought content normal.   Nursing note and vitals reviewed.      Significant Labs:   CBC:   Recent Labs   Lab 08/18/19  0509 08/19/19  0517   WBC 1.36* 1.69*   HGB 8.4* 7.9*   HCT 24.0* 22.8*   PLT 86* 87*     CMP:   Recent Labs   Lab 08/18/19  0509 08/19/19  0517    137   K 5.3* 4.6    105   CO2 26 26   GLU 87 90   BUN 2* 3*   CREATININE 0.7 0.8   CALCIUM 8.9 8.4*   PROT 5.0* 4.8*   ALBUMIN 2.6* 2.6*   BILITOT 1.2* 1.2*   ALKPHOS 74 70   AST 16 14   ALT 11 9*   ANIONGAP 6* 6*   EGFRNONAA >60 >60       Significant Imaging: I have reviewed all pertinent imaging results/findings within the past 24 hours.

## 2019-08-20 PROBLEM — B37.0 THRUSH: Status: RESOLVED | Noted: 2019-08-14 | Resolved: 2019-08-20

## 2019-08-20 LAB
ALBUMIN SERPL BCP-MCNC: 2.6 G/DL (ref 3.5–5.2)
ALP SERPL-CCNC: 69 U/L (ref 55–135)
ALT SERPL W/O P-5'-P-CCNC: 10 U/L (ref 10–44)
ANION GAP SERPL CALC-SCNC: 7 MMOL/L (ref 8–16)
ANISOCYTOSIS BLD QL SMEAR: SLIGHT
AST SERPL-CCNC: 14 U/L (ref 10–40)
BASOPHILS # BLD AUTO: ABNORMAL K/UL (ref 0–0.2)
BASOPHILS NFR BLD: 0 % (ref 0–1.9)
BILIRUB SERPL-MCNC: 1.1 MG/DL (ref 0.1–1)
BUN SERPL-MCNC: 2 MG/DL (ref 6–20)
CALCIUM SERPL-MCNC: 8.5 MG/DL (ref 8.7–10.5)
CHLORIDE SERPL-SCNC: 108 MMOL/L (ref 95–110)
CO2 SERPL-SCNC: 25 MMOL/L (ref 23–29)
CREAT SERPL-MCNC: 0.8 MG/DL (ref 0.5–1.4)
DACRYOCYTES BLD QL SMEAR: ABNORMAL
DIFFERENTIAL METHOD: ABNORMAL
EOSINOPHIL # BLD AUTO: ABNORMAL K/UL (ref 0–0.5)
EOSINOPHIL NFR BLD: 2 % (ref 0–8)
ERYTHROCYTE [DISTWIDTH] IN BLOOD BY AUTOMATED COUNT: 14.6 % (ref 11.5–14.5)
EST. GFR  (AFRICAN AMERICAN): >60 ML/MIN/1.73 M^2
EST. GFR  (NON AFRICAN AMERICAN): >60 ML/MIN/1.73 M^2
GLUCOSE SERPL-MCNC: 97 MG/DL (ref 70–110)
HCT VFR BLD AUTO: 23.3 % (ref 40–54)
HGB BLD-MCNC: 7.8 G/DL (ref 14–18)
LYMPHOCYTES # BLD AUTO: ABNORMAL K/UL (ref 1–4.8)
LYMPHOCYTES NFR BLD: 40 % (ref 18–48)
MAGNESIUM SERPL-MCNC: 1.5 MG/DL (ref 1.6–2.6)
MCH RBC QN AUTO: 35.1 PG (ref 27–31)
MCHC RBC AUTO-ENTMCNC: 33.5 G/DL (ref 32–36)
MCV RBC AUTO: 105 FL (ref 82–98)
METAMYELOCYTES NFR BLD MANUAL: 4 %
MONOCYTES # BLD AUTO: ABNORMAL K/UL (ref 0.3–1)
MONOCYTES NFR BLD: 28 % (ref 4–15)
NEUTROPHILS NFR BLD: 26 % (ref 38–73)
OVALOCYTES BLD QL SMEAR: ABNORMAL
PLATELET # BLD AUTO: 96 K/UL (ref 150–350)
PLATELET BLD QL SMEAR: ABNORMAL
PMV BLD AUTO: 8.9 FL (ref 9.2–12.9)
POCT GLUCOSE: 106 MG/DL (ref 70–110)
POCT GLUCOSE: 95 MG/DL (ref 70–110)
POCT GLUCOSE: 97 MG/DL (ref 70–110)
POIKILOCYTOSIS BLD QL SMEAR: SLIGHT
POLYCHROMASIA BLD QL SMEAR: ABNORMAL
POTASSIUM SERPL-SCNC: 4.1 MMOL/L (ref 3.5–5.1)
PROT SERPL-MCNC: 4.9 G/DL (ref 6–8.4)
RBC # BLD AUTO: 2.22 M/UL (ref 4.6–6.2)
SCHISTOCYTES BLD QL SMEAR: PRESENT
SODIUM SERPL-SCNC: 140 MMOL/L (ref 136–145)
SPHEROCYTES BLD QL SMEAR: ABNORMAL
STOMATOCYTES BLD QL SMEAR: PRESENT
TARGETS BLD QL SMEAR: ABNORMAL
WBC # BLD AUTO: 2.07 K/UL (ref 3.9–12.7)

## 2019-08-20 PROCEDURE — 25000003 PHARM REV CODE 250: Performed by: NURSE PRACTITIONER

## 2019-08-20 PROCEDURE — 85007 BL SMEAR W/DIFF WBC COUNT: CPT

## 2019-08-20 PROCEDURE — 36415 COLL VENOUS BLD VENIPUNCTURE: CPT

## 2019-08-20 PROCEDURE — 25000003 PHARM REV CODE 250: Performed by: INTERNAL MEDICINE

## 2019-08-20 PROCEDURE — 63700000 PHARM REV CODE 250 ALT 637 W/O HCPCS: Performed by: NURSE PRACTITIONER

## 2019-08-20 PROCEDURE — 63600175 PHARM REV CODE 636 W HCPCS: Performed by: NURSE PRACTITIONER

## 2019-08-20 PROCEDURE — 21400001 HC TELEMETRY ROOM

## 2019-08-20 PROCEDURE — 99232 SBSQ HOSP IP/OBS MODERATE 35: CPT | Mod: ,,, | Performed by: INTERNAL MEDICINE

## 2019-08-20 PROCEDURE — 85027 COMPLETE CBC AUTOMATED: CPT

## 2019-08-20 PROCEDURE — 94761 N-INVAS EAR/PLS OXIMETRY MLT: CPT

## 2019-08-20 PROCEDURE — 94640 AIRWAY INHALATION TREATMENT: CPT

## 2019-08-20 PROCEDURE — 99900035 HC TECH TIME PER 15 MIN (STAT)

## 2019-08-20 PROCEDURE — 99232 PR SUBSEQUENT HOSPITAL CARE,LEVL II: ICD-10-PCS | Mod: ,,, | Performed by: INTERNAL MEDICINE

## 2019-08-20 PROCEDURE — 83735 ASSAY OF MAGNESIUM: CPT

## 2019-08-20 PROCEDURE — 80053 COMPREHEN METABOLIC PANEL: CPT

## 2019-08-20 PROCEDURE — 25000242 PHARM REV CODE 250 ALT 637 W/ HCPCS: Performed by: NURSE PRACTITIONER

## 2019-08-20 RX ADMIN — METOPROLOL TARTRATE 50 MG: 50 TABLET ORAL at 08:08

## 2019-08-20 RX ADMIN — TIZANIDINE 4 MG: 4 TABLET ORAL at 01:08

## 2019-08-20 RX ADMIN — ARFORMOTEROL TARTRATE 15 MCG: 15 SOLUTION RESPIRATORY (INHALATION) at 07:08

## 2019-08-20 RX ADMIN — DULOXETINE 60 MG: 30 CAPSULE, DELAYED RELEASE ORAL at 08:08

## 2019-08-20 RX ADMIN — OXYCODONE HYDROCHLORIDE AND ACETAMINOPHEN 1 TABLET: 10; 325 TABLET ORAL at 06:08

## 2019-08-20 RX ADMIN — METRONIDAZOLE 500 MG: 500 TABLET ORAL at 09:08

## 2019-08-20 RX ADMIN — FLUCONAZOLE 200 MG: 100 TABLET ORAL at 08:08

## 2019-08-20 RX ADMIN — PANTOPRAZOLE SODIUM 40 MG: 40 TABLET, DELAYED RELEASE ORAL at 08:08

## 2019-08-20 RX ADMIN — ATORVASTATIN CALCIUM 20 MG: 10 TABLET, FILM COATED ORAL at 08:08

## 2019-08-20 RX ADMIN — ALPRAZOLAM 0.25 MG: 0.25 TABLET ORAL at 08:08

## 2019-08-20 RX ADMIN — LISINOPRIL 20 MG: 20 TABLET ORAL at 08:08

## 2019-08-20 RX ADMIN — OXYCODONE HYDROCHLORIDE AND ACETAMINOPHEN 1 TABLET: 10; 325 TABLET ORAL at 02:08

## 2019-08-20 RX ADMIN — TIZANIDINE 4 MG: 4 TABLET ORAL at 06:08

## 2019-08-20 RX ADMIN — BUDESONIDE 0.5 MG: 0.5 SUSPENSION RESPIRATORY (INHALATION) at 07:08

## 2019-08-20 RX ADMIN — OXYCODONE HYDROCHLORIDE AND ACETAMINOPHEN 1 TABLET: 10; 325 TABLET ORAL at 10:08

## 2019-08-20 RX ADMIN — FENOFIBRATE 160 MG: 160 TABLET ORAL at 08:08

## 2019-08-20 RX ADMIN — SODIUM CHLORIDE: 0.9 INJECTION, SOLUTION INTRAVENOUS at 03:08

## 2019-08-20 RX ADMIN — CIPROFLOXACIN HYDROCHLORIDE 500 MG: 500 TABLET, FILM COATED ORAL at 09:08

## 2019-08-20 RX ADMIN — METRONIDAZOLE 500 MG: 500 TABLET ORAL at 06:08

## 2019-08-20 RX ADMIN — DIPHENOXYLATE HYDROCHLORIDE AND ATROPINE SULFATE 1 TABLET: 2.5; .025 TABLET ORAL at 08:08

## 2019-08-20 RX ADMIN — NYSTATIN 500000 UNITS: 100000 SUSPENSION ORAL at 08:08

## 2019-08-20 RX ADMIN — AMLODIPINE BESYLATE 2.5 MG: 2.5 TABLET ORAL at 08:08

## 2019-08-20 RX ADMIN — DIPHENOXYLATE HYDROCHLORIDE AND ATROPINE SULFATE 1 TABLET: 2.5; .025 TABLET ORAL at 04:08

## 2019-08-20 RX ADMIN — TRAZODONE HYDROCHLORIDE 200 MG: 100 TABLET ORAL at 09:08

## 2019-08-20 RX ADMIN — ASPIRIN 81 MG: 81 TABLET, COATED ORAL at 08:08

## 2019-08-20 RX ADMIN — CIPROFLOXACIN HYDROCHLORIDE 500 MG: 500 TABLET, FILM COATED ORAL at 08:08

## 2019-08-20 RX ADMIN — ARFORMOTEROL TARTRATE 15 MCG: 15 SOLUTION RESPIRATORY (INHALATION) at 08:08

## 2019-08-20 RX ADMIN — RANOLAZINE 1000 MG: 500 TABLET, FILM COATED, EXTENDED RELEASE ORAL at 09:08

## 2019-08-20 RX ADMIN — MAGNESIUM OXIDE TAB 400 MG (241.3 MG ELEMENTAL MG) 400 MG: 400 (241.3 MG) TAB at 09:08

## 2019-08-20 RX ADMIN — TIZANIDINE 4 MG: 4 TABLET ORAL at 09:08

## 2019-08-20 RX ADMIN — ALPRAZOLAM 0.25 MG: 0.25 TABLET ORAL at 04:08

## 2019-08-20 RX ADMIN — MAGNESIUM OXIDE TAB 400 MG (241.3 MG ELEMENTAL MG) 400 MG: 400 (241.3 MG) TAB at 08:08

## 2019-08-20 RX ADMIN — FOLIC ACID 1 MG: 1 TABLET ORAL at 08:08

## 2019-08-20 RX ADMIN — RANOLAZINE 1000 MG: 500 TABLET, FILM COATED, EXTENDED RELEASE ORAL at 08:08

## 2019-08-20 RX ADMIN — METRONIDAZOLE 500 MG: 500 TABLET ORAL at 01:08

## 2019-08-20 RX ADMIN — METOPROLOL TARTRATE 50 MG: 50 TABLET ORAL at 09:08

## 2019-08-20 RX ADMIN — CHOLESTYRAMINE 4 G: 4 POWDER, FOR SUSPENSION ORAL at 09:08

## 2019-08-20 RX ADMIN — CHOLESTYRAMINE 4 G: 4 POWDER, FOR SUSPENSION ORAL at 08:08

## 2019-08-20 NOTE — SUBJECTIVE & OBJECTIVE
Interval History:  No acute events overnight.  Currently resting comfortably in bed in NAD.  ANC 0.7.  Continues to report daily improvement in diarrhea.  Stool cx and Ova/parasites negative.  Oral thrush has resolved.  Per Hemoc, cannot DC until >1.  Counts trending up.  Anticipate DC over the next 24-48 hours pending ANC count.    Review of Systems   Constitutional: Positive for activity change and fatigue. Negative for appetite change, chills, diaphoresis and fever.   HENT: Negative.    Eyes: Negative.    Respiratory: Negative for cough and shortness of breath.    Cardiovascular: Negative for chest pain, palpitations and leg swelling.   Gastrointestinal: Positive for diarrhea. Negative for abdominal pain, nausea and vomiting.        Reports diarrhea is improving   Genitourinary: Negative for difficulty urinating.   Musculoskeletal: Positive for back pain.   Skin: Negative for pallor, rash and wound.   Neurological: Negative for syncope, weakness and light-headedness.   Psychiatric/Behavioral: The patient is not nervous/anxious.      Objective:     Vital Signs (Most Recent):  Temp: 97.6 °F (36.4 °C) (08/20/19 0739)  Pulse: 60 (08/20/19 0739)  Resp: 16 (08/20/19 0739)  BP: 130/73 (08/20/19 0739)  SpO2: 96 % (08/20/19 0739) Vital Signs (24h Range):  Temp:  [97.4 °F (36.3 °C)-98.3 °F (36.8 °C)] 97.6 °F (36.4 °C)  Pulse:  [54-83] 60  Resp:  [14-20] 16  SpO2:  [96 %-98 %] 96 %  BP: (122-137)/(63-77) 130/73     Weight: 88.2 kg (194 lb 5.4 oz)  Body mass index is 27.88 kg/m².    Intake/Output Summary (Last 24 hours) at 8/20/2019 1128  Last data filed at 8/19/2019 1900  Gross per 24 hour   Intake 1700 ml   Output --   Net 1700 ml      Physical Exam   Constitutional: He is oriented to person, place, and time. He appears well-developed and well-nourished. He has a sickly appearance. No distress.   HENT:   Head: Normocephalic and atraumatic.   Nose: Nose normal.   Mouth/Throat: Oropharynx is clear and moist.   Eyes: Pupils  are equal, round, and reactive to light. Conjunctivae and EOM are normal. No scleral icterus.   Neck: Normal range of motion. Neck supple. No JVD present.   Cardiovascular: Normal rate, regular rhythm, normal heart sounds and intact distal pulses. Exam reveals no gallop and no friction rub.   No murmur heard.  Pulmonary/Chest: Effort normal and breath sounds normal. No stridor. No respiratory distress. He has no wheezes. He has no rales. He exhibits no tenderness.   Abdominal: Soft. Bowel sounds are normal. He exhibits no distension and no mass. There is no tenderness. There is no rebound and no guarding.   Musculoskeletal: Normal range of motion. He exhibits no edema or tenderness.   Neurological: He is alert and oriented to person, place, and time. No cranial nerve deficit.   Skin: Skin is warm and dry. No rash noted. He is not diaphoretic. No erythema. There is pallor.   Incisions to back are healed with no s/s of infection.   Psychiatric: He has a normal mood and affect. His behavior is normal. Judgment and thought content normal.   Nursing note and vitals reviewed.      Significant Labs:   CBC:   Recent Labs   Lab 08/19/19  0517 08/20/19  0538   WBC 1.69* 2.07*   HGB 7.9* 7.8*   HCT 22.8* 23.3*   PLT 87* 96*     CMP:   Recent Labs   Lab 08/19/19  0517 08/20/19  0538    140   K 4.6 4.1    108   CO2 26 25   GLU 90 97   BUN 3* 2*   CREATININE 0.8 0.8   CALCIUM 8.4* 8.5*   PROT 4.8* 4.9*   ALBUMIN 2.6* 2.6*   BILITOT 1.2* 1.1*   ALKPHOS 70 69   AST 14 14   ALT 9* 10   ANIONGAP 6* 7*   EGFRNONAA >60 >60       Significant Imaging: I have reviewed all pertinent imaging results/findings within the past 24 hours.

## 2019-08-20 NOTE — ASSESSMENT & PLAN NOTE
- Mag 1.5  - Continue Mag Ox BID  - Repeat chemistries in AM  - Replace magnesium and phosphorous as needed

## 2019-08-20 NOTE — ASSESSMENT & PLAN NOTE
- CT imaging Splenomegaly with the spleen measuring 18 cm.  - Hematology consulted; likely r/t sepsis and colitis  - Counts continue to improve  - Underwent Bone Marrow biopsy on 8/14 and per Dr. Nowak, BM negative for leukemia  - Hemoc recommends ANC is >1000 prior to DC, per lab ANC is currently 0.7  - Daily CBC  - Anticipate DC over the next 24-48 hours pending ANC count.  - Monitor

## 2019-08-20 NOTE — ASSESSMENT & PLAN NOTE
- Cdiff negative  - Stool cx shows NGTD  - Stool for ova/parasites negative  - Continue Cipro and Flagyl  - DC IV fluids as diarrhea has significantly improved  - Mech soft diet as tolerated   - Questran added on Tuesday, with improvement in diarrhea, will continue  - Prn analgesia and antiemetics

## 2019-08-20 NOTE — PROGRESS NOTES
[facility] -   Hematology/Oncology  Progress Note    Patient Name: Kodak Valentine  Admission Date: 8/12/2019  Hospital Length of Stay: 8 days  Code Status: No Order     Subjective:     HPI:  Pt is a 50 yo male with PMHx of CAD with stenting x 2 LAD, COPD, DM II, HTN, JUDI and HPL who presents to the ED with reports of severe, constant cramping lower abdominal pain x 5 days. Associated symptoms include profuse, greenish watery diarrhea, diaphoresis, lightheadedness, dark urine (today) and vomiting (1-2 days now resolved). Pt had back surgery 6 weeks ago but his back pain is no worse than usual. Pt's wife was treated at [facility] approx 6 weeks ago. Pt denies URI symptoms, cough, chest pain, palpitations, bloody diarrhea and other symptoms. He last ate some yogurt yesterday. V/S on arrival: Temp 98.0, pulse 113, resp 18 and B/P 133/70. Labs find WBC 0.80, Hgb 11.5?Hct 32.7, plt 92, left shift 22 %, hyponatremia 133, hypokalemia 2.6, gluc 176, total bili 1.5, .8. U/A was altered due to dark color. A contrasted CT ABD/Pelvis was resulted at 16:17 which found diffuse inflammatory changes of the descending colon and rectosigmoid consistent with colitis/proctitis.  No evidence of a pericolonic abscess can be seen. Pt is admitted for Sepsis, Acute Colitis and electrolyte imbalances.     Hematology/oncology consulted for pancytopenia.       Interval History: August 20, 2019-- Patient reports continued improvement in diarrhea. Reports episode of diarrhea x 1 this am. Stool culture negative. Pancytopenia continues to improve. ANC 0.7.  BMBx results unremarkable. Pancytopenia most consistent with severe infectious etiology      Oncology Treatment Plan:   [No treatment plan]    Medications:  Continuous Infusions:    Scheduled Meds:   amLODIPine  2.5 mg Oral Daily    arformoterol  15 mcg Nebulization Q12H    aspirin  81 mg Oral Daily    atorvastatin  20 mg Oral Daily    budesonide  0.5 mg  Nebulization Q12H    cholestyramine  1 packet Oral BID    ciprofloxacin HCl  500 mg Oral Q12H    DULoxetine  60 mg Oral Daily    fenofibrate  160 mg Oral Daily    fluconazole  200 mg Oral Daily    folic acid  1 mg Oral Daily    lisinopril  20 mg Oral Daily    magnesium oxide  400 mg Oral BID    metoprolol tartrate  50 mg Oral Q12H    metroNIDAZOLE  500 mg Oral Q8H    pantoprazole  40 mg Oral Daily    ranolazine  1,000 mg Oral BID    tiZANidine  4 mg Oral Q8H    traZODone  200 mg Oral QHS     PRN Meds:albuterol sulfate, ALPRAZolam, Dextrose 10% Bolus, Dextrose 10% Bolus, diphenoxylate-atropine 2.5-0.025 mg, glucagon (human recombinant), glucose, glucose, insulin aspart U-100, ondansetron, oxyCODONE-acetaminophen, promethazine (PHENERGAN) IVPB     Review of patient's allergies indicates:  No Known Allergies     Past Medical History:   Diagnosis Date    Anxiety     CAD (coronary artery disease)     PTCA x 2 LAD, restenosis and restent 7/12.    Chest pain syndrome 10/2/2015    COPD (chronic obstructive pulmonary disease)     CPAP (continuous positive airway pressure) dependence     @ night    Diabetes mellitus type 2, uncontrolled 4/13/2016    History of duodenal ulcer     with bleed    Hypertension     Mixed hyperlipidemia     JUDI (obstructive sleep apnea)     severe    S/P PTCA (percutaneous transluminal coronary angioplasty) 3/11/2015    Thrush 8/14/2019    Vitamin D deficiency      Past Surgical History:   Procedure Laterality Date    APPENDECTOMY      CARDIAC CATHETERIZATION      CATARACT EXTRACTION W/  INTRAOCULAR LENS IMPLANT Right 4-22-15    CORONARY STENT PLACEMENT  2012    ESOPHAGOGASTRODUODENOSCOPY (EGD) N/A 7/31/2014    Performed by Jose Dela Cruz [facility] at [facility] ENDO    EXCISION-SKIN Right 4/20/2015    Performed by Louis O. Jeansonne IV, MD at [facility] OR    HEART CATH WITH GRAFTS-LEFT Right 6/2/2014    Performed by Erma Green [facility] at [facility] CATH LAB    HEART CATH-LEFT Left  2017    [facility] at [facility] CATH LAB    HEMORRHOID SURGERY      INCISION AND DRAINAGE (I&D), BUTTOCK  3/26/2015    Performed by [facility]ne [facility], [facility] at [facility] OR    INCISION AND DRAINAGE-THIGH Right 3/26/2015    Performed by [facility]ne [facility], [facility] at [facility] OR    NISSEN FUNDOPLICATION      lap    SKIN LESION EXCISION Right     leg     Family History     Problem Relation (Age of Onset)    COPD Maternal Grandmother, Maternal Grandfather    Diabetes Maternal Grandfather    Heart block Father    Heart disease Mother, Sister        Tobacco Use    Smoking status: Former Smoker     Packs/day: 0.50     Years: 20.00     Pack years: 10.00     Types: Cigarettes     Last attempt to quit: 6/3/2014     Years since quittin.2    Smokeless tobacco: Never Used    Tobacco comment: currently vaping with nicotine since quit smoking in 2014   Substance and Sexual Activity    Alcohol use: Yes     Alcohol/week: 2.4 oz     Types: 4 Cans of beer per week    Drug use: No    Sexual activity: Not on file       Review of Systems   Constitutional: Positive for activity change, appetite change and fatigue. Negative for chills, diaphoresis and fever.   HENT: Negative.  Negative for trouble swallowing and voice change.    Eyes: Negative.    Respiratory: Negative for cough and shortness of breath.    Cardiovascular: Negative for chest pain, palpitations and leg swelling.   Gastrointestinal: Positive for diarrhea. Negative for abdominal distention, abdominal pain, anal bleeding, blood in stool, constipation, nausea, rectal pain and vomiting.   Genitourinary: Negative for difficulty urinating, flank pain, frequency and hematuria.   Musculoskeletal: Negative for back pain.   Skin: Negative for pallor, rash and wound.   Allergic/Immunologic: Positive for immunocompromised state.   Neurological: Positive for weakness. Negative for syncope and light-headedness.   Hematological: Negative for adenopathy. Does  not bruise/bleed easily.   Psychiatric/Behavioral: Negative for confusion. The patient is nervous/anxious.      Objective:     Vital Signs (Most Recent):  Temp: 97.6 °F (36.4 °C) (08/20/19 0739)  Pulse: 60 (08/20/19 0739)  Resp: 16 (08/20/19 0739)  BP: 130/73 (08/20/19 0739)  SpO2: 96 % (08/20/19 0739) Vital Signs (24h Range):  Temp:  [97.4 °F (36.3 °C)-98.3 °F (36.8 °C)] 97.6 °F (36.4 °C)  Pulse:  [54-83] 60  Resp:  [14-20] 16  SpO2:  [96 %-98 %] 96 %  BP: (122-137)/(63-77) 130/73     Weight: 88.2 kg (194 lb 5.4 oz)  Body mass index is 27.88 kg/m².  Body surface area is 2.09 meters squared.      Intake/Output Summary (Last 24 hours) at 8/20/2019 1154  Last data filed at 8/19/2019 1900  Gross per 24 hour   Intake 1700 ml   Output --   Net 1700 ml       Physical Exam   Constitutional: He is oriented to person, place, and time. He appears well-developed. He has a sickly appearance. No distress.   HENT:   Head: Normocephalic and atraumatic.   Nose: Nose normal.   Eyes: Pupils are equal, round, and reactive to light. Conjunctivae are normal. No scleral icterus.   Neck: Normal range of motion. Neck supple.   Cardiovascular: Normal rate, regular rhythm and normal heart sounds. Exam reveals no gallop and no friction rub.   No murmur heard.  Pulmonary/Chest: Effort normal and breath sounds normal.   Abdominal: Soft. Bowel sounds are normal. He exhibits no distension. There is no tenderness.   Musculoskeletal: Normal range of motion. He exhibits no edema or tenderness.   Neurological: He is alert and oriented to person, place, and time.   Skin: Skin is warm and dry. He is not diaphoretic. There is pallor.   Psychiatric: His speech is normal and behavior is normal. Judgment and thought content normal. His mood appears anxious. He is not actively hallucinating. Cognition and memory are normal. He is attentive.   Vitals reviewed.      Significant Labs:   CBC:   Recent Labs   Lab 08/19/19  0517 08/20/19  0538   WBC 1.69* 2.07*    HGB 7.9* 7.8*   HCT 22.8* 23.3*   PLT 87* 96*   , CMP:   Recent Labs   Lab 08/19/19  0517 08/20/19  0538    140   K 4.6 4.1    108   CO2 26 25   GLU 90 97   BUN 3* 2*   CREATININE 0.8 0.8   CALCIUM 8.4* 8.5*   PROT 4.8* 4.9*   ALBUMIN 2.6* 2.6*   BILITOT 1.2* 1.1*   ALKPHOS 70 69   AST 14 14   ALT 9* 10   ANIONGAP 6* 7*   EGFRNONAA >60 >60   , Coagulation:   No results for input(s): PT, INR, APTT in the last 48 hours., LDH: No results for input(s): LDHCSF, BFSOURCE in the last 48 hours., LFTs:   Recent Labs   Lab 08/19/19 0517 08/20/19  0538   ALT 9* 10   AST 14 14   ALKPHOS 70 69   BILITOT 1.2* 1.1*   PROT 4.8* 4.9*   ALBUMIN 2.6* 2.6*   , Urine Studies:   No results for input(s): COLORU, APPEARANCEUA, PHUR, SPECGRAV, PROTEINUA, GLUCUA, KETONESU, BILIRUBINUA, OCCULTUA, NITRITE, UROBILINOGEN, LEUKOCYTESUR, RBCUA, WBCUA, BACTERIA, SQUAMEPITHEL, HYALINECASTS in the last 48 hours.    Invalid input(s): WRIGHTSUR and All pertinent labs from the last 24 hours have been reviewed.    Diagnostic Results:  I have reviewed all pertinent imaging results/findings within the past 24 hours.    Assessment/Plan:     Acute colitis  8/15/19 states continued watery diarrhea.  States frequency improved.  Started on Questran yesterday.   ordered Immodium today prn.  C. Diff negative.  Has remained on cipro and flagyl IV for treatment of colitis.  Continue IV fluid - replace electrolytes - Magnesium 1.2 today, potassium WNL.  --CMP daily  --questran per   --Imodium prn per     8/16/19 Continued watery diarrhea despite adding questran and lomotil to treatment regime.  C. Diff negative.  On flagyl and cipro for treatment of colitis.    --CMP daily  --Consult GI for further recommendations  --Replace electrolytes as needed per     August 17, 2019-I had a detailed discussion with the patient today with regard to his current clinical situation.  Overall this appears to be gradually improving.  Management will be as per  Hospital Medicine.  Stool culture is pending at this time.    August 20, 2019  --Diarrhea resolving per patient. Management will continue per hospital medicine team. Stool studies negative. Continue IV hydration. Encourage PO hydration/nutrition. Supportive care    Pancytopenia  8/13/19 Patient with newly diagnosed pancytopenia WBC 0.83 (ANC 0.1), hemoglobin 8.8, platelets 83 K.  With diarrhea that started 2 days ago perfuse green watery.  C. Diff negative.  States had night sweats for 2 days.  Denies known fever.  Scheduled for BMB tomorrow at noon.  No need for transfusion at this time.  Splenomegaly on CT scan of abdomen/pelvis with diffuse mural thickening and inflammation of the adjacent fat of the rectosigmoid and descending colon consistent with colitis.  .8.  Blood cultures NGTD.    --Continue supportive care  --No GSCF support for now, until get results of BMB  --BMB tomorrow at noon    8/14/19 Continued pancytopenia WBC 0.87 (ANC 0.2), hemoglobin 9.7, platelets 86K.  Splenomegaly. Folic acid deficiency.  Not iron deficient or B12 deficient.  Continued diarrhea.  C. Diff negative.  Scheduled for BMB today.    --CBC daily  --Monitor for s/s of infection  --Start folate 1 mg Po dialy    8/15/19 Continued stable pancytopenia WBC 0.93, hemoglobin 8.2, platelets 87.  No need for platelets or prbc's at this time.  Patient has remained afebrile.  Started on folic acid 1 mg PO daily for folic acid deficiency.  Had BMB yesterday.  Tolerated well.    --CBC daily  --Monitor for s/s of infection  --continue folate 1 mg PO dialy  --Await BMB results    August 17, 2019-I had a detailed discussion with the patient today with regard to his current clinical situation.  His pancytopenia appears to be slowly improving.  Preliminary results of bone marrow biopsy were reviewed with hematopathology and discussed with patient today.  No evidence of acute leukemia noted.  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  I would expect to see continued improvement in his blood counts over the next 24-48 hours.  I have discussed this with Hospital Medicine today.    August 18, 2019-I had a detailed discussion with the patient today with regard to his current clinical situation.  Overall his clinical symptoms are slowly improving.  I would recommend that the patient be discharged home once ANC greater than 1000.  Please call with any questions.    August 19, 2019  --Pancytopenia slowly improving. ANC 0.5, awaiting ANC >1000 prior to discharge. Monitor S&S infections. Fever precautions. Hg 7.9. No urgent indication for blood transfusion. Monitor. Platelet count 87. No urgent indication for platelet transfusion. Monitor. Transfuse if < 10. Awaiting final BMBx results. Supportive care    August 20, 2019  --BMBx unremarkable.There is no specific diagnostic morphologic abnormality on this study. No evidence of Leukemia. The most likely etiology of his pancytopenia is severe infection. Unsure of exact source. Stools studies negative. Diarrhea resolving. Pancytopenia improving. ANC 0.7. Awaiting ANC > 1000 prior to discharge.  7.8. No urgent indication for blood transfusion. Monitor. Platelet count 96. No urgent indication for platelet transfusion. Monitor. Transfuse if < 10          Thank you for your consult. I will follow-up with patient. Please contact us if you have any additional questions.     Tyra Perales NP  Hematology/Oncology  [facility] [place]

## 2019-08-20 NOTE — PLAN OF CARE
Problem: Adult Inpatient Plan of Care  Goal: Plan of Care Review  Outcome: Ongoing (interventions implemented as appropriate)  Fluids promoted. Pain controlled. Free from injury. Telemetry. Glucose control. Aseptic technique maintained. NADN. Call light in reach. Chart check completed.

## 2019-08-20 NOTE — ASSESSMENT & PLAN NOTE
- Criteria including WBC 0.80 & 1.01, tachycardia 113  - CT of ABD/Pelvis notes Diffuse inflammatory changes of the descending colon and rectosigmoid consistent with colitis/proctitis.  No evidence of a pericolonic abscess can be seen.  The appendix cannot be identified.  Fluid-filled small bowel loops in the pelvis likely represent an ileus.  1.1 cm in diameter gallbladder calculus.  Splenomegaly with the spleen measuring 18 cm.  - BC x 1 on 8/12 show gram + cocci/clusters resembling staph in aerobic and anaerobic bottle, organism is a probable contaminate  - Received a total of 1.7L of IVFs in the ER  - DC IVFs  - Continue Cipro/flagyl for Colitis  - Remains hemodynamically stable  - Monitor

## 2019-08-20 NOTE — ASSESSMENT & PLAN NOTE
8/13/19 Patient with newly diagnosed pancytopenia WBC 0.83 (ANC 0.1), hemoglobin 8.8, platelets 83 K.  With diarrhea that started 2 days ago perfuse green watery.  C. Diff negative.  States had night sweats for 2 days.  Denies known fever.  Scheduled for BMB tomorrow at noon.  No need for transfusion at this time.  Splenomegaly on CT scan of abdomen/pelvis with diffuse mural thickening and inflammation of the adjacent fat of the rectosigmoid and descending colon consistent with colitis.  .8.  Blood cultures NGTD.    --Continue supportive care  --No GSCF support for now, until get results of BMB  --BMB tomorrow at noon    8/14/19 Continued pancytopenia WBC 0.87 (ANC 0.2), hemoglobin 9.7, platelets 86K.  Splenomegaly. Folic acid deficiency.  Not iron deficient or B12 deficient.  Continued diarrhea.  C. Diff negative.  Scheduled for BMB today.    --CBC daily  --Monitor for s/s of infection  --Start folate 1 mg Po dialy    8/15/19 Continued stable pancytopenia WBC 0.93, hemoglobin 8.2, platelets 87.  No need for platelets or prbc's at this time.  Patient has remained afebrile.  Started on folic acid 1 mg PO daily for folic acid deficiency.  Had BMB yesterday.  Tolerated well.    --CBC daily  --Monitor for s/s of infection  --continue folate 1 mg PO dialy  --Await BMB results    August 17, 2019-I had a detailed discussion with the patient today with regard to his current clinical situation.  His pancytopenia appears to be slowly improving.  Preliminary results of bone marrow biopsy were reviewed with hematopathology and discussed with patient today.  No evidence of acute leukemia noted.  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  I would expect to see continued improvement in his blood counts over the next 24-48 hours.  I have discussed this with Hospital Medicine today.    August 18, 2019-I had a detailed discussion with the patient today with regard to his current  clinical situation.  Overall his clinical symptoms are slowly improving.  I would recommend that the patient be discharged home once ANC greater than 1000.  Please call with any questions.    August 19, 2019  --Pancytopenia slowly improving. ANC 0.5, awaiting ANC >1000 prior to discharge. Monitor S&S infections. Fever precautions. Hg 7.9. No urgent indication for blood transfusion. Monitor. Platelet count 87. No urgent indication for platelet transfusion. Monitor. Transfuse if < 10. Awaiting final BMBx results. Supportive care    August 20, 2019  --BMBx unremarkable.There is no specific diagnostic morphologic abnormality on this study. No evidence of Leukemia. The most likely etiology of his pancytopenia is severe infection. Unsure of exact source. Stools studies negative. Diarrhea resolving. Pancytopenia improving. ANC 0.7. Awaiting ANC > 1000 prior to discharge.  7.8. No urgent indication for blood transfusion. Monitor. Platelet count 96. No urgent indication for platelet transfusion. Monitor. Transfuse if < 10

## 2019-08-20 NOTE — SUBJECTIVE & OBJECTIVE
Interval History: August 20, 2019-- Patient reports continued improvement in diarrhea. Reports episode of diarrhea x 1 this am. Stool culture negative. Pancytopenia continues to improve. ANC 0.7.  BMBx results unremarkable. Pancytopenia most consistent with severe infectious etiology      Oncology Treatment Plan:   [No treatment plan]    Medications:  Continuous Infusions:    Scheduled Meds:   amLODIPine  2.5 mg Oral Daily    arformoterol  15 mcg Nebulization Q12H    aspirin  81 mg Oral Daily    atorvastatin  20 mg Oral Daily    budesonide  0.5 mg Nebulization Q12H    cholestyramine  1 packet Oral BID    ciprofloxacin HCl  500 mg Oral Q12H    DULoxetine  60 mg Oral Daily    fenofibrate  160 mg Oral Daily    fluconazole  200 mg Oral Daily    folic acid  1 mg Oral Daily    lisinopril  20 mg Oral Daily    magnesium oxide  400 mg Oral BID    metoprolol tartrate  50 mg Oral Q12H    metroNIDAZOLE  500 mg Oral Q8H    pantoprazole  40 mg Oral Daily    ranolazine  1,000 mg Oral BID    tiZANidine  4 mg Oral Q8H    traZODone  200 mg Oral QHS     PRN Meds:albuterol sulfate, ALPRAZolam, Dextrose 10% Bolus, Dextrose 10% Bolus, diphenoxylate-atropine 2.5-0.025 mg, glucagon (human recombinant), glucose, glucose, insulin aspart U-100, ondansetron, oxyCODONE-acetaminophen, promethazine (PHENERGAN) IVPB     Review of patient's allergies indicates:  No Known Allergies     Past Medical History:   Diagnosis Date    Anxiety     CAD (coronary artery disease)     PTCA x 2 LAD, restenosis and restent 7/12.    Chest pain syndrome 10/2/2015    COPD (chronic obstructive pulmonary disease)     CPAP (continuous positive airway pressure) dependence     @ night    Diabetes mellitus type 2, uncontrolled 4/13/2016    History of duodenal ulcer     with bleed    Hypertension     Mixed hyperlipidemia     JUDI (obstructive sleep apnea)     severe    S/P PTCA (percutaneous transluminal coronary angioplasty) 3/11/2015     Thrush 2019    Vitamin D deficiency      Past Surgical History:   Procedure Laterality Date    APPENDECTOMY      CARDIAC CATHETERIZATION      CATARACT EXTRACTION W/  INTRAOCULAR LENS IMPLANT Right 4-22-15    CORONARY STENT PLACEMENT      ESOPHAGOGASTRODUODENOSCOPY (EGD) N/A 2014    Performed by Jose Dela Cruz [facility] at [facility] ENDO    EXCISION-SKIN Right 2015    Performed by Louis O. Jeansonne IV, MD at [facility] OR    HEART CATH WITH GRAFTS-LEFT Right 2014    Performed by Erma Green MD at [facility] CATH LAB    HEART CATH-LEFT Left 2017    Performed by Erma Green MD at [facility] CATH LAB    HEMORRHOID SURGERY      INCISION AND DRAINAGE (I&D), BUTTOCK  3/26/2015    Performed by Louis O. Jeansonne IV, MD at [facility] OR    INCISION AND DRAINAGE-THIGH Right 3/26/2015    Performed by Louis O. Jeansonne IV, MD at [facility] OR    NISSEN FUNDOPLICATION      lap    SKIN LESION EXCISION Right     leg     Family History     Problem Relation (Age of Onset)    COPD Maternal Grandmother, Maternal Grandfather    Diabetes Maternal Grandfather    Heart block Father    Heart disease Mother, Sister        Tobacco Use    Smoking status: Former Smoker     Packs/day: 0.50     Years: 20.00     Pack years: 10.00     Types: Cigarettes     Last attempt to quit: 6/3/2014     Years since quittin.2    Smokeless tobacco: Never Used    Tobacco comment: currently vaping with nicotine since quit smoking in 2014   Substance and Sexual Activity    Alcohol use: Yes     Alcohol/week: 2.4 oz     Types: 4 Cans of beer per week    Drug use: No    Sexual activity: Not on file       Review of Systems   Constitutional: Positive for activity change, appetite change and fatigue. Negative for chills, diaphoresis and fever.   HENT: Negative.  Negative for trouble swallowing and voice change.    Eyes: Negative.    Respiratory: Negative for cough and shortness of breath.    Cardiovascular: Negative for chest pain,  palpitations and leg swelling.   Gastrointestinal: Positive for diarrhea. Negative for abdominal distention, abdominal pain, anal bleeding, blood in stool, constipation, nausea, rectal pain and vomiting.   Genitourinary: Negative for difficulty urinating, flank pain, frequency and hematuria.   Musculoskeletal: Negative for back pain.   Skin: Negative for pallor, rash and wound.   Allergic/Immunologic: Positive for immunocompromised state.   Neurological: Positive for weakness. Negative for syncope and light-headedness.   Hematological: Negative for adenopathy. Does not bruise/bleed easily.   Psychiatric/Behavioral: Negative for confusion. The patient is nervous/anxious.      Objective:     Vital Signs (Most Recent):  Temp: 97.6 °F (36.4 °C) (08/20/19 0739)  Pulse: 60 (08/20/19 0739)  Resp: 16 (08/20/19 0739)  BP: 130/73 (08/20/19 0739)  SpO2: 96 % (08/20/19 0739) Vital Signs (24h Range):  Temp:  [97.4 °F (36.3 °C)-98.3 °F (36.8 °C)] 97.6 °F (36.4 °C)  Pulse:  [54-83] 60  Resp:  [14-20] 16  SpO2:  [96 %-98 %] 96 %  BP: (122-137)/(63-77) 130/73     Weight: 88.2 kg (194 lb 5.4 oz)  Body mass index is 27.88 kg/m².  Body surface area is 2.09 meters squared.      Intake/Output Summary (Last 24 hours) at 8/20/2019 1154  Last data filed at 8/19/2019 1900  Gross per 24 hour   Intake 1700 ml   Output --   Net 1700 ml       Physical Exam   Constitutional: He is oriented to person, place, and time. He appears well-developed. He has a sickly appearance. No distress.   HENT:   Head: Normocephalic and atraumatic.   Nose: Nose normal.   Eyes: Pupils are equal, round, and reactive to light. Conjunctivae are normal. No scleral icterus.   Neck: Normal range of motion. Neck supple.   Cardiovascular: Normal rate, regular rhythm and normal heart sounds. Exam reveals no gallop and no friction rub.   No murmur heard.  Pulmonary/Chest: Effort normal and breath sounds normal.   Abdominal: Soft. Bowel sounds are normal. He exhibits no  distension. There is no tenderness.   Musculoskeletal: Normal range of motion. He exhibits no edema or tenderness.   Neurological: He is alert and oriented to person, place, and time.   Skin: Skin is warm and dry. He is not diaphoretic. There is pallor.   Psychiatric: His speech is normal and behavior is normal. Judgment and thought content normal. His mood appears anxious. He is not actively hallucinating. Cognition and memory are normal. He is attentive.   Vitals reviewed.      Significant Labs:   CBC:   Recent Labs   Lab 08/19/19 0517 08/20/19 0538   WBC 1.69* 2.07*   HGB 7.9* 7.8*   HCT 22.8* 23.3*   PLT 87* 96*   , CMP:   Recent Labs   Lab 08/19/19 0517 08/20/19 0538    140   K 4.6 4.1    108   CO2 26 25   GLU 90 97   BUN 3* 2*   CREATININE 0.8 0.8   CALCIUM 8.4* 8.5*   PROT 4.8* 4.9*   ALBUMIN 2.6* 2.6*   BILITOT 1.2* 1.1*   ALKPHOS 70 69   AST 14 14   ALT 9* 10   ANIONGAP 6* 7*   EGFRNONAA >60 >60   , Coagulation:   No results for input(s): PT, INR, APTT in the last 48 hours., LDH: No results for input(s): LDHCSF, BFSOURCE in the last 48 hours., LFTs:   Recent Labs   Lab 08/19/19 0517 08/20/19 0538   ALT 9* 10   AST 14 14   ALKPHOS 70 69   BILITOT 1.2* 1.1*   PROT 4.8* 4.9*   ALBUMIN 2.6* 2.6*   , Urine Studies:   No results for input(s): COLORU, APPEARANCEUA, PHUR, SPECGRAV, PROTEINUA, GLUCUA, KETONESU, BILIRUBINUA, OCCULTUA, NITRITE, UROBILINOGEN, LEUKOCYTESUR, RBCUA, WBCUA, BACTERIA, SQUAMEPITHEL, HYALINECASTS in the last 48 hours.    Invalid input(s): WRIGHTSUR and All pertinent labs from the last 24 hours have been reviewed.    Diagnostic Results:  I have reviewed all pertinent imaging results/findings within the past 24 hours.

## 2019-08-20 NOTE — ASSESSMENT & PLAN NOTE
8/15/19 states continued watery diarrhea.  States frequency improved.  Started on Questran yesterday.   ordered Immodium today prn.  C. Diff negative.  Has remained on cipro and flagyl IV for treatment of colitis.  Continue IV fluid - replace electrolytes - Magnesium 1.2 today, potassium WNL.  --CMP daily  --questran per   --Imodium prn per     8/16/19 Continued watery diarrhea despite adding questran and lomotil to treatment regime.  C. Diff negative.  On flagyl and cipro for treatment of colitis.    --CMP daily  --Consult GI for further recommendations  --Replace electrolytes as needed per     August 17, 2019-I had a detailed discussion with the patient today with regard to his current clinical situation.  Overall this appears to be gradually improving.  Management will be as per Hospital Medicine.  Stool culture is pending at this time.    August 20, 2019  --Diarrhea resolving per patient. Management will continue per hospital medicine team. Stool studies negative. Continue IV hydration. Encourage PO hydration/nutrition. Supportive care

## 2019-08-20 NOTE — SIGNIFICANT EVENT
"Notified by multiple staff members that patient is requesting to DC home today and "does not understand his POC".  As primary provider since Friday, I have updated patient on his current POC and status at length on a daily basis.  Patient is aware that per Hemoc recs his ANC must be >1000 to DC and today it is 0.7.  Patient has been notified of lab results on a daily basis by myself in the morning, at which time he verbalizes understanding of POC.  Each afternoon patient requests to DC home.  Patient was informed on Sunday and again today that being that he is in his sound mind, we cannot keep him here against his will and he has the right to leave AMA should he choose, which he has not opted to do to date.  Updated charge nurse and CM on above.  Anticipate DC home once ANC is >1000 per [facility].     "

## 2019-08-20 NOTE — PROGRESS NOTES
[facility] Hospital Medicine  Progress Note    Patient Name: Kodak Valentine  MRN: [id number]  Patient Class: IP- Inpatient   Admission Date: 8/12/2019  Length of Stay: 8 days  Attending Physician: Redd Salazar MD  Primary Care Provider: Eliza Huddleston MD        Subjective:     Principal Problem:Sepsis        HPI:  Pt is a 50 yo male with PMHx of CAD with stenting x 2 LAD, COPD, DM II, HTN, JUDI and HPL who presents to the ED with reports of severe, constant cramping lower abdominal pain x 5 days. Associated symptoms include profuse, greenish watery diarrhea, diaphoresis, lightheadedness, dark urine (today) and vomiting (1-2 days now resolved). Pt had back surgery 6 weeks ago but his back pain is no worse than usual. Pt's wife was treated at [facility] approx 6 weeks ago. Pt denies URI symptoms, cough, chest pain, palpitations, bloody diarrhea and other symptoms. He last ate some yogurt yesterday. V/S on arrival: Temp 98.0, pulse 113, resp 18 and B/P 133/70. Labs find WBC 0.80, Hgb 11.5?Hct 32.7, plt 92, left shift 22 %, hyponatremia 133, hypokalemia 2.6, gluc 176, total bili 1.5, .8. U/A was altered due to dark color. A contrasted CT ABD/Pelvis was resulted at 16:17 which found diffuse inflammatory changes of the descending colon and rectosigmoid consistent with colitis/proctitis.  No evidence of a pericolonic abscess can be seen. Pt is admitted for Sepsis, Acute Colitis and electrolyte imbalances.     Overview/Hospital Course:  Admitted with Sepsis, Acute Colitis, electrolyte abnormalities, dehydration and pancytopenia. IV fluids given, lactic acid normal, Cipro/Flagyl in process, blood cultures NGTD and C diff negative. Hematology saw the patient with plans for bone marrow biopsy. 8/14 - had bone marrow today, results pending. Diarrhea decreasing, less abdominal pain. Describes sore throat pain and white appearing patches present to posterior pharynx - appears yeast in  "nature. 8/15  - Nystatin and Diflucan prescribed for thrush. Less abdominal pain, still with watery diarrhea - Questran and Imodium prescribed. Blood cultures from 8/12 find Gram positive cocci in clusters resembling Staph in one anaerobic bottle - Vanco started (may be contaminant - awaiting full ID).     As of 8/16 patient reports continued diarrhea, but "it has slowed down a lot".  Patient reports "going 5 times in 30 minutes, and now I'm going once every hour".  Cdiff negative.  Stool cx and ova/parasites pending.  Patient remains afebrile.  Continue Cipro/Flagyl for now along with Questran.  Case d/w GI.  Will monitor and if no improvement in diarrhea, patient may need a flex sig.  Hemoc continues to follow for pancytopenia and recommended that ANC is >1000 prior to DC.  Awaiting bone marrow biopsy results. Awaiting final BC results to determine contaminate versus true infection, continue empiric Vanc for now.      As of 8/17 patient reports continued improvement in diarrhea, reporting only 3 diarrheal stools overnight with none this morning.  BC show probable contaminate.  Stool cx shows NGTD.  Pancytopenia continues to improve.  Case d/w Dr. Nowak, BM biopsy negative for leukemia, if counts continue to improve, anticipate DC home in AM.      As of 8/18 no change in pancytopenia overnight.  Per Hemoc, cannot DC until ANC is >1000.  Patient continues to report improvement in diarrhea.  He remains afebrile.  Stool cx shows NGTD.  Ecoli, ova/parasites pending.  Continue oral Cipro/flagyl for colitis.      As of 8/19 patient reports last diarrheal stool was last night.  ANC 0.5.  Continue ABX for colitis.     As of 8/20 ANC 0.7.  Continues to report daily improvement in diarrhea.  Stool cx and Ova/parasites negative.  Oral thrush has resolved.  Per Hemoc, cannot DC until >1.  Counts trending up.  Anticipate DC over the next 24-48 hours pending ANC count.    Interval History:  No acute events overnight.  " Currently resting comfortably in bed in NAD.  ANC 0.7.  Continues to report daily improvement in diarrhea.  Stool cx and Ova/parasites negative.  Oral thrush has resolved.  Per Hemoc, cannot DC until >1.  Counts trending up.  Anticipate DC over the next 24-48 hours pending ANC count.    Review of Systems   Constitutional: Positive for activity change and fatigue. Negative for appetite change, chills, diaphoresis and fever.   HENT: Negative.    Eyes: Negative.    Respiratory: Negative for cough and shortness of breath.    Cardiovascular: Negative for chest pain, palpitations and leg swelling.   Gastrointestinal: Positive for diarrhea. Negative for abdominal pain, nausea and vomiting.        Reports diarrhea is improving   Genitourinary: Negative for difficulty urinating.   Musculoskeletal: Positive for back pain.   Skin: Negative for pallor, rash and wound.   Neurological: Negative for syncope, weakness and light-headedness.   Psychiatric/Behavioral: The patient is not nervous/anxious.      Objective:     Vital Signs (Most Recent):  Temp: 97.6 °F (36.4 °C) (08/20/19 0739)  Pulse: 60 (08/20/19 0739)  Resp: 16 (08/20/19 0739)  BP: 130/73 (08/20/19 0739)  SpO2: 96 % (08/20/19 0739) Vital Signs (24h Range):  Temp:  [97.4 °F (36.3 °C)-98.3 °F (36.8 °C)] 97.6 °F (36.4 °C)  Pulse:  [54-83] 60  Resp:  [14-20] 16  SpO2:  [96 %-98 %] 96 %  BP: (122-137)/(63-77) 130/73     Weight: 88.2 kg (194 lb 5.4 oz)  Body mass index is 27.88 kg/m².    Intake/Output Summary (Last 24 hours) at 8/20/2019 1128  Last data filed at 8/19/2019 1900  Gross per 24 hour   Intake 1700 ml   Output --   Net 1700 ml      Physical Exam   Constitutional: He is oriented to person, place, and time. He appears well-developed and well-nourished. He has a sickly appearance. No distress.   HENT:   Head: Normocephalic and atraumatic.   Nose: Nose normal.   Mouth/Throat: Oropharynx is clear and moist.   Eyes: Pupils are equal, round, and reactive to light.  Conjunctivae and EOM are normal. No scleral icterus.   Neck: Normal range of motion. Neck supple. No JVD present.   Cardiovascular: Normal rate, regular rhythm, normal heart sounds and intact distal pulses. Exam reveals no gallop and no friction rub.   No murmur heard.  Pulmonary/Chest: Effort normal and breath sounds normal. No stridor. No respiratory distress. He has no wheezes. He has no rales. He exhibits no tenderness.   Abdominal: Soft. Bowel sounds are normal. He exhibits no distension and no mass. There is no tenderness. There is no rebound and no guarding.   Musculoskeletal: Normal range of motion. He exhibits no edema or tenderness.   Neurological: He is alert and oriented to person, place, and time. No cranial nerve deficit.   Skin: Skin is warm and dry. No rash noted. He is not diaphoretic. No erythema. There is pallor.   Incisions to back are healed with no s/s of infection.   Psychiatric: He has a normal mood and affect. His behavior is normal. Judgment and thought content normal.   Nursing note and vitals reviewed.      Significant Labs:   CBC:   Recent Labs   Lab 08/19/19  0517 08/20/19  0538   WBC 1.69* 2.07*   HGB 7.9* 7.8*   HCT 22.8* 23.3*   PLT 87* 96*     CMP:   Recent Labs   Lab 08/19/19 0517 08/20/19  0538    140   K 4.6 4.1    108   CO2 26 25   GLU 90 97   BUN 3* 2*   CREATININE 0.8 0.8   CALCIUM 8.4* 8.5*   PROT 4.8* 4.9*   ALBUMIN 2.6* 2.6*   BILITOT 1.2* 1.1*   ALKPHOS 70 69   AST 14 14   ALT 9* 10   ANIONGAP 6* 7*   EGFRNONAA >60 >60       Significant Imaging: I have reviewed all pertinent imaging results/findings within the past 24 hours.      Assessment/Plan:      * Sepsis  - Criteria including WBC 0.80 & 1.01, tachycardia 113  - CT of ABD/Pelvis notes Diffuse inflammatory changes of the descending colon and rectosigmoid consistent with colitis/proctitis.  No evidence of a pericolonic abscess can be seen.  The appendix cannot be identified.  Fluid-filled small bowel loops  in the pelvis likely represent an ileus.  1.1 cm in diameter gallbladder calculus.  Splenomegaly with the spleen measuring 18 cm.  - BC x 1 on 8/12 show gram + cocci/clusters resembling staph in aerobic and anaerobic bottle, organism is a probable contaminate  - Received a total of 1.7L of IVFs in the ER  - DC IVFs  - Continue Cipro/flagyl for Colitis  - Remains hemodynamically stable  - Monitor    Acute colitis  - Cdiff negative  - Stool cx shows NGTD  - Stool for ova/parasites negative  - Continue Cipro and Flagyl  - DC IV fluids as diarrhea has significantly improved  - Mech soft diet as tolerated   - Questran added on Tuesday, with improvement in diarrhea, will continue  - Prn analgesia and antiemetics      Pancytopenia  - CT imaging Splenomegaly with the spleen measuring 18 cm.  - Hematology consulted; likely r/t sepsis and colitis  - Counts continue to improve  - Underwent Bone Marrow biopsy on 8/14 and per Dr. Nowak, BM negative for leukemia  - Hemoc recommends ANC is >1000 prior to DC, per lab ANC is currently 0.7  - Daily CBC  - Anticipate DC over the next 24-48 hours pending ANC count.  - Monitor      Hypomagnesemia  - Mag 1.5  - Continue Mag Ox BID  - Repeat chemistries in AM  - Replace magnesium and phosphorous as needed      CAD (coronary artery disease)  - Denies any chest pain  - Continue ASA, Atorvastatin, Fenofibrate, Ranexa, Lopressor and Lisinopril   - Monitor      Hypertension associated with diabetes  - BP under fair control  - Continue Lopressor, Norvasc, and Lisinopril   - monitor and adjust meds as needed        Controlled type 2 diabetes mellitus with stage 2 chronic kidney disease, without long-term current use of insulin  - BG under fair control  - A1c 4.1  - Hold home Metformin  - Accu checks ACHS with SSI prn  - Monitor      Hyperlipidemia associated with type 2 diabetes mellitus  - Continue Statin and Fenofibrate      Moderate COPD (chronic obstructive pulmonary disease)  -  Currently appears compensated  - Continue nebs  - Supplemental O2 prn  - monitor      Anxiety  - Continue home meds      GERD (gastroesophageal reflux disease)  - Continue home Protonix      JUDI treated with BiPAP  - Cpap q hs        VTE Risk Mitigation (From admission, onward)        Ordered     Place sequential compression device  Until discontinued      08/16/19 2350                Marti Bhatia, SPENCER, ACNP-BC  Department of Hospital Medicine   [facility]

## 2019-08-20 NOTE — PLAN OF CARE
Problem: Adult Inpatient Plan of Care  Goal: Plan of Care Review  Outcome: Ongoing (interventions implemented as appropriate)  Patient remains free from harm. Pain controlled with PO meds per order. Blood Glucose monitoring. No acute distress noted at this time. Will continue to monitor.

## 2019-08-21 ENCOUNTER — TELEPHONE (OUTPATIENT)
Dept: FAMILY MEDICINE | Facility: CLINIC | Age: 49
End: 2019-08-21

## 2019-08-21 VITALS
SYSTOLIC BLOOD PRESSURE: 141 MMHG | DIASTOLIC BLOOD PRESSURE: 69 MMHG | RESPIRATION RATE: 18 BRPM | HEART RATE: 74 BPM | HEIGHT: 70 IN | WEIGHT: 194.31 LBS | BODY MASS INDEX: 27.82 KG/M2 | TEMPERATURE: 98 F | OXYGEN SATURATION: 96 %

## 2019-08-21 DIAGNOSIS — D64.9 ANEMIA, UNSPECIFIED TYPE: Primary | ICD-10-CM

## 2019-08-21 PROBLEM — A41.9 SEPSIS: Status: RESOLVED | Noted: 2019-08-12 | Resolved: 2019-08-21

## 2019-08-21 LAB
ALBUMIN SERPL BCP-MCNC: 2.9 G/DL (ref 3.5–5.2)
ALP SERPL-CCNC: 70 U/L (ref 55–135)
ALT SERPL W/O P-5'-P-CCNC: 10 U/L (ref 10–44)
ANION GAP SERPL CALC-SCNC: 7 MMOL/L (ref 8–16)
ANISOCYTOSIS BLD QL SMEAR: SLIGHT
AST SERPL-CCNC: 15 U/L (ref 10–40)
BASOPHILS NFR BLD: 0 % (ref 0–1.9)
BILIRUB SERPL-MCNC: 1.2 MG/DL (ref 0.1–1)
BUN SERPL-MCNC: 2 MG/DL (ref 6–20)
CALCIUM SERPL-MCNC: 9.2 MG/DL (ref 8.7–10.5)
CHLORIDE SERPL-SCNC: 107 MMOL/L (ref 95–110)
CO2 SERPL-SCNC: 25 MMOL/L (ref 23–29)
CREAT SERPL-MCNC: 0.8 MG/DL (ref 0.5–1.4)
DACRYOCYTES BLD QL SMEAR: ABNORMAL
DIFFERENTIAL METHOD: ABNORMAL
EOSINOPHIL NFR BLD: 0 % (ref 0–8)
ERYTHROCYTE [DISTWIDTH] IN BLOOD BY AUTOMATED COUNT: 14.8 % (ref 11.5–14.5)
ERYTHROCYTE [DISTWIDTH] IN BLOOD BY AUTOMATED COUNT: 14.8 % (ref 11.5–14.5)
EST. GFR  (AFRICAN AMERICAN): >60 ML/MIN/1.73 M^2
EST. GFR  (NON AFRICAN AMERICAN): >60 ML/MIN/1.73 M^2
GLUCOSE SERPL-MCNC: 83 MG/DL (ref 70–110)
HCT VFR BLD AUTO: 25.3 % (ref 40–54)
HCT VFR BLD AUTO: 25.3 % (ref 40–54)
HGB BLD-MCNC: 8.6 G/DL (ref 14–18)
HGB BLD-MCNC: 8.6 G/DL (ref 14–18)
LYMPHOCYTES NFR BLD: 34 % (ref 18–48)
MAGNESIUM SERPL-MCNC: 1.6 MG/DL (ref 1.6–2.6)
MCH RBC QN AUTO: 36.1 PG (ref 27–31)
MCH RBC QN AUTO: 36.1 PG (ref 27–31)
MCHC RBC AUTO-ENTMCNC: 34 G/DL (ref 32–36)
MCHC RBC AUTO-ENTMCNC: 34 G/DL (ref 32–36)
MCV RBC AUTO: 106 FL (ref 82–98)
MCV RBC AUTO: 106 FL (ref 82–98)
METAMYELOCYTES NFR BLD MANUAL: 10 %
MONOCYTES NFR BLD: 26 % (ref 4–15)
MYELOCYTES NFR BLD MANUAL: 2 %
NEUTROPHILS NFR BLD: 28 % (ref 38–73)
OVALOCYTES BLD QL SMEAR: ABNORMAL
PLATELET # BLD AUTO: 104 K/UL (ref 150–350)
PLATELET # BLD AUTO: 104 K/UL (ref 150–350)
PLATELET BLD QL SMEAR: ABNORMAL
PMV BLD AUTO: 8.8 FL (ref 9.2–12.9)
PMV BLD AUTO: 8.8 FL (ref 9.2–12.9)
POCT GLUCOSE: 172 MG/DL (ref 70–110)
POCT GLUCOSE: 90 MG/DL (ref 70–110)
POIKILOCYTOSIS BLD QL SMEAR: SLIGHT
POLYCHROMASIA BLD QL SMEAR: ABNORMAL
POTASSIUM SERPL-SCNC: 4.4 MMOL/L (ref 3.5–5.1)
PROT SERPL-MCNC: 5.4 G/DL (ref 6–8.4)
RBC # BLD AUTO: 2.38 M/UL (ref 4.6–6.2)
RBC # BLD AUTO: 2.38 M/UL (ref 4.6–6.2)
SCHISTOCYTES BLD QL SMEAR: PRESENT
SODIUM SERPL-SCNC: 139 MMOL/L (ref 136–145)
SPHEROCYTES BLD QL SMEAR: ABNORMAL
STOMATOCYTES BLD QL SMEAR: PRESENT
TARGETS BLD QL SMEAR: ABNORMAL
WBC # BLD AUTO: 2.19 K/UL (ref 3.9–12.7)
WBC # BLD AUTO: 2.19 K/UL (ref 3.9–12.7)

## 2019-08-21 PROCEDURE — 99232 SBSQ HOSP IP/OBS MODERATE 35: CPT | Mod: ,,, | Performed by: INTERNAL MEDICINE

## 2019-08-21 PROCEDURE — 25000242 PHARM REV CODE 250 ALT 637 W/ HCPCS: Performed by: NURSE PRACTITIONER

## 2019-08-21 PROCEDURE — 83735 ASSAY OF MAGNESIUM: CPT

## 2019-08-21 PROCEDURE — 63700000 PHARM REV CODE 250 ALT 637 W/O HCPCS: Performed by: NURSE PRACTITIONER

## 2019-08-21 PROCEDURE — 85027 COMPLETE CBC AUTOMATED: CPT

## 2019-08-21 PROCEDURE — 25000003 PHARM REV CODE 250: Performed by: NURSE PRACTITIONER

## 2019-08-21 PROCEDURE — 99232 PR SUBSEQUENT HOSPITAL CARE,LEVL II: ICD-10-PCS | Mod: ,,, | Performed by: INTERNAL MEDICINE

## 2019-08-21 PROCEDURE — 94640 AIRWAY INHALATION TREATMENT: CPT

## 2019-08-21 PROCEDURE — 36415 COLL VENOUS BLD VENIPUNCTURE: CPT

## 2019-08-21 PROCEDURE — 80053 COMPREHEN METABOLIC PANEL: CPT

## 2019-08-21 PROCEDURE — 85007 BL SMEAR W/DIFF WBC COUNT: CPT

## 2019-08-21 PROCEDURE — 94761 N-INVAS EAR/PLS OXIMETRY MLT: CPT

## 2019-08-21 RX ORDER — CHOLESTYRAMINE 4 G/9G
1 POWDER, FOR SUSPENSION ORAL 2 TIMES DAILY
Qty: 180 PACKET | Refills: 0 | Status: SHIPPED | OUTPATIENT
Start: 2019-08-21 | End: 2019-09-18 | Stop reason: ALTCHOICE

## 2019-08-21 RX ORDER — METRONIDAZOLE 500 MG/1
500 TABLET ORAL EVERY 8 HOURS
Qty: 15 TABLET | Refills: 0 | Status: SHIPPED | OUTPATIENT
Start: 2019-08-21 | End: 2019-08-26

## 2019-08-21 RX ORDER — CIPROFLOXACIN 500 MG/1
500 TABLET ORAL EVERY 12 HOURS
Qty: 10 TABLET | Refills: 0 | Status: SHIPPED | OUTPATIENT
Start: 2019-08-21 | End: 2019-08-21

## 2019-08-21 RX ORDER — LANOLIN ALCOHOL/MO/W.PET/CERES
400 CREAM (GRAM) TOPICAL 2 TIMES DAILY
Refills: 0 | COMMUNITY
Start: 2019-08-21 | End: 2019-08-30

## 2019-08-21 RX ORDER — CIPROFLOXACIN 500 MG/1
500 TABLET ORAL EVERY 12 HOURS
Qty: 10 TABLET | Refills: 0 | Status: SHIPPED | OUTPATIENT
Start: 2019-08-21 | End: 2019-08-26

## 2019-08-21 RX ORDER — FOLIC ACID 1 MG/1
1 TABLET ORAL DAILY
Qty: 30 TABLET | Refills: 0 | Status: SHIPPED | OUTPATIENT
Start: 2019-08-22 | End: 2019-08-30 | Stop reason: ALTCHOICE

## 2019-08-21 RX ADMIN — METOPROLOL TARTRATE 50 MG: 50 TABLET ORAL at 09:08

## 2019-08-21 RX ADMIN — METRONIDAZOLE 500 MG: 500 TABLET ORAL at 06:08

## 2019-08-21 RX ADMIN — RANOLAZINE 1000 MG: 500 TABLET, FILM COATED, EXTENDED RELEASE ORAL at 09:08

## 2019-08-21 RX ADMIN — ALPRAZOLAM 0.25 MG: 0.25 TABLET ORAL at 12:08

## 2019-08-21 RX ADMIN — MAGNESIUM OXIDE TAB 400 MG (241.3 MG ELEMENTAL MG) 400 MG: 400 (241.3 MG) TAB at 09:08

## 2019-08-21 RX ADMIN — BUDESONIDE 0.5 MG: 0.5 SUSPENSION RESPIRATORY (INHALATION) at 07:08

## 2019-08-21 RX ADMIN — DULOXETINE 60 MG: 30 CAPSULE, DELAYED RELEASE ORAL at 09:08

## 2019-08-21 RX ADMIN — PANTOPRAZOLE SODIUM 40 MG: 40 TABLET, DELAYED RELEASE ORAL at 09:08

## 2019-08-21 RX ADMIN — FENOFIBRATE 160 MG: 160 TABLET ORAL at 09:08

## 2019-08-21 RX ADMIN — TIZANIDINE 4 MG: 4 TABLET ORAL at 06:08

## 2019-08-21 RX ADMIN — FLUCONAZOLE 200 MG: 100 TABLET ORAL at 09:08

## 2019-08-21 RX ADMIN — FOLIC ACID 1 MG: 1 TABLET ORAL at 09:08

## 2019-08-21 RX ADMIN — ASPIRIN 81 MG: 81 TABLET, COATED ORAL at 09:08

## 2019-08-21 RX ADMIN — CIPROFLOXACIN HYDROCHLORIDE 500 MG: 500 TABLET, FILM COATED ORAL at 09:08

## 2019-08-21 RX ADMIN — CHOLESTYRAMINE 4 G: 4 POWDER, FOR SUSPENSION ORAL at 09:08

## 2019-08-21 RX ADMIN — AMLODIPINE BESYLATE 2.5 MG: 2.5 TABLET ORAL at 09:08

## 2019-08-21 RX ADMIN — OXYCODONE HYDROCHLORIDE AND ACETAMINOPHEN 1 TABLET: 10; 325 TABLET ORAL at 12:08

## 2019-08-21 RX ADMIN — LISINOPRIL 20 MG: 20 TABLET ORAL at 09:08

## 2019-08-21 RX ADMIN — OXYCODONE HYDROCHLORIDE AND ACETAMINOPHEN 1 TABLET: 10; 325 TABLET ORAL at 09:08

## 2019-08-21 RX ADMIN — ATORVASTATIN CALCIUM 20 MG: 10 TABLET, FILM COATED ORAL at 09:08

## 2019-08-21 RX ADMIN — ARFORMOTEROL TARTRATE 15 MCG: 15 SOLUTION RESPIRATORY (INHALATION) at 07:08

## 2019-08-21 NOTE — DISCHARGE SUMMARY
[facility] Hospital Medicine  Discharge Summary      Patient Name: Kodak Valentine  MRN: [id number]  Admission Date: 8/12/2019  Hospital Length of Stay: 9 days  Discharge Date and Time:  08/21/2019 12:42 PM  Attending Physician: No att. providers found   Discharging Provider: Marti Bhatia NP  Primary Care Provider: Eliza Huddleston MD      HPI:   Pt is a 48 yo male with PMHx of CAD with stenting x 2 LAD, COPD, DM II, HTN, JUDI and HPL who presents to the ED with reports of severe, constant cramping lower abdominal pain x 5 days. Associated symptoms include profuse, greenish watery diarrhea, diaphoresis, lightheadedness, dark urine (today) and vomiting (1-2 days now resolved). Pt had back surgery 6 weeks ago but his back pain is no worse than usual. Pt's wife was treated at [facility] approx 6 weeks ago. Pt denies URI symptoms, cough, chest pain, palpitations, bloody diarrhea and other symptoms. He last ate some yogurt yesterday. V/S on arrival: Temp 98.0, pulse 113, resp 18 and B/P 133/70. Labs find WBC 0.80, Hgb 11.5?Hct 32.7, plt 92, left shift 22 %, hyponatremia 133, hypokalemia 2.6, gluc 176, total bili 1.5, .8. U/A was altered due to dark color. A contrasted CT ABD/Pelvis was resulted at 16:17 which found diffuse inflammatory changes of the descending colon and rectosigmoid consistent with colitis/proctitis.  No evidence of a pericolonic abscess can be seen. Pt is admitted for Sepsis, Acute Colitis and electrolyte imbalances.     * No surgery found *      Hospital Course:   Admitted with Sepsis, Acute Colitis, electrolyte abnormalities, dehydration and pancytopenia. IV fluids given, lactic acid normal, Cipro/Flagyl in process, blood cultures NGTD and C diff negative. Hematology saw the patient with plans for bone marrow biopsy. 8/14 - had bone marrow today, results pending. Diarrhea decreasing, less abdominal pain. Describes sore throat pain and white appearing patches  "present to posterior pharynx - appears yeast in nature. 8/15  - Nystatin and Diflucan prescribed for thrush. Less abdominal pain, still with watery diarrhea - Questran and Imodium prescribed. Blood cultures from 8/12 find Gram positive cocci in clusters resembling Staph in one anaerobic bottle - Vanco started (may be contaminant - awaiting full ID).     As of 8/16 patient reports continued diarrhea, but "it has slowed down a lot".  Patient reports "going 5 times in 30 minutes, and now I'm going once every hour".  Cdiff negative.  Stool cx and ova/parasites pending.  Patient remains afebrile.  Continue Cipro/Flagyl for now along with Questran.  Case d/w GI.  Will monitor and if no improvement in diarrhea, patient may need a flex sig.  [facility] continues to follow for pancytopenia and recommended that ANC is >1000 prior to DC.  Awaiting bone marrow biopsy results. Awaiting final BC results to determine contaminate versus true infection, continue empiric Vanc for now.      As of 8/17 patient reports continued improvement in diarrhea, reporting only 3 diarrheal stools overnight with none this morning.  BC show probable contaminate.  Stool cx shows NGTD.  Pancytopenia continues to improve.  Case d/w Dr. Nowak, BM biopsy negative for leukemia, if counts continue to improve, anticipate DC home in AM.      As of 8/18 no change in pancytopenia overnight.  Per [facility], cannot DC until ANC is >1000.  Patient continues to report improvement in diarrhea.  He remains afebrile.  Stool cx shows NGTD.  Ecoli, ova/parasites pending.  Continue oral Cipro/flagyl for colitis.      As of 8/19 patient reports last diarrheal stool was last night.  ANC 0.5.  Continue ABX for colitis.     As of 8/20 ANC 0.7.  Continues to report daily improvement in diarrhea.  Stool cx and Ova/parasites negative.  Oral thrush has resolved.  Per [facility], cannot DC until >1.  Counts trending up.  Anticipate DC over the next 24-48 hours pending ANC count.    As of " "8/21 per d/w lab this morning ANC is now 0.82.  Patient reports diarrhea has "stopped", and he has no complaints today.  Patient is anxious to leave as he "has a pain management appointment at 11 that I cannot miss".  VS remain stable and physical exam is benign.  Remains afebrile with no leukocytosis.  Case d/w [facility], who agrees with DC home today from their standpoint with outpatient f/u with them in clinic in 2-3 weeks for repeat CBC.  Patient also instructed to f/u with PCP within 3 days for post hospital evaluation, and with GI provider of his choice for routine colonoscopy, verbalized + understanding.  Case d/w Dr. Salazar.  Patient seen and examined and deemed medically stable to DC home today.  Patient will DC home to complete 5 more days of oral Cipro and Flagyl for Colitis for a total of 10 days of treatment.  Medications reconciled for DC home.       Consults: Hematology.    No new Assessment & Plan notes have been filed under this hospital service since the last note was generated.  Service: Hospital Medicine    Final Active Diagnoses:    Diagnosis Date Noted POA    Acute colitis [K52.9] 08/12/2019 Yes    Pancytopenia [D61.818] 08/12/2019 Yes    Hypomagnesemia [E83.42] 08/12/2019 Yes    CAD (coronary artery disease) [I25.10]  Yes    Hypertension associated with diabetes [E11.59, I10]  Yes    Controlled type 2 diabetes mellitus with stage 2 chronic kidney disease, without long-term current use of insulin [E11.22, N18.2] 04/13/2016 Yes    Hyperlipidemia associated with type 2 diabetes mellitus [E11.69, E78.5] 04/04/2018 Yes    Moderate COPD (chronic obstructive pulmonary disease) [J44.9] 04/07/2016 Yes    Anxiety [F41.9]  Yes    GERD (gastroesophageal reflux disease) [K21.9] 06/11/2014 Yes    JUDI treated with BiPAP [G47.33] 04/07/2016 Yes     Chronic      Problems Resolved During this Admission:    Diagnosis Date Noted Date Resolved POA    PRINCIPAL PROBLEM:  Sepsis [A41.9] 08/12/2019 " 08/21/2019 Yes    Thrush [B37.0] 08/14/2019 08/20/2019 Yes       Discharged Condition: good    Disposition: Home or Self Care    Follow Up:  Follow-up Information     Eliza Huddleston MD In 3 days.    Specialty:  Internal Medicine  Why:  Follow up with PCP within 3 days for post hospital evaluation and monitoring.  Contact information:  [address] [address]  [phone]             Cash Nowak MD In 2 weeks.    Specialty:  Hematology and Oncology  Why:  Follow up with [facility] in 2-3 weeks for post hospital evaluation and monitoring of blood counts.  Contact information:  [address]  [place] [address]  [phone]                 Patient Instructions:      Diet Cardiac     Notify your health care provider if you experience any of the following:  temperature >100.4     Notify your health care provider if you experience any of the following:  persistent nausea and vomiting or diarrhea     Notify your health care provider if you experience any of the following:  severe uncontrolled pain     Notify your health care provider if you experience any of the following:  difficulty breathing or increased cough     Notify your health care provider if you experience any of the following:  increased confusion or weakness     Notify your health care provider if you experience any of the following:  persistent dizziness, light-headedness, or visual disturbances     Activity as tolerated       Significant Diagnostic Studies: Labs:   BMP:   Recent Labs   Lab 08/20/19  0538 08/21/19  0707   GLU 97 83    139   K 4.1 4.4    107   CO2 25 25   BUN 2* 2*   CREATININE 0.8 0.8   CALCIUM 8.5* 9.2   MG 1.5* 1.6   , CMP   Recent Labs   Lab 08/20/19  0538 08/21/19  0707    139   K 4.1 4.4    107   CO2 25 25   GLU 97 83   BUN 2* 2*   CREATININE 0.8 0.8   CALCIUM 8.5* 9.2   PROT 4.9* 5.4*   ALBUMIN 2.6* 2.9*   BILITOT 1.1* 1.2*   ALKPHOS 69 70   AST 14 15   ALT 10 10   ANIONGAP 7*  7*   ESTGFRAFRICA >60 >60   EGFRNONAA >60 >60    and CBC   Recent Labs   Lab 08/20/19  0538 08/21/19  0707   WBC 2.07* 2.19*  2.19*   HGB 7.8* 8.6*  8.6*   HCT 23.3* 25.3*  25.3*   PLT 96* 104*  104*     Microbiology:   Blood Culture   Lab Results   Component Value Date    LABBLOO  08/12/2019     Gram stain seema bottle: Gram positive cocci in clusters resembling Staph     LABBLOO  08/12/2019     Results called to and read back by: Roderick Kowalski RN  08/14/2019      LABBLOO 20:00 08/12/2019    LABBLOO (A) 08/12/2019     COAGULASE-NEGATIVE STAPHYLOCOCCUS SPECIES  Organism is a probable contaminant      and Stool cx     Pending Diagnostic Studies:     Procedure Component Value Units Date/Time    Tissue Specimen to Pathology, Bone Marrow Aspiration/Biopsy Procedure [[id number]] Collected:  08/14/19 1035    Order Status:  Sent Lab Status:  No result     Specimen:  Bone Marrow Clot, Right Iliac Crest     Tissue Specimen to Pathology, Bone Marrow Aspiration/Biopsy Procedure [[id number]] Collected:  08/14/19 1035    Order Status:  Sent Lab Status:  No result     Specimen:  Bone Marrow Core Bx, Right Iliac Crest          Imaging Results          X-Ray Chest 1 View (Final result)  Result time 08/12/19 19:01:39    Final result by Bo Shukla MD (08/12/19 19:01:39)                 Impression:      No acute findings.      Electronically signed by: Bo Shukla MD  Date:    08/12/2019  Time:    19:01             Narrative:    EXAMINATION:  XR CHEST 1 VIEW    CLINICAL HISTORY:  Coronary artery disease.    TECHNIQUE:  AP view of the chest was performed.    COMPARISON:  06/05/2019    FINDINGS:  The cardiac and mediastinal silhouettes appear within normal limits.   The lungs are clear bilaterally.  Remote ununited left clavicular fracture.                               CT Abdomen Pelvis With Contrast (Final result)  Result time 08/12/19 16:17:35    Final result by Jt León MD (08/12/19 16:17:35)                 Impression:       Diffuse inflammatory changes of the descending colon and rectosigmoid consistent with colitis/proctitis.  No evidence of a pericolonic abscess can be seen.  The appendix cannot be identified.  Fluid-filled small bowel loops in the pelvis likely represent an ileus.  1.1 cm in diameter gallbladder calculus.  Splenomegaly with the spleen measuring 18 cm.    All CT scans at this facility use dose modulation, iterative reconstruction and/or weight based dosing when appropriate to reduce radiation dose to as low as reasonably achievable.      Electronically signed by: Jt León MD  Date:    08/12/2019  Time:    16:17             Narrative:    EXAMINATION:  CT ABDOMEN PELVIS WITH CONTRAST    CLINICAL HISTORY:  Nausea, vomiting, diarrhea;    TECHNIQUE:  Low dose axial images, sagittal and coronal reformations were obtained from the lung bases to the pubic symphysis following the IV administration of 75 mL of Omnipaque 350 and the oral administration of 30 ml of Omnipaque 350.    COMPARISON:  None.    FINDINGS:  ABDOMEN:    - Lung bases: No infiltrates and no nodules.    - Liver: No focal mass.    - Gallbladder: 1.1 cm in diameter partially calcified gallbladder stone.    - Bile Ducts: No evidence of intra or extra hepatic biliary ductal dilation.    - Spleen: Splenomegaly.  The spleen measures up to 18 cm x 6.7 cm in diameter.    - Kidneys: No mass or hydronephrosis.    - Adrenals: Unremarkable.    - Pancreas: No mass or peripancreatic fat stranding.    - Retroperitoneum:  No significant adenopathy.    - Vascular: Atherosclerotic calcifications of the nondilated abdominal aorta.    - Abdominal wall:  Unremarkable.    PELVIS:    No pelvic mass, adenopathy, or free fluid.    BOWEL/MESENTERY:    Diffuse mural thickening and inflammation of the adjacent fat of the rectosigmoid and descending colon consistent with colitis.  No pericolonic abscess identified.    BONES:  Remote lumbar fusion.                                 Medications:  Reconciled Home Medications:      Medication List      START taking these medications    cholestyramine 4 gram packet  Commonly known as:  QUESTRAN  Take 1 packet (4 g total) by mouth 2 (two) times daily.     ciprofloxacin HCl 500 MG tablet  Commonly known as:  CIPRO  Take 1 tablet (500 mg total) by mouth every 12 (twelve) hours. for 5 days     folic acid 1 MG tablet  Commonly known as:  FOLVITE  Take 1 tablet (1 mg total) by mouth once daily.  Start taking on:  8/22/2019     magnesium oxide 400 mg (241.3 mg magnesium) tablet  Commonly known as:  MAG-OX  Take 1 tablet (400 mg total) by mouth 2 (two) times daily.     metroNIDAZOLE 500 MG tablet  Commonly known as:  FLAGYL  Take 1 tablet (500 mg total) by mouth every 8 (eight) hours. for 5 days        CONTINUE taking these medications    albuterol 90 mcg/actuation inhaler  Commonly known as:  PROAIR HFA  Inhale 2 puffs into the lungs every 4 (four) hours as needed. Rescue     ALPRAZolam 0.25 MG tablet  Commonly known as:  XANAX  TAKE ONE TABLET BY MOUTH 3 TIMES DAILY AS NEEDED FOR ANXIETY     amLODIPine 2.5 MG tablet  Commonly known as:  NORVASC  TAKE 1 TABLET BY MOUTH ONCE A DAY     aspirin 81 MG EC tablet  Commonly known as:  ECOTRIN  Take 1 tablet (81 mg total) by mouth once daily.     atorvastatin 20 MG tablet  Commonly known as:  LIPITOR  TAKE 1 TABLET BY MOUTH ONCE A DAY IN THE EVENING FOR CHOLESTEROL     cholecalciferol (vitamin D3) 2,000 unit Cap  Commonly known as:  VITAMIN D3  Take 1 capsule (2,000 Units total) by mouth once daily.     clonazePAM 0.5 MG tablet  Commonly known as:  KLONOPIN  Take 0.25 mg by mouth 2 (two) times daily.     DULoxetine 60 MG capsule  Commonly known as:  CYMBALTA  TAKE 1 CAPSULE BY MOUTH ONCE A DAY     fenofibrate 160 MG Tab  TAKE 1 TABLET BY MOUTH ONCE A DAY     fluticasone furoate-vilanterol 200-25 mcg/dose Dsdv diskus inhaler  Commonly known as:  BREO  INHALE 1 PUFF ONCE A DAY     lisinopril 20 MG  tablet  Commonly known as:  PRINIVIL,ZESTRIL  TAKE 1 TABLET BY MOUTH ONCE A DAY     metFORMIN 500 MG tablet  Commonly known as:  GLUCOPHAGE  TAKE ONE TABLET BY MOUTH TWICE DAILY WITH MEALS     metoprolol tartrate 50 MG tablet  Commonly known as:  LOPRESSOR  TAKE 1 TABLET BY MOUTH EVERY 12 HOURS     oxyCODONE-acetaminophen  mg per tablet  Commonly known as:  PERCOCET  Take 1 tablet by mouth 4 (four) times daily as needed.     pantoprazole 40 MG tablet  Commonly known as:  PROTONIX  TAKE 1 TABLET BY MOUTH TWICE A DAY     ranolazine 1,000 mg Tb12  Commonly known as:  RANEXA  Take 1 tablet (1,000 mg total) by mouth 2 (two) times daily.     sildenafil 100 MG tablet  Commonly known as:  VIAGRA  TAKE AS DIRECTED     tiotropium bromide 2.5 mcg/actuation Mist  Commonly known as:  SPIRIVA RESPIMAT  INHALE 2 PUFFS INTO THE LUNGS ONCE DAILY     tiZANidine 4 MG tablet  Commonly known as:  ZANAFLEX  Take 4 mg by mouth 2 (two) times daily.     traZODone 100 MG tablet  Commonly known as:  DESYREL  Take 1-2 tablets (100-200 mg total) by mouth nightly as needed for Insomnia.            Indwelling Lines/Drains at time of discharge:   Lines/Drains/Airways          None          Time spent on the discharge of patient: 45 minutes  Patient was seen and examined on the date of discharge and determined to be suitable for discharge.         Marti Bhatia DNP, ACNP-BC  Department of Hospital Medicine  [facility]

## 2019-08-21 NOTE — ASSESSMENT & PLAN NOTE
8/15/19 states continued watery diarrhea.  States frequency improved.  Started on Questran yesterday.   ordered Immodium today prn.  C. Diff negative.  Has remained on cipro and flagyl IV for treatment of colitis.  Continue IV fluid - replace electrolytes - Magnesium 1.2 today, potassium WNL.  --CMP daily  --questran per   --Imodium prn per     8/16/19 Continued watery diarrhea despite adding questran and lomotil to treatment regime.  C. Diff negative.  On flagyl and cipro for treatment of colitis.    --CMP daily  --Consult GI for further recommendations  --Replace electrolytes as needed per     August 17, 2019-I had a detailed discussion with the patient today with regard to his current clinical situation.  Overall this appears to be gradually improving.  Management will be as per Hospital Medicine.  Stool culture is pending at this time.    August 20, 2019  --Diarrhea resolving per patient. Management will continue per hospital medicine team. Stool studies negative. Continue IV hydration. Encourage PO hydration/nutrition. Supportive care    August 21, 2019  --Diarrhea resolved per patient. Pancytopenia continues to improve. ANC 0.8. Stable for discharge. Would like to see back in outpatient setting with repeat CBC in 2-3 weeks. Would expect continued improvement in CBC with resolving infection. Discussed in detail with patient fever precautions. Discussed with patient S&S to return to ED for. Patient verbalizes understanding and agrees to plan. Discussed with hospital medicine team

## 2019-08-21 NOTE — PROGRESS NOTES
[facility] -   Hematology/Oncology  Progress Note    Patient Name: Kodak Valentine  Admission Date: 8/12/2019  Hospital Length of Stay: 9 days  Code Status: No Order     Subjective:      HPI:  Pt is a 50 yo male with PMHx of CAD with stenting x 2 LAD, COPD, DM II, HTN, JUDI and HPL who presents to the ED with reports of severe, constant cramping lower abdominal pain x 5 days. Associated symptoms include profuse, greenish watery diarrhea, diaphoresis, lightheadedness, dark urine (today) and vomiting (1-2 days now resolved). Pt had back surgery 6 weeks ago but his back pain is no worse than usual. Pt's wife was treated at [facility] approx 6 weeks ago. Pt denies URI symptoms, cough, chest pain, palpitations, bloody diarrhea and other symptoms. He last ate some yogurt yesterday. V/S on arrival: Temp 98.0, pulse 113, resp 18 and B/P 133/70. Labs find WBC 0.80, Hgb 11.5?Hct 32.7, plt 92, left shift 22 %, hyponatremia 133, hypokalemia 2.6, gluc 176, total bili 1.5, .8. U/A was altered due to dark color. A contrasted CT ABD/Pelvis was resulted at 16:17 which found diffuse inflammatory changes of the descending colon and rectosigmoid consistent with colitis/proctitis.  No evidence of a pericolonic abscess can be seen. Pt is admitted for Sepsis, Acute Colitis and electrolyte imbalances.     Hematology/oncology consulted for pancytopenia.       Interval History: August 20, 2019-- Patient reports continued improvement in diarrhea. Reports episode of diarrhea x 1 this am. Stool culture negative. Pancytopenia continues to improve. ANC 0.7.  BMBx results unremarkable. Pancytopenia most consistent with severe infectious etiology      August 21, 2019--patient reports resolution of diarrhea. Remains afebrile. ANC 0.8. CBC continues to improve     Oncology Treatment Plan:   [No treatment plan]    Medications:  Continuous Infusions:    Scheduled Meds:   amLODIPine  2.5 mg Oral Daily    arformoterol  15  mcg Nebulization Q12H    aspirin  81 mg Oral Daily    atorvastatin  20 mg Oral Daily    budesonide  0.5 mg Nebulization Q12H    cholestyramine  1 packet Oral BID    ciprofloxacin HCl  500 mg Oral Q12H    DULoxetine  60 mg Oral Daily    fenofibrate  160 mg Oral Daily    folic acid  1 mg Oral Daily    lisinopril  20 mg Oral Daily    magnesium oxide  400 mg Oral BID    metoprolol tartrate  50 mg Oral Q12H    metroNIDAZOLE  500 mg Oral Q8H    pantoprazole  40 mg Oral Daily    ranolazine  1,000 mg Oral BID    tiZANidine  4 mg Oral Q8H    traZODone  200 mg Oral QHS     PRN Meds:albuterol sulfate, ALPRAZolam, Dextrose 10% Bolus, Dextrose 10% Bolus, diphenoxylate-atropine 2.5-0.025 mg, glucagon (human recombinant), glucose, glucose, insulin aspart U-100, ondansetron, oxyCODONE-acetaminophen, promethazine (PHENERGAN) IVPB     Review of patient's allergies indicates:  No Known Allergies     Past Medical History:   Diagnosis Date    Anxiety     CAD (coronary artery disease)     PTCA x 2 LAD, restenosis and restent 7/12.    Chest pain syndrome 10/2/2015    COPD (chronic obstructive pulmonary disease)     CPAP (continuous positive airway pressure) dependence     @ night    Diabetes mellitus type 2, uncontrolled 4/13/2016    History of duodenal ulcer     with bleed    Hypertension     Mixed hyperlipidemia     JUDI (obstructive sleep apnea)     severe    S/P PTCA (percutaneous transluminal coronary angioplasty) 3/11/2015    Thrush 8/14/2019    Vitamin D deficiency      Past Surgical History:   Procedure Laterality Date    APPENDECTOMY      CARDIAC CATHETERIZATION      CATARACT EXTRACTION W/  INTRAOCULAR LENS IMPLANT Right 4-22-15    CORONARY STENT PLACEMENT  2012    ESOPHAGOGASTRODUODENOSCOPY (EGD) N/A 7/31/2014    [facility] ENDO    EXCISION-SKIN Right 4/20/2015    Performed [facility] Louis O. Jeansonne IV, [facility] at [facility] OR    HEART CATH WITH GRAFTS-LEFT Right 6/2/2014     [facility] CATH LAB    HEART CATH-LEFT Left 2017    Performed by [facility] at [facility] CATH LAB    HEMORRHOID SURGERY      INCISION AND DRAINAGE (I&D), BUTTOCK  3/26/2015    Performed by [facility], [facility] at [facility] OR    INCISION AND DRAINAGE-THIGH Right 3/26/2015    Performed by [facility], [facility] at [facility] OR    NISSEN FUNDOPLICATION      lap    SKIN LESION EXCISION Right     leg     Family History     Problem Relation (Age of Onset)    COPD Maternal Grandmother, Maternal Grandfather    Diabetes Maternal Grandfather    Heart block Father    Heart disease Mother, Sister        Tobacco Use    Smoking status: Former Smoker     Packs/day: 0.50     Years: 20.00     Pack years: 10.00     Types: Cigarettes     Last attempt to quit: 6/3/2014     Years since quittin.2    Smokeless tobacco: Never Used    Tobacco comment: currently vaping with nicotine since quit smoking in 2014   Substance and Sexual Activity    Alcohol use: Yes     Alcohol/week: 2.4 oz     Types: 4 Cans of beer per week    Drug use: No    Sexual activity: Not on file       Review of Systems   Constitutional: Negative for activity change, appetite change, chills, diaphoresis, fatigue and fever.   HENT: Negative.  Negative for trouble swallowing and voice change.    Eyes: Negative.    Respiratory: Negative for cough and shortness of breath.    Cardiovascular: Negative for chest pain, palpitations and leg swelling.   Gastrointestinal: Negative for abdominal distention, abdominal pain, anal bleeding, blood in stool, constipation, nausea, rectal pain and vomiting.   Genitourinary: Negative for difficulty urinating, flank pain, frequency and hematuria.   Musculoskeletal: Negative for back pain.   Skin: Negative for pallor, rash and wound.   Allergic/Immunologic: Positive for immunocompromised state.   Neurological: Negative for syncope, weakness and light-headedness.   Hematological: Negative  for adenopathy. Does not bruise/bleed easily.   Psychiatric/Behavioral: Negative for confusion. The patient is nervous/anxious.      Objective:     Vital Signs (Most Recent):  Temp: 97.6 °F (36.4 °C) (08/21/19 0736)  Pulse: (!) 54 (08/21/19 0736)  Resp: 18 (08/21/19 0736)  BP: (!) 141/69 (08/21/19 0736)  SpO2: 96 % (08/21/19 0736) Vital Signs (24h Range):  Temp:  [97.5 °F (36.4 °C)-98.7 °F (37.1 °C)] 97.6 °F (36.4 °C)  Pulse:  [54-74] 54  Resp:  [16-18] 18  SpO2:  [96 %-99 %] 96 %  BP: (105-153)/(69-82) 141/69     Weight: 88.2 kg (194 lb 5.4 oz)  Body mass index is 27.88 kg/m².  Body surface area is 2.09 meters squared.      Intake/Output Summary (Last 24 hours) at 8/21/2019 0929  Last data filed at 8/21/2019 0800  Gross per 24 hour   Intake 840 ml   Output --   Net 840 ml       Physical Exam   Constitutional: He is oriented to person, place, and time. He appears well-developed. He is cooperative. No distress.   HENT:   Head: Normocephalic and atraumatic.   Nose: Nose normal.   Eyes: Pupils are equal, round, and reactive to light. Conjunctivae are normal. No scleral icterus.   Neck: Normal range of motion. Neck supple.   Cardiovascular: Normal rate, regular rhythm and normal heart sounds. Exam reveals no gallop and no friction rub.   No murmur heard.  Pulmonary/Chest: Effort normal and breath sounds normal.   Abdominal: Soft. Bowel sounds are normal. He exhibits no distension. There is no tenderness.   Musculoskeletal: Normal range of motion. He exhibits no edema or tenderness.   Neurological: He is alert and oriented to person, place, and time.   Skin: Skin is warm and dry. He is not diaphoretic. No pallor.   Psychiatric: His speech is normal and behavior is normal. Judgment and thought content normal. His mood appears anxious. He is not actively hallucinating. Cognition and memory are normal. He is attentive.   Vitals reviewed.      Significant Labs:   CBC:   Recent Labs   Lab 08/20/19  0538 08/21/19  0707   WBC  2.07* 2.19*  2.19*   HGB 7.8* 8.6*  8.6*   HCT 23.3* 25.3*  25.3*   PLT 96* 104*  104*   , CMP:   Recent Labs   Lab 08/20/19  0538      K 4.1      CO2 25   GLU 97   BUN 2*   CREATININE 0.8   CALCIUM 8.5*   PROT 4.9*   ALBUMIN 2.6*   BILITOT 1.1*   ALKPHOS 69   AST 14   ALT 10   ANIONGAP 7*   EGFRNONAA >60   , Coagulation:   No results for input(s): PT, INR, APTT in the last 48 hours., LDH: No results for input(s): LDHCSF, BFSOURCE in the last 48 hours., LFTs:   Recent Labs   Lab 08/20/19  0538   ALT 10   AST 14   ALKPHOS 69   BILITOT 1.1*   PROT 4.9*   ALBUMIN 2.6*   , Urine Studies:   No results for input(s): COLORU, APPEARANCEUA, PHUR, SPECGRAV, PROTEINUA, GLUCUA, KETONESU, BILIRUBINUA, OCCULTUA, NITRITE, UROBILINOGEN, LEUKOCYTESUR, RBCUA, WBCUA, BACTERIA, SQUAMEPITHEL, HYALINECASTS in the last 48 hours.    Invalid input(s): WRIGHTSUR and All pertinent labs from the last 24 hours have been reviewed.    Diagnostic Results:  I have reviewed all pertinent imaging results/findings within the past 24 hours.    Assessment/Plan:     Acute colitis  8/15/19 states continued watery diarrhea.  States frequency improved.  Started on Questran yesterday.   ordered Immodium today prn.  C. Diff negative.  Has remained on cipro and flagyl IV for treatment of colitis.  Continue IV fluid - replace electrolytes - Magnesium 1.2 today, potassium WNL.  --CMP daily  --questran per   --Imodium prn per     8/16/19 Continued watery diarrhea despite adding questran and lomotil to treatment regime.  C. Diff negative.  On flagyl and cipro for treatment of colitis.    --CMP daily  --Consult GI for further recommendations  --Replace electrolytes as needed per     August 17, 2019-I had a detailed discussion with the patient today with regard to his current clinical situation.  Overall this appears to be gradually improving.  Management will be as per Hospital Medicine.  Stool culture is pending at this time.    August 20,  2019  --Diarrhea resolving per patient. Management will continue per hospital medicine team. Stool studies negative. Continue IV hydration. Encourage PO hydration/nutrition. Supportive care    August 21, 2019  --Diarrhea resolved per patient. Pancytopenia continues to improve. ANC 0.8. Stable for discharge. Would like to see back in outpatient setting with repeat CBC in 2-3 weeks. Would expect continued improvement in CBC with resolving infection. Discussed in detail with patient fever precautions. Discussed with patient S&S to return to ED for. Patient verbalizes understanding and agrees to plan. Discussed with hospital medicine team    Pancytopenia  8/13/19 Patient with newly diagnosed pancytopenia WBC 0.83 (ANC 0.1), hemoglobin 8.8, platelets 83 K.  With diarrhea that started 2 days ago perfuse green watery.  C. Diff negative.  States had night sweats for 2 days.  Denies known fever.  Scheduled for BMB tomorrow at noon.  No need for transfusion at this time.  Splenomegaly on CT scan of abdomen/pelvis with diffuse mural thickening and inflammation of the adjacent fat of the rectosigmoid and descending colon consistent with colitis.  .8.  Blood cultures NGTD.    --Continue supportive care  --[facility], until get results of BMB  --BMB tomorrow at noon    8/14/19 Continued pancytopenia WBC 0.87 (ANC 0.2), hemoglobin 9.7, platelets 86K.  Splenomegaly. Folic acid deficiency.  Not iron deficient or B12 deficient.  Continued diarrhea.  C. Diff negative.  Scheduled for BMB today.    --CBC daily  --Monitor for s/s of infection  --Start folate 1 mg Po dialy    8/15/19 Continued stable pancytopenia WBC 0.93, hemoglobin 8.2, platelets 87.  No need for platelets or prbc's at this time.  Patient has remained afebrile.  Started on folic acid 1 mg PO daily for folic acid deficiency.  Had BMB yesterday.  Tolerated well.    --CBC daily  --Monitor for s/s of infection  --continue folate 1 mg PO dialy  --Await  BMB results    August 17, 2019-I had a detailed discussion with the patient today with regard to his current clinical situation.  His pancytopenia appears to be slowly improving.  Preliminary results of bone marrow biopsy were reviewed with hematopathology and discussed with patient today.  No evidence of acute leukemia noted.  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  I would expect to see continued improvement in his blood counts over the next 24-48 hours.  I have discussed this with Hospital Medicine today.    August 18, 2019-I had a detailed discussion with the patient today with regard to his current clinical situation.  Overall his clinical symptoms are slowly improving.  I would recommend that the patient be discharged home once ANC greater than 1000.  Please call with any questions.    August 19, 2019  --Pancytopenia slowly improving. ANC 0.5, awaiting ANC >1000 prior to discharge. Monitor S&S infections. Fever precautions. Hg 7.9. No urgent indication for blood transfusion. Monitor. Platelet count 87. No urgent indication for platelet transfusion. Monitor. Transfuse if < 10. Awaiting final BMBx results. Supportive care    August 20, 2019  --BMBx unremarkable.There is no specific diagnostic morphologic abnormality on this study. No evidence of Leukemia. The most likely etiology of his pancytopenia is severe infection. Unsure of exact source. Stools studies negative. Diarrhea resolving. Pancytopenia improving. ANC 0.7. Awaiting ANC > 1000 prior to discharge.  7.8. No urgent indication for blood transfusion. Monitor. Platelet count 96. No urgent indication for platelet transfusion. Monitor. Transfuse if < 10          Thank you for your consult. I will follow-up with patient. Please contact us if you have any additional questions.     Tyra Perales NP  Hematology/Oncology  [facility]

## 2019-08-21 NOTE — PLAN OF CARE
Problem: Adult Inpatient Plan of Care  Goal: Plan of Care Review  Outcome: Ongoing (interventions implemented as appropriate)  Patient remained free from injury. Blood glucose monitored. PRN pain meds managed pain. 12hr chart check complete.

## 2019-08-21 NOTE — PLAN OF CARE
Aug29 Established Patient Visit with Tyra Perales NP   Thursday Aug 29, 2019 9:00 AM   Arrive at check-in approximately 15 minutes before your scheduled appointment time. Bring all outside medical records and imaging, along with a list of your current medications and insurance card.  4th Floor - From [address], take the [facility] exit ([address]). Enter the facility from the [address]  [facility]   [address] [place] [address]  [place] [address]   [phone]         08/21/19 1317   Final Note   Assessment Type Final Discharge Note   Anticipated Discharge Disposition Home   Hospital Follow Up  Appt(s) scheduled? Yes   Right Care Referral Info   Post Acute Recommendation No Care

## 2019-08-21 NOTE — PLAN OF CARE
Problem: Adult Inpatient Plan of Care  Goal: Plan of Care Review  Outcome: Ongoing (interventions implemented as appropriate)  Pt tolerating well with no distress noted.

## 2019-08-21 NOTE — TELEPHONE ENCOUNTER
----- Message from Anum Bae RN sent at 8/21/2019 10:36 AM CDT -----  Patient needs a 3 days hospital follow up appointment.  Patient will discharge today so please contact patient with appointment date and time.

## 2019-08-21 NOTE — SUBJECTIVE & OBJECTIVE
Interval History: August 20, 2019-- Patient reports continued improvement in diarrhea. Reports episode of diarrhea x 1 this am. Stool culture negative. Pancytopenia continues to improve. ANC 0.7.  BMBx results unremarkable. Pancytopenia most consistent with severe infectious etiology      August 21, 2019--patient reports resolution of diarrhea. Remains afebrile. ANC 0.8. CBC continues to improve     Oncology Treatment Plan:   [No treatment plan]    Medications:  Continuous Infusions:    Scheduled Meds:   amLODIPine  2.5 mg Oral Daily    arformoterol  15 mcg Nebulization Q12H    aspirin  81 mg Oral Daily    atorvastatin  20 mg Oral Daily    budesonide  0.5 mg Nebulization Q12H    cholestyramine  1 packet Oral BID    ciprofloxacin HCl  500 mg Oral Q12H    DULoxetine  60 mg Oral Daily    fenofibrate  160 mg Oral Daily    folic acid  1 mg Oral Daily    lisinopril  20 mg Oral Daily    magnesium oxide  400 mg Oral BID    metoprolol tartrate  50 mg Oral Q12H    metroNIDAZOLE  500 mg Oral Q8H    pantoprazole  40 mg Oral Daily    ranolazine  1,000 mg Oral BID    tiZANidine  4 mg Oral Q8H    traZODone  200 mg Oral QHS     PRN Meds:albuterol sulfate, ALPRAZolam, Dextrose 10% Bolus, Dextrose 10% Bolus, diphenoxylate-atropine 2.5-0.025 mg, glucagon (human recombinant), glucose, glucose, insulin aspart U-100, ondansetron, oxyCODONE-acetaminophen, promethazine (PHENERGAN) IVPB     Review of patient's allergies indicates:  No Known Allergies     Past Medical History:   Diagnosis Date    Anxiety     CAD (coronary artery disease)     PTCA x 2 LAD, restenosis and restent 7/12.    Chest pain syndrome 10/2/2015    COPD (chronic obstructive pulmonary disease)     CPAP (continuous positive airway pressure) dependence     @ night    Diabetes mellitus type 2, uncontrolled 4/13/2016    History of duodenal ulcer     with bleed    Hypertension     Mixed hyperlipidemia     JUDI (obstructive sleep apnea)     severe     S/P PTCA (percutaneous transluminal coronary angioplasty) 3/11/2015    Thrush 2019    Vitamin D deficiency      Past Surgical History:   Procedure Laterality Date    APPENDECTOMY      CARDIAC CATHETERIZATION      CATARACT EXTRACTION W/  INTRAOCULAR LENS IMPLANT Right 4-22-15    CORONARY STENT PLACEMENT      ESOPHAGOGASTRODUODENOSCOPY (EGD) N/A 2014    Performed by [facility] at [facility] ENDO    EXCISION-SKIN Right 2015    Performed by Louis O. Jeansonne IV, MD at [facility] OR    HEART CATH WITH GRAFTS-LEFT Right 2014    Performed by Erma Green MD at [facility] CATH LAB    HEART CATH-LEFT Left 2017    Performed by Erma Green MD at [facility] CATH LAB    HEMORRHOID SURGERY      INCISION AND DRAINAGE (I&D), BUTTOCK  3/26/2015    Performed by Louis O. Jeansonne IV, MD at [facility] OR    INCISION AND DRAINAGE-THIGH Right 3/26/2015    Performed by Louis O. Jeansonne IV, MD at [facility] OR    NISSEN FUNDOPLICATION      lap    SKIN LESION EXCISION Right     leg     Family History     Problem Relation (Age of Onset)    COPD Maternal Grandmother, Maternal Grandfather    Diabetes Maternal Grandfather    Heart block Father    Heart disease Mother, Sister        Tobacco Use    Smoking status: Former Smoker     Packs/day: 0.50     Years: 20.00     Pack years: 10.00     Types: Cigarettes     Last attempt to quit: 6/3/2014     Years since quittin.2    Smokeless tobacco: Never Used    Tobacco comment: currently vaping with nicotine since quit smoking in 2014   Substance and Sexual Activity    Alcohol use: Yes     Alcohol/week: 2.4 oz     Types: 4 Cans of beer per week    Drug use: No    Sexual activity: Not on file       Review of Systems   Constitutional: Negative for activity change, appetite change, chills, diaphoresis, fatigue and fever.   HENT: Negative.  Negative for trouble swallowing and voice change.    Eyes: Negative.    Respiratory: Negative for cough and shortness of breath.     Cardiovascular: Negative for chest pain, palpitations and leg swelling.   Gastrointestinal: Negative for abdominal distention, abdominal pain, anal bleeding, blood in stool, constipation, nausea, rectal pain and vomiting.   Genitourinary: Negative for difficulty urinating, flank pain, frequency and hematuria.   Musculoskeletal: Negative for back pain.   Skin: Negative for pallor, rash and wound.   Allergic/Immunologic: Positive for immunocompromised state.   Neurological: Negative for syncope, weakness and light-headedness.   Hematological: Negative for adenopathy. Does not bruise/bleed easily.   Psychiatric/Behavioral: Negative for confusion. The patient is nervous/anxious.      Objective:     Vital Signs (Most Recent):  Temp: 97.6 °F (36.4 °C) (08/21/19 0736)  Pulse: (!) 54 (08/21/19 0736)  Resp: 18 (08/21/19 0736)  BP: (!) 141/69 (08/21/19 0736)  SpO2: 96 % (08/21/19 0736) Vital Signs (24h Range):  Temp:  [97.5 °F (36.4 °C)-98.7 °F (37.1 °C)] 97.6 °F (36.4 °C)  Pulse:  [54-74] 54  Resp:  [16-18] 18  SpO2:  [96 %-99 %] 96 %  BP: (105-153)/(69-82) 141/69     Weight: 88.2 kg (194 lb 5.4 oz)  Body mass index is 27.88 kg/m².  Body surface area is 2.09 meters squared.      Intake/Output Summary (Last 24 hours) at 8/21/2019 0929  Last data filed at 8/21/2019 0800  Gross per 24 hour   Intake 840 ml   Output --   Net 840 ml       Physical Exam   Constitutional: He is oriented to person, place, and time. He appears well-developed. He is cooperative. No distress.   HENT:   Head: Normocephalic and atraumatic.   Nose: Nose normal.   Eyes: Pupils are equal, round, and reactive to light. Conjunctivae are normal. No scleral icterus.   Neck: Normal range of motion. Neck supple.   Cardiovascular: Normal rate, regular rhythm and normal heart sounds. Exam reveals no gallop and no friction rub.   No murmur heard.  Pulmonary/Chest: Effort normal and breath sounds normal.   Abdominal: Soft. Bowel sounds are normal. He exhibits no  distension. There is no tenderness.   Musculoskeletal: Normal range of motion. He exhibits no edema or tenderness.   Neurological: He is alert and oriented to person, place, and time.   Skin: Skin is warm and dry. He is not diaphoretic. No pallor.   Psychiatric: His speech is normal and behavior is normal. Judgment and thought content normal. His mood appears anxious. He is not actively hallucinating. Cognition and memory are normal. He is attentive.   Vitals reviewed.      Significant Labs:   CBC:   Recent Labs   Lab 08/20/19  0538 08/21/19  0707   WBC 2.07* 2.19*  2.19*   HGB 7.8* 8.6*  8.6*   HCT 23.3* 25.3*  25.3*   PLT 96* 104*  104*   , CMP:   Recent Labs   Lab 08/20/19  0538      K 4.1      CO2 25   GLU 97   BUN 2*   CREATININE 0.8   CALCIUM 8.5*   PROT 4.9*   ALBUMIN 2.6*   BILITOT 1.1*   ALKPHOS 69   AST 14   ALT 10   ANIONGAP 7*   EGFRNONAA >60   , Coagulation:   No results for input(s): PT, INR, APTT in the last 48 hours., LDH: No results for input(s): LDHCSF, BFSOURCE in the last 48 hours., LFTs:   Recent Labs   Lab 08/20/19  0538   ALT 10   AST 14   ALKPHOS 69   BILITOT 1.1*   PROT 4.9*   ALBUMIN 2.6*   , Urine Studies:   No results for input(s): COLORU, APPEARANCEUA, PHUR, SPECGRAV, PROTEINUA, GLUCUA, KETONESU, BILIRUBINUA, OCCULTUA, NITRITE, UROBILINOGEN, LEUKOCYTESUR, RBCUA, WBCUA, BACTERIA, SQUAMEPITHEL, HYALINECASTS in the last 48 hours.    Invalid input(s): WRIGHTSUR and All pertinent labs from the last 24 hours have been reviewed.    Diagnostic Results:  I have reviewed all pertinent imaging results/findings within the past 24 hours.

## 2019-08-22 LAB
CHROM BANDING METHOD: NORMAL
CHROMOSOME ANALYSIS BM ADDITIONAL INFORMATION: NORMAL
CHROMOSOME ANALYSIS BM RELEASED BY: NORMAL
CHROMOSOME ANALYSIS BM RESULT SUMMARY: NORMAL
CLINICAL CYTOGENETICIST REVIEW: NORMAL
KARYOTYP MAR: NORMAL
REASON FOR REFERRAL (NARRATIVE): NORMAL
REF LAB TEST METHOD: NORMAL
SPECIMEN SOURCE: NORMAL
SPECIMEN: NORMAL

## 2019-08-23 ENCOUNTER — PATIENT OUTREACH (OUTPATIENT)
Dept: ADMINISTRATIVE | Facility: CLINIC | Age: 49
End: 2019-08-23

## 2019-08-23 NOTE — PATIENT INSTRUCTIONS
Understanding Sepsis  Sepsis is a severe response the body has to an infection. It is most often caused by bacteria. It is also known as septicemia, or systemic inflammatory response syndrome (SIRS). Sepsis is a medical emergency. It needs to be treated right away.  What is sepsis?  Sepsis is when the body reacts to an infection with a severe inflammatory response. It can be caused by bacteria, fungus, or a virus. Sepsis can cause many kinds of problems around the body. It can lead severe low blood pressure (shock) and organ failure. This can lead to death if not treated.  Sepsis is most common in:  · Infants and older adults  · People with an infection such as pneumonia, meningitis, or a urinary tract infection  · People who have an illness such as cancer, AIDS, or diabetes  · People being treated with chemotherapy medications or radiation  · People who have had a transplant  Symptoms of sepsis  Symptoms of sepsis can include:  · Chills and shaking  · Rapid heartbeat  · Rapid breathing  · Shortness of breath  · Severe nausea or uncontrolled vomiting  · Confusion  · Dizziness  · Decreased urination  · Severe pain, including in the back or joints   Diagnosing sepsis  If your health care provider thinks you may have sepsis, you will be given tests. You may have blood and urine tests. These are done to look for bacteria, viruses, or fungus. You may also have X-rays or other imaging tests. These may be done to look at your organs to locate the source of infection.  Treating sepsis  If you have sepsis, your health care provider will give you antibiotics through a thin, flexible tube put into a vein in your arm (IV). You will also be given fluids through the IV. You may also be given nutrition or other medications through your IV. Your health care provider will talk with you about other treatments you may need. These may include using an oxygen mask or a ventilator to help with breathing. Treatment may last at least 7  to 10 days. Sepsis must be treated in the hospital.  Date Last Reviewed: 7/15/2015  © 9610-4033 [employer]. [address] [place] [address], [place] [address]. All rights reserved. This information is not intended as a substitute for professional medical care. Always follow your healthcare professional's instructions.

## 2019-08-23 NOTE — PROGRESS NOTES
Nisa Mcgee RN attempted to contact patient. No answer. The following message was left for the patient to return the call:  Good morning, I am a nurse calling on behalf of [facility] from the [facility].  This is a Transitional Care Call for oKdak Valentine. When you have a moment please contact us at [phone] or [phone] Monday through Friday, between the hours of 8 am to 4 pm. We look forward to speaking with you. On behalf of [facility] have a nice day.    The patient has a scheduled HOSFU appointment with Eliza Huddleston MD on 9/4/19 @ [id number]. Message sent to Physician staff.

## 2019-08-26 ENCOUNTER — TELEPHONE (OUTPATIENT)
Dept: FAMILY MEDICINE | Facility: CLINIC | Age: 49
End: 2019-08-26

## 2019-08-26 NOTE — TELEPHONE ENCOUNTER
----- Message from Nisa Mcgee RN sent at 8/26/2019  4:34 PM CDT -----  Hi,  Kodak Valentine is a recent discharge from [facility].  He is asking if he still needs all of the labs ordered to be drawn on 8/28/19.  He had some of these done during his recent admission. Can he get the remaining labs drawn on 8/29/19 when he comes for HEM/ONC appt?  Please call him and advise.  Thanks  Nisa Mcgee RN

## 2019-08-26 NOTE — PROGRESS NOTES
Subjective:      Patient ID: Kodak Valentine is a 49 y.o. male.    Chief Complaint: No chief complaint on file.      HPI       Review of Systems      Objective:   There were no vitals taken for this visit.    Physical Exam        Assessment:       No diagnosis found.      Plan:     There are no diagnoses linked to this encounter.

## 2019-08-28 ENCOUNTER — TELEPHONE (OUTPATIENT)
Dept: ADMINISTRATIVE | Facility: CLINIC | Age: 49
End: 2019-08-28

## 2019-08-28 NOTE — PROGRESS NOTES
Spoke with Kodak Valentine about appt change with Tyra Perales.  He is aware of date 8/30/19.  He confirmed appt and will check in at 8:30am for lab and then see provider at 9am

## 2019-08-30 ENCOUNTER — INFUSION (OUTPATIENT)
Dept: INFUSION THERAPY | Facility: HOSPITAL | Age: 49
End: 2019-08-30
Attending: NURSE PRACTITIONER
Payer: COMMERCIAL

## 2019-08-30 ENCOUNTER — OFFICE VISIT (OUTPATIENT)
Dept: FAMILY MEDICINE | Facility: CLINIC | Age: 49
End: 2019-08-30
Payer: COMMERCIAL

## 2019-08-30 ENCOUNTER — LAB VISIT (OUTPATIENT)
Dept: LAB | Facility: HOSPITAL | Age: 49
End: 2019-08-30
Attending: NURSE PRACTITIONER
Payer: COMMERCIAL

## 2019-08-30 ENCOUNTER — OFFICE VISIT (OUTPATIENT)
Dept: HEMATOLOGY/ONCOLOGY | Facility: CLINIC | Age: 49
End: 2019-08-30
Payer: COMMERCIAL

## 2019-08-30 VITALS
TEMPERATURE: 98 F | RESPIRATION RATE: 18 BRPM | SYSTOLIC BLOOD PRESSURE: 109 MMHG | DIASTOLIC BLOOD PRESSURE: 69 MMHG | HEART RATE: 90 BPM | OXYGEN SATURATION: 97 % | HEIGHT: 70 IN | WEIGHT: 188.69 LBS | BODY MASS INDEX: 27.01 KG/M2

## 2019-08-30 VITALS
OXYGEN SATURATION: 98 % | WEIGHT: 188.69 LBS | HEIGHT: 70 IN | DIASTOLIC BLOOD PRESSURE: 54 MMHG | HEART RATE: 88 BPM | TEMPERATURE: 98 F | SYSTOLIC BLOOD PRESSURE: 102 MMHG | BODY MASS INDEX: 27.01 KG/M2

## 2019-08-30 VITALS
SYSTOLIC BLOOD PRESSURE: 93 MMHG | WEIGHT: 188.69 LBS | HEART RATE: 74 BPM | TEMPERATURE: 98 F | DIASTOLIC BLOOD PRESSURE: 64 MMHG | BODY MASS INDEX: 27.01 KG/M2 | HEIGHT: 70 IN

## 2019-08-30 DIAGNOSIS — E11.69 HYPERLIPIDEMIA ASSOCIATED WITH TYPE 2 DIABETES MELLITUS: ICD-10-CM

## 2019-08-30 DIAGNOSIS — E53.8 FOLIC ACID DEFICIENCY: ICD-10-CM

## 2019-08-30 DIAGNOSIS — N18.2 CONTROLLED TYPE 2 DIABETES MELLITUS WITH STAGE 2 CHRONIC KIDNEY DISEASE, WITHOUT LONG-TERM CURRENT USE OF INSULIN: ICD-10-CM

## 2019-08-30 DIAGNOSIS — R30.0 DYSURIA: ICD-10-CM

## 2019-08-30 DIAGNOSIS — E78.5 HYPERLIPIDEMIA ASSOCIATED WITH TYPE 2 DIABETES MELLITUS: ICD-10-CM

## 2019-08-30 DIAGNOSIS — E11.22 CONTROLLED TYPE 2 DIABETES MELLITUS WITH STAGE 2 CHRONIC KIDNEY DISEASE, WITHOUT LONG-TERM CURRENT USE OF INSULIN: ICD-10-CM

## 2019-08-30 DIAGNOSIS — F41.9 ANXIETY: ICD-10-CM

## 2019-08-30 DIAGNOSIS — E55.9 VITAMIN D DEFICIENCY: ICD-10-CM

## 2019-08-30 DIAGNOSIS — E11.59 HYPERTENSION ASSOCIATED WITH DIABETES: ICD-10-CM

## 2019-08-30 DIAGNOSIS — R19.7 DIARRHEA, UNSPECIFIED TYPE: ICD-10-CM

## 2019-08-30 DIAGNOSIS — E86.1 HYPOTENSION DUE TO HYPOVOLEMIA: ICD-10-CM

## 2019-08-30 DIAGNOSIS — R42 DIZZINESS: ICD-10-CM

## 2019-08-30 DIAGNOSIS — D61.818 PANCYTOPENIA: ICD-10-CM

## 2019-08-30 DIAGNOSIS — I10 ESSENTIAL HYPERTENSION: ICD-10-CM

## 2019-08-30 DIAGNOSIS — E86.1 HYPOTENSION DUE TO HYPOVOLEMIA: Primary | ICD-10-CM

## 2019-08-30 DIAGNOSIS — R19.7 DIARRHEA, UNSPECIFIED TYPE: Primary | ICD-10-CM

## 2019-08-30 DIAGNOSIS — K52.9 ACUTE COLITIS: Primary | ICD-10-CM

## 2019-08-30 DIAGNOSIS — D64.9 ANEMIA, UNSPECIFIED TYPE: ICD-10-CM

## 2019-08-30 DIAGNOSIS — I25.10 CORONARY ARTERY DISEASE WITHOUT ANGINA PECTORIS, UNSPECIFIED VESSEL OR LESION TYPE, UNSPECIFIED WHETHER NATIVE OR TRANSPLANTED HEART: ICD-10-CM

## 2019-08-30 DIAGNOSIS — D53.9 MACROCYTIC ANEMIA: ICD-10-CM

## 2019-08-30 DIAGNOSIS — I15.2 HYPERTENSION ASSOCIATED WITH DIABETES: ICD-10-CM

## 2019-08-30 DIAGNOSIS — R11.0 NAUSEA: ICD-10-CM

## 2019-08-30 LAB
25(OH)D3+25(OH)D2 SERPL-MCNC: 36 NG/ML (ref 30–96)
ALBUMIN SERPL BCP-MCNC: 3.6 G/DL (ref 3.5–5.2)
ALBUMIN SERPL BCP-MCNC: 3.6 G/DL (ref 3.5–5.2)
ALP SERPL-CCNC: 60 U/L (ref 55–135)
ALP SERPL-CCNC: 60 U/L (ref 55–135)
ALT SERPL W/O P-5'-P-CCNC: 5 U/L (ref 10–44)
ALT SERPL W/O P-5'-P-CCNC: 5 U/L (ref 10–44)
ANION GAP SERPL CALC-SCNC: 12 MMOL/L (ref 8–16)
ANION GAP SERPL CALC-SCNC: 12 MMOL/L (ref 8–16)
AST SERPL-CCNC: 14 U/L (ref 10–40)
AST SERPL-CCNC: 14 U/L (ref 10–40)
BASOPHILS # BLD AUTO: 0.05 K/UL (ref 0–0.2)
BASOPHILS # BLD AUTO: 0.05 K/UL (ref 0–0.2)
BASOPHILS NFR BLD: 1.1 % (ref 0–1.9)
BASOPHILS NFR BLD: 1.1 % (ref 0–1.9)
BILIRUB SERPL-MCNC: 0.7 MG/DL (ref 0.1–1)
BILIRUB SERPL-MCNC: 0.7 MG/DL (ref 0.1–1)
BILIRUB UR QL STRIP: NEGATIVE
BUN SERPL-MCNC: 9 MG/DL (ref 6–20)
BUN SERPL-MCNC: 9 MG/DL (ref 6–20)
CALCIUM SERPL-MCNC: 9.8 MG/DL (ref 8.7–10.5)
CALCIUM SERPL-MCNC: 9.8 MG/DL (ref 8.7–10.5)
CHLORIDE SERPL-SCNC: 98 MMOL/L (ref 95–110)
CHLORIDE SERPL-SCNC: 98 MMOL/L (ref 95–110)
CHOLEST SERPL-MCNC: 99 MG/DL (ref 120–199)
CHOLEST/HDLC SERPL: 3.1 {RATIO} (ref 2–5)
CLARITY UR: CLEAR
CO2 SERPL-SCNC: 23 MMOL/L (ref 23–29)
CO2 SERPL-SCNC: 23 MMOL/L (ref 23–29)
COLOR UR: YELLOW
COMPLEXED PSA SERPL-MCNC: 0.75 NG/ML (ref 0–4)
CREAT SERPL-MCNC: 1.1 MG/DL (ref 0.5–1.4)
CREAT SERPL-MCNC: 1.1 MG/DL (ref 0.5–1.4)
DIFFERENTIAL METHOD: ABNORMAL
DIFFERENTIAL METHOD: ABNORMAL
EOSINOPHIL # BLD AUTO: 0.1 K/UL (ref 0–0.5)
EOSINOPHIL # BLD AUTO: 0.1 K/UL (ref 0–0.5)
EOSINOPHIL NFR BLD: 2.4 % (ref 0–8)
EOSINOPHIL NFR BLD: 2.4 % (ref 0–8)
ERYTHROCYTE [DISTWIDTH] IN BLOOD BY AUTOMATED COUNT: 14.3 % (ref 11.5–14.5)
ERYTHROCYTE [DISTWIDTH] IN BLOOD BY AUTOMATED COUNT: 14.3 % (ref 11.5–14.5)
EST. GFR  (AFRICAN AMERICAN): >60 ML/MIN/1.73 M^2
EST. GFR  (AFRICAN AMERICAN): >60 ML/MIN/1.73 M^2
EST. GFR  (NON AFRICAN AMERICAN): >60 ML/MIN/1.73 M^2
EST. GFR  (NON AFRICAN AMERICAN): >60 ML/MIN/1.73 M^2
ESTIMATED AVG GLUCOSE: 68 MG/DL (ref 68–131)
GLUCOSE SERPL-MCNC: 86 MG/DL (ref 70–110)
GLUCOSE SERPL-MCNC: 86 MG/DL (ref 70–110)
GLUCOSE UR QL STRIP: NEGATIVE
HBA1C MFR BLD HPLC: 4 % (ref 4–5.6)
HCT VFR BLD AUTO: 31.7 % (ref 40–54)
HCT VFR BLD AUTO: 31.7 % (ref 40–54)
HDLC SERPL-MCNC: 32 MG/DL (ref 40–75)
HDLC SERPL: 32.3 % (ref 20–50)
HGB BLD-MCNC: 11 G/DL (ref 14–18)
HGB BLD-MCNC: 11 G/DL (ref 14–18)
HGB UR QL STRIP: NEGATIVE
IMM GRANULOCYTES # BLD AUTO: 0.04 K/UL (ref 0–0.04)
IMM GRANULOCYTES # BLD AUTO: 0.04 K/UL (ref 0–0.04)
IMM GRANULOCYTES NFR BLD AUTO: 0.9 % (ref 0–0.5)
IMM GRANULOCYTES NFR BLD AUTO: 0.9 % (ref 0–0.5)
KETONES UR QL STRIP: NEGATIVE
LDLC SERPL CALC-MCNC: 27.4 MG/DL (ref 63–159)
LEUKOCYTE ESTERASE UR QL STRIP: NEGATIVE
LYMPHOCYTES # BLD AUTO: 1.5 K/UL (ref 1–4.8)
LYMPHOCYTES # BLD AUTO: 1.5 K/UL (ref 1–4.8)
LYMPHOCYTES NFR BLD: 32.4 % (ref 18–48)
LYMPHOCYTES NFR BLD: 32.4 % (ref 18–48)
MCH RBC QN AUTO: 36.2 PG (ref 27–31)
MCH RBC QN AUTO: 36.2 PG (ref 27–31)
MCHC RBC AUTO-ENTMCNC: 34.7 G/DL (ref 32–36)
MCHC RBC AUTO-ENTMCNC: 34.7 G/DL (ref 32–36)
MCV RBC AUTO: 104 FL (ref 82–98)
MCV RBC AUTO: 104 FL (ref 82–98)
MONOCYTES # BLD AUTO: 0.5 K/UL (ref 0.3–1)
MONOCYTES # BLD AUTO: 0.5 K/UL (ref 0.3–1)
MONOCYTES NFR BLD: 10.4 % (ref 4–15)
MONOCYTES NFR BLD: 10.4 % (ref 4–15)
NEUTROPHILS # BLD AUTO: 2.5 K/UL (ref 1.8–7.7)
NEUTROPHILS # BLD AUTO: 2.5 K/UL (ref 1.8–7.7)
NEUTROPHILS NFR BLD: 53.7 % (ref 38–73)
NEUTROPHILS NFR BLD: 53.7 % (ref 38–73)
NITRITE UR QL STRIP: NEGATIVE
NONHDLC SERPL-MCNC: 67 MG/DL
NRBC BLD-RTO: 0 /100 WBC
NRBC BLD-RTO: 0 /100 WBC
PH UR STRIP: 6 [PH] (ref 5–8)
PLATELET # BLD AUTO: 163 K/UL (ref 150–350)
PLATELET # BLD AUTO: 163 K/UL (ref 150–350)
PMV BLD AUTO: 8.1 FL (ref 9.2–12.9)
PMV BLD AUTO: 8.1 FL (ref 9.2–12.9)
POTASSIUM SERPL-SCNC: 3.9 MMOL/L (ref 3.5–5.1)
POTASSIUM SERPL-SCNC: 3.9 MMOL/L (ref 3.5–5.1)
PROT SERPL-MCNC: 6.2 G/DL (ref 6–8.4)
PROT SERPL-MCNC: 6.2 G/DL (ref 6–8.4)
PROT UR QL STRIP: NEGATIVE
RBC # BLD AUTO: 3.04 M/UL (ref 4.6–6.2)
RBC # BLD AUTO: 3.04 M/UL (ref 4.6–6.2)
SODIUM SERPL-SCNC: 133 MMOL/L (ref 136–145)
SODIUM SERPL-SCNC: 133 MMOL/L (ref 136–145)
SP GR UR STRIP: 1.01 (ref 1–1.03)
TRIGL SERPL-MCNC: 198 MG/DL (ref 30–150)
TSH SERPL DL<=0.005 MIU/L-ACNC: 0.49 UIU/ML (ref 0.4–4)
URN SPEC COLLECT METH UR: NORMAL
WBC # BLD AUTO: 4.63 K/UL (ref 3.9–12.7)
WBC # BLD AUTO: 4.63 K/UL (ref 3.9–12.7)

## 2019-08-30 PROCEDURE — 99999 PR PBB SHADOW E&M-EST. PATIENT-LVL III: ICD-10-PCS | Mod: PBBFAC,,, | Performed by: NURSE PRACTITIONER

## 2019-08-30 PROCEDURE — 83036 HEMOGLOBIN GLYCOSYLATED A1C: CPT

## 2019-08-30 PROCEDURE — 96360 HYDRATION IV INFUSION INIT: CPT

## 2019-08-30 PROCEDURE — 85025 COMPLETE CBC W/AUTO DIFF WBC: CPT

## 2019-08-30 PROCEDURE — 3008F PR BODY MASS INDEX (BMI) DOCUMENTED: ICD-10-PCS | Mod: CPTII,S$GLB,, | Performed by: NURSE PRACTITIONER

## 2019-08-30 PROCEDURE — 99496 TRANSJ CARE MGMT HIGH F2F 7D: CPT | Mod: S$GLB,,, | Performed by: INTERNAL MEDICINE

## 2019-08-30 PROCEDURE — 80061 LIPID PANEL: CPT

## 2019-08-30 PROCEDURE — 99999 PR PBB SHADOW E&M-EST. PATIENT-LVL III: ICD-10-PCS | Mod: PBBFAC,,, | Performed by: INTERNAL MEDICINE

## 2019-08-30 PROCEDURE — 99215 PR OFFICE/OUTPT VISIT, EST, LEVL V, 40-54 MIN: ICD-10-PCS | Mod: 25,S$GLB,, | Performed by: NURSE PRACTITIONER

## 2019-08-30 PROCEDURE — 84443 ASSAY THYROID STIM HORMONE: CPT

## 2019-08-30 PROCEDURE — 81003 URINALYSIS AUTO W/O SCOPE: CPT

## 2019-08-30 PROCEDURE — 99215 OFFICE O/P EST HI 40 MIN: CPT | Mod: 25,S$GLB,, | Performed by: NURSE PRACTITIONER

## 2019-08-30 PROCEDURE — 36415 COLL VENOUS BLD VENIPUNCTURE: CPT

## 2019-08-30 PROCEDURE — 87086 URINE CULTURE/COLONY COUNT: CPT

## 2019-08-30 PROCEDURE — 3074F SYST BP LT 130 MM HG: CPT | Mod: CPTII,S$GLB,, | Performed by: NURSE PRACTITIONER

## 2019-08-30 PROCEDURE — 96361 HYDRATE IV INFUSION ADD-ON: CPT

## 2019-08-30 PROCEDURE — 99496 TRANSITIONAL CARE MANAGE SERVICE 7 DAY DISCHARGE: ICD-10-PCS | Mod: S$GLB,,, | Performed by: INTERNAL MEDICINE

## 2019-08-30 PROCEDURE — 3078F PR MOST RECENT DIASTOLIC BLOOD PRESSURE < 80 MM HG: ICD-10-PCS | Mod: CPTII,S$GLB,, | Performed by: NURSE PRACTITIONER

## 2019-08-30 PROCEDURE — 80053 COMPREHEN METABOLIC PANEL: CPT

## 2019-08-30 PROCEDURE — 63600175 PHARM REV CODE 636 W HCPCS: Performed by: NURSE PRACTITIONER

## 2019-08-30 PROCEDURE — 99999 PR PBB SHADOW E&M-EST. PATIENT-LVL III: CPT | Mod: PBBFAC,,, | Performed by: INTERNAL MEDICINE

## 2019-08-30 PROCEDURE — 82306 VITAMIN D 25 HYDROXY: CPT

## 2019-08-30 PROCEDURE — 3078F DIAST BP <80 MM HG: CPT | Mod: CPTII,S$GLB,, | Performed by: NURSE PRACTITIONER

## 2019-08-30 PROCEDURE — 99999 PR PBB SHADOW E&M-EST. PATIENT-LVL III: CPT | Mod: PBBFAC,,, | Performed by: NURSE PRACTITIONER

## 2019-08-30 PROCEDURE — 84153 ASSAY OF PSA TOTAL: CPT

## 2019-08-30 PROCEDURE — 3008F BODY MASS INDEX DOCD: CPT | Mod: CPTII,S$GLB,, | Performed by: NURSE PRACTITIONER

## 2019-08-30 PROCEDURE — 3074F PR MOST RECENT SYSTOLIC BLOOD PRESSURE < 130 MM HG: ICD-10-PCS | Mod: CPTII,S$GLB,, | Performed by: NURSE PRACTITIONER

## 2019-08-30 RX ORDER — FOLIC ACID 1 MG/1
1 TABLET ORAL DAILY
Qty: 90 TABLET | Refills: 3 | Status: SHIPPED | OUTPATIENT
Start: 2019-08-30 | End: 2020-06-16 | Stop reason: SDUPTHER

## 2019-08-30 RX ORDER — HEPARIN 100 UNIT/ML
500 SYRINGE INTRAVENOUS
Status: CANCELLED | OUTPATIENT
Start: 2019-08-30

## 2019-08-30 RX ORDER — SODIUM CHLORIDE 0.9 % (FLUSH) 0.9 %
10 SYRINGE (ML) INJECTION
Status: CANCELLED | OUTPATIENT
Start: 2019-08-30

## 2019-08-30 RX ORDER — LISINOPRIL 20 MG/1
10 TABLET ORAL DAILY
Qty: 30 TABLET | Refills: 11
Start: 2019-08-30 | End: 2020-12-30 | Stop reason: SDUPTHER

## 2019-08-30 RX ORDER — MAGNESIUM 200 MG
1000 TABLET ORAL DAILY
Qty: 90 TABLET | Refills: 3 | Status: SHIPPED | OUTPATIENT
Start: 2019-08-30 | End: 2020-10-08

## 2019-08-30 RX ORDER — METOPROLOL TARTRATE 50 MG/1
25 TABLET ORAL EVERY 12 HOURS
Qty: 60 TABLET | Refills: 11
Start: 2019-08-30 | End: 2020-10-02 | Stop reason: SDUPTHER

## 2019-08-30 RX ADMIN — SODIUM CHLORIDE 1500 ML: 0.9 INJECTION, SOLUTION INTRAVENOUS at 01:08

## 2019-08-30 NOTE — PROGRESS NOTES
"Transitional Care Note  Subjective:       Patient ID: Kodak Valentine is a 49 y.o. male.  Chief Complaint: Transitional Care    Family and/or Caretaker present at visit?  No.  Diagnostic tests reviewed/disposition: No diagnosic tests pending after this hospitalization.  Disease/illness education:   Home health/community services discussion/referrals: Patient does not have home health established from hospital visit.  They do not need home health.  If needed, we will set up home health for the patient.   Establishment or re-establishment of referral orders for community resources: No other necessary community resources.   Discussion with other health care providers: No discussion with other health care providers necessary.   [facility]   Here for follow up of medical problems and 8/12-8/21 hospital follow up for colitis with sepsis:  ------------------------------------------------------------------------  Hospital Course:   Admitted with Sepsis, Acute Colitis, electrolyte abnormalities, dehydration and pancytopenia. IV fluids given, lactic acid normal, Cipro/Flagyl in process, blood cultures NGTD and C diff negative. Hematology saw the patient with plans for bone marrow biopsy. 8/14 - had bone marrow today, results pending. Diarrhea decreasing, less abdominal pain. Describes sore throat pain and white appearing patches present to posterior pharynx - appears yeast in nature. 8/15  - Nystatin and Diflucan prescribed for thrush. Less abdominal pain, still with watery diarrhea - Questran and Imodium prescribed. Blood cultures from 8/12 find Gram positive cocci in clusters resembling Staph in one anaerobic bottle - Vanco started (may be contaminant - awaiting full ID).      As of 8/16 patient reports continued diarrhea, but "it has slowed down a lot".  Patient reports "going 5 times in 30 minutes, and now I'm going once every hour".  Cdiff negative.  Stool cx and ova/parasites pending.  Patient remains afebrile.  Continue " "Cipro/Flagyl for now along with Questran.  Case d/w GI.  Will monitor and if no improvement in diarrhea, patient may need a flex sig.  Hemoc continues to follow for pancytopenia and recommended that ANC is >1000 prior to DC.  Awaiting bone marrow biopsy results. Awaiting final BC results to determine contaminate versus true infection, continue empiric Vanc for now.       As of 8/17 patient reports continued improvement in diarrhea, reporting only 3 diarrheal stools overnight with none this morning.  BC show probable contaminate.  Stool cx shows NGTD.  Pancytopenia continues to improve.  Case d/w Dr. Nowak, BM biopsy negative for leukemia, if counts continue to improve, anticipate DC home in AM.       As of 8/18 no change in pancytopenia overnight.  Per Hemoc, cannot DC until ANC is >1000.  Patient continues to report improvement in diarrhea.  He remains afebrile.  Stool cx shows NGTD.  Ecoli, ova/parasites pending.  Continue oral Cipro/flagyl for colitis.       As of 8/19 patient reports last diarrheal stool was last night.  ANC 0.5.  Continue ABX for colitis.      As of 8/20 ANC 0.7.  Continues to report daily improvement in diarrhea.  Stool cx and Ova/parasites negative.  Oral thrush has resolved.  Per Hemoc, cannot DC until >1.  Counts trending up.  Anticipate DC over the next 24-48 hours pending ANC count.     As of 8/21 per d/w lab this morning ANC is now 0.82.  Patient reports diarrhea has "stopped", and he has no complaints today.  Patient is anxious to leave as he "has a pain management appointment at 11 that I cannot miss".  VS remain stable and physical exam is benign.  Remains afebrile with no leukocytosis.  Case d/w Hemoc, who agrees with DC home today from their standpoint with outpatient f/u with them in clinic in 2-3 weeks for repeat CBC.  Patient also instructed to f/u with PCP within 3 days for post hospital evaluation, and with GI provider of his choice for routine colonoscopy, verbalized + " "understanding.  Case d/w Dr. Salazar.  Patient seen and examined and deemed medically stable to DC home today.  Patient will DC home to complete 5 more days of oral Cipro and Flagyl for Colitis for a total of 10 days of treatment.  Medications reconciled for DC home.      -----------------------------------------------------------------------------  Did not receive PRBC in hosp.    Completed 10d cipro/flagyl.  No f/c.  No n/v.  Has lost 25# since last visit 2mo ago. After d/c 1 week ago, had no BMs until 2 days ago, and now with 5-6 loose brown stool daily.  Did stop questran 2 days ago.  Feels dizzy all the time.  Poor appetite, but eating good protein meals.  No urine burning.  No cp/sob/palp.  Still on percocet QID, even though back surgery was considered a success.      Updated/ annual due 7/19:  HM: 10/18 fluvax, 7/15 [id number], 1/12 [id number], 7/10 TDaP, no cscope yet, 7/18 PSA, 6/10 HCV neg, 5/18 [facility].      Review of Systems   Constitutional: Negative for chills, diaphoresis, fatigue and fever.   Respiratory: Negative for cough, chest tightness and shortness of breath.    Cardiovascular: Negative for chest pain, palpitations and leg swelling.   Gastrointestinal: Negative for blood in stool, constipation, diarrhea, nausea and vomiting.   Genitourinary: Negative for difficulty urinating and frequency.   Musculoskeletal: Negative for arthralgias.       Objective:       Vitals:    08/30/19 1035   BP: (!) 102/54   BP Location: Right arm   Patient Position: Sitting   BP Method: Medium (Manual)   Pulse: 88   Temp: 97.9 °F (36.6 °C)   TempSrc: Temporal   SpO2: 98%   Weight: 85.6 kg (188 lb 11.4 oz)   Height: 5' 10" (1.778 m)     Physical Exam   Constitutional: He is oriented to person, place, and time. He appears well-developed and well-nourished.   HENT:   Mouth/Throat: Oropharynx is clear and moist.   Neck: Normal range of motion. Neck supple.   Cardiovascular: Normal rate, regular rhythm and intact " distal pulses. Exam reveals no gallop and no friction rub.   No murmur heard.  Pulmonary/Chest: Effort normal and breath sounds normal. He has no wheezes. He has no rales.   Abdominal: Soft. Bowel sounds are normal. He exhibits no mass. There is tenderness (right side). There is no rebound.   Musculoskeletal: He exhibits no edema.   Lymphadenopathy:     He has no cervical adenopathy.   Neurological: He is alert and oriented to person, place, and time.   Psychiatric: He has a normal mood and affect.     Results for KIMBERLI BHATTI (MRN [id number]) as of 8/30/2019 10:36   Ref. Range 8/30/2019 08:55 8/30/2019 09:37   WBC Latest Ref Range: 3.90 - 12.70 K/uL 4.63    RBC Latest Ref Range: 4.60 - 6.20 M/uL 3.04 (L)    Hemoglobin Latest Ref Range: 14.0 - 18.0 g/dL 11.0 (L)    Hematocrit Latest Ref Range: 40.0 - 54.0 % 31.7 (L)    MCV Latest Ref Range: 82 - 98 fL 104 (H)    MCH Latest Ref Range: 27.0 - 31.0 pg 36.2 (H)    MCHC Latest Ref Range: 32.0 - 36.0 g/dL 34.7    RDW Latest Ref Range: 11.5 - 14.5 % 14.3    Platelets Latest Ref Range: 150 - 350 K/uL 163    MPV Latest Ref Range: 9.2 - 12.9 fL 8.1 (L)    Gran% Latest Ref Range: 38.0 - 73.0 % 53.7    Gran # (ANC) Latest Ref Range: 1.8 - 7.7 K/uL 2.5    Lymph% Latest Ref Range: 18.0 - 48.0 % 32.4    Lymph # Latest Ref Range: 1.0 - 4.8 K/uL 1.5    Mono% Latest Ref Range: 4.0 - 15.0 % 10.4    Mono # Latest Ref Range: 0.3 - 1.0 K/uL 0.5    Eosinophil% Latest Ref Range: 0.0 - 8.0 % 2.4    Eos # Latest Ref Range: 0.0 - 0.5 K/uL 0.1    Basophil% Latest Ref Range: 0.0 - 1.9 % 1.1    Baso # Latest Ref Range: 0.00 - 0.20 K/uL 0.05    nRBC Latest Ref Range: 0 /100 WBC 0    Differential Method Unknown Automated    Immature Grans (Abs) Latest Ref Range: 0.00 - 0.04 K/uL 0.04    Immature Granulocytes Latest Ref Range: 0.0 - 0.5 % 0.9 (H)    Specimen UA Unknown  Urine, Clean Catch   Color, UA Latest Ref Range: Yellow, Straw, Barb   Yellow   Appearance, UA Latest Ref Range: Clear   Clear    Specific Homer, UA Latest Ref Range: 1.005 - 1.030   1.015   pH, UA Latest Ref Range: 5.0 - 8.0   6.0   Protein, UA Latest Ref Range: Negative   Negative   Glucose, UA Latest Ref Range: Negative   Negative   Ketones, UA Latest Ref Range: Negative   Negative   Occult Blood UA Latest Ref Range: Negative   Negative   NITRITE UA Latest Ref Range: Negative   Negative   Bilirubin (UA) Latest Ref Range: Negative   Negative   Leukocytes, UA Latest Ref Range: Negative   Negative   CULTURE, URINE Unknown  Rpt     Assessment:       1. Acute colitis    2. Pancytopenia    3. Hypertension associated with diabetes    4. Controlled type 2 diabetes mellitus with stage 2 chronic kidney disease, without long-term current use of insulin    5. Coronary artery disease without angina pectoris, unspecified vessel or lesion type, unspecified whether native or transplanted heart    6. Anxiety    7. Essential hypertension    8. Diarrhea, unspecified type        Plan:         Kodak was seen today for transitional care.    Diagnoses and all orders for this visit:    Acute colitis- tender on right side, when colitis involved the left.  Poss reCT if not feeling better next week.    Pancytopenia now resolved.    Hypertension associated with diabetes- stop amlodipine, half lisinopril, half metoprolol.  -     metoprolol tartrate (LOPRESSOR) 50 MG tablet; Take 0.5 tablets (25 mg total) by mouth every 12 (twelve) hours.  -     lisinopril (PRINIVIL,ZESTRIL) 20 MG tablet; Take 0.5 tablets (10 mg total) by mouth once daily.    Controlled type 2 diabetes mellitus with stage 2 chronic kidney disease, without long-term current use of insulin- stop metformin for now.    Coronary artery disease without angina pectoris, unspecified vessel or lesion type, unspecified whether native or transplanted heart- clinically stable.    Anxiety    Diarrhea, unspecified type- restart questran.  R/o C dif.  -     Clostridium difficile EIA; Future

## 2019-08-30 NOTE — ASSESSMENT & PLAN NOTE
Patient admitted to [facility] 08/12/2019 with complaints of diarrhea, nausea, vomiting, abdominal pain.  As part of workup patient found to be pancytopenic.  The patient did have bone marrow biopsy performed on 08/14/2019 that was unremarkable.  Pancytopenia attributed to severe infection.  Stool culture done on patient during hospitalization came back negative.  During hospitalization as patient diarrhea continued to improve so did his pancytopenia.  During hospitalization patient found to have folic acid deficiency.  He was placed on folic acid 1 mg p.o. daily.  Reports completing this prescription yesterday.    Patient reports persistent diarrhea.  Reports 6 episodes of diarrhea yesterday.  Denies any diarrhea today. Reports taking Imodium. Patient is hypotensive.  Verified low BP via manual blood pressure check right arm.  Patient currently taking Lopressor, lisinopril, amlodipine for blood pressure.  Reports taking all 3 this morning.  These are prescribed by his PCP.  I have asked patient to discuss at upcoming appointment with PCP.  With persistent diarrhea may need reduced BP meds    WBC now normal. Platelet count normal. Patient's hemoglobin 11.0, improved from hospitalization.  Review of the medical records revealed that prior to 08/12/2019 patient did have some intermittent mild macrocytic anemia.  On 10/26/2015 patient's hemoglobin 12.8, .  Patient has had element of macrocytosis s far back as available medical record in epic system (2005)    Will have patient do additional labs on the way out today.  Will check CMP, vitamin B12, folate, iron studies, MMA, homocystine.  Patient with complaints of intermittent dysuria.  Will do UA with reflex urine culture.     Reordered folic acid 1 mg p.o. daily.  Also asked patient to begin taking vitamin B12 1000 mcg sublingual tablets daily.  If vitamin B12 level results come back normal will have patient discontinue vitamin B12  supplementation    Patient had upon with PCP following my visit.  Will have patient scheduled for infusion appointment following visit with PCP for 1.5 L normal saline fluid bolus.  Consult placed to GI.  Appointment made for 9/5.  It is quite possible patient to need upper and lower endoscopies to further assess his gastrointestinal complaints.    Will have patient follow-up to 3 months with repeat labs

## 2019-08-30 NOTE — PATIENT INSTRUCTIONS

## 2019-08-30 NOTE — DISCHARGE INSTRUCTIONS
[facility]  [address] [address]  [address]  [phone] phone     [phone] fax  Hours of Operation: Monday- Friday 8:00am- 5:00pm  After hours phone  [phone]  Hematology / Oncology Physicians on call      Dr. Bravo Vega      Please call with any concerns regarding your appointment today.    FALL PREVENTION   Falls often occur due to slipping, tripping or losing your balance. Here are ways to reduce your risk of falling again.   Was there anything that caused your fall that can be fixed, removed or replaced?   Make your home safe by keeping walkways clear of objects you may trip over.   Use non-slip pads under rugs.   Do not walk in poorly lit areas.   Do not stand on chairs or wobbly ladders.   Use caution when reaching overhead or looking upward. This position can cause a loss of balance.   Be sure your shoes fit properly, have non-slip bottoms and are in good condition.   Be cautious when going up and down stairs, curbs, and when walking on uneven sidewalks.   If your balance is poor, consider using a cane or walker.   If your fall was related to alcohol use, stop or limit alcohol intake.   If your fall was related to use of sleeping medicines, talk to your doctor about this. You may need to reduce your dosage at bedtime if you awaken during the night to go to the bathroom.   To reduce the need for nighttime bathroom trips:   Avoid drinking fluids for several hours before going to bed   Empty your bladder before going to bed   Men can keep a urinal at the bedside   © 5879-3612 [employer], [address] [place] [address], [place] [address]. All rights reserved. This information is not intended as a substitute for professional medical care. Always follow your healthcare professional's instructions.

## 2019-08-30 NOTE — PROGRESS NOTES
Subjective:       Patient ID: Kodak Valentine is a 49 y.o. male.    Chief Complaint: Hospital follow up low blood counts    HPI: 49 y.o male with h/o HTN, HLD, JUDI, CAD, COPD. Patient was admitted to [facility] 08/12/2019 with complaints of diarrhea, nausea, vomiting, abdominal pain.  As part of workup patient found to be pancytopenic.  Hospital diagnoses included acute colitis, sepsis, pancytopenia. The patient did have bone marrow biopsy performed on 08/14/2019 that was unremarkable.  Pancytopenia attributed to severe infection.  Stool culture done on patient during hospitalization came back negative.  Patient treated with IV antibiotics, IV hydration, electrolyte replacement. During hospitalization as patient diarrhea continued to improve so did his pancytopenia.  During hospitalization patient found to have folic acid deficiency.  He was placed on folic acid 1 mg p.o. daily.  Reports completing this prescription yesterday.    Patient reports persistent diarrhea.  Reports 6 episodes of diarrhea yesterday.  Denies any diarrhea today. Reports taking Imodium. Patient is hypotensive.  Verified low BP via manual blood pressure check right arm.  Patient currently taking Lopressor, lisinopril, amlodipine for blood pressure.  Reports taking all 3 this morning.  These are prescribed by his PCP.  I have asked patient to discuss at upcoming appointment with PCP.  With persistent diarrhea may need reduced BP meds    WBC now normal. Platelet count normal. Patient's hemoglobin 11.0, improved from hospitalization.  Review of the medical records revealed that prior to 08/12/2019 patient did have some intermittent mild macrocytic anemia.  On 10/26/2015 patient's hemoglobin 12.8, .  Patient has had element of macrocytosis s far back as available medical record in epic system (2005)  Social History     Socioeconomic History    Marital status:      Spouse name: Not on file    Number of children: Not on file     Years of education: Not on file    Highest education level: Not on file   Occupational History     Employer: [employer]   Social Needs    Financial resource strain: Not on file    Food insecurity:     Worry: Not on file     Inability: Not on file    Transportation needs:     Medical: Not on file     Non-medical: Not on file   Tobacco Use    Smoking status: Former Smoker     Packs/day: 0.50     Years: 20.00     Pack years: 10.00     Types: Cigarettes     Last attempt to quit: 6/3/2014     Years since quittin.2    Smokeless tobacco: Never Used    Tobacco comment: currently vaping with nicotine since quit smoking in 2014   Substance and Sexual Activity    Alcohol use: Yes     Alcohol/week: 2.4 oz     Types: 4 Cans of beer per week    Drug use: No    Sexual activity: Not on file   Lifestyle    Physical activity:     Days per week: Not on file     Minutes per session: Not on file    Stress: Not on file   Relationships    Social connections:     Talks on phone: Not on file     Gets together: Not on file     Attends Yazidi service: Not on file     Active member of club or organization: Not on file     Attends meetings of clubs or organizations: Not on file     Relationship status: Not on file   Other Topics Concern    Not on file   Social History Narrative    . 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       Past Medical History:   Diagnosis Date    Anxiety     CAD (coronary artery disease)     PTCA x 2 LAD, restenosis and restent .    Chest pain syndrome 10/2/2015    COPD (chronic obstructive pulmonary disease)     CPAP (continuous positive airway pressure) dependence     @ night    Diabetes mellitus type 2, uncontrolled 2016    History of duodenal ulcer     with bleed    Hypertension     Mixed hyperlipidemia     JUDI (obstructive sleep apnea)     severe    S/P PTCA (percutaneous transluminal coronary angioplasty) 3/11/2015    Thrush 2019    Vitamin D  deficiency        Family History   Problem Relation Age of Onset    Heart disease Mother     Heart block Father     Heart disease Sister     COPD Maternal Grandmother     Diabetes Maternal Grandfather     COPD Maternal Grandfather        Past Surgical History:   Procedure Laterality Date    APPENDECTOMY      BACK SURGERY  06/2019    Lumber     CARDIAC CATHETERIZATION      CATARACT EXTRACTION W/  INTRAOCULAR LENS IMPLANT Right 4-22-15    CORONARY STENT PLACEMENT  2012    ESOPHAGOGASTRODUODENOSCOPY (EGD) N/A 7/31/2014    Performed by Jose Dela Cruz [facility] at [facility] ENDO    EXCISION-SKIN Right 4/20/2015    Performed by Louis O. Jeansonne IV, MD at [facility] OR    HEART CATH WITH GRAFTS-LEFT Right 6/2/2014    Performed by Erma Green MD at [facility] CATH LAB    HEART CATH-LEFT Left 2/21/2017    Performed by Erma Green MD at [facility] CATH LAB    HEMORRHOID SURGERY      INCISION AND DRAINAGE (I&D), BUTTOCK  3/26/2015    Performed by [facility]ne IV, MD at [facility] OR    INCISION AND DRAINAGE-THIGH Right 3/26/2015    Performed by [facility]ne IV, MD at [facility] OR    NISSEN FUNDOPLICATION      lap    SKIN LESION EXCISION Right     leg       Review of Systems   Constitutional: Positive for fatigue. Negative for activity change, appetite change, chills, diaphoresis, fever and unexpected weight change.   HENT: Negative for congestion, mouth sores, nosebleeds, sore throat, trouble swallowing and voice change.    Eyes: Negative for photophobia and visual disturbance.   Respiratory: Negative for cough, chest tightness, shortness of breath and wheezing.    Cardiovascular: Negative for chest pain, palpitations and leg swelling.   Gastrointestinal: Positive for nausea. Negative for abdominal distention, abdominal pain, anal bleeding, blood in stool, constipation, diarrhea and vomiting.   Genitourinary: Positive for dysuria. Negative for difficulty urinating and hematuria.   Musculoskeletal: Negative for  arthralgias, back pain and myalgias.   Skin: Negative for pallor, rash and wound.   Neurological: Positive for dizziness. Negative for syncope, weakness and headaches.   Hematological: Negative for adenopathy. Does not bruise/bleed easily.   Psychiatric/Behavioral: The patient is nervous/anxious.          Medication List with Changes/Refills   New Medications    CYANOCOBALAMIN, VITAMIN B-12, 1,000 MCG SUBL    Place 1,000 mcg under the tongue once daily.    FOLIC ACID (FOLVITE) 1 MG TABLET    Take 1 tablet (1 mg total) by mouth once daily.   Current Medications    ALBUTEROL (PROAIR HFA) 90 MCG/ACTUATION INHALER    Inhale 2 puffs into the lungs every 4 (four) hours as needed. Rescue    ALPRAZOLAM (XANAX) 0.25 MG TABLET    TAKE ONE TABLET BY MOUTH 3 TIMES DAILY AS NEEDED FOR ANXIETY    AMLODIPINE (NORVASC) 2.5 MG TABLET    TAKE 1 TABLET BY MOUTH ONCE A DAY    ASPIRIN (ECOTRIN) 81 MG EC TABLET    Take 1 tablet (81 mg total) by mouth once daily.    ATORVASTATIN (LIPITOR) 20 MG TABLET    TAKE 1 TABLET BY MOUTH ONCE A DAY IN THE EVENING FOR CHOLESTEROL    CHOLECALCIFEROL, VITAMIN D3, (VITAMIN D3) 2,000 UNIT CAP    Take 1 capsule (2,000 Units total) by mouth once daily.    CHOLESTYRAMINE (QUESTRAN) 4 GRAM PACKET    Take 1 packet (4 g total) by mouth 2 (two) times daily.    DULOXETINE (CYMBALTA) 60 MG CAPSULE    TAKE 1 CAPSULE BY MOUTH ONCE A DAY    FENOFIBRATE 160 MG TAB    TAKE 1 TABLET BY MOUTH ONCE A DAY    FLUTICASONE FUROATE-VILANTEROL (BREO) 200-25 MCG/DOSE DSDV DISKUS INHALER    INHALE 1 PUFF ONCE A DAY    LISINOPRIL (PRINIVIL,ZESTRIL) 20 MG TABLET    TAKE 1 TABLET BY MOUTH ONCE A DAY    METFORMIN (GLUCOPHAGE) 500 MG TABLET    TAKE ONE TABLET BY MOUTH TWICE DAILY WITH MEALS    METOPROLOL TARTRATE (LOPRESSOR) 50 MG TABLET    TAKE 1 TABLET BY MOUTH EVERY 12 HOURS    OXYCODONE-ACETAMINOPHEN (PERCOCET)  MG PER TABLET    Take 1 tablet by mouth 4 (four) times daily as needed.    PANTOPRAZOLE (PROTONIX) 40 MG TABLET     TAKE 1 TABLET BY MOUTH TWICE A DAY    RANOLAZINE (RANEXA) 1,000 MG TB12    Take 1 tablet (1,000 mg total) by mouth 2 (two) times daily.    SILDENAFIL (VIAGRA) 100 MG TABLET    TAKE AS DIRECTED    TIOTROPIUM BROMIDE (SPIRIVA RESPIMAT) 2.5 MCG/ACTUATION MIST    INHALE 2 PUFFS INTO THE LUNGS ONCE DAILY    TIZANIDINE (ZANAFLEX) 4 MG TABLET    Take 4 mg by mouth 2 (two) times daily.    TRAZODONE (DESYREL) 100 MG TABLET    Take 1-2 tablets (100-200 mg total) by mouth nightly as needed for Insomnia.   Discontinued Medications    CLONAZEPAM (KLONOPIN) 0.5 MG TABLET    Take 0.25 mg by mouth 2 (two) times daily.    FOLIC ACID (FOLVITE) 1 MG TABLET    Take 1 tablet (1 mg total) by mouth once daily.    MAGNESIUM OXIDE (MAG-OX) 400 MG (241.3 MG MAGNESIUM) TABLET    Take 1 tablet (400 mg total) by mouth 2 (two) times daily.     Objective:     Vitals:    08/30/19 0924   BP: 93/64   Pulse: 74   Temp: 98.3 °F (36.8 °C)     Lab Results   Component Value Date    WBC 4.63 08/30/2019    WBC 4.63 08/30/2019    HGB 11.0 (L) 08/30/2019    HGB 11.0 (L) 08/30/2019    HCT 31.7 (L) 08/30/2019    HCT 31.7 (L) 08/30/2019     (H) 08/30/2019     (H) 08/30/2019     08/30/2019     08/30/2019     BMP  Lab Results   Component Value Date     08/21/2019    K 4.4 08/21/2019     08/21/2019    CO2 25 08/21/2019    BUN 2 (L) 08/21/2019    CREATININE 0.8 08/21/2019    CALCIUM 9.2 08/21/2019    ANIONGAP 7 (L) 08/21/2019    ESTGFRAFRICA >60 08/21/2019    EGFRNONAA >60 08/21/2019     Lab Results   Component Value Date    ALT 10 08/21/2019    AST 15 08/21/2019    ALKPHOS 70 08/21/2019    BILITOT 1.2 (H) 08/21/2019     Lab Results   Component Value Date    IRON 84 08/12/2019    TIBC 377 08/12/2019    FERRITIN 273 08/12/2019     Lab Results   Component Value Date    FOLATE <2.0 (L) 08/12/2019     Lab Results   Component Value Date    JRLKMVRB71 575 11/04/2015         Physical Exam   Constitutional: He is oriented to person,  place, and time. He appears well-developed and well-nourished. He is cooperative.   HENT:   Head: Normocephalic.   Right Ear: External ear normal.   Left Ear: External ear normal.   Nose: Nose normal.   Mouth/Throat: Oropharynx is clear and moist.   Eyes: Conjunctivae, EOM and lids are normal. Right eye exhibits no discharge. Left eye exhibits no discharge. No scleral icterus.   Neck: Normal range of motion. No thyroid mass present.   Cardiovascular: Normal rate, regular rhythm and normal heart sounds.   Pulmonary/Chest: Effort normal and breath sounds normal. No respiratory distress. He has no wheezes. He has no rhonchi. He has no rales.   Abdominal: Soft. Bowel sounds are normal. He exhibits no distension. There is no tenderness.   Genitourinary:   Genitourinary Comments: deferred   Musculoskeletal: Normal range of motion. He exhibits no edema.   Lymphadenopathy:        Head (right side): No submandibular, no preauricular and no posterior auricular adenopathy present.        Head (left side): No submandibular, no preauricular and no posterior auricular adenopathy present.        Right cervical: No superficial cervical adenopathy present.       Left cervical: No superficial cervical adenopathy present.   Neurological: He is alert and oriented to person, place, and time.   Skin: Skin is warm, dry and intact.   Psychiatric: His speech is normal and behavior is normal. Thought content normal. His mood appears anxious.   Vitals reviewed.       Assessment:     Problem List Items Addressed This Visit        Oncology    Pancytopenia     Patient admitted to [facility] 08/12/2019 with complaints of diarrhea, nausea, vomiting, abdominal pain.  As part of workup patient found to be pancytopenic.  The patient did have bone marrow biopsy performed on 08/14/2019 that was unremarkable.  Pancytopenia attributed to severe infection.  Stool culture done on patient during hospitalization came back negative.  During  hospitalization as patient diarrhea continued to improve so did his pancytopenia.  During hospitalization patient found to have folic acid deficiency.  He was placed on folic acid 1 mg p.o. daily.  Reports completing this prescription yesterday.    Patient reports persistent diarrhea.  Reports 6 episodes of diarrhea yesterday.  Denies any diarrhea today. Reports taking Imodium. Patient is hypotensive.  Verified low BP via manual blood pressure check right arm.  Patient currently taking Lopressor, lisinopril, amlodipine for blood pressure.  Reports taking all 3 this morning.  These are prescribed by his PCP.  I have asked patient to discuss at upcoming appointment with PCP.  With persistent diarrhea may need reduced BP meds    WBC now normal. Platelet count normal. Patient's hemoglobin 11.0, improved from hospitalization.  Review of the medical records revealed that prior to 08/12/2019 patient did have some intermittent mild macrocytic anemia.  On 10/26/2015 patient's hemoglobin 12.8, .  Patient has had element of macrocytosis s far back as available medical record in epic system (2005)    Will have patient do additional labs on the way out today.  Will check CMP, vitamin B12, folate, iron studies, MMA, homocystine.  Patient with complaints of intermittent dysuria.  Will do UA with reflex urine culture.     Reordered folic acid 1 mg p.o. daily.  Also asked patient to begin taking vitamin B12 1000 mcg sublingual tablets daily.  If vitamin B12 level results come back normal will have patient discontinue vitamin B12 supplementation    Patient had upon with PCP following my visit.  Will have patient scheduled for infusion appointment following visit with PCP for 1.5 L normal saline fluid bolus.  Consult placed to GI.  Appointment made for 9/5.  It is quite possible patient to need upper and lower endoscopies to further assess his gastrointestinal complaints.    Will have patient follow-up to 3 months with repeat  labs            Other    Hypotension due to hypovolemia    Dizziness      Other Visit Diagnoses     Diarrhea, unspecified type    -  Primary    Relevant Orders    Ambulatory consult to Gastroenterology    Comprehensive metabolic panel    Dysuria        Relevant Orders    Urinalysis (Completed)    Urine culture    Macrocytic anemia        Relevant Medications    folic acid (FOLVITE) 1 MG tablet    cyanocobalamin, vitamin B-12, 1,000 mcg Subl    Other Relevant Orders    CBC auto differential    Comprehensive metabolic panel    Vitamin B12    Folate    Iron and TIBC    Ferritin    Folate    Vitamin B12    Methylmalonic acid, serum    Methylmalonic acid, serum    Homocysteine, serum    Homocysteine, serum    Nausea        Relevant Orders    Ambulatory consult to Gastroenterology    Folic acid deficiency        Relevant Medications    folic acid (FOLVITE) 1 MG tablet            Plan:     Diarrhea, unspecified type  -     Ambulatory consult to Gastroenterology  -     Comprehensive metabolic panel; Future; Expected date: 08/30/2019    Dysuria  -     Urinalysis; Future; Expected date: 08/30/2019  -     Urine culture; Future; Expected date: 08/30/2019    Macrocytic anemia  -     CBC auto differential; Future; Expected date: 08/30/2019  -     Comprehensive metabolic panel; Future; Expected date: 08/30/2019  -     Vitamin B12; Future; Expected date: 08/30/2019  -     Folate; Future; Expected date: 08/30/2019  -     Iron and TIBC; Future; Expected date: 08/30/2019  -     Ferritin; Future; Expected date: 08/30/2019  -     Folate; Future; Expected date: 08/30/2019  -     Vitamin B12; Future; Expected date: 08/30/2019  -     Methylmalonic acid, serum; Future; Expected date: 08/30/2019  -     Methylmalonic acid, serum; Future; Expected date: 08/30/2019  -     Homocysteine, serum; Future; Expected date: 08/30/2019  -     Homocysteine, serum; Future; Expected date: 08/30/2019  -     folic acid (FOLVITE) 1 MG tablet; Take 1 tablet (1  mg total) by mouth once daily.  Dispense: 90 tablet; Refill: 3  -     cyanocobalamin, vitamin B-12, 1,000 mcg Subl; Place 1,000 mcg under the tongue once daily.  Dispense: 90 tablet; Refill: 3    Nausea  -     Ambulatory consult to Gastroenterology    Folic acid deficiency  -     folic acid (FOLVITE) 1 MG tablet; Take 1 tablet (1 mg total) by mouth once daily.  Dispense: 90 tablet; Refill: 3    Hypotension due to hypovolemia    Dizziness    Pancytopenia          I will review assessment/plan with collaborating physician Dr. Rolando Perales, VICKIEP-C

## 2019-09-01 LAB — BACTERIA UR CULT: NORMAL

## 2019-09-16 DIAGNOSIS — G47.00 INSOMNIA, UNSPECIFIED TYPE: ICD-10-CM

## 2019-09-16 DIAGNOSIS — E78.2 MIXED HYPERLIPIDEMIA: ICD-10-CM

## 2019-09-16 RX ORDER — FENOFIBRATE 160 MG/1
TABLET ORAL
Qty: 30 TABLET | Refills: 6 | Status: SHIPPED | OUTPATIENT
Start: 2019-09-16 | End: 2020-04-25

## 2019-09-16 RX ORDER — TRAZODONE HYDROCHLORIDE 100 MG/1
TABLET ORAL
Qty: 60 TABLET | Refills: 11 | Status: SHIPPED | OUTPATIENT
Start: 2019-09-16 | End: 2021-02-09 | Stop reason: SDUPTHER

## 2019-09-16 RX ORDER — ALPRAZOLAM 0.25 MG/1
TABLET ORAL
Qty: 90 TABLET | Refills: 1 | Status: SHIPPED | OUTPATIENT
Start: 2019-09-16 | End: 2019-09-19 | Stop reason: SDUPTHER

## 2019-09-18 ENCOUNTER — OFFICE VISIT (OUTPATIENT)
Dept: GASTROENTEROLOGY | Facility: CLINIC | Age: 49
End: 2019-09-18
Payer: COMMERCIAL

## 2019-09-18 VITALS
HEART RATE: 64 BPM | WEIGHT: 193.31 LBS | DIASTOLIC BLOOD PRESSURE: 80 MMHG | HEIGHT: 70 IN | SYSTOLIC BLOOD PRESSURE: 138 MMHG | BODY MASS INDEX: 27.67 KG/M2

## 2019-09-18 DIAGNOSIS — R10.13 DYSPEPSIA: ICD-10-CM

## 2019-09-18 DIAGNOSIS — K52.9 ACUTE COLITIS: Primary | ICD-10-CM

## 2019-09-18 DIAGNOSIS — K21.9 GASTROESOPHAGEAL REFLUX DISEASE, ESOPHAGITIS PRESENCE NOT SPECIFIED: ICD-10-CM

## 2019-09-18 DIAGNOSIS — R10.9 RIGHT SIDED ABDOMINAL PAIN: ICD-10-CM

## 2019-09-18 PROCEDURE — 99999 PR PBB SHADOW E&M-EST. PATIENT-LVL III: ICD-10-PCS | Mod: PBBFAC,,, | Performed by: NURSE PRACTITIONER

## 2019-09-18 PROCEDURE — 99204 OFFICE O/P NEW MOD 45 MIN: CPT | Mod: S$GLB,,, | Performed by: NURSE PRACTITIONER

## 2019-09-18 PROCEDURE — 3079F PR MOST RECENT DIASTOLIC BLOOD PRESSURE 80-89 MM HG: ICD-10-PCS | Mod: CPTII,S$GLB,, | Performed by: NURSE PRACTITIONER

## 2019-09-18 PROCEDURE — 3079F DIAST BP 80-89 MM HG: CPT | Mod: CPTII,S$GLB,, | Performed by: NURSE PRACTITIONER

## 2019-09-18 PROCEDURE — 3008F PR BODY MASS INDEX (BMI) DOCUMENTED: ICD-10-PCS | Mod: CPTII,S$GLB,, | Performed by: NURSE PRACTITIONER

## 2019-09-18 PROCEDURE — 3075F PR MOST RECENT SYSTOLIC BLOOD PRESS GE 130-139MM HG: ICD-10-PCS | Mod: CPTII,S$GLB,, | Performed by: NURSE PRACTITIONER

## 2019-09-18 PROCEDURE — 99204 PR OFFICE/OUTPT VISIT, NEW, LEVL IV, 45-59 MIN: ICD-10-PCS | Mod: S$GLB,,, | Performed by: NURSE PRACTITIONER

## 2019-09-18 PROCEDURE — 3008F BODY MASS INDEX DOCD: CPT | Mod: CPTII,S$GLB,, | Performed by: NURSE PRACTITIONER

## 2019-09-18 PROCEDURE — 3075F SYST BP GE 130 - 139MM HG: CPT | Mod: CPTII,S$GLB,, | Performed by: NURSE PRACTITIONER

## 2019-09-18 PROCEDURE — 99999 PR PBB SHADOW E&M-EST. PATIENT-LVL III: CPT | Mod: PBBFAC,,, | Performed by: NURSE PRACTITIONER

## 2019-09-18 RX ORDER — AMLODIPINE BESYLATE 2.5 MG/1
2.5 TABLET ORAL DAILY
Refills: 11 | COMMUNITY
Start: 2019-09-17 | End: 2020-02-09

## 2019-09-18 NOTE — LETTER
September 18, 2019      Tyra Perales NP  [address] [place] [address]           [facility]  [address]  [place] [address]  Phone: [phone]  Fax: [phone]          Patient: Kodak Valentine   MR Number: [id number]   YOB: 1970   Date of Visit: 9/18/2019       Dear Tyra Perales:    Thank you for referring Kodak Valentine to me for evaluation. Attached you will find relevant portions of my assessment and plan of care.    If you have questions, please do not hesitate to call me. I look forward to following Kodak Valentine along with you.    Sincerely,    Delfina Torres NP    Enclosure  CC:  No Recipients    If you would like to receive this communication electronically, please contact [email or web address] or [phone] to request more information on [employer] access.    For providers and/or their staff who would like to refer a patient to [facility], please contact us through our one-stop-shop provider referral line, [facility], at [phone].    If you feel you have received this communication in error or would no longer like to receive these types of communications, please e-mail [email or web address]

## 2019-09-18 NOTE — PROGRESS NOTES
Clinic Consult:  [facility] Consultation Note    Reason for Consult:  The primary encounter diagnosis was Acute colitis. Diagnoses of Gastroesophageal reflux disease, esophagitis presence not specified, Dyspepsia, and Right sided abdominal pain were also pertinent to this visit.    PCP: Eliza Huddleston   [address] [place] [address]  / [place] [id number]    HPI:  This is a 49 y.o. male here for evaluation of the above. He presents to clinic have recent hospitalization for acute colitis of the descending, sigmoid, and rectum. This was evidenced on CT scan. He was placed on ABX. Stool studies negative. Since discharge, he has continued with diarrhea but is only occasional now. He has diarrhea about twice a week. He describes some continued abdominal pain but is located over the right side of his abdomen. His last colonoscopy was 10 years ago. He also has chronic GERD. Reports being fairly well controlled on PPI but does admit to breakthrough symptoms about once a week.     Review of Systems   Constitutional: Negative for fever, malaise/fatigue and weight loss.   HENT: Negative for sore throat.    Respiratory: Negative for cough and wheezing.    Cardiovascular: Negative for chest pain and palpitations.   Gastrointestinal: Positive for abdominal pain, diarrhea and heartburn. Negative for blood in stool, constipation, melena, nausea and vomiting.   Genitourinary: Negative for dysuria and frequency.   Musculoskeletal: Negative for back pain, joint pain, myalgias and neck pain.   Skin: Negative for itching and rash.   Neurological: Negative for dizziness, speech change, seizures, loss of consciousness and headaches.   Psychiatric/Behavioral: Negative for depression and substance abuse. The patient is not nervous/anxious.        Medical History:  has a past medical history of Anxiety, CAD (coronary artery disease), Chest pain syndrome (10/2/2015), COPD (chronic obstructive pulmonary disease), CPAP (continuous  positive airway pressure) dependence, Diabetes mellitus type 2, uncontrolled (4/13/2016), History of duodenal ulcer, Hypertension, Mixed hyperlipidemia, JUDI (obstructive sleep apnea), S/P PTCA (percutaneous transluminal coronary angioplasty) (3/11/2015), Thrush (8/14/2019), and Vitamin D deficiency.    Surgical History:  has a past surgical history that includes Nissen fundoplication; Hemorrhoid surgery; Coronary stent placement (2012); Skin lesion excision (Right); Appendectomy; Cataract extraction w/  intraocular lens implant (Right, 4-22-15); Cardiac catheterization; and Back surgery (06/2019).    Family History: family history includes COPD in his maternal grandfather and maternal grandmother; Diabetes in his maternal grandfather; Heart block in his father; Heart disease in his mother and sister..     Social History:  reports that he quit smoking about 5 years ago. His smoking use included cigarettes. He has a 10.00 pack-year smoking history. He has never used smokeless tobacco. He reports that he drinks about 2.4 oz of alcohol per week. He reports that he does not use drugs.    Allergies: Reviewed    Home Medications:   Current Outpatient Medications on File Prior to Visit   Medication Sig Dispense Refill    albuterol (PROAIR HFA) 90 mcg/actuation inhaler Inhale 2 puffs into the lungs every 4 (four) hours as needed. Rescue 18 g 3    ALPRAZolam (XANAX) 0.25 MG tablet TAKE ONE TABLET BY MOUTH 3 TIMES DAILY AS NEEDED FOR ANXIETY 90 tablet 1    amLODIPine (NORVASC) 2.5 MG tablet Take 2.5 mg by mouth once daily.  11    aspirin (ECOTRIN) 81 MG EC tablet Take 1 tablet (81 mg total) by mouth once daily.  0    atorvastatin (LIPITOR) 20 MG tablet TAKE 1 TABLET BY MOUTH ONCE A DAY IN THE EVENING FOR CHOLESTEROL 30 tablet 11    cholecalciferol, vitamin D3, (VITAMIN D3) 2,000 unit Cap Take 1 capsule (2,000 Units total) by mouth once daily. 100 capsule 6    cyanocobalamin, vitamin B-12, 1,000 mcg Subl Place 1,000 mcg  "under the tongue once daily. 90 tablet 3    DULoxetine (CYMBALTA) 60 MG capsule TAKE 1 CAPSULE BY MOUTH ONCE A DAY 30 capsule 11    fenofibrate 160 MG Tab TAKE 1 TABLET BY MOUTH ONCE A DAY 30 tablet 6    fluticasone furoate-vilanterol (BREO) 200-25 mcg/dose DsDv diskus inhaler INHALE 1 PUFF ONCE A DAY 60 each 11    folic acid (FOLVITE) 1 MG tablet Take 1 tablet (1 mg total) by mouth once daily. 90 tablet 3    lisinopril (PRINIVIL,ZESTRIL) 20 MG tablet Take 0.5 tablets (10 mg total) by mouth once daily. 30 tablet 11    metoprolol tartrate (LOPRESSOR) 50 MG tablet Take 0.5 tablets (25 mg total) by mouth every 12 (twelve) hours. 60 tablet 11    oxyCODONE-acetaminophen (PERCOCET)  mg per tablet Take 1 tablet by mouth 4 (four) times daily as needed.  0    pantoprazole (PROTONIX) 40 MG tablet TAKE 1 TABLET BY MOUTH TWICE A DAY 60 tablet 11    ranolazine (RANEXA) 1,000 mg Tb12 Take 1 tablet (1,000 mg total) by mouth 2 (two) times daily. 180 tablet 3    sildenafil (VIAGRA) 100 MG tablet TAKE AS DIRECTED 30 tablet 6    tiotropium bromide (SPIRIVA RESPIMAT) 2.5 mcg/actuation Mist INHALE 2 PUFFS INTO THE LUNGS ONCE DAILY 4 g 11    traZODone (DESYREL) 100 MG tablet TAKE 1 OR 2 TABLETS BY MOUTH NIGHTLY AS NEEDED FOR INSOMNIA 60 tablet 11    [DISCONTINUED] cholestyramine (QUESTRAN) 4 gram packet Take 1 packet (4 g total) by mouth 2 (two) times daily. 180 packet 0    [DISCONTINUED] metFORMIN (GLUCOPHAGE) 500 MG tablet TAKE ONE TABLET BY MOUTH TWICE DAILY WITH MEALS 60 tablet 11    [DISCONTINUED] tiZANidine (ZANAFLEX) 4 MG tablet Take 4 mg by mouth 2 (two) times daily.       No current facility-administered medications on file prior to visit.        Physical Exam:  /80   Pulse 64   Ht 5' 10" (1.778 m)   Wt 87.7 kg (193 lb 5.5 oz)   BMI 27.74 kg/m²   Body mass index is 27.74 kg/m².  Physical Exam   Constitutional: He is oriented to person, place, and time and well-developed, well-nourished, and in no " distress. No distress.   HENT:   Head: Normocephalic.   Eyes: Pupils are equal, round, and reactive to light. Conjunctivae are normal.   Cardiovascular: Normal rate, regular rhythm and normal heart sounds.   Pulmonary/Chest: Effort normal and breath sounds normal. No respiratory distress.   Abdominal: Soft. Bowel sounds are normal. He exhibits no distension. There is no tenderness.   Neurological: He is alert and oriented to person, place, and time. No cranial nerve deficit.   Skin: Skin is warm and dry. No rash noted.   Psychiatric: Mood and affect normal.       Labs: Pertinent labs reviewed.    Assessment:  1. Acute colitis    2. Gastroesophageal reflux disease, esophagitis presence not specified    3. Dyspepsia    4. Right sided abdominal pain         Recommendations:  - symptoms improved on ABX but still with lingering symptoms.   - plan for colonoscopy for further evaluation.   - will also check EGD at time of colonoscopy as he is having breakthrough symptoms on PPI.   - zantac as needed.   -     Case request GI: ESOPHAGOGASTRODUODENOSCOPY (EGD), COLONOSCOPY    Follow up to be determined after procedure.    Thank you so much for allowing me to participate in the care of CELESTINO Hendricks

## 2019-09-19 RX ORDER — ALPRAZOLAM 0.25 MG/1
TABLET ORAL
Qty: 90 TABLET | Refills: 1 | Status: SHIPPED | OUTPATIENT
Start: 2019-09-19 | End: 2020-10-14 | Stop reason: SDUPTHER

## 2019-11-13 RX ORDER — SODIUM, POTASSIUM,MAG SULFATES 17.5-3.13G
SOLUTION, RECONSTITUTED, ORAL ORAL
Qty: 354 ML | Refills: 0 | Status: ON HOLD | OUTPATIENT
Start: 2019-11-13 | End: 2020-03-11 | Stop reason: HOSPADM

## 2019-11-20 RX ORDER — PANTOPRAZOLE SODIUM 40 MG/1
TABLET, DELAYED RELEASE ORAL
Qty: 60 TABLET | Refills: 11 | Status: SHIPPED | OUTPATIENT
Start: 2019-11-20 | End: 2020-12-30 | Stop reason: SDUPTHER

## 2019-11-22 ENCOUNTER — PATIENT MESSAGE (OUTPATIENT)
Dept: HEMATOLOGY/ONCOLOGY | Facility: CLINIC | Age: 49
End: 2019-11-22

## 2019-12-02 DIAGNOSIS — J44.9 CHRONIC OBSTRUCTIVE PULMONARY DISEASE, UNSPECIFIED COPD TYPE: ICD-10-CM

## 2019-12-02 RX ORDER — DULOXETIN HYDROCHLORIDE 60 MG/1
CAPSULE, DELAYED RELEASE ORAL
Qty: 30 CAPSULE | Refills: 11 | Status: SHIPPED | OUTPATIENT
Start: 2019-12-02 | End: 2020-12-30 | Stop reason: SDUPTHER

## 2019-12-03 RX ORDER — PREDNISONE 20 MG/1
TABLET ORAL
Qty: 26 TABLET | Refills: 0 | Status: SHIPPED | OUTPATIENT
Start: 2019-12-03 | End: 2020-12-30 | Stop reason: SDUPTHER

## 2020-02-09 RX ORDER — AMLODIPINE BESYLATE 2.5 MG/1
TABLET ORAL
Qty: 30 TABLET | Refills: 6 | Status: SHIPPED | OUTPATIENT
Start: 2020-02-09 | End: 2020-07-23

## 2020-03-11 ENCOUNTER — HOSPITAL ENCOUNTER (OUTPATIENT)
Facility: HOSPITAL | Age: 50
Discharge: HOME | End: 2020-03-11
Attending: INTERNAL MEDICINE | Admitting: INTERNAL MEDICINE
Payer: COMMERCIAL

## 2020-03-11 ENCOUNTER — ANESTHESIA EVENT (OUTPATIENT)
Dept: ENDOSCOPY | Facility: HOSPITAL | Age: 50
End: 2020-03-11
Payer: COMMERCIAL

## 2020-03-11 ENCOUNTER — ANESTHESIA (OUTPATIENT)
Dept: ENDOSCOPY | Facility: HOSPITAL | Age: 50
End: 2020-03-11
Payer: COMMERCIAL

## 2020-03-11 VITALS
RESPIRATION RATE: 18 BRPM | WEIGHT: 178.38 LBS | BODY MASS INDEX: 25.54 KG/M2 | TEMPERATURE: 98 F | OXYGEN SATURATION: 100 % | DIASTOLIC BLOOD PRESSURE: 78 MMHG | SYSTOLIC BLOOD PRESSURE: 115 MMHG | HEIGHT: 70 IN | HEART RATE: 71 BPM

## 2020-03-11 DIAGNOSIS — R10.13 DYSPEPSIA: ICD-10-CM

## 2020-03-11 DIAGNOSIS — K21.9 GERD (GASTROESOPHAGEAL REFLUX DISEASE): Primary | ICD-10-CM

## 2020-03-11 DIAGNOSIS — K21.9 GASTROESOPHAGEAL REFLUX DISEASE WITHOUT ESOPHAGITIS: ICD-10-CM

## 2020-03-11 DIAGNOSIS — K52.9 ACUTE COLITIS: ICD-10-CM

## 2020-03-11 PROCEDURE — 27201012 HC FORCEPS, HOT/COLD, DISP: Performed by: INTERNAL MEDICINE

## 2020-03-11 PROCEDURE — 37000009 HC ANESTHESIA EA ADD 15 MINS: Performed by: INTERNAL MEDICINE

## 2020-03-11 PROCEDURE — 45380 COLONOSCOPY AND BIOPSY: CPT | Performed by: INTERNAL MEDICINE

## 2020-03-11 PROCEDURE — 37000008 HC ANESTHESIA 1ST 15 MINUTES: Performed by: INTERNAL MEDICINE

## 2020-03-11 PROCEDURE — 45385 PR COLONOSCOPY,REMV LESN,SNARE: ICD-10-PCS | Mod: ,,, | Performed by: INTERNAL MEDICINE

## 2020-03-11 PROCEDURE — 27201089 HC SNARE, DISP (ANY): Performed by: INTERNAL MEDICINE

## 2020-03-11 PROCEDURE — 63600175 PHARM REV CODE 636 W HCPCS: Performed by: NURSE ANESTHETIST, CERTIFIED REGISTERED

## 2020-03-11 PROCEDURE — 43239 EGD BIOPSY SINGLE/MULTIPLE: CPT | Mod: 51,,, | Performed by: INTERNAL MEDICINE

## 2020-03-11 PROCEDURE — 88305 TISSUE EXAM BY PATHOLOGIST: CPT | Performed by: PATHOLOGY

## 2020-03-11 PROCEDURE — 88305 TISSUE EXAM BY PATHOLOGIST: CPT | Mod: 26,,, | Performed by: PATHOLOGY

## 2020-03-11 PROCEDURE — 43239 PR EGD, FLEX, W/BIOPSY, SGL/MULTI: ICD-10-PCS | Mod: 51,,, | Performed by: INTERNAL MEDICINE

## 2020-03-11 PROCEDURE — 45385 COLONOSCOPY W/LESION REMOVAL: CPT | Mod: ,,, | Performed by: INTERNAL MEDICINE

## 2020-03-11 PROCEDURE — 45380 COLONOSCOPY AND BIOPSY: CPT | Mod: 59,,, | Performed by: INTERNAL MEDICINE

## 2020-03-11 PROCEDURE — 25000003 PHARM REV CODE 250: Performed by: NURSE ANESTHETIST, CERTIFIED REGISTERED

## 2020-03-11 PROCEDURE — 88305 TISSUE EXAM BY PATHOLOGIST: ICD-10-PCS | Mod: 26,,, | Performed by: PATHOLOGY

## 2020-03-11 PROCEDURE — 45385 COLONOSCOPY W/LESION REMOVAL: CPT | Performed by: INTERNAL MEDICINE

## 2020-03-11 PROCEDURE — 43239 EGD BIOPSY SINGLE/MULTIPLE: CPT | Performed by: INTERNAL MEDICINE

## 2020-03-11 PROCEDURE — 45380 PR COLONOSCOPY,BIOPSY: ICD-10-PCS | Mod: 59,,, | Performed by: INTERNAL MEDICINE

## 2020-03-11 RX ORDER — SODIUM CHLORIDE, SODIUM LACTATE, POTASSIUM CHLORIDE, CALCIUM CHLORIDE 600; 310; 30; 20 MG/100ML; MG/100ML; MG/100ML; MG/100ML
INJECTION, SOLUTION INTRAVENOUS CONTINUOUS
Status: CANCELLED | OUTPATIENT
Start: 2020-03-11

## 2020-03-11 RX ORDER — LIDOCAINE HYDROCHLORIDE 10 MG/ML
INJECTION, SOLUTION EPIDURAL; INFILTRATION; INTRACAUDAL; PERINEURAL
Status: DISCONTINUED | OUTPATIENT
Start: 2020-03-11 | End: 2020-03-11

## 2020-03-11 RX ORDER — SODIUM CHLORIDE, SODIUM LACTATE, POTASSIUM CHLORIDE, CALCIUM CHLORIDE 600; 310; 30; 20 MG/100ML; MG/100ML; MG/100ML; MG/100ML
INJECTION, SOLUTION INTRAVENOUS CONTINUOUS PRN
Status: DISCONTINUED | OUTPATIENT
Start: 2020-03-11 | End: 2020-03-11

## 2020-03-11 RX ORDER — SODIUM CHLORIDE 0.9 % (FLUSH) 0.9 %
10 SYRINGE (ML) INJECTION
Status: CANCELLED | OUTPATIENT
Start: 2020-03-11

## 2020-03-11 RX ORDER — PROPOFOL 10 MG/ML
VIAL (ML) INTRAVENOUS
Status: DISCONTINUED | OUTPATIENT
Start: 2020-03-11 | End: 2020-03-11

## 2020-03-11 RX ADMIN — PROPOFOL 40 MG: 10 INJECTION, EMULSION INTRAVENOUS at 10:03

## 2020-03-11 RX ADMIN — PROPOFOL 50 MG: 10 INJECTION, EMULSION INTRAVENOUS at 10:03

## 2020-03-11 RX ADMIN — SODIUM CHLORIDE, SODIUM LACTATE, POTASSIUM CHLORIDE, AND CALCIUM CHLORIDE: 600; 310; 30; 20 INJECTION, SOLUTION INTRAVENOUS at 10:03

## 2020-03-11 RX ADMIN — LIDOCAINE HYDROCHLORIDE 50 MG: 10 INJECTION, SOLUTION EPIDURAL; INFILTRATION; INTRACAUDAL; PERINEURAL at 10:03

## 2020-03-11 NOTE — TRANSFER OF CARE
"Anesthesia Transfer of Care Note    Patient: Kodak Valentine    Procedure(s) Performed: Procedure(s) (LRB):  ESOPHAGOGASTRODUODENOSCOPY (EGD) (N/A)  COLONOSCOPY (N/A)    Patient location: GI    Anesthesia Type: MAC    Transport from OR: Transported from OR on room air with adequate spontaneous ventilation    Post pain: adequate analgesia    Post assessment: no apparent anesthetic complications and tolerated procedure well    Post vital signs: stable    Level of consciousness: awake, alert and oriented    Nausea/Vomiting: no nausea/vomiting    Complications: none    Transfer of care protocol was followed      Last vitals:   Visit Vitals  /79 (BP Location: Left arm, Patient Position: Lying)   Pulse 68   Temp 36.6 °C (97.9 °F) (Tympanic)   Resp 17   Ht 5' 10" (1.778 m)   Wt 80.9 kg (178 lb 5.6 oz)   SpO2 100%   BMI 25.59 kg/m²     "

## 2020-03-11 NOTE — H&P
Short Stay Endoscopy History and Physical    PCP - Eliza Huddleston MD    Procedure - EGD and colonoscopy  ASA - 2  Mallampati - per anesthesia  History of Anesthesia problems - no  Family history Anesthesia problems -  no     HPI:  This is a 49 y.o. male here for evaluation of :   Active Hospital Problems    Diagnosis  POA    *GERD (gastroesophageal reflux disease) [K21.9]  Yes    Dyspepsia [R10.13]  No    Acute colitis [K52.9]  Yes      Resolved Hospital Problems   No resolved problems to display.         Health Maintenance       Date Due Completion Date    Pneumococcal Vaccine (Highest Risk) (3 of 3 - PPSV23) 01/30/2017 7/29/2015    Shingles Vaccine (1 of 2) 04/05/2018 ---    Colonoscopy 04/05/2018 ---    Eye Exam 01/01/2019 1/1/2018 (Done)    Override on 1/1/2018: Done (Jan 2018)    Override on 4/27/2017: Done    Override on 4/27/2016: Done    Urine Microalbumin 07/11/2019 7/11/2018    Foot Exam 07/18/2019 7/18/2018    Override on 4/20/2016: Done    Influenza Vaccine (1) 09/01/2019 9/26/2018    Hemoglobin A1c 02/29/2020 8/30/2019    TETANUS VACCINE 07/19/2020 7/19/2010    PROSTATE-SPECIFIC ANTIGEN 08/30/2020 8/30/2019    Lipid Panel 08/30/2020 8/30/2019    High Dose Statin 03/11/2021 3/11/2020    Aspirin/Antiplatelet Therapy 03/11/2021 3/11/2020          EGD  Reflux - yes  Dysphagia - no  Abdominal pain - yes  Diarrhea - no  Anemia - no  GI bleeding - no  Other - no    Colonoscopy  Screening - no  History of polyps - no  Diarrhea - no  Anemia - no  Blood in stools - no  Abdominal pain - yes  Other - no    ROS:  CONSTITUTIONAL: Denies weight change,  fatigue, fevers, chills, night sweats.  CARDIOVASCULAR: Denies chest pain, shortness of breath, orthopnea and edema.  RESPIRATORY: Denies cough, hemoptysis, dyspnea, and wheezing.  GI: See HPI.    Medical History:   Past Medical History:   Diagnosis Date    Anxiety     CAD (coronary artery disease)     PTCA x 2 LAD, restenosis and restent 7/12.    Chest pain  syndrome 10/2/2015    COPD (chronic obstructive pulmonary disease)     CPAP (continuous positive airway pressure) dependence     @ night    Diabetes mellitus type 2, uncontrolled 2016    History of duodenal ulcer     with bleed    Hypertension     Mixed hyperlipidemia     JUDI (obstructive sleep apnea)     severe    S/P PTCA (percutaneous transluminal coronary angioplasty) 3/11/2015    Thrush 2019    Vitamin D deficiency        Surgical History:   Past Surgical History:   Procedure Laterality Date    APPENDECTOMY      BACK SURGERY  2019    Lumber     CARDIAC CATHETERIZATION      CATARACT EXTRACTION W/  INTRAOCULAR LENS IMPLANT Right 4-22-15    CORONARY STENT PLACEMENT      HEMORRHOID SURGERY      NISSEN FUNDOPLICATION      lap    SKIN LESION EXCISION Right     leg       Family History:   Family History   Problem Relation Age of Onset    Heart disease Mother     Heart block Father     Heart disease Sister     COPD Maternal Grandmother     Diabetes Maternal Grandfather     COPD Maternal Grandfather        Social History:   Social History     Tobacco Use    Smoking status: Current Every Day Smoker     Packs/day: 1.00     Years: 20.00     Pack years: 20.00     Types: Cigarettes     Last attempt to quit: 6/3/2014     Years since quittin.7    Smokeless tobacco: Never Used    Tobacco comment: currently vaping with nicotine since quit smoking in 2014   Substance Use Topics    Alcohol use: Yes     Alcohol/week: 6.0 standard drinks     Types: 4 Cans of beer, 2 Shots of liquor per week     Comment: daily    Drug use: No       Allergies:   Review of patient's allergies indicates:  No Known Allergies    Medications:   No current facility-administered medications on file prior to encounter.      Current Outpatient Medications on File Prior to Encounter   Medication Sig Dispense Refill    albuterol (PROAIR HFA) 90 mcg/actuation inhaler Inhale 2 puffs into the lungs every 4  (four) hours as needed. Rescue 18 g 3    aspirin (ECOTRIN) 81 MG EC tablet Take 1 tablet (81 mg total) by mouth once daily.  0    atorvastatin (LIPITOR) 20 MG tablet TAKE 1 TABLET BY MOUTH ONCE A DAY IN THE EVENING FOR CHOLESTEROL 30 tablet 11    cholecalciferol, vitamin D3, (VITAMIN D3) 2,000 unit Cap Take 1 capsule (2,000 Units total) by mouth once daily. 100 capsule 6    cyanocobalamin, vitamin B-12, 1,000 mcg Subl Place 1,000 mcg under the tongue once daily. 90 tablet 3    fenofibrate 160 MG Tab TAKE 1 TABLET BY MOUTH ONCE A DAY 30 tablet 6    fluticasone furoate-vilanterol (BREO) 200-25 mcg/dose DsDv diskus inhaler INHALE 1 PUFF ONCE A DAY 60 each 11    lisinopril (PRINIVIL,ZESTRIL) 20 MG tablet Take 0.5 tablets (10 mg total) by mouth once daily. 30 tablet 11    metoprolol tartrate (LOPRESSOR) 50 MG tablet Take 0.5 tablets (25 mg total) by mouth every 12 (twelve) hours. 60 tablet 11    oxyCODONE-acetaminophen (PERCOCET)  mg per tablet Take 1 tablet by mouth 4 (four) times daily as needed.  0    ranolazine (RANEXA) 1,000 mg Tb12 Take 1 tablet (1,000 mg total) by mouth 2 (two) times daily. 180 tablet 3    tiotropium bromide (SPIRIVA RESPIMAT) 2.5 mcg/actuation Mist INHALE 2 PUFFS INTO THE LUNGS ONCE DAILY 4 g 11    traZODone (DESYREL) 100 MG tablet TAKE 1 OR 2 TABLETS BY MOUTH NIGHTLY AS NEEDED FOR INSOMNIA 60 tablet 11    folic acid (FOLVITE) 1 MG tablet Take 1 tablet (1 mg total) by mouth once daily. 90 tablet 3    sildenafil (VIAGRA) 100 MG tablet TAKE AS DIRECTED 30 tablet 6       Physical Exam:  Vital Signs:   Vitals:    03/11/20 1000   BP: 130/79   Pulse: 68   Resp: 17   Temp: 97.9 °F (36.6 °C)     General Appearance: Well appearing in no acute distress  ENT: OP clear  Chest: CTA B  CV: RRR, no m/r/g  Abd: s/nt/nd/nabs  Ext: no edema    Labs:Reviewed    IMP:  Active Hospital Problems    Diagnosis  POA    *GERD (gastroesophageal reflux disease) [K21.9]  Yes    Dyspepsia [R10.13]  No     Acute colitis [K52.9]  Yes      Resolved Hospital Problems   No resolved problems to display.         Plan:   I have explained the risks and benefits of upper endoscopy and colonoscopy to the patient including but not limited to bleeding, perforation, infection, and death. The patient wishes to proceed.

## 2020-03-11 NOTE — OR NURSING
Pt denies anything loose or removable in mouth. Bite block placed gently into mouth while pt awake, no complaints from pt.

## 2020-03-11 NOTE — [employer] PATIENT INSTRUCTIONS
Discharge Summary/Instructions after an Endoscopic Procedure  Patient Name: Kodak Valentine  Patient MRN: [id number]  Patient YOB: 1970 Wednesday, March 11, 2020 Kanwal Vasquez MD  RESTRICTIONS:  During your procedure today, you received medications for sedation.  These   medications may affect your judgment, balance and coordination.  Therefore,   for 24 hours, you have the following restrictions:   - DO NOT drive a car, operate machinery, make legal/financial decisions,   sign important papers or drink alcohol.    ACTIVITY:  Today: no heavy lifting, straining or running due to procedural   sedation/anesthesia.  The following day: return to full activity including work.  DIET:  Eat and drink normally unless instructed otherwise.     TREATMENT FOR COMMON SIDE EFFECTS:  - Mild abdominal pain, nausea, belching, bloating or excessive gas:  rest,   eat lightly and use a heating pad.  - Sore Throat: treat with throat lozenges and/or gargle with warm salt   water.  - Because air was used during the procedure, expelling large amounts of air   from your rectum or belching is normal.  - If a bowel prep was taken, you may not have a bowel movement for 1-3 days.    This is normal.  SYMPTOMS TO WATCH FOR AND REPORT TO YOUR PHYSICIAN:  1. Abdominal pain or bloating, other than gas cramps.  2. Chest pain.  3. Back pain.  4. Signs of infection such as: chills or fever occurring within 24 hours   after the procedure.  5. Rectal bleeding, which would show as bright red, maroon, or black stools.   (A tablespoon of blood from the rectum is not serious, especially if   hemorrhoids are present.)  6. Vomiting.  7. Weakness or dizziness.  GO DIRECTLY TO THE NEAREST EMERGENCY ROOM IF YOU HAVE ANY OF THE FOLLOWING:      Difficulty breathing              Chills and/or fever over 101 F   Persistent vomiting and/or vomiting blood   Severe abdominal pain   Severe chest pain   Black, tarry stools   Bleeding- more than one  tablespoon   Any other symptom or condition that you feel may need urgent attention  Your doctor recommends these additional instructions:  If any biopsies were taken, your doctors clinic will contact you in 1 to 2   weeks with any results.  - Discharge patient to home (via wheelchair).   - Resume previous diet.   - Continue present medications.   - Await pathology results.   - Repeat colonoscopy in 5 years for surveillance.   - Telephone GI clinic for pathology results in 3 weeks.   - Patient has a contact number available for emergencies.  The signs and   symptoms of potential delayed complications were discussed with the   patient.  Return to normal activities tomorrow.  Written discharge   instructions were provided to the patient.  For questions, problems or results please call your physician Kanwal Vasquez MD at Work:  [phone]  If you have any questions about the above instructions, call the GI   department at [phone] or call the endoscopy unit at [phone]   from 7am until 3 pm.  [facility] [place], EMERGENCY ROOM PHONE NUMBER:   [phone]  IF A COMPLICATION OR EMERGENCY SITUATION ARISES AND YOU ARE UNABLE TO REACH   YOUR PHYSICIAN - GO DIRECTLY TO THE EMERGENCY ROOM.  I have read or have had read to me these discharge instructions for my   procedure and have received a written copy.  I understand these   instructions and will follow-up with my physician if I have any questions.     __________________________________       _____________________________________  Nurse Signature                                          Patient/Designated   Responsible Party Signature  MD Kanwal Arteaga MD  3/11/2020 11:12:19 AM  [employer]

## 2020-03-11 NOTE — DISCHARGE SUMMARY
Endoscopy Discharge Summary      Admit Date: 3/11/2020    Discharge Date and Time:  3/11/2020 11:14 AM    Attending Physician: Kanwal Vasquez MD     Discharge Physician: Kanwal Vasquez MD     Principal Admitting Diagnoses: GERD (gastroesophageal reflux disease)         Discharge Diagnosis: The primary encounter diagnosis was GERD (gastroesophageal reflux disease). Diagnoses of Gastroesophageal reflux disease without esophagitis, Acute colitis, and Dyspepsia were also pertinent to this visit.     Discharged Condition: Good    Indication for Admission: GERD (gastroesophageal reflux disease)     Hospital Course: Patient was admitted for an inpatient procedure and tolerated the procedure well with no complications.    Significant Diagnostic Studies: EGD with biopsy and colonoscopy with polypectomy and biopsy    Estimated Blood Loss: 1 ml.    Discussed with: patient.    Disposition: Home.    Follow Up/Patient Instructions:   Current Discharge Medication List      CONTINUE these medications which have NOT CHANGED    Details   albuterol (PROAIR HFA) 90 mcg/actuation inhaler Inhale 2 puffs into the lungs every 4 (four) hours as needed. Rescue  Qty: 18 g, Refills: 3    Associated Diagnoses: COPD, severity to be determined; COPD, severe      ALPRAZolam (XANAX) 0.25 MG tablet TAKE ONE TABLET BY MOUTH 3 TIMES DAILY AS NEEDED FOR ANXIETY  Qty: 90 tablet, Refills: 1      amLODIPine (NORVASC) 2.5 MG tablet TAKE 1 TABLET BY MOUTH ONCE A DAY  Qty: 30 tablet, Refills: 6      aspirin (ECOTRIN) 81 MG EC tablet Take 1 tablet (81 mg total) by mouth once daily.  Refills: 0    Associated Diagnoses: Coronary artery disease of native artery of native heart with stable angina pectoris      atorvastatin (LIPITOR) 20 MG tablet TAKE 1 TABLET BY MOUTH ONCE A DAY IN THE EVENING FOR CHOLESTEROL  Qty: 30 tablet, Refills: 11    Comments: This prescription was filled on 8/10/2019. Any refills authorized will be placed on file.       cholecalciferol, vitamin D3, (VITAMIN D3) 2,000 unit Cap Take 1 capsule (2,000 Units total) by mouth once daily.  Qty: 100 capsule, Refills: 6    Associated Diagnoses: Vitamin D deficiency      cyanocobalamin, vitamin B-12, 1,000 mcg Subl Place 1,000 mcg under the tongue once daily.  Qty: 90 tablet, Refills: 3    Associated Diagnoses: Macrocytic anemia      DULoxetine (CYMBALTA) 60 MG capsule TAKE 1 CAPSULE BY MOUTH ONCE A DAY  Qty: 30 capsule, Refills: 11      fenofibrate 160 MG Tab TAKE 1 TABLET BY MOUTH ONCE A DAY  Qty: 30 tablet, Refills: 6    Associated Diagnoses: Mixed hyperlipidemia      fluticasone furoate-vilanterol (BREO) 200-25 mcg/dose DsDv diskus inhaler INHALE 1 PUFF ONCE A DAY  Qty: 60 each, Refills: 11    Associated Diagnoses: COPD, severe      lisinopril (PRINIVIL,ZESTRIL) 20 MG tablet Take 0.5 tablets (10 mg total) by mouth once daily.  Qty: 30 tablet, Refills: 11    Comments: This prescription was filled on 2/16/2019. Any refills authorized will be placed on file.  Associated Diagnoses: Hypertension associated with diabetes      metoprolol tartrate (LOPRESSOR) 50 MG tablet Take 0.5 tablets (25 mg total) by mouth every 12 (twelve) hours.  Qty: 60 tablet, Refills: 11    Associated Diagnoses: Hypertension associated with diabetes      oxyCODONE-acetaminophen (PERCOCET)  mg per tablet Take 1 tablet by mouth 4 (four) times daily as needed.  Refills: 0      pantoprazole (PROTONIX) 40 MG tablet TAKE 1 TABLET BY MOUTH TWICE A DAY  Qty: 60 tablet, Refills: 11      ranolazine (RANEXA) 1,000 mg Tb12 Take 1 tablet (1,000 mg total) by mouth 2 (two) times daily.  Qty: 180 tablet, Refills: 3    Associated Diagnoses: Coronary artery disease of native artery of native heart with stable angina pectoris; S/P PTCA (percutaneous transluminal coronary angioplasty)      tiotropium bromide (SPIRIVA RESPIMAT) 2.5 mcg/actuation Mist INHALE 2 PUFFS INTO THE LUNGS ONCE DAILY  Qty: 4 g, Refills: 11    Associated  Diagnoses: COPD, severe      traZODone (DESYREL) 100 MG tablet TAKE 1 OR 2 TABLETS BY MOUTH NIGHTLY AS NEEDED FOR INSOMNIA  Qty: 60 tablet, Refills: 11    Associated Diagnoses: Insomnia, unspecified type      folic acid (FOLVITE) 1 MG tablet Take 1 tablet (1 mg total) by mouth once daily.  Qty: 90 tablet, Refills: 3    Associated Diagnoses: Macrocytic anemia; Folic acid deficiency      predniSONE (DELTASONE) 20 MG tablet TAKE 3 TABLETS FOR 3 DAYS; THEN 2 FOR 3 DAYS; THEN 1 FOR 3 DAYS; THEN 1/2 FOR 3 DAYS  Qty: 26 tablet, Refills: 0    Associated Diagnoses: Chronic obstructive pulmonary disease, unspecified COPD type      sildenafil (VIAGRA) 100 MG tablet TAKE AS DIRECTED  Qty: 30 tablet, Refills: 6         STOP taking these medications       sodium,potassium,mag sulfates (SUPREP BOWEL PREP KIT) 17.5-3.13-1.6 gram SolR Comments:   Reason for Stopping:               No discharge procedures on file.        Kanwal Vasquez MD  Gastroenterology and Hepatology

## 2020-03-11 NOTE — ANESTHESIA POSTPROCEDURE EVALUATION
Anesthesia Post Evaluation    Patient: Kodak Valentine    Procedure(s) Performed: Procedure(s) (LRB):  ESOPHAGOGASTRODUODENOSCOPY (EGD) (N/A)  COLONOSCOPY (N/A)    Final Anesthesia Type: MAC    Patient location during evaluation: GI PACU  Patient participation: Yes- Able to Participate  Level of consciousness: awake and alert and oriented  Post-procedure vital signs: reviewed and stable  Pain management: adequate  Airway patency: patent    PONV status at discharge: No PONV  Anesthetic complications: no      Cardiovascular status: hemodynamically stable  Respiratory status: unassisted, spontaneous ventilation and room air  Hydration status: euvolemic  Follow-up not needed.          Vitals Value Taken Time   /78 3/11/2020 11:25 AM   Temp 36.7 °C (98.1 °F) 3/11/2020 11:03 AM   Pulse 71 3/11/2020 11:25 AM   Resp 18 3/11/2020 11:25 AM   SpO2 100 % 3/11/2020 11:25 AM         Event Time     Out of Recovery 11:27:35          Pain/Dea Score: Dea Score: 10 (3/11/2020 11:26 AM)

## 2020-03-11 NOTE — ANESTHESIA PREPROCEDURE EVALUATION
03/11/2020  Kodak Valentine is a 49 y.o., male.    Anesthesia Evaluation    I have reviewed the Patient Summary Reports.    I have reviewed the Nursing Notes.   I have reviewed the Medications.     Review of Systems  Social:  Smoker, Alcohol Use 1 pack per day  2 mixed drinks a day   Hematology/Oncology:  Hematology Normal   Oncology Normal     EENT/Dental:EENT/Dental Normal   Cardiovascular:   Hypertension, well controlled CAD  CABG/stent  hyperlipidemia    Pulmonary:   COPD Sleep Apnea, CPAP    Education provided regarding risk of obstructive sleep apnea     Renal/:   Chronic Renal Disease, CRI    Hepatic/GI:   Bowel Prep. GERD, well controlled    Musculoskeletal:   Arthritis   Spine Disorders: lumbar    Neurological:  Neurology Normal    Endocrine:   Diabetes, well controlled    Dermatological:  Skin Normal    Psych:   anxiety depression          Physical Exam  General:  Well nourished    Airway/Jaw/Neck:  Airway Findings: Mouth Opening: Small, but > 3cm Tongue: Normal  General Airway Assessment: Adult  Mallampati: III  TM Distance: Normal, at least 6 cm  Jaw/Neck Findings:  Neck ROM: Normal ROM      Dental:  Dental Findings: Edentulous   Chest/Lungs:  Chest/Lungs Findings: Clear to auscultation, Normal Respiratory Rate     Heart/Vascular:  Heart Findings: Rate: Normal  Rhythm: Regular Rhythm  Sounds: Normal        Mental Status:  Mental Status Findings:  Cooperative, Alert and Oriented         Anesthesia Plan  Type of Anesthesia, risks & benefits discussed:  Anesthesia Type:  MAC  Patient's Preference:   Intra-op Monitoring Plan: standard ASA monitors  Intra-op Monitoring Plan Comments:   Post Op Pain Control Plan:   Post Op Pain Control Plan Comments:   Induction:   IV  Beta Blocker:  Patient is on a Beta-Blocker and has received one dose within the past 24 hours (No further documentation required).        Informed Consent: Patient understands risks and agrees with Anesthesia plan.  Questions answered. Anesthesia consent signed with patient.  ASA Score: 3     Day of Surgery Review of History & Physical: I have interviewed and examined the patient. I have reviewed the patient's H&P dated:  There are no significant changes.          Ready For Surgery From Anesthesia Perspective.

## 2020-03-11 NOTE — ANESTHESIA RELEASE NOTE
"Anesthesia Release from PACU Note    Patient: Kodak Valentine    Procedure(s) Performed: Procedure(s) (LRB):  ESOPHAGOGASTRODUODENOSCOPY (EGD) (N/A)  COLONOSCOPY (N/A)    Anesthesia type: MAC    Post pain: Adequate analgesia    Post assessment: no apparent anesthetic complications and tolerated procedure well    Last Vitals:   Visit Vitals  /78   Pulse 71   Temp 36.7 °C (98.1 °F) (Temporal)   Resp 18   Ht 5' 10" (1.778 m)   Wt 80.9 kg (178 lb 5.6 oz)   SpO2 100%   BMI 25.59 kg/m²       Post vital signs: stable    Level of consciousness: awake, alert  and oriented    Nausea/Vomiting: no nausea/no vomiting    Complications: none    Airway Patency: patent    Respiratory: unassisted, spontaneous ventilation, room air    Cardiovascular: stable and blood pressure at baseline    Hydration: euvolemic  "

## 2020-03-11 NOTE — DISCHARGE INSTRUCTIONS
Colonoscopy     A camera attached to a flexible tube with a viewing lens is used to take video pictures.     Colonoscopy is a test to view the inside of your lower digestive tract (colon and rectum). Sometimes it can show the last part of the small intestine (ileum). During the test, small pieces of tissue may be removed for testing. This is called a biopsy. Small growths, such as polyps, may also be removed.   Why is colonoscopy done?  The test is done to help look for colon cancer. And it can help find the source of abdominal pain, bleeding, and changes in bowel habits. It may be needed once a year, depending on factors such as your:  · Age  · Health history  · Family health history  · Symptoms  · Results from any prior colonoscopy  Risks and possible complications  These include:  · Bleeding               · A puncture or tear in the colon   · Risks of anesthesia  · A cancer lesion not being seen  Getting ready   To prepare for the test:  · Talk with your healthcare provider about the risks of the test (see below). Also ask your healthcare provider about alternatives to the test.  · Tell your healthcare provider about any medicines you take. Also tell him or her about any health conditions you may have.  · Make sure your rectum and colon are empty for the test. Follow the diet and bowel prep instructions exactly. If you dont, the test may need to be rescheduled.  · Plan for a friend or family member to drive you home after the test.     Colonoscopy provides an inside view of the entire colon.     You may discuss the results with your doctor right away or at a future visit.  During the test   The test is usually done in the hospital on an outpatient basis. This means you go home the same day. The procedure takes about 30 minutes. During that time:  · You are given relaxing (sedating) medicine through an IV line. You may be drowsy, or fully asleep.  · The healthcare provider will first give you a physical exam to  check for anal and rectal problems.  · Then the anus is lubricated and the scope inserted.  · If you are awake, you may have a feeling similar to needing to have a bowel movement. You may also feel pressure as air is pumped into the colon. Its OK to pass gas during the procedure.  · Biopsy, polyp removal, or other treatments may be done during the test.  After the test   You may have gas right after the test. It can help to try to pass it to help prevent later bloating. Your healthcare provider may discuss the results with you right away. Or you may need to schedule a follow-up visit to talk about the results. After the test, you can go back to your normal eating and other activities. You may be tired from the sedation and need to rest for a few hours.  Date Last Reviewed: 11/1/2016 © 2000-2017 [employer]. [address] [place] [address]. All rights reserved. This information is not intended as a substitute for professional medical care. Always follow your healthcare professional's instructions.        Upper GI Endoscopy     During endoscopy, a long, flexible tube is used to view the inside of your upper GI tract.      Upper GI endoscopy allows your healthcare provider to look directly into the beginning of your gastrointestinal (GI) tract. The esophagus, stomach, and duodenum (the first part of the small intestine) make up the upper GI tract.   Before the exam  Follow these and any other instructions you are given before your endoscopy. If you dont follow the healthcare providers instructions carefully, the test may need to be canceled or done over:  · Don't eat or drink anything after midnight the night before your exam. If your exam is in the afternoon, drink only clear liquids in the morning. Don't eat or drink anything for 8 hours before the exam. In some cases, you may be able to take medicines with sips of water until 2 hours before the procedure. Speak with your healthcare  provider about this.   · Bring your X-rays and any other test results you have.  · Because you will be sedated, arrange for an adult to drive you home after the exam.  · Tell your healthcare provider before the exam if you are taking any medicines or have any medical problems.  The procedure  Here is what to expect:  · You will lie on the endoscopy table. Usually patients lie on the left side.  · You will be monitored and given oxygen.  · Your throat may be numbed with a spray or gargle. You are given medicine through an intravenous (IV) line that will help you relax and remain comfortable. You may be awake or asleep during the procedure.  · The healthcare provider will put the endoscope in your mouth and down your esophagus. It is thinner than most pieces of food that you swallow. It will not affect your breathing. The medicine helps keep you from gagging.  · Air is put into your GI tract to expand it. It can make you burp.  · During the procedure, the healthcare provider can take biopsies (tissue samples), remove abnormalities, such as polyps, or treat abnormalities through a variety of devices placed through the endoscope. You will not feel this.   · The endoscope carries images of your upper GI tract to a video screen. If you are awake, you may be able to look at the images.  · After the procedure is done, you will rest for a time. An adult must drive you home.  When to call your healthcare provider  Contact your healthcare provider if you have:  · Black or tarry stools, or blood in your stool  · Fever  · Pain in your belly that does not go away  · Nausea and vomiting, or vomiting blood   Date Last Reviewed: 7/1/2016  © 5514-6917 [employer]. [address] [place] [address]. All rights reserved. This information is not intended as a substitute for professional medical care. Always follow your healthcare professional's instructions.        Diverticulosis    Diverticulosis means that small  pouches have formed in the wall of your large intestine (colon). Most often, this problem causes no symptoms and is common as people age. But the pouches in the colon are at risk of becoming infected. When this happens, the condition is called diverticulitis. Although most people with diverticulosis never develop diverticulitis, it is still not uncommon. Rectal bleeding can also occur and in less common situations, a type of colon inflammation called colitis.  While most people do not have symptoms, some people with diverticulosis may have:  · Abdominal cramps and pain  · Bloating  · Constipation  · Change in bowel habits  Causes  The exact cause of diverticulosis (and diverticulitis) has not been proved, but a few things are associated with the condition:  · Low-fiber diet  · Constipation  · Lack of exercise  Your healthcare provider will talk with you about how to manage your condition. Diet changes may be all that are needed to help control diverticulosis and prevent progression to diverticulitis. If you develop diverticulitis, you will likely need other treatments.  Home care  You may be told to take fiber supplements daily. Fiber adds bulk to the stool so that it passes through the colon more easily. Stool softeners may be recommended. You may also be given medications for pain relief. Be sure to take all medications as directed.  In the past, people were told to avoid corn, nuts, and seeds. This is no longer necessary.  Follow these guidelines when caring for yourself at home:  · Eat unprocessed foods that are high in fiber. Whole grains, fruits, and vegetables are good choices.  · Drink 6 to 8 glasses of water every day unless your healthcare provider has you limit how much fluid you should have.  · Watch for changes in your bowel movements. Tell your provider if you notice any changes.  · Begin an exercise program. Ask your provider how to get started. Generally, walking is the best.  · Get plenty of rest  and sleep.  Follow-up care  Follow up with your healthcare provider, or as advised. Regular visits may be needed to check on your health. Sometimes special procedures such as colonoscopy, are needed after an episode of diverticulitis or blooding. Be sure to keep all your appointments.  If a stool sample was taken, or cultures were done, you should be told if they are positive, or if your treatment needs to be changed. You can call as directed for the results.  If X-rays were done, a radiologist will look at them. You will be told if there is a change in your treatment.  If antibiotics were prescribed, be sure to finish them all.  When to seek medical advice  Call your healthcare provider right away if any of these occur:  · Fever of 100.4°F (38°C) or higher, or as directed by your healthcare provider  · Severe cramps in the lower left side of the abdomen or pain that is getting worse  · Tenderness in the lower left side of the abdomen or worsening pain throughout the abdomen  · Diarrhea or constipation that doesn't get better within 24 hours  · Nausea and vomiting  · Bleeding from the rectum  Call 911  Call emergency services if any of the following occur:  · Trouble breathing  · Confusion  · Very drowsy or trouble awakening  · Fainting or loss of consciousness  · Rapid heart rate  · Chest pain  Date Last Reviewed: 12/30/2015 © 2000-2017 [employer]. [address] [place] [address], [place] [address]. All rights reserved. This information is not intended as a substitute for professional medical care. Always follow your healthcare professional's instructions.        Gastritis (Adult)    Gastritis is inflammation and irritation of the stomach lining. It can be present for a short time (acute) or be long lasting (chronic). Gastritis is often caused by infection with bacteria called H pylori. More than a third of people in the [place] have this bacteria in their bodies. In many cases, H pylori causes no problems or  symptoms. In some people, though, the infection irritates the stomach lining and causes gastritis. Other causes of stomach irritation include drinking alcohol or taking pain-relieving medicines called NSAIDs (such as aspirin or ibuprofen).   Symptoms of gastritis can include:  · Abdominal pain or bloating  · Loss of appetite  · Nausea or vomiting  · Vomiting blood or having black stools  · Feeling more tired than usual  An inflamed and irritated stomach lining is more likely to develop a sore called an ulcer. To help prevent this, gastritis should be treated.  Home care  If needed, medicines may be prescribed. If you have H pylori infection, treating it will likely relieve your symptoms. Other changes can help reduce stomach irritation and help it heal.  · If you have been prescribed medicines for H pylori infection, take them as directed. Take all of the medicine until it is finished or your healthcare provider tells you to stop, even if you feel better.  · Your healthcare provider may recommend avoiding NSAIDs. If you take daily aspirin for your heart or other medical reasons, do not stop without talking to your healthcare provider first.  · Avoid drinking alcohol.  · Stop smoking. Smoking can irritate the stomach and delay healing. As much as possible, stay away from second hand smoke.  Follow-up care  Follow up with your healthcare provider, or as advised by our staff. Testing may be needed to check for inflammation or an ulcer.  When to seek medical advice  Call your healthcare provider for any of the following:  · Stomach pain that gets worse or moves to the lower right abdomen (appendix area)  · Chest pain that appears or gets worse, or spreads to the back, neck, shoulder, or arm  · Frequent vomiting (cant keep down liquids)  · Blood in the stool or vomit (red or black in color)  · Feeling weak or dizzy  · Fever of 100.4ºF (38ºC) or higher, or as directed by your healthcare provider  Date Last Reviewed:  6/22/2015  © 7111-0027 [employer], [facility]. [address] [place] [address], [place] [address]. All rights reserved. This information is not intended as a substitute for professional medical care. Always follow your healthcare professional's instructions.

## 2020-03-11 NOTE — [employer] PATIENT INSTRUCTIONS
Discharge Summary/Instructions after an Endoscopic Procedure  Patient Name: Kodak Valentine  Patient MRN: [id number]  Patient YOB: 1970 Wednesday, March 11, 2020 Kanwal Vasquez MD  RESTRICTIONS:  During your procedure today, you received medications for sedation.  These   medications may affect your judgment, balance and coordination.  Therefore,   for 24 hours, you have the following restrictions:   - DO NOT drive a car, operate machinery, make legal/financial decisions,   sign important papers or drink alcohol.    ACTIVITY:  Today: no heavy lifting, straining or running due to procedural   sedation/anesthesia.  The following day: return to full activity including work.  DIET:  Eat and drink normally unless instructed otherwise.     TREATMENT FOR COMMON SIDE EFFECTS:  - Mild abdominal pain, nausea, belching, bloating or excessive gas:  rest,   eat lightly and use a heating pad.  - Sore Throat: treat with throat lozenges and/or gargle with warm salt   water.  - Because air was used during the procedure, expelling large amounts of air   from your rectum or belching is normal.  - If a bowel prep was taken, you may not have a bowel movement for 1-3 days.    This is normal.  SYMPTOMS TO WATCH FOR AND REPORT TO YOUR PHYSICIAN:  1. Abdominal pain or bloating, other than gas cramps.  2. Chest pain.  3. Back pain.  4. Signs of infection such as: chills or fever occurring within 24 hours   after the procedure.  5. Rectal bleeding, which would show as bright red, maroon, or black stools.   (A tablespoon of blood from the rectum is not serious, especially if   hemorrhoids are present.)  6. Vomiting.  7. Weakness or dizziness.  GO DIRECTLY TO THE NEAREST EMERGENCY ROOM IF YOU HAVE ANY OF THE FOLLOWING:      Difficulty breathing              Chills and/or fever over 101 F   Persistent vomiting and/or vomiting blood   Severe abdominal pain   Severe chest pain   Black, tarry stools   Bleeding- more than one  tablespoon   Any other symptom or condition that you feel may need urgent attention  Your doctor recommends these additional instructions:  If any biopsies were taken, your doctors clinic will contact you in 1 to 2   weeks with any results.  - Discharge patient to home (via wheelchair).   - Resume previous diet.   - Continue present medications.   - Await pathology results.   - Telephone GI clinic for pathology results in 2 weeks.   - Patient has a contact number available for emergencies.  The signs and   symptoms of potential delayed complications were discussed with the   patient.  Return to normal activities tomorrow.  Written discharge   instructions were provided to the patient.  For questions, problems or results please call your physician Kanwal Vasquez MD at Work:  [phone]  If you have any questions about the above instructions, call the GI   department at [phone] or call the endoscopy unit at [phone]   from 7am until 3 pm.  [facility] [place], EMERGENCY ROOM PHONE NUMBER:   [phone]  IF A COMPLICATION OR EMERGENCY SITUATION ARISES AND YOU ARE UNABLE TO REACH   YOUR PHYSICIAN - GO DIRECTLY TO THE EMERGENCY ROOM.  I have read or have had read to me these discharge instructions for my   procedure and have received a written copy.  I understand these   instructions and will follow-up with my physician if I have any questions.     __________________________________       _____________________________________  Nurse Signature                                          Patient/Designated   Responsible Party Signature  MD Kanwal Arteaga MD  3/11/2020 11:14:10 AM  [employer]

## 2020-03-23 LAB
FINAL PATHOLOGIC DIAGNOSIS: NORMAL
GROSS: NORMAL

## 2020-04-25 DIAGNOSIS — E78.2 MIXED HYPERLIPIDEMIA: ICD-10-CM

## 2020-04-25 RX ORDER — FENOFIBRATE 160 MG/1
TABLET ORAL
Qty: 30 TABLET | Refills: 6 | Status: SHIPPED | OUTPATIENT
Start: 2020-04-25 | End: 2020-12-26

## 2020-05-07 ENCOUNTER — TELEPHONE (OUTPATIENT)
Dept: PULMONOLOGY | Facility: CLINIC | Age: 50
End: 2020-05-07

## 2020-05-07 NOTE — TELEPHONE ENCOUNTER
Tried calling pt to inform him that upcoming appt has been rescheduled due to provider not being in clinic. No answer and vm has not been set up yet. Appt letter mailed.

## 2020-05-19 ENCOUNTER — TELEPHONE (OUTPATIENT)
Dept: FAMILY MEDICINE | Facility: CLINIC | Age: 50
End: 2020-05-19

## 2020-05-19 NOTE — TELEPHONE ENCOUNTER
----- Message from Mary Rainey sent at 5/19/2020 10:22 AM CDT -----  Contact: [phone]    .Type:  Sooner Apoointment Request    Caller is requesting a sooner appointment.  Caller declined first available appointment listed below.  Caller will not accept being placed on the waitlist and is requesting a message be sent to doctor.  Name of Caller:Kodak Valentine    When is the first available appointment?7/6/20  Symptoms: Swelling of feet   Would the patient rather a call back or a response via [facility]? Call back   Best Call Back Number:.[phone] (home)   Additional Information:  If possible pt would like to come in on 5/27/20.       Thank You,   Mary Rainey

## 2020-05-19 NOTE — TELEPHONE ENCOUNTER
S/w pt's wife.  Scheduled appt for ble edema as requested.  Pt does not have a ride until next week./rpr

## 2020-06-11 ENCOUNTER — PATIENT OUTREACH (OUTPATIENT)
Dept: ADMINISTRATIVE | Facility: OTHER | Age: 50
End: 2020-06-11

## 2020-06-11 DIAGNOSIS — N18.2 CONTROLLED TYPE 2 DIABETES MELLITUS WITH STAGE 2 CHRONIC KIDNEY DISEASE, WITHOUT LONG-TERM CURRENT USE OF INSULIN: Primary | ICD-10-CM

## 2020-06-11 DIAGNOSIS — E11.22 CONTROLLED TYPE 2 DIABETES MELLITUS WITH STAGE 2 CHRONIC KIDNEY DISEASE, WITHOUT LONG-TERM CURRENT USE OF INSULIN: Primary | ICD-10-CM

## 2020-06-11 NOTE — PROGRESS NOTES
Chart reviewed.   Immunizations: Triggered Imm Registry     Orders placed: A1c and eye exam   Upcoming appts to satisfy ROSANA topics: n/a

## 2020-06-12 ENCOUNTER — OFFICE VISIT (OUTPATIENT)
Dept: PULMONOLOGY | Facility: CLINIC | Age: 50
End: 2020-06-12
Payer: COMMERCIAL

## 2020-06-12 VITALS
WEIGHT: 154.75 LBS | HEIGHT: 70 IN | BODY MASS INDEX: 22.15 KG/M2 | DIASTOLIC BLOOD PRESSURE: 86 MMHG | OXYGEN SATURATION: 100 % | HEART RATE: 107 BPM | SYSTOLIC BLOOD PRESSURE: 134 MMHG | RESPIRATION RATE: 16 BRPM

## 2020-06-12 DIAGNOSIS — J44.9 COPD, SEVERE: Primary | ICD-10-CM

## 2020-06-12 DIAGNOSIS — R06.02 SOB (SHORTNESS OF BREATH): Primary | ICD-10-CM

## 2020-06-12 DIAGNOSIS — N18.2 CONTROLLED TYPE 2 DIABETES MELLITUS WITH STAGE 2 CHRONIC KIDNEY DISEASE, WITHOUT LONG-TERM CURRENT USE OF INSULIN: ICD-10-CM

## 2020-06-12 DIAGNOSIS — F17.201 TOBACCO DEPENDENCE IN REMISSION: ICD-10-CM

## 2020-06-12 DIAGNOSIS — I15.2 HYPERTENSION ASSOCIATED WITH DIABETES: ICD-10-CM

## 2020-06-12 DIAGNOSIS — J44.9 COPD, SEVERITY TO BE DETERMINED: ICD-10-CM

## 2020-06-12 DIAGNOSIS — R63.4 UNINTENDED WEIGHT LOSS: ICD-10-CM

## 2020-06-12 DIAGNOSIS — J44.89 NOCTURNAL HYPOXEMIA DUE TO OBSTRUCTIVE CHRONIC BRONCHITIS: Chronic | ICD-10-CM

## 2020-06-12 DIAGNOSIS — G47.33 OSA TREATED WITH BIPAP: Chronic | ICD-10-CM

## 2020-06-12 DIAGNOSIS — E78.5 HYPERLIPIDEMIA ASSOCIATED WITH TYPE 2 DIABETES MELLITUS: ICD-10-CM

## 2020-06-12 DIAGNOSIS — J44.9 MODERATE COPD (CHRONIC OBSTRUCTIVE PULMONARY DISEASE): ICD-10-CM

## 2020-06-12 DIAGNOSIS — E11.69 HYPERLIPIDEMIA ASSOCIATED WITH TYPE 2 DIABETES MELLITUS: ICD-10-CM

## 2020-06-12 DIAGNOSIS — G47.36 NOCTURNAL HYPOXEMIA DUE TO OBSTRUCTIVE CHRONIC BRONCHITIS: Chronic | ICD-10-CM

## 2020-06-12 DIAGNOSIS — I25.10 CORONARY ARTERY DISEASE WITHOUT ANGINA PECTORIS, UNSPECIFIED VESSEL OR LESION TYPE, UNSPECIFIED WHETHER NATIVE OR TRANSPLANTED HEART: ICD-10-CM

## 2020-06-12 DIAGNOSIS — K52.9 ACUTE COLITIS: ICD-10-CM

## 2020-06-12 DIAGNOSIS — E11.22 CONTROLLED TYPE 2 DIABETES MELLITUS WITH STAGE 2 CHRONIC KIDNEY DISEASE, WITHOUT LONG-TERM CURRENT USE OF INSULIN: ICD-10-CM

## 2020-06-12 DIAGNOSIS — E11.59 HYPERTENSION ASSOCIATED WITH DIABETES: ICD-10-CM

## 2020-06-12 PROCEDURE — 3008F PR BODY MASS INDEX (BMI) DOCUMENTED: ICD-10-PCS | Mod: CPTII,S$GLB,, | Performed by: INTERNAL MEDICINE

## 2020-06-12 PROCEDURE — 3044F HG A1C LEVEL LT 7.0%: CPT | Mod: CPTII,S$GLB,, | Performed by: INTERNAL MEDICINE

## 2020-06-12 PROCEDURE — 99999 PR PBB SHADOW E&M-EST. PATIENT-LVL V: CPT | Mod: PBBFAC,,, | Performed by: INTERNAL MEDICINE

## 2020-06-12 PROCEDURE — 3079F DIAST BP 80-89 MM HG: CPT | Mod: CPTII,S$GLB,, | Performed by: INTERNAL MEDICINE

## 2020-06-12 PROCEDURE — 99999 PR PBB SHADOW E&M-EST. PATIENT-LVL V: ICD-10-PCS | Mod: PBBFAC,,, | Performed by: INTERNAL MEDICINE

## 2020-06-12 PROCEDURE — 3075F SYST BP GE 130 - 139MM HG: CPT | Mod: CPTII,S$GLB,, | Performed by: INTERNAL MEDICINE

## 2020-06-12 PROCEDURE — 3008F BODY MASS INDEX DOCD: CPT | Mod: CPTII,S$GLB,, | Performed by: INTERNAL MEDICINE

## 2020-06-12 PROCEDURE — 3079F PR MOST RECENT DIASTOLIC BLOOD PRESSURE 80-89 MM HG: ICD-10-PCS | Mod: CPTII,S$GLB,, | Performed by: INTERNAL MEDICINE

## 2020-06-12 PROCEDURE — 3044F PR MOST RECENT HEMOGLOBIN A1C LEVEL <7.0%: ICD-10-PCS | Mod: CPTII,S$GLB,, | Performed by: INTERNAL MEDICINE

## 2020-06-12 PROCEDURE — 3075F PR MOST RECENT SYSTOLIC BLOOD PRESS GE 130-139MM HG: ICD-10-PCS | Mod: CPTII,S$GLB,, | Performed by: INTERNAL MEDICINE

## 2020-06-12 PROCEDURE — 99214 OFFICE O/P EST MOD 30 MIN: CPT | Mod: S$GLB,,, | Performed by: INTERNAL MEDICINE

## 2020-06-12 PROCEDURE — 99214 PR OFFICE/OUTPT VISIT, EST, LEVL IV, 30-39 MIN: ICD-10-PCS | Mod: S$GLB,,, | Performed by: INTERNAL MEDICINE

## 2020-06-12 RX ORDER — ALBUTEROL SULFATE 90 UG/1
2 AEROSOL, METERED RESPIRATORY (INHALATION) EVERY 4 HOURS PRN
Qty: 18 G | Refills: 3 | Status: SHIPPED | OUTPATIENT
Start: 2020-06-12 | End: 2021-02-09 | Stop reason: SDUPTHER

## 2020-06-12 RX ORDER — FLUTICASONE FUROATE AND VILANTEROL 200; 25 UG/1; UG/1
POWDER RESPIRATORY (INHALATION)
Qty: 60 EACH | Refills: 11 | Status: SHIPPED | OUTPATIENT
Start: 2020-06-12 | End: 2021-06-24

## 2020-06-12 NOTE — ASSESSMENT & PLAN NOTE
BIPAP 18/14 with Oxygen bled  Worden score 8  Need new supplies  Usage > 46.7%    Discussed therapeutic goals for positive airway pressure therapy(CPAP or BiPAP):   Ideal is usage 100% of nights for 6 - 8 hours per night. Minimum usage is 70% of night for at least 4 hours per night used. Pateint expressed understanding. All Questions answered.

## 2020-06-12 NOTE — ASSESSMENT & PLAN NOTE
Lost weight from 233Lb to 154 Lb  Recent EGD and coloscopy  ? Colitis  Need to see GI  Message sent

## 2020-06-18 ENCOUNTER — TELEPHONE (OUTPATIENT)
Dept: GASTROENTEROLOGY | Facility: CLINIC | Age: 50
End: 2020-06-18

## 2020-06-23 ENCOUNTER — PATIENT OUTREACH (OUTPATIENT)
Dept: ADMINISTRATIVE | Facility: OTHER | Age: 50
End: 2020-06-23

## 2020-06-25 ENCOUNTER — OFFICE VISIT (OUTPATIENT)
Dept: GASTROENTEROLOGY | Facility: CLINIC | Age: 50
End: 2020-06-25
Payer: COMMERCIAL

## 2020-06-25 ENCOUNTER — LAB VISIT (OUTPATIENT)
Dept: LAB | Facility: HOSPITAL | Age: 50
End: 2020-06-25
Attending: NURSE PRACTITIONER
Payer: COMMERCIAL

## 2020-06-25 VITALS
BODY MASS INDEX: 22.5 KG/M2 | HEIGHT: 70 IN | DIASTOLIC BLOOD PRESSURE: 85 MMHG | WEIGHT: 157.19 LBS | HEART RATE: 104 BPM | SYSTOLIC BLOOD PRESSURE: 140 MMHG

## 2020-06-25 DIAGNOSIS — R93.3 ABNORMAL COLONOSCOPY: ICD-10-CM

## 2020-06-25 DIAGNOSIS — K52.9 CHRONIC DIARRHEA: Primary | ICD-10-CM

## 2020-06-25 DIAGNOSIS — K52.9 ACUTE COLITIS: ICD-10-CM

## 2020-06-25 DIAGNOSIS — R63.4 WEIGHT LOSS: ICD-10-CM

## 2020-06-25 LAB
ALBUMIN SERPL BCP-MCNC: 2.6 G/DL (ref 3.5–5.2)
ALP SERPL-CCNC: 89 U/L (ref 55–135)
ALT SERPL W/O P-5'-P-CCNC: 18 U/L (ref 10–44)
ANION GAP SERPL CALC-SCNC: 7 MMOL/L (ref 8–16)
AST SERPL-CCNC: 25 U/L (ref 10–40)
BASOPHILS # BLD AUTO: 0.03 K/UL (ref 0–0.2)
BASOPHILS NFR BLD: 0.6 % (ref 0–1.9)
BILIRUB SERPL-MCNC: 0.5 MG/DL (ref 0.1–1)
BUN SERPL-MCNC: 4 MG/DL (ref 6–20)
CALCIUM SERPL-MCNC: 8.7 MG/DL (ref 8.7–10.5)
CHLORIDE SERPL-SCNC: 103 MMOL/L (ref 95–110)
CO2 SERPL-SCNC: 28 MMOL/L (ref 23–29)
CREAT SERPL-MCNC: 0.8 MG/DL (ref 0.5–1.4)
CRP SERPL-MCNC: 1.7 MG/L (ref 0–8.2)
DIFFERENTIAL METHOD: ABNORMAL
EOSINOPHIL # BLD AUTO: 0 K/UL (ref 0–0.5)
EOSINOPHIL NFR BLD: 0.6 % (ref 0–8)
ERYTHROCYTE [DISTWIDTH] IN BLOOD BY AUTOMATED COUNT: 13 % (ref 11.5–14.5)
ERYTHROCYTE [SEDIMENTATION RATE] IN BLOOD BY WESTERGREN METHOD: 3 MM/HR (ref 0–23)
EST. GFR  (AFRICAN AMERICAN): >60 ML/MIN/1.73 M^2
EST. GFR  (NON AFRICAN AMERICAN): >60 ML/MIN/1.73 M^2
GLUCOSE SERPL-MCNC: 107 MG/DL (ref 70–110)
HCT VFR BLD AUTO: 30.5 % (ref 40–54)
HGB BLD-MCNC: 10.4 G/DL (ref 14–18)
IMM GRANULOCYTES # BLD AUTO: 0.03 K/UL (ref 0–0.04)
IMM GRANULOCYTES NFR BLD AUTO: 0.6 % (ref 0–0.5)
LYMPHOCYTES # BLD AUTO: 1.1 K/UL (ref 1–4.8)
LYMPHOCYTES NFR BLD: 21.2 % (ref 18–48)
MCH RBC QN AUTO: 40.3 PG (ref 27–31)
MCHC RBC AUTO-ENTMCNC: 34.1 G/DL (ref 32–36)
MCV RBC AUTO: 118 FL (ref 82–98)
MONOCYTES # BLD AUTO: 0.4 K/UL (ref 0.3–1)
MONOCYTES NFR BLD: 8.1 % (ref 4–15)
NEUTROPHILS # BLD AUTO: 3.5 K/UL (ref 1.8–7.7)
NEUTROPHILS NFR BLD: 68.9 % (ref 38–73)
NRBC BLD-RTO: 0 /100 WBC
PLATELET # BLD AUTO: 184 K/UL (ref 150–350)
PMV BLD AUTO: 9.3 FL (ref 9.2–12.9)
POTASSIUM SERPL-SCNC: 4.5 MMOL/L (ref 3.5–5.1)
PROT SERPL-MCNC: 5.3 G/DL (ref 6–8.4)
RBC # BLD AUTO: 2.58 M/UL (ref 4.6–6.2)
SODIUM SERPL-SCNC: 138 MMOL/L (ref 136–145)
WBC # BLD AUTO: 5.04 K/UL (ref 3.9–12.7)

## 2020-06-25 PROCEDURE — 86140 C-REACTIVE PROTEIN: CPT

## 2020-06-25 PROCEDURE — 3077F PR MOST RECENT SYSTOLIC BLOOD PRESSURE >= 140 MM HG: ICD-10-PCS | Mod: CPTII,S$GLB,, | Performed by: NURSE PRACTITIONER

## 2020-06-25 PROCEDURE — 3008F BODY MASS INDEX DOCD: CPT | Mod: CPTII,S$GLB,, | Performed by: NURSE PRACTITIONER

## 2020-06-25 PROCEDURE — 3079F DIAST BP 80-89 MM HG: CPT | Mod: CPTII,S$GLB,, | Performed by: NURSE PRACTITIONER

## 2020-06-25 PROCEDURE — 99999 PR PBB SHADOW E&M-EST. PATIENT-LVL IV: ICD-10-PCS | Mod: PBBFAC,,, | Performed by: NURSE PRACTITIONER

## 2020-06-25 PROCEDURE — 3008F PR BODY MASS INDEX (BMI) DOCUMENTED: ICD-10-PCS | Mod: CPTII,S$GLB,, | Performed by: NURSE PRACTITIONER

## 2020-06-25 PROCEDURE — 36415 COLL VENOUS BLD VENIPUNCTURE: CPT

## 2020-06-25 PROCEDURE — 3079F PR MOST RECENT DIASTOLIC BLOOD PRESSURE 80-89 MM HG: ICD-10-PCS | Mod: CPTII,S$GLB,, | Performed by: NURSE PRACTITIONER

## 2020-06-25 PROCEDURE — 3077F SYST BP >= 140 MM HG: CPT | Mod: CPTII,S$GLB,, | Performed by: NURSE PRACTITIONER

## 2020-06-25 PROCEDURE — 85652 RBC SED RATE AUTOMATED: CPT

## 2020-06-25 PROCEDURE — 99999 PR PBB SHADOW E&M-EST. PATIENT-LVL IV: CPT | Mod: PBBFAC,,, | Performed by: NURSE PRACTITIONER

## 2020-06-25 PROCEDURE — 80053 COMPREHEN METABOLIC PANEL: CPT

## 2020-06-25 PROCEDURE — 85025 COMPLETE CBC W/AUTO DIFF WBC: CPT

## 2020-06-25 PROCEDURE — 99214 PR OFFICE/OUTPT VISIT, EST, LEVL IV, 30-39 MIN: ICD-10-PCS | Mod: S$GLB,,, | Performed by: NURSE PRACTITIONER

## 2020-06-25 PROCEDURE — 99214 OFFICE O/P EST MOD 30 MIN: CPT | Mod: S$GLB,,, | Performed by: NURSE PRACTITIONER

## 2020-06-25 NOTE — PROGRESS NOTES
"  Clinic Follow Up:  [facility] Follow Up Note    Reason for Follow Up:  The primary encounter diagnosis was Chronic diarrhea. Diagnoses of Abnormal colonoscopy and Weight loss were also pertinent to this visit.    PCP: Eliza Huddleston       HPI:  This is a 50 y.o. male here for follow up of the above. He was previously seen by myself for findings of acute colitis in September 2019. He was placed on Cipro and Flagyl for empiric treatment of infectious colitis. He has continued diarrhea since then. He reports about 3-4 watery stools per day. He had once single episode of rectal bleeding but nothing recently. Associated symptoms include abdominal pain. He also has been having significant weight loss over the past couple years. He has a documented weight loss of 78 lbs.     He had an EGD colonoscopy in March 2020.  EGD: erythema in antrum, small hiatal hernia.  Colonoscopy: diffuse mild inflammation found in the sigmoid and descending colon. One polyp. Internal hemorrhoids. Pathology of colonoscopy showed "colonic mucosa with mild architectural distortion and hyalinization of the lamina propria. Differential diagnosis for these hlstologic features includes ischemia, enema effect, solitary rectal ulcer/mucosa! prolapse, radiation colitis, and chronic IDB (although this Is histologically unlikely).    He has risk factors for ischemic colitis with age and smoking history.     Review of Systems   Constitutional: Positive for unexpected weight change. Negative for activity change and appetite change.        As per interval history above   Respiratory: Negative for cough and shortness of breath.    Cardiovascular: Negative for chest pain.   Gastrointestinal: Positive for abdominal pain and diarrhea. Negative for blood in stool, constipation, nausea and vomiting.   Skin: Negative for color change and rash.       Medical History:  Past Medical History:   Diagnosis Date    Anxiety     CAD (coronary " artery disease)     PTCA x 2 LAD, restenosis and restent .    Chest pain syndrome 10/2/2015    COPD (chronic obstructive pulmonary disease)     CPAP (continuous positive airway pressure) dependence     @ night    Diabetes mellitus type 2, uncontrolled 2016    History of duodenal ulcer     with bleed    Hypertension     Mixed hyperlipidemia     JUDI (obstructive sleep apnea)     severe    S/P PTCA (percutaneous transluminal coronary angioplasty) 3/11/2015    Thrush 2019    Vitamin D deficiency        Surgical History:   Past Surgical History:   Procedure Laterality Date    APPENDECTOMY      BACK SURGERY  2019    [place]     CARDIAC CATHETERIZATION      CATARACT EXTRACTION W/  INTRAOCULAR LENS IMPLANT Right 4-22-15    COLONOSCOPY N/A 3/11/2020    Procedure: COLONOSCOPY;  Surgeon: Kanwal Vasquez MD;  Location: [facility] ENDO;  Service: Endoscopy;  Laterality: N/A;    CORONARY STENT PLACEMENT      ESOPHAGOGASTRODUODENOSCOPY N/A 3/11/2020    Procedure: ESOPHAGOGASTRODUODENOSCOPY (EGD);  Surgeon: Kanwal Vasquez MD;  Location: [facility];  Service: Endoscopy;  Laterality: N/A;    HEMORRHOID SURGERY      NISSEN FUNDOPLICATION      lap    SKIN LESION EXCISION Right     leg       Family History:   Family History   Problem Relation Age of Onset    Heart disease Mother     Heart block Father     Heart disease Sister     COPD Maternal Grandmother     Diabetes Maternal Grandfather     COPD Maternal Grandfather        Social History:   Social History     Tobacco Use    Smoking status: Current Every Day Smoker     Packs/day: 1.00     Years: 20.00     Pack years: 20.00     Types: Cigarettes     Last attempt to quit: 6/3/2014     Years since quittin.0    Smokeless tobacco: Never Used    Tobacco comment: currently vaping with nicotine since quit smoking in 2014   Substance Use Topics    Alcohol use: Yes     Alcohol/week: 6.0 standard drinks     Types: 4 Cans of beer, 2 Shots  of liquor per week     Comment: daily    Drug use: No       Allergies: Review of patient's allergies indicates:  No Known Allergies    Home Medications:  Current Outpatient Medications on File Prior to Visit   Medication Sig Dispense Refill    albuterol (PROAIR HFA) 90 mcg/actuation inhaler Inhale 2 puffs into the lungs every 4 (four) hours as needed. Rescue 18 g 3    ALPRAZolam (XANAX) 0.25 MG tablet TAKE ONE TABLET BY MOUTH 3 TIMES DAILY AS NEEDED FOR ANXIETY 90 tablet 1    amLODIPine (NORVASC) 2.5 MG tablet TAKE 1 TABLET BY MOUTH ONCE A DAY 30 tablet 6    atorvastatin (LIPITOR) 20 MG tablet TAKE 1 TABLET BY MOUTH ONCE A DAY IN THE EVENING FOR CHOLESTEROL 30 tablet 11    cholecalciferol, vitamin D3, (VITAMIN D3) 2,000 unit Cap Take 1 capsule (2,000 Units total) by mouth once daily. 100 capsule 6    cyanocobalamin, vitamin B-12, 1,000 mcg Subl Place 1,000 mcg under the tongue once daily. 90 tablet 3    DULoxetine (CYMBALTA) 60 MG capsule TAKE 1 CAPSULE BY MOUTH ONCE A DAY 30 capsule 11    fenofibrate 160 MG Tab TAKE 1 TABLET BY MOUTH ONCE A DAY 30 tablet 6    fluticasone furoate-vilanteroL (BREO ELLIPTA) 200-25 mcg/dose DsDv diskus inhaler inhale 1 puff into the lungs once daily 60 each 11    folic acid (FOLVITE) 1 MG tablet TAKE 1 TABLET BY MOUTH ONCE A DAY 90 tablet 3    lisinopril (PRINIVIL,ZESTRIL) 20 MG tablet Take 0.5 tablets (10 mg total) by mouth once daily. 30 tablet 11    metoprolol tartrate (LOPRESSOR) 50 MG tablet Take 0.5 tablets (25 mg total) by mouth every 12 (twelve) hours. 60 tablet 11    oxyCODONE-acetaminophen (PERCOCET)  mg per tablet Take 1 tablet by mouth 4 (four) times daily as needed.  0    pantoprazole (PROTONIX) 40 MG tablet TAKE 1 TABLET BY MOUTH TWICE A DAY 60 tablet 11    predniSONE (DELTASONE) 20 MG tablet TAKE 3 TABLETS FOR 3 DAYS; THEN 2 FOR 3 DAYS; THEN 1 FOR 3 DAYS; THEN 1/2 FOR 3 DAYS 26 tablet 0    ranolazine (RANEXA) 1,000 mg Tb12 TAKE ONE TABLET BY MOUTH  "TWICE DAILY 180 tablet 3    sildenafil (VIAGRA) 100 MG tablet TAKE AS DIRECTED 30 tablet 6    tiotropium bromide (SPIRIVA RESPIMAT) 2.5 mcg/actuation Mist INHALE 2 PUFFS INTO THE LUNGS ONCE DAILY 4 g 11    traZODone (DESYREL) 100 MG tablet TAKE 1 OR 2 TABLETS BY MOUTH NIGHTLY AS NEEDED FOR INSOMNIA 60 tablet 11    aspirin (ECOTRIN) 81 MG EC tablet Take 1 tablet (81 mg total) by mouth once daily.  0    [DISCONTINUED] folic acid (FOLVITE) 1 MG tablet Take 1 tablet (1 mg total) by mouth once daily. 90 tablet 3     No current facility-administered medications on file prior to visit.        BP (!) 140/85   Pulse 104   Ht 5' 10" (1.778 m)   Wt 71.3 kg (157 lb 3 oz)   BMI 22.55 kg/m²   Body mass index is 22.55 kg/m².  Physical Exam   Constitutional: He is oriented to person, place, and time and well-developed, well-nourished, and in no distress. No distress.   HENT:   Head: Normocephalic.   Eyes: Pupils are equal, round, and reactive to light. Conjunctivae are normal.   Cardiovascular: Normal rate, regular rhythm and normal heart sounds.   Pulmonary/Chest: Effort normal and breath sounds normal. No respiratory distress.   Abdominal: Soft. Bowel sounds are normal. He exhibits no distension. There is no abdominal tenderness.   Neurological: He is alert and oriented to person, place, and time. No cranial nerve deficit.   Skin: Skin is warm and dry. No rash noted.   Psychiatric: Mood and affect normal.       Labs: Pertinent labs reviewed.  CRC Screening: up to date     Assessment:   1. Chronic diarrhea    2. Abnormal colonoscopy    3. Weight loss      - unclear etiology of diarrhea and erythema of sigmoid and descending colon.   - he is of increased risk for ischemic colitis given age and smoking status  - pathology shows IBD less likely cause  - likely not prolapse based on area of involvement    Recommendations:   - needs further workup to help identify cause of inflammation  - check inflammatory markers and " calprotectin. Consider IBD serology-- to rule out IBD etiology.   - consider CTA abdomen/pelvis to assess for vascular compromise that would indicate an ischemic etiology   - discussed colonoscopy and pathology results with endoscopist after patient left office  - She agrees with workup and will also check CTA with also consideration of IBD serology panel.     Chronic diarrhea  -     CBC auto differential; Future; Expected date: 06/25/2020  -     Comprehensive metabolic panel; Future; Expected date: 06/25/2020  -     ESR (SEDIMENTATION RATE, MANUAL); Future; Expected date: 06/25/2020  -     C-reactive protein; Future; Expected date: 06/25/2020  -     Calprotectin; Future; Expected date: 06/25/2020    Abnormal colonoscopy  -     CTA Abdomen and Pelvis; Future; Expected date: 06/28/2020    Weight loss  -     CTA Abdomen and Pelvis; Future; Expected date: 06/28/2020      Return to Clinic:  Follow up to be determined after results/ procedure(s).    Thank you for the opportunity to participate in the care of this patient.  CELESTINO Hagen

## 2020-06-30 ENCOUNTER — TELEPHONE (OUTPATIENT)
Dept: GASTROENTEROLOGY | Facility: CLINIC | Age: 50
End: 2020-06-30

## 2020-06-30 NOTE — TELEPHONE ENCOUNTER
----- Message from Delfina Torres NP sent at 6/28/2020  2:52 PM CDT -----  Notify patient of need for CTA and please schedule.

## 2020-06-30 NOTE — TELEPHONE ENCOUNTER
Patient scheduled for CTA @ [place] location on 7/7/20 @ 2pm (scheduled with Debi in Radiology).  Patient notified of time, date and location. Also informed patient that he will need to be fasting 4 hrs prior to test, patient verbalized understanding.

## 2020-07-07 ENCOUNTER — HOSPITAL ENCOUNTER (OUTPATIENT)
Dept: RADIOLOGY | Facility: HOSPITAL | Age: 50
Discharge: HOME | End: 2020-07-07
Attending: NURSE PRACTITIONER
Payer: COMMERCIAL

## 2020-07-07 DIAGNOSIS — R93.3 ABNORMAL COLONOSCOPY: ICD-10-CM

## 2020-07-07 DIAGNOSIS — R63.4 WEIGHT LOSS: ICD-10-CM

## 2020-07-07 PROCEDURE — 25500020 PHARM REV CODE 255

## 2020-07-07 PROCEDURE — 74174 CTA ABD&PLVS W/CONTRAST: CPT | Mod: TC

## 2020-07-08 DIAGNOSIS — K52.9 ACUTE COLITIS: Primary | ICD-10-CM

## 2020-07-13 ENCOUNTER — TELEPHONE (OUTPATIENT)
Dept: PULMONOLOGY | Facility: CLINIC | Age: 50
End: 2020-07-13

## 2020-07-13 NOTE — TELEPHONE ENCOUNTER
----- Message from Shauna Hernandez sent at 7/13/2020  2:53 PM CDT -----  Pt is calling regarding swollen feet and legs. Would like an ref for an diabetes. Please call back at [phone]

## 2020-07-13 NOTE — TELEPHONE ENCOUNTER
Pt states the swelling in his feet and legs is worse.  He is asking that you refer him to someone who could help him.

## 2020-07-14 ENCOUNTER — TELEPHONE (OUTPATIENT)
Dept: PULMONOLOGY | Facility: CLINIC | Age: 50
End: 2020-07-14

## 2020-07-14 NOTE — TELEPHONE ENCOUNTER
----- Message from Keara Maya LPN sent at 7/13/2020  4:36 PM CDT -----  Cardiology would be 1st   He see's Dr Norma Polanco MD

## 2020-07-22 ENCOUNTER — PATIENT OUTREACH (OUTPATIENT)
Dept: ADMINISTRATIVE | Facility: OTHER | Age: 50
End: 2020-07-22

## 2020-07-23 ENCOUNTER — OFFICE VISIT (OUTPATIENT)
Dept: DERMATOLOGY | Facility: CLINIC | Age: 50
End: 2020-07-23
Payer: COMMERCIAL

## 2020-07-23 ENCOUNTER — OFFICE VISIT (OUTPATIENT)
Dept: CARDIOLOGY | Facility: CLINIC | Age: 50
End: 2020-07-23
Payer: COMMERCIAL

## 2020-07-23 ENCOUNTER — HOSPITAL ENCOUNTER (OUTPATIENT)
Dept: CARDIOLOGY | Facility: HOSPITAL | Age: 50
Discharge: HOME | End: 2020-07-23
Attending: INTERNAL MEDICINE
Payer: COMMERCIAL

## 2020-07-23 VITALS
BODY MASS INDEX: 24.68 KG/M2 | OXYGEN SATURATION: 99 % | SYSTOLIC BLOOD PRESSURE: 110 MMHG | DIASTOLIC BLOOD PRESSURE: 68 MMHG | HEART RATE: 84 BPM | WEIGHT: 172 LBS

## 2020-07-23 DIAGNOSIS — I25.118 CORONARY ARTERY DISEASE OF NATIVE ARTERY OF NATIVE HEART WITH STABLE ANGINA PECTORIS: ICD-10-CM

## 2020-07-23 DIAGNOSIS — E78.5 HYPERLIPIDEMIA ASSOCIATED WITH TYPE 2 DIABETES MELLITUS: ICD-10-CM

## 2020-07-23 DIAGNOSIS — L28.1 PRURIGO NODULARIS: ICD-10-CM

## 2020-07-23 DIAGNOSIS — D49.2 SKIN NEOPLASM: Primary | ICD-10-CM

## 2020-07-23 DIAGNOSIS — E11.59 HYPERTENSION ASSOCIATED WITH DIABETES: ICD-10-CM

## 2020-07-23 DIAGNOSIS — I73.9 CLAUDICATION: ICD-10-CM

## 2020-07-23 DIAGNOSIS — R60.0 LOCALIZED EDEMA: ICD-10-CM

## 2020-07-23 DIAGNOSIS — L73.9 FOLLICULITIS: ICD-10-CM

## 2020-07-23 DIAGNOSIS — I25.118 CORONARY ARTERY DISEASE OF NATIVE ARTERY OF NATIVE HEART WITH STABLE ANGINA PECTORIS: Primary | ICD-10-CM

## 2020-07-23 DIAGNOSIS — I15.2 HYPERTENSION ASSOCIATED WITH DIABETES: ICD-10-CM

## 2020-07-23 DIAGNOSIS — I73.9 PVD (PERIPHERAL VASCULAR DISEASE): ICD-10-CM

## 2020-07-23 DIAGNOSIS — Z98.61 S/P PTCA (PERCUTANEOUS TRANSLUMINAL CORONARY ANGIOPLASTY): ICD-10-CM

## 2020-07-23 DIAGNOSIS — E11.69 HYPERLIPIDEMIA ASSOCIATED WITH TYPE 2 DIABETES MELLITUS: ICD-10-CM

## 2020-07-23 DIAGNOSIS — I25.118 CORONARY ARTERY DISEASE OF NATIVE HEART WITH STABLE ANGINA PECTORIS, UNSPECIFIED VESSEL OR LESION TYPE: Primary | ICD-10-CM

## 2020-07-23 PROCEDURE — 99202 OFFICE O/P NEW SF 15 MIN: CPT | Mod: 25,S$GLB,, | Performed by: DERMATOLOGY

## 2020-07-23 PROCEDURE — 99215 OFFICE O/P EST HI 40 MIN: CPT | Mod: S$GLB,,, | Performed by: INTERNAL MEDICINE

## 2020-07-23 PROCEDURE — 3044F HG A1C LEVEL LT 7.0%: CPT | Mod: CPTII,S$GLB,, | Performed by: INTERNAL MEDICINE

## 2020-07-23 PROCEDURE — 99999 PR PBB SHADOW E&M-EST. PATIENT-LVL III: CPT | Mod: PBBFAC,,, | Performed by: DERMATOLOGY

## 2020-07-23 PROCEDURE — 3078F DIAST BP <80 MM HG: CPT | Mod: CPTII,S$GLB,, | Performed by: DERMATOLOGY

## 2020-07-23 PROCEDURE — 3078F PR MOST RECENT DIASTOLIC BLOOD PRESSURE < 80 MM HG: ICD-10-PCS | Mod: CPTII,S$GLB,, | Performed by: INTERNAL MEDICINE

## 2020-07-23 PROCEDURE — 88312 PR  SPECIAL STAINS,GROUP I: ICD-10-PCS | Mod: 26,,, | Performed by: PATHOLOGY

## 2020-07-23 PROCEDURE — 88305 TISSUE EXAM BY PATHOLOGIST: CPT | Mod: 26,,, | Performed by: PATHOLOGY

## 2020-07-23 PROCEDURE — 3074F PR MOST RECENT SYSTOLIC BLOOD PRESSURE < 130 MM HG: ICD-10-PCS | Mod: CPTII,S$GLB,, | Performed by: INTERNAL MEDICINE

## 2020-07-23 PROCEDURE — 93010 EKG 12-LEAD: ICD-10-PCS | Mod: ,,, | Performed by: INTERNAL MEDICINE

## 2020-07-23 PROCEDURE — 3074F SYST BP LT 130 MM HG: CPT | Mod: CPTII,S$GLB,, | Performed by: INTERNAL MEDICINE

## 2020-07-23 PROCEDURE — 3008F PR BODY MASS INDEX (BMI) DOCUMENTED: ICD-10-PCS | Mod: CPTII,S$GLB,, | Performed by: INTERNAL MEDICINE

## 2020-07-23 PROCEDURE — 3074F SYST BP LT 130 MM HG: CPT | Mod: CPTII,S$GLB,, | Performed by: DERMATOLOGY

## 2020-07-23 PROCEDURE — 99202 PR OFFICE/OUTPT VISIT, NEW, LEVL II, 15-29 MIN: ICD-10-PCS | Mod: 25,S$GLB,, | Performed by: DERMATOLOGY

## 2020-07-23 PROCEDURE — 88312 SPECIAL STAINS GROUP 1: CPT | Mod: 59 | Performed by: PATHOLOGY

## 2020-07-23 PROCEDURE — 11102 PR TANGENTIAL BIOPSY, SKIN, SINGLE LESION: ICD-10-PCS | Mod: S$GLB,,, | Performed by: DERMATOLOGY

## 2020-07-23 PROCEDURE — 11102 TANGNTL BX SKIN SINGLE LES: CPT | Mod: S$GLB,,, | Performed by: DERMATOLOGY

## 2020-07-23 PROCEDURE — 3078F PR MOST RECENT DIASTOLIC BLOOD PRESSURE < 80 MM HG: ICD-10-PCS | Mod: CPTII,S$GLB,, | Performed by: DERMATOLOGY

## 2020-07-23 PROCEDURE — 99999 PR PBB SHADOW E&M-EST. PATIENT-LVL IV: ICD-10-PCS | Mod: PBBFAC,,, | Performed by: INTERNAL MEDICINE

## 2020-07-23 PROCEDURE — 3074F PR MOST RECENT SYSTOLIC BLOOD PRESSURE < 130 MM HG: ICD-10-PCS | Mod: CPTII,S$GLB,, | Performed by: DERMATOLOGY

## 2020-07-23 PROCEDURE — 99999 PR PBB SHADOW E&M-EST. PATIENT-LVL III: ICD-10-PCS | Mod: PBBFAC,,, | Performed by: DERMATOLOGY

## 2020-07-23 PROCEDURE — 87070 CULTURE OTHR SPECIMN AEROBIC: CPT

## 2020-07-23 PROCEDURE — 88305 TISSUE EXAM BY PATHOLOGIST: ICD-10-PCS | Mod: 26,,, | Performed by: PATHOLOGY

## 2020-07-23 PROCEDURE — 88312 SPECIAL STAINS GROUP 1: CPT | Mod: 26,,, | Performed by: PATHOLOGY

## 2020-07-23 PROCEDURE — 88305 TISSUE EXAM BY PATHOLOGIST: CPT | Performed by: PATHOLOGY

## 2020-07-23 PROCEDURE — 3044F PR MOST RECENT HEMOGLOBIN A1C LEVEL <7.0%: ICD-10-PCS | Mod: CPTII,S$GLB,, | Performed by: INTERNAL MEDICINE

## 2020-07-23 PROCEDURE — 93010 ELECTROCARDIOGRAM REPORT: CPT | Mod: ,,, | Performed by: INTERNAL MEDICINE

## 2020-07-23 PROCEDURE — 99999 PR PBB SHADOW E&M-EST. PATIENT-LVL IV: CPT | Mod: PBBFAC,,, | Performed by: INTERNAL MEDICINE

## 2020-07-23 PROCEDURE — 99215 PR OFFICE/OUTPT VISIT, EST, LEVL V, 40-54 MIN: ICD-10-PCS | Mod: S$GLB,,, | Performed by: INTERNAL MEDICINE

## 2020-07-23 PROCEDURE — 93005 ELECTROCARDIOGRAM TRACING: CPT

## 2020-07-23 PROCEDURE — 3008F BODY MASS INDEX DOCD: CPT | Mod: CPTII,S$GLB,, | Performed by: INTERNAL MEDICINE

## 2020-07-23 PROCEDURE — 3078F DIAST BP <80 MM HG: CPT | Mod: CPTII,S$GLB,, | Performed by: INTERNAL MEDICINE

## 2020-07-23 RX ORDER — FUROSEMIDE 40 MG/1
40 TABLET ORAL
Qty: 30 TABLET | Refills: 11 | Status: SHIPPED | OUTPATIENT
Start: 2020-07-24 | End: 2021-04-12 | Stop reason: SDUPTHER

## 2020-07-23 RX ORDER — TRIAMCINOLONE ACETONIDE 1 MG/G
OINTMENT TOPICAL 2 TIMES DAILY PRN
Qty: 80 G | Refills: 1 | Status: SHIPPED | OUTPATIENT
Start: 2020-07-23 | End: 2021-06-24

## 2020-07-23 RX ORDER — MUPIROCIN 20 MG/G
OINTMENT TOPICAL 2 TIMES DAILY
Qty: 22 G | Refills: 1 | Status: SHIPPED | OUTPATIENT
Start: 2020-07-23

## 2020-07-23 RX ORDER — DOXYCYCLINE 100 MG/1
100 CAPSULE ORAL DAILY
Qty: 14 CAPSULE | Refills: 0 | Status: SHIPPED | OUTPATIENT
Start: 2020-07-23 | End: 2020-08-06

## 2020-07-23 NOTE — PROGRESS NOTES
Subjective:   Patient ID:  Kodak Valentine is a 50 y.o. male who presents for follow up of No chief complaint on file.      49 yo male, came in for leg swelling.  PMH CAD s/p coronary stenting, [facility] in  midLAD 50% in stent lesion FFR 0.85, PDA 50%, LVEDP 25 mmHG, normal EF, COPD, JUDI on Bipap, smoker, DM Type II, HTN, HLP.  C/o leg swelling for 4 months, worse in PM and resolved in AM. Tingling pain and felt weak. Calf pain at exertion.  No chest pain, dizziness and faint  Chronic GARCIA  Sleeps on 1 pillow  BP LDL and BS controlled  ekg today NSR   abd CTA revealed diffuse moderate atherosclerotic disease through the abdominal aortic and iliac vessels.  Mild irregular mural thrombus is noted throughout the lower aorta.  No aneurysm.  Incidental common origin of the celiac and superior mesenteric artery which appears widely patent.  LEVON origin is patent with mild to moderate stenosis related to atherosclerotic disease.  Iliac arteries are somewhat ectatic particularly the right iliac artery measuring up to 1.6 cm.  Severe stenosis is seen at the origins of the internal iliac arteries.  External iliac arteries demonstrate moderate atherosclerotic disease without evidence of focal stenosis.  50% narrowing is seen within the right common femoral artery.      Past Medical History:   Diagnosis Date    Anxiety     CAD (coronary artery disease)     PTCA x 2 LAD, restenosis and restent 7/12.    Chest pain syndrome 10/2/2015    COPD (chronic obstructive pulmonary disease)     CPAP (continuous positive airway pressure) dependence     @ night    Diabetes mellitus type 2, uncontrolled 4/13/2016    History of duodenal ulcer     with bleed    Hypertension     Mixed hyperlipidemia     JUDI (obstructive sleep apnea)     severe    S/P PTCA (percutaneous transluminal coronary angioplasty) 3/11/2015    Thrush 8/14/2019    Vitamin D deficiency        Past Surgical History:   Procedure Laterality Date     APPENDECTOMY      [facility]     CARDIAC CATHETERIZATION      CATARACT EXTRACTION W/  INTRAOCULAR LENS IMPLANT Right 4-22-15    COLONOSCOPY N/A 3/11/2020    Procedure: COLONOSCOPY;  Surgeon: Kanwal Vasquez MD;  Location: [facility];  Service: Endoscopy;  Laterality: N/A;    CORONARY STENT PLACEMENT      ESOPHAGOGASTRODUODENOSCOPY N/A 3/11/2020    Procedure: ESOPHAGOGASTRODUODENOSCOPY (EGD);  Surgeon: Kanwal Vasquez MD;  Location: [facility];  Service: Endoscopy;  Laterality: N/A;    HEMORRHOID SURGERY      NISSEN FUNDOPLICATION      lap    SKIN LESION EXCISION Right     leg       Social History     Tobacco Use    Smoking status: Current Every Day Smoker     Packs/day: 1.00     Years: 20.00     Pack years: 20.00     Types: Cigarettes     Last attempt to quit: 6/3/2014     Years since quittin.1    Smokeless tobacco: Never Used    Tobacco comment: currently vaping with nicotine since quit smoking in 2014   Substance Use Topics    Alcohol use: Yes     Alcohol/week: 6.0 standard drinks     Types: 4 Cans of beer, 2 Shots of liquor per week     Comment: daily    Drug use: No       Family History   Problem Relation Age of Onset    Heart disease Mother     Heart block Father     Heart disease Sister     COPD Maternal Grandmother     Diabetes Maternal Grandfather     COPD Maternal Grandfather          Review of Systems   Constitution: Negative for decreased appetite, diaphoresis, fever, malaise/fatigue and night sweats.   HENT: Negative for nosebleeds.    Eyes: Negative for blurred vision and double vision.   Cardiovascular: Positive for dyspnea on exertion and leg swelling. Negative for chest pain, claudication, irregular heartbeat, near-syncope, orthopnea, palpitations, paroxysmal nocturnal dyspnea and syncope.   Respiratory: Negative for cough, shortness of breath, sleep disturbances due to breathing, snoring, sputum production and wheezing.    Endocrine: Negative  for cold intolerance and polyuria.   Hematologic/Lymphatic: Does not bruise/bleed easily.   Skin: Negative for rash.   Musculoskeletal: Negative for back pain, falls, joint pain, joint swelling and neck pain.   Gastrointestinal: Negative for abdominal pain, heartburn, nausea and vomiting.   Genitourinary: Negative for dysuria, frequency and hematuria.   Neurological: Negative for difficulty with concentration, dizziness, focal weakness, headaches, light-headedness, numbness, seizures and weakness.   Psychiatric/Behavioral: Negative for depression, memory loss and substance abuse. The patient does not have insomnia.    Allergic/Immunologic: Negative for HIV exposure and hives.       Objective:   Physical Exam   Constitutional: He is oriented to person, place, and time. He appears well-nourished.   HENT:   Head: Normocephalic.   Eyes: Pupils are equal, round, and reactive to light.   Neck: Normal carotid pulses and no JVD present. Carotid bruit is not present. No thyromegaly present.   Cardiovascular: Normal rate, regular rhythm, normal heart sounds and normal pulses.  No extrasystoles are present. PMI is not displaced. Exam reveals no gallop and no S3.   No murmur heard.  Pulmonary/Chest: Breath sounds normal. No stridor. No respiratory distress.   Abdominal: Soft. Bowel sounds are normal. There is no abdominal tenderness. There is no rebound.   Musculoskeletal: Normal range of motion.         General: Edema (BLE +) present.   Neurological: He is alert and oriented to person, place, and time.   Skin: Skin is intact. No rash noted.   Psychiatric: His behavior is normal.       Lab Results   Component Value Date    CHOL 99 (L) 08/30/2019    CHOL 135 03/28/2018    CHOL 182 04/18/2017     Lab Results   Component Value Date    HDL 32 (L) 08/30/2019    HDL 36 (L) 03/28/2018    HDL 37 (L) 04/18/2017     Lab Results   Component Value Date    LDLCALC 27.4 (L) 08/30/2019    LDLCALC 60.8 (L) 03/28/2018    LDLCALC 86.6 04/18/2017      Lab Results   Component Value Date    TRIG 198 (H) 08/30/2019    TRIG 191 (H) 03/28/2018    TRIG 292 (H) 04/18/2017     Lab Results   Component Value Date    CHOLHDL 32.3 08/30/2019    CHOLHDL 26.7 03/28/2018    CHOLHDL 20.3 04/18/2017       Chemistry        Component Value Date/Time     06/25/2020 1436    K 4.5 06/25/2020 1436     06/25/2020 1436    CO2 28 06/25/2020 1436    BUN 4 (L) 06/25/2020 1436    CREATININE 0.8 06/25/2020 1436     06/25/2020 1436        Component Value Date/Time    CALCIUM 8.7 06/25/2020 1436    ALKPHOS 89 06/25/2020 1436    AST 25 06/25/2020 1436    ALT 18 06/25/2020 1436    BILITOT 0.5 06/25/2020 1436    ESTGFRAFRICA >60.0 06/25/2020 1436    EGFRNONAA >60.0 06/25/2020 1436          Lab Results   Component Value Date    HGBA1C 4.0 08/30/2019     Lab Results   Component Value Date    TSH 0.493 08/30/2019     Lab Results   Component Value Date    INR 1.1 08/13/2019    INR 1.0 08/12/2019    INR 1.0 02/15/2017     Lab Results   Component Value Date    WBC 5.04 06/25/2020    HGB 10.4 (L) 06/25/2020    HCT 30.5 (L) 06/25/2020     (H) 06/25/2020     06/25/2020     BMP  Sodium   Date Value Ref Range Status   06/25/2020 138 136 - 145 mmol/L Final     Potassium   Date Value Ref Range Status   06/25/2020 4.5 3.5 - 5.1 mmol/L Final     Chloride   Date Value Ref Range Status   06/25/2020 103 95 - 110 mmol/L Final     CO2   Date Value Ref Range Status   06/25/2020 28 23 - 29 mmol/L Final     BUN, Bld   Date Value Ref Range Status   06/25/2020 4 (L) 6 - 20 mg/dL Final     Creatinine   Date Value Ref Range Status   06/25/2020 0.8 0.5 - 1.4 mg/dL Final     Calcium   Date Value Ref Range Status   06/25/2020 8.7 8.7 - 10.5 mg/dL Final     Anion Gap   Date Value Ref Range Status   06/25/2020 7 (L) 8 - 16 mmol/L Final     eGFR if    Date Value Ref Range Status   06/25/2020 >60.0 >60 mL/min/1.73 m^2 Final     eGFR if non    Date Value Ref Range  Status   06/25/2020 >60.0 >60 mL/min/1.73 m^2 Final     Comment:     Calculation used to obtain the estimated glomerular filtration  rate (eGFR) is the CKD-EPI equation.        BNP  @LABRCNTIP(BNP,BNPTRIAGEBLO)@  @LABRCNTIP(troponini)@  CrCl cannot be calculated (Patient's most recent lab result is older than the maximum 7 days allowed.).  No results found in the last 24 hours.  No results found in the last 24 hours.  No results found in the last 24 hours.    Assessment:      1. Coronary artery disease of native heart with stable angina pectoris, unspecified vessel or lesion type    2. Hyperlipidemia associated with type 2 diabetes mellitus    3. Hypertension associated with diabetes    4. S/P PTCA (percutaneous transluminal coronary angioplasty)    5. Claudication    6. Localized edema    7. PVD (peripheral vascular disease)        Plan:   D/c amlodipine  Add Lasix 40 mg 4 TIMES A WEEK  Check BNP and BMP in 2 weeks  Check LE arterial and venous Doppler  Phar.MPI   Continue ASA lipitor fenofibrate metoprolol lisinopril ranexa  Counseled DASH  Check Lipid profile in 6 months  Recommend heart-healthy diet, weight control and regular exercise.  Yaya. Risk modification.   RTC in 6 months    I have reviewed all pertinent labs and cardiac studies independently. Plans and recommendations have been formulated under my direct supervision. All questions answered and patient voiced understanding.   If symptoms persist go to the ED

## 2020-07-23 NOTE — PATIENT INSTRUCTIONS
For itchy skin I recommend an over the counter product call Sarna lotion that you can apply several times throughout the day.       SKIN CARE      1. Definition    Xerosis is the term for dry skin.  We all have a natural oil coating over our skin produced by the skin oil glands.  If this oil is removed, the skin becomes dry which can lead to cracking, which can lead to inflammation.  Xerosis is usually a long-term problem that recurs often, especially in the winter.    2. Cause     Long hot baths or showers can remove our natural oil and lead to xerosis.  One should never take more than one bath or shower a day and for no longer than ten minutes.   Use of harsh soaps such as Zest, Dial, Stormy Spring, Lever and Ivory can worsen and cause xerosis.   Cold winter weather worsens xerosis because the amount of moisture contained in cold air is much less than the amount of moisture in warm air.    3. Treatment     Treatment is intended to restore the natural oil to your skin.  Keep the skin lubricated.     Do not take more than one bath or shower a day.  Use lukewarm water, not hot.  Hot water dries out the skin.     Use a gentle moisturizing soap such as Cetaphil soap, Dove, or Cetaphil Restoraderm cleanser.     When toweling dry, dont rub.  Blot the skin so there is still some water left on the skin.  You should apply a moisturizing cream to all of the skin such as Cerave cream, Cetaphil cream, Restoraderm or Eucerin Original Formula cream.   Alpha hydroxyacid lotions, i.e., AmLactin, also work very well for preventing dry skin, but may burn when used on inflamed or reddened skin.           Shave Biopsy Wound Care    Your doctor has performed a shave biopsy today.  A band aid and vaseline ointment has been placed over the site.  This should remain in place for 24 hours.  It is recommended that you keep the area dry for the first 24 hours.  After 24 hours, you may remove the band aid and wash the area with warm  "soap and water and apply Vaseline jelly.  Many patients prefer to use Neosporin or Bacitracin ointment.  This is acceptable; however, know that you can develop an allergy to this medication even if you have used it safely for years.  It is important to keep the area moist.  Letting it dry out and get air slows healing time, and will worsen the scar.  Band aid is optional after first 24 hours.      If you notice increasing redness, tenderness, pain, or yellow drainage at the biopsy site, please notify your doctor.  These are signs of an infection.    If your biopsy site is bleeding, apply firm pressure for 15 minutes straight.  Repeat for another 15 minutes, if it is still bleeding.   If the surgical site continues to bleed, then please contact your doctor.      For [facility] users:   You will receive a [facility] notification after the pathologist has finished reviewing your biopsy specimen. Pathology results, however, will not be released online so you will see a "no content" message. Once your dermatologist reviews and clinically correlates your biopsy results, you will either receive a letter in the mail with the results of a phone call from your doctor's office if further explanation or treatment is warranted.             "

## 2020-07-23 NOTE — PROGRESS NOTES
Subjective:       Patient ID:  Kodak Valentine is a 50 y.o. male who presents for   Chief Complaint   Patient presents with    Rash     c/o rash to body x 3 yrs    Mass     c/o bumps to bilateral thighs, neck and scalp     History of Present Illness: The patient presents with chief complaint of rash  Location: body  Duration: 3 yrs  Signs/Symptoms: itchy and bruises    Prior treatments: none          History of Present Illness: The patient presents with chief complaint of bumps  Location: bilateral thighs, neck and scalp   Duration: 1 month  Signs/Symptoms: painful  Prior treatments: Bactroban      Patient with H/O COPD, CAD, DMII, PVD and being worked up for colitis of unclear etiology (recent CTA negative for ischemic cause). He also has a h/o pancytopenia 8/2019 s/p BM Bx, negative for leukemia. Remains anemic, but WBC and platelets have normalized.     Patient endorses history of >25 pound weight loss in last year.     Review of Systems   Constitutional: Negative for malaise.   Skin: Positive for itching and rash.        Objective:    Physical Exam   Constitutional: No distress.   Neurological: He is alert and oriented to person, place, and time.   Psychiatric: He has a normal mood and affect.   Skin:   Areas Examined (abnormalities noted in diagram):   Scalp / Hair Palpated and Inspected  Head / Face Inspection Performed  Neck Inspection Performed  Chest / Axilla Inspection Performed  Abdomen Inspection Performed  Genitals / Buttocks / Groin Inspection Performed  Back Inspection Performed  RUE Inspected  LUE Inspection Performed  RLE Inspected  LLE Inspection Performed                  Diagram Legend     Erythematous scaling macule/papule c/w actinic keratosis       Vascular papule c/w angioma      Pigmented verrucoid papule/plaque c/w seborrheic keratosis      Yellow umbilicated papule c/w sebaceous hyperplasia      Irregularly shaped tan macule c/w lentigo     1-2 mm smooth white papules consistent with  Milia      Movable subcutaneous cyst with punctum c/w epidermal inclusion cyst      Subcutaneous movable cyst c/w pilar cyst      Firm pink to brown papule c/w dermatofibroma      Pedunculated fleshy papule(s) c/w skin tag(s)      Evenly pigmented macule c/w junctional nevus     Mildly variegated pigmented, slightly irregular-bordered macule c/w mildly atypical nevus      Flesh colored to evenly pigmented papule c/w intradermal nevus       Pink pearly papule/plaque c/w basal cell carcinoma      Erythematous hyperkeratotic cursted plaque c/w SCC      Surgical scar with no sign of skin cancer recurrence      Open and closed comedones      Inflammatory papules and pustules      Verrucoid papule consistent consistent with wart     Erythematous eczematous patches and plaques     Dystrophic onycholytic nail with subungual debris c/w onychomycosis     Umbilicated papule    Erythematous-base heme-crusted tan verrucoid plaque consistent with inflamed seborrheic keratosis     Erythematous Silvery Scaling Plaque c/w Psoriasis     See annotation      Assessment / Plan:      Pathology Orders:     Normal Orders This Visit    Specimen to Pathology, Dermatology     Comments:    Number of Specimens:->1  ------------------------->-------------------------  Spec 1 Procedure:->Biopsy  Spec 1 Clinical Impression:->R/O SCC  Spec 1 Source:->left neck    Questions:    Procedure Type: Dermatology and skin neoplasms    Number of Specimens: 1    ------------------------: -------------------------    Spec 1 Procedure: Biopsy    Spec 1 Clinical Impression: R/O SCC    Spec 1 Source: left neck    Clinical Information: scaly nodule        Skin neoplasm, left neck  -     SHAVE BIOPSY  -     Specimen to Pathology, Dermatology  Shave biopsy procedure note:    Shave biopsy performed after verbal consent including risk of infection, scar, recurrence, need for additional treatment of site. Area prepped with alcohol, anesthetized with approximately 1.0cc  of 1% lidocaine with epinephrine. Lesional tissue shaved with razor blade. Hemostasis achieved with application of aluminum chloride. No complications. Dressing applied. Wound care explained.      Folliculitis  -     Abscess Culture, f/u results  START:  -     doxycycline (VIBRAMYCIN) 100 MG Cap; Take 1 capsule (100 mg total) by mouth once daily. for 14 days  Dispense: 14 capsule; Refill: 0  - recommend daily hibiclens wash     Prurigo nodularis  - pruritus likely multifactorial and may include comorbidities and chronic opiate use (on scheduled percocet for back pain from back surgery 8 months ago)  - recommend f/u with PCP and GI for ongoing workup for colitis, anemia and weight loss  - recommend OTC Sarna lotion TID    START:    -     mupirocin (BACTROBAN) 2 % ointment; Apply topically 2 (two) times daily. Apply to open areas of skin  Dispense: 22 g; Refill: 1  -     triamcinolone acetonide 0.1% (KENALOG) 0.1 % ointment; Apply topically 2 (two) times daily as needed. For rash/itch. Do not use on face, underarms or groin/genitalia.  Dispense: 80 g; Refill: 1             Follow up in about 3 months (around 10/23/2020).

## 2020-07-27 LAB — BACTERIA SPEC AEROBE CULT: NORMAL

## 2020-07-30 LAB
FINAL PATHOLOGIC DIAGNOSIS: NORMAL
GROSS: NORMAL
MICROSCOPIC EXAM: NORMAL

## 2020-08-31 ENCOUNTER — PATIENT OUTREACH (OUTPATIENT)
Dept: ADMINISTRATIVE | Facility: HOSPITAL | Age: 50
End: 2020-08-31

## 2020-08-31 NOTE — PROGRESS NOTES
L/M for Pt to schedule eye exam no answer         Brooke MYERS LPN Care Coordinator  Care Coordination Department  [facility]  [phone]

## 2020-09-11 ENCOUNTER — PATIENT OUTREACH (OUTPATIENT)
Dept: ADMINISTRATIVE | Facility: HOSPITAL | Age: 50
End: 2020-09-11

## 2020-09-11 DIAGNOSIS — J44.9 COPD, SEVERE: Primary | ICD-10-CM

## 2020-09-11 NOTE — PROGRESS NOTES
DM REPORT 9/2/2020. Attempting to contact pt to schedule overdue HM & F/U. Unable to reach patient at this time. Left voicemail.

## 2020-09-11 NOTE — PROGRESS NOTES
Working A1C Report    Assisted to schedule annual exam, follow up diabetes. Will do lab after appt.  Offered to schedule eye exam. Declined saying he went to [employer]. Will have MALKA signed at visit.

## 2020-09-16 ENCOUNTER — LAB VISIT (OUTPATIENT)
Dept: LAB | Facility: HOSPITAL | Age: 50
End: 2020-09-16
Attending: INTERNAL MEDICINE
Payer: COMMERCIAL

## 2020-09-16 ENCOUNTER — OFFICE VISIT (OUTPATIENT)
Dept: FAMILY MEDICINE | Facility: CLINIC | Age: 50
End: 2020-09-16
Payer: COMMERCIAL

## 2020-09-16 VITALS
HEIGHT: 70 IN | SYSTOLIC BLOOD PRESSURE: 138 MMHG | TEMPERATURE: 98 F | BODY MASS INDEX: 22.82 KG/M2 | OXYGEN SATURATION: 98 % | HEART RATE: 89 BPM | DIASTOLIC BLOOD PRESSURE: 80 MMHG | WEIGHT: 159.38 LBS

## 2020-09-16 DIAGNOSIS — F41.9 ANXIETY: ICD-10-CM

## 2020-09-16 DIAGNOSIS — Z00.00 ENCOUNTER FOR PREVENTIVE HEALTH EXAMINATION: Primary | ICD-10-CM

## 2020-09-16 DIAGNOSIS — E55.9 VITAMIN D DEFICIENCY: ICD-10-CM

## 2020-09-16 DIAGNOSIS — E11.59 HYPERTENSION ASSOCIATED WITH DIABETES: ICD-10-CM

## 2020-09-16 DIAGNOSIS — Z00.00 ENCOUNTER FOR PREVENTIVE HEALTH EXAMINATION: ICD-10-CM

## 2020-09-16 DIAGNOSIS — E78.5 HYPERLIPIDEMIA ASSOCIATED WITH TYPE 2 DIABETES MELLITUS: ICD-10-CM

## 2020-09-16 DIAGNOSIS — R63.4 UNINTENDED WEIGHT LOSS: ICD-10-CM

## 2020-09-16 DIAGNOSIS — N18.2 CONTROLLED TYPE 2 DIABETES MELLITUS WITH STAGE 2 CHRONIC KIDNEY DISEASE, WITHOUT LONG-TERM CURRENT USE OF INSULIN: ICD-10-CM

## 2020-09-16 DIAGNOSIS — E11.69 HYPERLIPIDEMIA ASSOCIATED WITH TYPE 2 DIABETES MELLITUS: ICD-10-CM

## 2020-09-16 DIAGNOSIS — J44.89 NOCTURNAL HYPOXEMIA DUE TO OBSTRUCTIVE CHRONIC BRONCHITIS: Chronic | ICD-10-CM

## 2020-09-16 DIAGNOSIS — E11.22 CONTROLLED TYPE 2 DIABETES MELLITUS WITH STAGE 2 CHRONIC KIDNEY DISEASE, WITHOUT LONG-TERM CURRENT USE OF INSULIN: ICD-10-CM

## 2020-09-16 DIAGNOSIS — I25.10 CORONARY ARTERY DISEASE WITHOUT ANGINA PECTORIS, UNSPECIFIED VESSEL OR LESION TYPE, UNSPECIFIED WHETHER NATIVE OR TRANSPLANTED HEART: ICD-10-CM

## 2020-09-16 DIAGNOSIS — J44.9 MODERATE COPD (CHRONIC OBSTRUCTIVE PULMONARY DISEASE): ICD-10-CM

## 2020-09-16 DIAGNOSIS — I15.2 HYPERTENSION ASSOCIATED WITH DIABETES: ICD-10-CM

## 2020-09-16 DIAGNOSIS — G47.36 NOCTURNAL HYPOXEMIA DUE TO OBSTRUCTIVE CHRONIC BRONCHITIS: Chronic | ICD-10-CM

## 2020-09-16 DIAGNOSIS — D64.9 ANEMIA, UNSPECIFIED TYPE: ICD-10-CM

## 2020-09-16 DIAGNOSIS — D69.6 THROMBOCYTOPENIA: ICD-10-CM

## 2020-09-16 DIAGNOSIS — G47.33 OSA TREATED WITH BIPAP: Chronic | ICD-10-CM

## 2020-09-16 LAB
ALBUMIN SERPL BCP-MCNC: 3.7 G/DL (ref 3.5–5.2)
ALP SERPL-CCNC: 101 U/L (ref 55–135)
ALT SERPL W/O P-5'-P-CCNC: 38 U/L (ref 10–44)
ANION GAP SERPL CALC-SCNC: 9 MMOL/L (ref 8–16)
AST SERPL-CCNC: 61 U/L (ref 10–40)
BASOPHILS # BLD AUTO: 0.02 K/UL (ref 0–0.2)
BASOPHILS NFR BLD: 0.4 % (ref 0–1.9)
BILIRUB SERPL-MCNC: 0.8 MG/DL (ref 0.1–1)
BUN SERPL-MCNC: 7 MG/DL (ref 6–20)
CALCIUM SERPL-MCNC: 9.5 MG/DL (ref 8.7–10.5)
CHLORIDE SERPL-SCNC: 96 MMOL/L (ref 95–110)
CHOLEST SERPL-MCNC: 193 MG/DL (ref 120–199)
CHOLEST/HDLC SERPL: 1.9 {RATIO} (ref 2–5)
CO2 SERPL-SCNC: 30 MMOL/L (ref 23–29)
CREAT SERPL-MCNC: 0.9 MG/DL (ref 0.5–1.4)
DIFFERENTIAL METHOD: ABNORMAL
EOSINOPHIL # BLD AUTO: 0 K/UL (ref 0–0.5)
EOSINOPHIL NFR BLD: 0.6 % (ref 0–8)
ERYTHROCYTE [DISTWIDTH] IN BLOOD BY AUTOMATED COUNT: 13.3 % (ref 11.5–14.5)
EST. GFR  (AFRICAN AMERICAN): >60 ML/MIN/1.73 M^2
EST. GFR  (NON AFRICAN AMERICAN): >60 ML/MIN/1.73 M^2
FERRITIN SERPL-MCNC: 192 NG/ML (ref 20–300)
GLUCOSE SERPL-MCNC: 76 MG/DL (ref 70–110)
HCT VFR BLD AUTO: 39.6 % (ref 40–54)
HDLC SERPL-MCNC: 103 MG/DL (ref 40–75)
HDLC SERPL: 53.4 % (ref 20–50)
HGB BLD-MCNC: 12.9 G/DL (ref 14–18)
IMM GRANULOCYTES # BLD AUTO: 0.01 K/UL (ref 0–0.04)
IMM GRANULOCYTES NFR BLD AUTO: 0.2 % (ref 0–0.5)
IRON SERPL-MCNC: 137 UG/DL (ref 45–160)
LDLC SERPL CALC-MCNC: 65.4 MG/DL (ref 63–159)
LYMPHOCYTES # BLD AUTO: 1.1 K/UL (ref 1–4.8)
LYMPHOCYTES NFR BLD: 22.2 % (ref 18–48)
MCH RBC QN AUTO: 34.6 PG (ref 27–31)
MCHC RBC AUTO-ENTMCNC: 32.6 G/DL (ref 32–36)
MCV RBC AUTO: 106 FL (ref 82–98)
MONOCYTES # BLD AUTO: 0.3 K/UL (ref 0.3–1)
MONOCYTES NFR BLD: 6.3 % (ref 4–15)
NEUTROPHILS # BLD AUTO: 3.5 K/UL (ref 1.8–7.7)
NEUTROPHILS NFR BLD: 70.3 % (ref 38–73)
NONHDLC SERPL-MCNC: 90 MG/DL
NRBC BLD-RTO: 0 /100 WBC
PLATELET # BLD AUTO: 116 K/UL (ref 150–350)
PMV BLD AUTO: 10 FL (ref 9.2–12.9)
POTASSIUM SERPL-SCNC: 3.8 MMOL/L (ref 3.5–5.1)
PROT SERPL-MCNC: 6.8 G/DL (ref 6–8.4)
RBC # BLD AUTO: 3.73 M/UL (ref 4.6–6.2)
SARS-COV-2 IGG SERPLBLD QL IA.RAPID: POSITIVE
SATURATED IRON: 40 % (ref 20–50)
SODIUM SERPL-SCNC: 135 MMOL/L (ref 136–145)
TOTAL IRON BINDING CAPACITY: 346 UG/DL (ref 250–450)
TRANSFERRIN SERPL-MCNC: 234 MG/DL (ref 200–375)
TRIGL SERPL-MCNC: 123 MG/DL (ref 30–150)
TSH SERPL DL<=0.005 MIU/L-ACNC: 0.7 UIU/ML (ref 0.4–4)
WBC # BLD AUTO: 4.92 K/UL (ref 3.9–12.7)

## 2020-09-16 PROCEDURE — 83615 LACTATE (LD) (LDH) ENZYME: CPT

## 2020-09-16 PROCEDURE — 90472 IMMUNIZATION ADMIN EACH ADD: CPT | Mod: S$GLB,,, | Performed by: INTERNAL MEDICINE

## 2020-09-16 PROCEDURE — 84443 ASSAY THYROID STIM HORMONE: CPT

## 2020-09-16 PROCEDURE — 3008F BODY MASS INDEX DOCD: CPT | Mod: CPTII,S$GLB,, | Performed by: INTERNAL MEDICINE

## 2020-09-16 PROCEDURE — 99999 PR PBB SHADOW E&M-EST. PATIENT-LVL V: ICD-10-PCS | Mod: PBBFAC,,, | Performed by: INTERNAL MEDICINE

## 2020-09-16 PROCEDURE — 83540 ASSAY OF IRON: CPT

## 2020-09-16 PROCEDURE — 90471 FLU VACCINE (QUAD) GREATER THAN OR EQUAL TO 3YO PRESERVATIVE FREE IM: ICD-10-PCS | Mod: S$GLB,,, | Performed by: INTERNAL MEDICINE

## 2020-09-16 PROCEDURE — 80053 COMPREHEN METABOLIC PANEL: CPT

## 2020-09-16 PROCEDURE — 90686 IIV4 VACC NO PRSV 0.5 ML IM: CPT | Mod: S$GLB,,, | Performed by: INTERNAL MEDICINE

## 2020-09-16 PROCEDURE — 90715 TDAP VACCINE GREATER THAN OR EQUAL TO 7YO IM: ICD-10-PCS | Mod: S$GLB,,, | Performed by: INTERNAL MEDICINE

## 2020-09-16 PROCEDURE — 3008F PR BODY MASS INDEX (BMI) DOCUMENTED: ICD-10-PCS | Mod: CPTII,S$GLB,, | Performed by: INTERNAL MEDICINE

## 2020-09-16 PROCEDURE — 82306 VITAMIN D 25 HYDROXY: CPT

## 2020-09-16 PROCEDURE — 84153 ASSAY OF PSA TOTAL: CPT

## 2020-09-16 PROCEDURE — 3075F SYST BP GE 130 - 139MM HG: CPT | Mod: CPTII,S$GLB,, | Performed by: INTERNAL MEDICINE

## 2020-09-16 PROCEDURE — 90686 FLU VACCINE (QUAD) GREATER THAN OR EQUAL TO 3YO PRESERVATIVE FREE IM: ICD-10-PCS | Mod: S$GLB,,, | Performed by: INTERNAL MEDICINE

## 2020-09-16 PROCEDURE — 85025 COMPLETE CBC W/AUTO DIFF WBC: CPT

## 2020-09-16 PROCEDURE — 90715 TDAP VACCINE 7 YRS/> IM: CPT | Mod: S$GLB,,, | Performed by: INTERNAL MEDICINE

## 2020-09-16 PROCEDURE — 90471 IMMUNIZATION ADMIN: CPT | Mod: S$GLB,,, | Performed by: INTERNAL MEDICINE

## 2020-09-16 PROCEDURE — 3079F DIAST BP 80-89 MM HG: CPT | Mod: CPTII,S$GLB,, | Performed by: INTERNAL MEDICINE

## 2020-09-16 PROCEDURE — 99999 PR PBB SHADOW E&M-EST. PATIENT-LVL V: CPT | Mod: PBBFAC,,, | Performed by: INTERNAL MEDICINE

## 2020-09-16 PROCEDURE — 36415 COLL VENOUS BLD VENIPUNCTURE: CPT | Mod: PO

## 2020-09-16 PROCEDURE — 90472 TDAP VACCINE GREATER THAN OR EQUAL TO 7YO IM: ICD-10-PCS | Mod: S$GLB,,, | Performed by: INTERNAL MEDICINE

## 2020-09-16 PROCEDURE — 3079F PR MOST RECENT DIASTOLIC BLOOD PRESSURE 80-89 MM HG: ICD-10-PCS | Mod: CPTII,S$GLB,, | Performed by: INTERNAL MEDICINE

## 2020-09-16 PROCEDURE — 86769 SARS-COV-2 COVID-19 ANTIBODY: CPT

## 2020-09-16 PROCEDURE — 80061 LIPID PANEL: CPT

## 2020-09-16 PROCEDURE — 3075F PR MOST RECENT SYSTOLIC BLOOD PRESS GE 130-139MM HG: ICD-10-PCS | Mod: CPTII,S$GLB,, | Performed by: INTERNAL MEDICINE

## 2020-09-16 PROCEDURE — 82728 ASSAY OF FERRITIN: CPT

## 2020-09-16 PROCEDURE — 99396 PREV VISIT EST AGE 40-64: CPT | Mod: 25,S$GLB,, | Performed by: INTERNAL MEDICINE

## 2020-09-16 PROCEDURE — 99396 PR PREVENTIVE VISIT,EST,40-64: ICD-10-PCS | Mod: 25,S$GLB,, | Performed by: INTERNAL MEDICINE

## 2020-09-16 PROCEDURE — 83036 HEMOGLOBIN GLYCOSYLATED A1C: CPT

## 2020-09-16 RX ORDER — AMLODIPINE BESYLATE 2.5 MG/1
2.5 TABLET ORAL DAILY
Status: ON HOLD | COMMUNITY
Start: 2020-08-17 | End: 2021-06-21 | Stop reason: HOSPADM

## 2020-09-17 ENCOUNTER — PATIENT MESSAGE (OUTPATIENT)
Dept: FAMILY MEDICINE | Facility: CLINIC | Age: 50
End: 2020-09-17

## 2020-09-17 LAB
25(OH)D3+25(OH)D2 SERPL-MCNC: 36 NG/ML (ref 30–96)
COMPLEXED PSA SERPL-MCNC: 0.13 NG/ML (ref 0–4)
ESTIMATED AVG GLUCOSE: 74 MG/DL (ref 68–131)
HBA1C MFR BLD HPLC: 4.2 % (ref 4–5.6)
LDH SERPL L TO P-CCNC: 150 U/L (ref 110–260)

## 2020-10-02 DIAGNOSIS — E11.69 HYPERLIPIDEMIA ASSOCIATED WITH TYPE 2 DIABETES MELLITUS: Primary | ICD-10-CM

## 2020-10-02 DIAGNOSIS — I15.2 HYPERTENSION ASSOCIATED WITH DIABETES: ICD-10-CM

## 2020-10-02 DIAGNOSIS — E11.59 HYPERTENSION ASSOCIATED WITH DIABETES: ICD-10-CM

## 2020-10-02 DIAGNOSIS — E78.5 HYPERLIPIDEMIA ASSOCIATED WITH TYPE 2 DIABETES MELLITUS: Primary | ICD-10-CM

## 2020-10-02 RX ORDER — ATORVASTATIN CALCIUM 20 MG/1
TABLET, FILM COATED ORAL
Qty: 90 TABLET | Refills: 3 | Status: SHIPPED | OUTPATIENT
Start: 2020-10-02

## 2020-10-02 RX ORDER — METOPROLOL TARTRATE 50 MG/1
25 TABLET ORAL EVERY 12 HOURS
Qty: 90 TABLET | Refills: 3 | Status: SHIPPED | OUTPATIENT
Start: 2020-10-02

## 2020-10-13 ENCOUNTER — PATIENT MESSAGE (OUTPATIENT)
Dept: FAMILY MEDICINE | Facility: CLINIC | Age: 50
End: 2020-10-13

## 2020-10-13 DIAGNOSIS — F41.9 ANXIETY: Primary | ICD-10-CM

## 2020-10-13 RX ORDER — ALPRAZOLAM 0.25 MG/1
TABLET ORAL
Qty: 90 TABLET | Refills: 1 | Status: CANCELLED | OUTPATIENT
Start: 2020-10-13

## 2020-10-14 ENCOUNTER — PATIENT MESSAGE (OUTPATIENT)
Dept: FAMILY MEDICINE | Facility: CLINIC | Age: 50
End: 2020-10-14

## 2020-10-14 RX ORDER — SILDENAFIL 100 MG/1
100 TABLET, FILM COATED ORAL
Qty: 30 TABLET | Refills: 6 | Status: ON HOLD | OUTPATIENT
Start: 2020-10-14 | End: 2021-06-21 | Stop reason: HOSPADM

## 2020-10-14 RX ORDER — ALPRAZOLAM 0.25 MG/1
TABLET ORAL
Qty: 90 TABLET | Refills: 1 | Status: SHIPPED | OUTPATIENT
Start: 2020-10-14 | End: 2020-12-30 | Stop reason: SDUPTHER

## 2020-12-30 ENCOUNTER — PATIENT MESSAGE (OUTPATIENT)
Dept: PULMONOLOGY | Facility: CLINIC | Age: 50
End: 2020-12-30

## 2020-12-30 DIAGNOSIS — J44.9 CHRONIC OBSTRUCTIVE PULMONARY DISEASE, UNSPECIFIED COPD TYPE: ICD-10-CM

## 2020-12-30 DIAGNOSIS — I25.118 CORONARY ARTERY DISEASE OF NATIVE ARTERY OF NATIVE HEART WITH STABLE ANGINA PECTORIS: ICD-10-CM

## 2020-12-30 RX ORDER — ASPIRIN 81 MG/1
81 TABLET ORAL DAILY
Qty: 30 TABLET | Refills: 11 | Status: SHIPPED | OUTPATIENT
Start: 2020-12-30 | End: 2021-06-24

## 2020-12-30 RX ORDER — PREDNISONE 20 MG/1
TABLET ORAL
Qty: 26 TABLET | Refills: 0 | Status: ON HOLD | OUTPATIENT
Start: 2020-12-30 | End: 2021-06-21 | Stop reason: HOSPADM

## 2021-02-22 ENCOUNTER — TELEPHONE (OUTPATIENT)
Dept: FAMILY MEDICINE | Facility: CLINIC | Age: 51
End: 2021-02-22

## 2021-02-23 ENCOUNTER — TELEPHONE (OUTPATIENT)
Dept: FAMILY MEDICINE | Facility: CLINIC | Age: 51
End: 2021-02-23

## 2021-02-23 ENCOUNTER — OFFICE VISIT (OUTPATIENT)
Dept: FAMILY MEDICINE | Facility: CLINIC | Age: 51
End: 2021-02-23
Payer: COMMERCIAL

## 2021-02-23 VITALS
SYSTOLIC BLOOD PRESSURE: 123 MMHG | TEMPERATURE: 99 F | OXYGEN SATURATION: 100 % | BODY MASS INDEX: 27.75 KG/M2 | DIASTOLIC BLOOD PRESSURE: 76 MMHG | HEART RATE: 76 BPM | HEIGHT: 70 IN | WEIGHT: 193.81 LBS

## 2021-02-23 DIAGNOSIS — E11.22 CONTROLLED TYPE 2 DIABETES MELLITUS WITH STAGE 2 CHRONIC KIDNEY DISEASE, WITHOUT LONG-TERM CURRENT USE OF INSULIN: ICD-10-CM

## 2021-02-23 DIAGNOSIS — Z01.810 PREOP CARDIOVASCULAR EXAM: Primary | ICD-10-CM

## 2021-02-23 DIAGNOSIS — I25.10 CORONARY ARTERY DISEASE WITHOUT ANGINA PECTORIS, UNSPECIFIED VESSEL OR LESION TYPE, UNSPECIFIED WHETHER NATIVE OR TRANSPLANTED HEART: ICD-10-CM

## 2021-02-23 DIAGNOSIS — E11.59 HYPERTENSION ASSOCIATED WITH DIABETES: ICD-10-CM

## 2021-02-23 DIAGNOSIS — D69.6 THROMBOCYTOPENIA: ICD-10-CM

## 2021-02-23 DIAGNOSIS — F41.9 ANXIETY: ICD-10-CM

## 2021-02-23 DIAGNOSIS — N18.2 CONTROLLED TYPE 2 DIABETES MELLITUS WITH STAGE 2 CHRONIC KIDNEY DISEASE, WITHOUT LONG-TERM CURRENT USE OF INSULIN: ICD-10-CM

## 2021-02-23 DIAGNOSIS — I15.2 HYPERTENSION ASSOCIATED WITH DIABETES: ICD-10-CM

## 2021-02-23 DIAGNOSIS — R63.4 UNINTENDED WEIGHT LOSS: ICD-10-CM

## 2021-02-23 DIAGNOSIS — M47.26 OSTEOARTHRITIS OF SPINE WITH RADICULOPATHY, LUMBAR REGION: ICD-10-CM

## 2021-02-23 DIAGNOSIS — J44.9 MODERATE COPD (CHRONIC OBSTRUCTIVE PULMONARY DISEASE): ICD-10-CM

## 2021-02-23 PROCEDURE — 3044F PR MOST RECENT HEMOGLOBIN A1C LEVEL <7.0%: ICD-10-PCS | Mod: CPTII,S$GLB,, | Performed by: INTERNAL MEDICINE

## 2021-02-23 PROCEDURE — 1125F AMNT PAIN NOTED PAIN PRSNT: CPT | Mod: S$GLB,,, | Performed by: INTERNAL MEDICINE

## 2021-02-23 PROCEDURE — 99214 PR OFFICE/OUTPT VISIT, EST, LEVL IV, 30-39 MIN: ICD-10-PCS | Mod: S$GLB,,, | Performed by: INTERNAL MEDICINE

## 2021-02-23 PROCEDURE — 3078F PR MOST RECENT DIASTOLIC BLOOD PRESSURE < 80 MM HG: ICD-10-PCS | Mod: CPTII,S$GLB,, | Performed by: INTERNAL MEDICINE

## 2021-02-23 PROCEDURE — 3074F PR MOST RECENT SYSTOLIC BLOOD PRESSURE < 130 MM HG: ICD-10-PCS | Mod: CPTII,S$GLB,, | Performed by: INTERNAL MEDICINE

## 2021-02-23 PROCEDURE — 3008F BODY MASS INDEX DOCD: CPT | Mod: CPTII,S$GLB,, | Performed by: INTERNAL MEDICINE

## 2021-02-23 PROCEDURE — 3008F PR BODY MASS INDEX (BMI) DOCUMENTED: ICD-10-PCS | Mod: CPTII,S$GLB,, | Performed by: INTERNAL MEDICINE

## 2021-02-23 PROCEDURE — 99214 OFFICE O/P EST MOD 30 MIN: CPT | Mod: S$GLB,,, | Performed by: INTERNAL MEDICINE

## 2021-02-23 PROCEDURE — 3074F SYST BP LT 130 MM HG: CPT | Mod: CPTII,S$GLB,, | Performed by: INTERNAL MEDICINE

## 2021-02-23 PROCEDURE — 3078F DIAST BP <80 MM HG: CPT | Mod: CPTII,S$GLB,, | Performed by: INTERNAL MEDICINE

## 2021-02-23 PROCEDURE — 1125F PR PAIN SEVERITY QUANTIFIED, PAIN PRESENT: ICD-10-PCS | Mod: S$GLB,,, | Performed by: INTERNAL MEDICINE

## 2021-02-23 PROCEDURE — 3044F HG A1C LEVEL LT 7.0%: CPT | Mod: CPTII,S$GLB,, | Performed by: INTERNAL MEDICINE

## 2021-02-23 PROCEDURE — 99999 PR PBB SHADOW E&M-EST. PATIENT-LVL IV: CPT | Mod: PBBFAC,,, | Performed by: INTERNAL MEDICINE

## 2021-02-23 PROCEDURE — 99999 PR PBB SHADOW E&M-EST. PATIENT-LVL IV: ICD-10-PCS | Mod: PBBFAC,,, | Performed by: INTERNAL MEDICINE

## 2021-03-25 ENCOUNTER — IMMUNIZATION (OUTPATIENT)
Dept: INTERNAL MEDICINE | Facility: CLINIC | Age: 51
End: 2021-03-25
Payer: COMMERCIAL

## 2021-03-25 DIAGNOSIS — Z23 NEED FOR VACCINATION: Primary | ICD-10-CM

## 2021-03-25 PROCEDURE — 0001A COVID-19, MRNA, LNP-S, PF, 30 MCG/0.3 ML DOSE VACCINE: CPT | Mod: CV19,S$GLB,, | Performed by: FAMILY MEDICINE

## 2021-03-25 PROCEDURE — 0001A COVID-19, MRNA, LNP-S, PF, 30 MCG/0.3 ML DOSE VACCINE: ICD-10-PCS | Mod: CV19,S$GLB,, | Performed by: FAMILY MEDICINE

## 2021-03-25 PROCEDURE — 91300 COVID-19, MRNA, LNP-S, PF, 30 MCG/0.3 ML DOSE VACCINE: CPT | Mod: S$GLB,,, | Performed by: FAMILY MEDICINE

## 2021-03-25 PROCEDURE — 91300 COVID-19, MRNA, LNP-S, PF, 30 MCG/0.3 ML DOSE VACCINE: ICD-10-PCS | Mod: S$GLB,,, | Performed by: FAMILY MEDICINE

## 2021-04-12 DIAGNOSIS — E78.2 MIXED HYPERLIPIDEMIA: ICD-10-CM

## 2021-04-13 RX ORDER — FENOFIBRATE 160 MG/1
160 TABLET ORAL DAILY
Qty: 30 TABLET | Refills: 6 | Status: SHIPPED | OUTPATIENT
Start: 2021-04-13

## 2021-04-15 ENCOUNTER — IMMUNIZATION (OUTPATIENT)
Dept: INTERNAL MEDICINE | Facility: CLINIC | Age: 51
End: 2021-04-15
Payer: COMMERCIAL

## 2021-04-15 DIAGNOSIS — Z23 NEED FOR VACCINATION: Primary | ICD-10-CM

## 2021-04-15 PROCEDURE — 0002A COVID-19, MRNA, LNP-S, PF, 30 MCG/0.3 ML DOSE VACCINE: CPT | Mod: CV19,S$GLB,, | Performed by: FAMILY MEDICINE

## 2021-04-15 PROCEDURE — 0002A COVID-19, MRNA, LNP-S, PF, 30 MCG/0.3 ML DOSE VACCINE: ICD-10-PCS | Mod: CV19,S$GLB,, | Performed by: FAMILY MEDICINE

## 2021-04-15 PROCEDURE — 91300 COVID-19, MRNA, LNP-S, PF, 30 MCG/0.3 ML DOSE VACCINE: CPT | Mod: S$GLB,,, | Performed by: FAMILY MEDICINE

## 2021-04-15 PROCEDURE — 91300 COVID-19, MRNA, LNP-S, PF, 30 MCG/0.3 ML DOSE VACCINE: ICD-10-PCS | Mod: S$GLB,,, | Performed by: FAMILY MEDICINE

## 2021-05-20 ENCOUNTER — PATIENT OUTREACH (OUTPATIENT)
Dept: ADMINISTRATIVE | Facility: HOSPITAL | Age: 51
End: 2021-05-20

## 2021-06-16 DIAGNOSIS — E11.9 TYPE 2 DIABETES MELLITUS WITHOUT COMPLICATION: ICD-10-CM

## 2021-06-19 ENCOUNTER — HOSPITAL ENCOUNTER (INPATIENT)
Facility: HOSPITAL | Age: 51
LOS: 2 days | Discharge: HOME HEALTH | DRG: 809 | End: 2021-06-21
Attending: EMERGENCY MEDICINE | Admitting: INTERNAL MEDICINE
Payer: COMMERCIAL

## 2021-06-19 DIAGNOSIS — K92.1 MELENA: ICD-10-CM

## 2021-06-19 DIAGNOSIS — E87.1 HYPONATREMIA: ICD-10-CM

## 2021-06-19 DIAGNOSIS — I15.2 HYPERTENSION ASSOCIATED WITH DIABETES: ICD-10-CM

## 2021-06-19 DIAGNOSIS — D61.818 PANCYTOPENIA: ICD-10-CM

## 2021-06-19 DIAGNOSIS — E11.59 HYPERTENSION ASSOCIATED WITH DIABETES: ICD-10-CM
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
## 2021-06-19 DIAGNOSIS — M79.603 ARM PAIN: ICD-10-CM

## 2021-06-19 DIAGNOSIS — D64.9 ANEMIA, UNSPECIFIED TYPE: ICD-10-CM

## 2021-06-19 DIAGNOSIS — R94.31 ABNORMAL EKG: ICD-10-CM

## 2021-06-19 DIAGNOSIS — S42.331A CLOSED DISPLACED OBLIQUE FRACTURE OF SHAFT OF RIGHT HUMERUS, INITIAL ENCOUNTER: Primary | ICD-10-CM

## 2021-06-19 DIAGNOSIS — E87.6 HYPOKALEMIA: ICD-10-CM

## 2021-06-19 DIAGNOSIS — D64.9 SYMPTOMATIC ANEMIA: ICD-10-CM

## 2021-06-19 DIAGNOSIS — R53.1 WEAKNESS: ICD-10-CM

## 2021-06-19 PROBLEM — S42.301A CLOSED FRACTURE OF RIGHT HUMERUS: Status: ACTIVE | Noted: 2021-06-19

## 2021-06-19 PROBLEM — F10.10 ETOH ABUSE: Status: ACTIVE | Noted: 2021-06-19

## 2021-06-19 LAB
ABO + RH BLD: NORMAL
ALBUMIN SERPL BCP-MCNC: 3 G/DL (ref 3.5–5.2)
ALP SERPL-CCNC: 51 U/L (ref 55–135)
ALT SERPL W/O P-5'-P-CCNC: 10 U/L (ref 10–44)
ANION GAP SERPL CALC-SCNC: 15 MMOL/L (ref 8–16)
AST SERPL-CCNC: 19 U/L (ref 10–40)
BASOPHILS # BLD AUTO: ABNORMAL K/UL (ref 0–0.2)
BASOPHILS NFR BLD: 0 % (ref 0–1.9)
BILIRUB SERPL-MCNC: 2.7 MG/DL (ref 0.1–1)
BLD GP AB SCN CELLS X3 SERPL QL: NORMAL
BLD PROD TYP BPU: NORMAL
BLD PROD TYP BPU: NORMAL
BLOOD UNIT EXPIRATION DATE: NORMAL
BLOOD UNIT EXPIRATION DATE: NORMAL
BLOOD UNIT TYPE CODE: 6200
BLOOD UNIT TYPE CODE: 9500
BLOOD UNIT TYPE: NORMAL
BLOOD UNIT TYPE: NORMAL
BNP SERPL-MCNC: 52 PG/ML (ref 0–99)
BUN SERPL-MCNC: 13 MG/DL (ref 6–20)
CALCIUM SERPL-MCNC: 7.6 MG/DL (ref 8.7–10.5)
CHLORIDE SERPL-SCNC: 84 MMOL/L (ref 95–110)
CK SERPL-CCNC: 95 U/L (ref 20–200)
CO2 SERPL-SCNC: 24 MMOL/L (ref 23–29)
CODING SYSTEM: NORMAL
CODING SYSTEM: NORMAL
CREAT SERPL-MCNC: 1.3 MG/DL (ref 0.5–1.4)
DIFFERENTIAL METHOD: ABNORMAL
DISPENSE STATUS: NORMAL
DISPENSE STATUS: NORMAL
EOSINOPHIL # BLD AUTO: ABNORMAL K/UL (ref 0–0.5)
EOSINOPHIL NFR BLD: 2 % (ref 0–8)
ERYTHROCYTE [DISTWIDTH] IN BLOOD BY AUTOMATED COUNT: 20.5 % (ref 11.5–14.5)
EST. GFR  (AFRICAN AMERICAN): >60 ML/MIN/1.73 M^2
EST. GFR  (NON AFRICAN AMERICAN): >60 ML/MIN/1.73 M^2
ETHANOL SERPL-MCNC: 106 MG/DL
GLUCOSE SERPL-MCNC: 100 MG/DL (ref 70–110)
HCT VFR BLD AUTO: 18.5 % (ref 40–54)
HCT VFR BLD AUTO: 24.7 % (ref 40–54)
HCV AB SERPL QL IA: NEGATIVE
HEP C VIRUS HOLD SPECIMEN: NORMAL
HGB BLD-MCNC: 6.4 G/DL (ref 14–18)
HGB BLD-MCNC: 8.4 G/DL (ref 14–18)
HIV 1+2 AB+HIV1 P24 AG SERPL QL IA: NEGATIVE
IMM GRANULOCYTES # BLD AUTO: ABNORMAL K/UL (ref 0–0.04)
IMM GRANULOCYTES NFR BLD AUTO: ABNORMAL % (ref 0–0.5)
LYMPHOCYTES # BLD AUTO: ABNORMAL K/UL (ref 1–4.8)
LYMPHOCYTES NFR BLD: 25 % (ref 18–48)
MCH RBC QN AUTO: 37.4 PG (ref 27–31)
MCHC RBC AUTO-ENTMCNC: 34.6 G/DL (ref 32–36)
MCV RBC AUTO: 108 FL (ref 82–98)
MONOCYTES # BLD AUTO: ABNORMAL K/UL (ref 0.3–1)
MONOCYTES NFR BLD: 17 % (ref 4–15)
NEUTROPHILS NFR BLD: 54 % (ref 38–73)
NEUTS BAND NFR BLD MANUAL: 2 %
NRBC BLD-RTO: 0 /100 WBC
NUM UNITS TRANS PACKED RBC: NORMAL
NUM UNITS TRANS PACKED RBC: NORMAL
PLATELET # BLD AUTO: 112 K/UL (ref 150–450)
PMV BLD AUTO: 9.4 FL (ref 9.2–12.9)
POTASSIUM SERPL-SCNC: 2.8 MMOL/L (ref 3.5–5.1)
POTASSIUM SERPL-SCNC: 2.8 MMOL/L (ref 3.5–5.1)
PROT SERPL-MCNC: 5.1 G/DL (ref 6–8.4)
RBC # BLD AUTO: 1.71 M/UL (ref 4.6–6.2)
SODIUM SERPL-SCNC: 123 MMOL/L (ref 136–145)
TROPONIN I SERPL DL<=0.01 NG/ML-MCNC: <0.006 NG/ML (ref 0–0.03)
WBC # BLD AUTO: 1.81 K/UL (ref 3.9–12.7)

## 2021-06-19 PROCEDURE — 63600175 PHARM REV CODE 636 W HCPCS: Performed by: EMERGENCY MEDICINE

## 2021-06-19 PROCEDURE — 97110 THERAPEUTIC EXERCISES: CPT

## 2021-06-19 PROCEDURE — 21400001 HC TELEMETRY ROOM

## 2021-06-19 PROCEDURE — 85007 BL SMEAR W/DIFF WBC COUNT: CPT | Performed by: EMERGENCY MEDICINE

## 2021-06-19 PROCEDURE — 11000001 HC ACUTE MED/SURG PRIVATE ROOM

## 2021-06-19 PROCEDURE — 83880 ASSAY OF NATRIURETIC PEPTIDE: CPT | Performed by: EMERGENCY MEDICINE

## 2021-06-19 PROCEDURE — 86920 COMPATIBILITY TEST SPIN: CPT | Performed by: EMERGENCY MEDICINE

## 2021-06-19 PROCEDURE — 36415 COLL VENOUS BLD VENIPUNCTURE: CPT | Performed by: EMERGENCY MEDICINE

## 2021-06-19 PROCEDURE — 86920 COMPATIBILITY TEST SPIN: CPT | Performed by: NURSE PRACTITIONER

## 2021-06-19 PROCEDURE — 25000003 PHARM REV CODE 250: Performed by: NURSE PRACTITIONER

## 2021-06-19 PROCEDURE — 85027 COMPLETE CBC AUTOMATED: CPT | Performed by: EMERGENCY MEDICINE

## 2021-06-19 PROCEDURE — P9016 RBC LEUKOCYTES REDUCED: HCPCS | Performed by: EMERGENCY MEDICINE

## 2021-06-19 PROCEDURE — 80053 COMPREHEN METABOLIC PANEL: CPT | Performed by: EMERGENCY MEDICINE

## 2021-06-19 PROCEDURE — 36430 TRANSFUSION BLD/BLD COMPNT: CPT

## 2021-06-19 PROCEDURE — 86803 HEPATITIS C AB TEST: CPT | Performed by: EMERGENCY MEDICINE

## 2021-06-19 PROCEDURE — 96360 HYDRATION IV INFUSION INIT: CPT

## 2021-06-19 PROCEDURE — 99291 CRITICAL CARE FIRST HOUR: CPT | Mod: 25

## 2021-06-19 PROCEDURE — 99223 1ST HOSP IP/OBS HIGH 75: CPT | Mod: ,,, | Performed by: INTERNAL MEDICINE

## 2021-06-19 PROCEDURE — 25000003 PHARM REV CODE 250: Performed by: EMERGENCY MEDICINE

## 2021-06-19 PROCEDURE — 85014 HEMATOCRIT: CPT | Mod: 91 | Performed by: NURSE PRACTITIONER

## 2021-06-19 PROCEDURE — 63600175 PHARM REV CODE 636 W HCPCS: Performed by: NURSE PRACTITIONER

## 2021-06-19 PROCEDURE — 82550 ASSAY OF CK (CPK): CPT | Performed by: EMERGENCY MEDICINE

## 2021-06-19 PROCEDURE — 93005 ELECTROCARDIOGRAM TRACING: CPT

## 2021-06-19 PROCEDURE — 84484 ASSAY OF TROPONIN QUANT: CPT | Performed by: EMERGENCY MEDICINE

## 2021-06-19 PROCEDURE — 97162 PT EVAL MOD COMPLEX 30 MIN: CPT

## 2021-06-19 PROCEDURE — 86900 BLOOD TYPING SEROLOGIC ABO: CPT | Performed by: EMERGENCY MEDICINE

## 2021-06-19 PROCEDURE — 97530 THERAPEUTIC ACTIVITIES: CPT

## 2021-06-19 PROCEDURE — 85018 HEMOGLOBIN: CPT | Mod: 91 | Performed by: NURSE PRACTITIONER

## 2021-06-19 PROCEDURE — 82077 ASSAY SPEC XCP UR&BREATH IA: CPT | Performed by: EMERGENCY MEDICINE

## 2021-06-19 PROCEDURE — 84132 ASSAY OF SERUM POTASSIUM: CPT | Performed by: NURSE PRACTITIONER

## 2021-06-19 PROCEDURE — 93010 EKG 12-LEAD: ICD-10-PCS | Mod: 76,,, | Performed by: INTERNAL MEDICINE

## 2021-06-19 PROCEDURE — 86703 HIV-1/HIV-2 1 RESULT ANTBDY: CPT | Performed by: EMERGENCY MEDICINE

## 2021-06-19 PROCEDURE — 29105 APPLICATION LONG ARM SPLINT: CPT | Mod: RT

## 2021-06-19 PROCEDURE — C9113 INJ PANTOPRAZOLE SODIUM, VIA: HCPCS | Performed by: EMERGENCY MEDICINE

## 2021-06-19 PROCEDURE — 93010 ELECTROCARDIOGRAM REPORT: CPT | Mod: 76,,, | Performed by: INTERNAL MEDICINE

## 2021-06-19 PROCEDURE — 99223 PR INITIAL HOSPITAL CARE,LEVL III: ICD-10-PCS | Mod: ,,, | Performed by: INTERNAL MEDICINE

## 2021-06-19 PROCEDURE — P9016 RBC LEUKOCYTES REDUCED: HCPCS | Performed by: NURSE PRACTITIONER

## 2021-06-19 PROCEDURE — 36415 COLL VENOUS BLD VENIPUNCTURE: CPT | Performed by: NURSE PRACTITIONER

## 2021-06-19 RX ORDER — MORPHINE SULFATE 2 MG/ML
2 INJECTION, SOLUTION INTRAMUSCULAR; INTRAVENOUS
Status: COMPLETED | OUTPATIENT
Start: 2021-06-19 | End: 2021-06-19

## 2021-06-19 RX ORDER — ORPHENADRINE CITRATE 100 MG/1
100 TABLET, EXTENDED RELEASE ORAL 2 TIMES DAILY PRN
COMMUNITY
Start: 2021-06-02

## 2021-06-19 RX ORDER — SODIUM CHLORIDE 0.9 % (FLUSH) 0.9 %
10 SYRINGE (ML) INJECTION
Status: DISCONTINUED | OUTPATIENT
Start: 2021-06-19 | End: 2021-06-21 | Stop reason: HOSPADM

## 2021-06-19 RX ORDER — LANOLIN ALCOHOL/MO/W.PET/CERES
800 CREAM (GRAM) TOPICAL
Status: DISCONTINUED | OUTPATIENT
Start: 2021-06-19 | End: 2021-06-21 | Stop reason: HOSPADM

## 2021-06-19 RX ORDER — POLYETHYLENE GLYCOL 3350 17 G/17G
17 POWDER, FOR SOLUTION ORAL DAILY PRN
Status: DISCONTINUED | OUTPATIENT
Start: 2021-06-19 | End: 2021-06-21 | Stop reason: HOSPADM

## 2021-06-19 RX ORDER — SODIUM CHLORIDE 9 MG/ML
INJECTION, SOLUTION INTRAVENOUS CONTINUOUS
Status: DISCONTINUED | OUTPATIENT
Start: 2021-06-19 | End: 2021-06-20

## 2021-06-19 RX ORDER — ONDANSETRON 2 MG/ML
4 INJECTION INTRAMUSCULAR; INTRAVENOUS EVERY 8 HOURS PRN
Status: DISCONTINUED | OUTPATIENT
Start: 2021-06-19 | End: 2021-06-21 | Stop reason: HOSPADM

## 2021-06-19 RX ORDER — HYDROCODONE BITARTRATE AND ACETAMINOPHEN 500; 5 MG/1; MG/1
TABLET ORAL
Status: DISCONTINUED | OUTPATIENT
Start: 2021-06-19 | End: 2021-06-21 | Stop reason: HOSPADM

## 2021-06-19 RX ORDER — POTASSIUM CHLORIDE 20 MEQ/1
40 TABLET, EXTENDED RELEASE ORAL EVERY 4 HOURS
Status: COMPLETED | OUTPATIENT
Start: 2021-06-19 | End: 2021-06-19

## 2021-06-19 RX ORDER — SODIUM,POTASSIUM PHOSPHATES 280-250MG
2 POWDER IN PACKET (EA) ORAL
Status: DISCONTINUED | OUTPATIENT
Start: 2021-06-19 | End: 2021-06-21 | Stop reason: HOSPADM

## 2021-06-19 RX ORDER — PANTOPRAZOLE SODIUM 40 MG/1
40 TABLET, DELAYED RELEASE ORAL 2 TIMES DAILY
Status: DISCONTINUED | OUTPATIENT
Start: 2021-06-19 | End: 2021-06-21 | Stop reason: HOSPADM

## 2021-06-19 RX ORDER — OXYCODONE AND ACETAMINOPHEN 10; 325 MG/1; MG/1
1 TABLET ORAL EVERY 4 HOURS PRN
Status: DISCONTINUED | OUTPATIENT
Start: 2021-06-19 | End: 2021-06-21 | Stop reason: HOSPADM

## 2021-06-19 RX ORDER — TALC
6 POWDER (GRAM) TOPICAL NIGHTLY PRN
Status: DISCONTINUED | OUTPATIENT
Start: 2021-06-19 | End: 2021-06-21 | Stop reason: HOSPADM

## 2021-06-19 RX ORDER — POTASSIUM CHLORIDE 7.45 MG/ML
10 INJECTION INTRAVENOUS
Status: COMPLETED | OUTPATIENT
Start: 2021-06-19 | End: 2021-06-19

## 2021-06-19 RX ORDER — CHLORDIAZEPOXIDE HYDROCHLORIDE 5 MG/1
10 CAPSULE, GELATIN COATED ORAL 3 TIMES DAILY
Status: DISCONTINUED | OUTPATIENT
Start: 2021-06-19 | End: 2021-06-21 | Stop reason: HOSPADM

## 2021-06-19 RX ORDER — HYDROCODONE BITARTRATE AND ACETAMINOPHEN 500; 5 MG/1; MG/1
TABLET ORAL
Status: DISCONTINUED | OUTPATIENT
Start: 2021-06-19 | End: 2021-06-19

## 2021-06-19 RX ORDER — DIAZEPAM 5 MG/1
5 TABLET ORAL NIGHTLY
COMMUNITY
Start: 2021-02-26 | End: 2021-06-25

## 2021-06-19 RX ORDER — PANTOPRAZOLE SODIUM 40 MG/10ML
40 INJECTION, POWDER, LYOPHILIZED, FOR SOLUTION INTRAVENOUS
Status: COMPLETED | OUTPATIENT
Start: 2021-06-19 | End: 2021-06-19

## 2021-06-19 RX ORDER — ACETAMINOPHEN 325 MG/1
650 TABLET ORAL EVERY 4 HOURS PRN
Status: DISCONTINUED | OUTPATIENT
Start: 2021-06-19 | End: 2021-06-21 | Stop reason: HOSPADM

## 2021-06-19 RX ORDER — RANOLAZINE 500 MG/1
1000 TABLET, EXTENDED RELEASE ORAL 2 TIMES DAILY
Status: DISCONTINUED | OUTPATIENT
Start: 2021-06-19 | End: 2021-06-21 | Stop reason: HOSPADM

## 2021-06-19 RX ADMIN — PANTOPRAZOLE SODIUM 40 MG: 40 INJECTION, POWDER, FOR SOLUTION INTRAVENOUS at 08:06

## 2021-06-19 RX ADMIN — CHLORDIAZEPOXIDE HYDROCHLORIDE 10 MG: 5 CAPSULE ORAL at 08:06

## 2021-06-19 RX ADMIN — POTASSIUM CHLORIDE 40 MEQ: 1500 TABLET, EXTENDED RELEASE ORAL at 01:06

## 2021-06-19 RX ADMIN — POTASSIUM CHLORIDE 10 MEQ: 7.46 INJECTION, SOLUTION INTRAVENOUS at 08:06

## 2021-06-19 RX ADMIN — SODIUM CHLORIDE: 0.9 INJECTION, SOLUTION INTRAVENOUS at 01:06

## 2021-06-19 RX ADMIN — MORPHINE SULFATE 2 MG: 2 INJECTION, SOLUTION INTRAMUSCULAR; INTRAVENOUS at 08:06

## 2021-06-19 RX ADMIN — SODIUM CHLORIDE 500 ML: 0.9 INJECTION, SOLUTION INTRAVENOUS at 10:06

## 2021-06-19 RX ADMIN — MORPHINE SULFATE 2 MG: 2 INJECTION, SOLUTION INTRAMUSCULAR; INTRAVENOUS at 10:06

## 2021-06-19 RX ADMIN — OXYCODONE AND ACETAMINOPHEN 1 TABLET: 325; 10 TABLET ORAL at 05:06

## 2021-06-19 RX ADMIN — CHLORDIAZEPOXIDE HYDROCHLORIDE 10 MG: 5 CAPSULE ORAL at 03:06

## 2021-06-19 RX ADMIN — SODIUM CHLORIDE 1000 ML: 0.9 INJECTION, SOLUTION INTRAVENOUS at 07:06

## 2021-06-19 RX ADMIN — MORPHINE SULFATE 2 MG: 2 INJECTION, SOLUTION INTRAMUSCULAR; INTRAVENOUS at 11:06

## 2021-06-19 RX ADMIN — OXYCODONE AND ACETAMINOPHEN 1 TABLET: 325; 10 TABLET ORAL at 10:06

## 2021-06-19 RX ADMIN — PANTOPRAZOLE SODIUM 40 MG: 40 TABLET, DELAYED RELEASE ORAL at 08:06

## 2021-06-19 RX ADMIN — POTASSIUM CHLORIDE 40 MEQ: 1500 TABLET, EXTENDED RELEASE ORAL at 05:06

## 2021-06-19 RX ADMIN — OXYCODONE AND ACETAMINOPHEN 1 TABLET: 325; 10 TABLET ORAL at 01:06

## 2021-06-19 RX ADMIN — RANOLAZINE 1000 MG: 500 TABLET, EXTENDED RELEASE ORAL at 08:06

## 2021-06-19 RX ADMIN — Medication 6 MG: at 10:06

## 2021-06-19 RX ADMIN — POTASSIUM BICARBONATE 20 MEQ: 391 TABLET, EFFERVESCENT ORAL at 08:06

## 2021-06-19 RX ADMIN — SODIUM CHLORIDE: 0.9 INJECTION, SOLUTION INTRAVENOUS at 09:06

## 2021-06-20 LAB
ALBUMIN SERPL BCP-MCNC: 2.9 G/DL (ref 3.5–5.2)
ALP SERPL-CCNC: 59 U/L (ref 55–135)
ALT SERPL W/O P-5'-P-CCNC: 11 U/L (ref 10–44)
ANION GAP SERPL CALC-SCNC: 13 MMOL/L (ref 8–16)
AST SERPL-CCNC: 20 U/L (ref 10–40)
BASOPHILS # BLD AUTO: 0.01 K/UL (ref 0–0.2)
BASOPHILS NFR BLD: 1.2 % (ref 0–1.9)
BILIRUB SERPL-MCNC: 3.4 MG/DL (ref 0.1–1)
BILIRUB UR QL STRIP: NEGATIVE
BUN SERPL-MCNC: 10 MG/DL (ref 6–20)
CALCIUM SERPL-MCNC: 7.8 MG/DL (ref 8.7–10.5)
CHLORIDE SERPL-SCNC: 97 MMOL/L (ref 95–110)
CLARITY UR: CLEAR
CO2 SERPL-SCNC: 21 MMOL/L (ref 23–29)
COLOR UR: YELLOW
CREAT SERPL-MCNC: 0.8 MG/DL (ref 0.5–1.4)
DIFFERENTIAL METHOD: ABNORMAL
EOSINOPHIL # BLD AUTO: 0 K/UL (ref 0–0.5)
EOSINOPHIL NFR BLD: 1.2 % (ref 0–8)
ERYTHROCYTE [DISTWIDTH] IN BLOOD BY AUTOMATED COUNT: 25.7 % (ref 11.5–14.5)
EST. GFR  (AFRICAN AMERICAN): >60 ML/MIN/1.73 M^2
EST. GFR  (NON AFRICAN AMERICAN): >60 ML/MIN/1.73 M^2
FERRITIN SERPL-MCNC: 388 NG/ML (ref 20–300)
FOLATE SERPL-MCNC: 16.4 NG/ML (ref 4–24)
GLUCOSE SERPL-MCNC: 87 MG/DL (ref 70–110)
GLUCOSE UR QL STRIP: NEGATIVE
HCT VFR BLD AUTO: 22.1 % (ref 40–54)
HCT VFR BLD AUTO: 25.7 % (ref 40–54)
HGB BLD-MCNC: 7.6 G/DL (ref 14–18)
HGB BLD-MCNC: 8.6 G/DL (ref 14–18)
HGB UR QL STRIP: NEGATIVE
IMM GRANULOCYTES # BLD AUTO: 0.01 K/UL (ref 0–0.04)
IMM GRANULOCYTES NFR BLD AUTO: 1.2 % (ref 0–0.5)
KETONES UR QL STRIP: NEGATIVE
LEUKOCYTE ESTERASE UR QL STRIP: NEGATIVE
LYMPHOCYTES # BLD AUTO: 0.3 K/UL (ref 1–4.8)
LYMPHOCYTES NFR BLD: 37.6 % (ref 18–48)
MAGNESIUM SERPL-MCNC: 1.1 MG/DL (ref 1.6–2.6)
MCH RBC QN AUTO: 34.8 PG (ref 27–31)
MCHC RBC AUTO-ENTMCNC: 33.5 G/DL (ref 32–36)
MCV RBC AUTO: 104 FL (ref 82–98)
MONOCYTES # BLD AUTO: 0.1 K/UL (ref 0.3–1)
MONOCYTES NFR BLD: 15.3 % (ref 4–15)
NEUTROPHILS # BLD AUTO: 0.4 K/UL (ref 1.8–7.7)
NEUTROPHILS NFR BLD: 43.5 % (ref 38–73)
NITRITE UR QL STRIP: NEGATIVE
NRBC BLD-RTO: 0 /100 WBC
PH UR STRIP: 6 [PH] (ref 5–8)
PHOSPHATE SERPL-MCNC: 1.9 MG/DL (ref 2.7–4.5)
PLATELET # BLD AUTO: 77 K/UL (ref 150–450)
PLATELET BLD QL SMEAR: ABNORMAL
PMV BLD AUTO: 10 FL (ref 9.2–12.9)
POTASSIUM SERPL-SCNC: 3 MMOL/L (ref 3.5–5.1)
PROCALCITONIN SERPL IA-MCNC: 0.06 NG/ML
PROT SERPL-MCNC: 5.2 G/DL (ref 6–8.4)
PROT UR QL STRIP: NEGATIVE
RBC # BLD AUTO: 2.47 M/UL (ref 4.6–6.2)
SODIUM SERPL-SCNC: 131 MMOL/L (ref 136–145)
SP GR UR STRIP: 1.01 (ref 1–1.03)
URN SPEC COLLECT METH UR: NORMAL
UROBILINOGEN UR STRIP-ACNC: 1 EU/DL
VIT B12 SERPL-MCNC: 1223 PG/ML (ref 210–950)
WBC # BLD AUTO: 0.85 K/UL (ref 3.9–12.7)

## 2021-06-20 PROCEDURE — 97530 THERAPEUTIC ACTIVITIES: CPT

## 2021-06-20 PROCEDURE — 84100 ASSAY OF PHOSPHORUS: CPT | Performed by: NURSE PRACTITIONER

## 2021-06-20 PROCEDURE — 88189 PR  FLOWCYTOMETRY/READ, 16 & > MARKERS: ICD-10-PCS | Mod: ,,, | Performed by: PATHOLOGY

## 2021-06-20 PROCEDURE — 36415 COLL VENOUS BLD VENIPUNCTURE: CPT | Performed by: INTERNAL MEDICINE

## 2021-06-20 PROCEDURE — 80053 COMPREHEN METABOLIC PANEL: CPT | Performed by: NURSE PRACTITIONER

## 2021-06-20 PROCEDURE — 99222 1ST HOSP IP/OBS MODERATE 55: CPT | Mod: ,,, | Performed by: INTERNAL MEDICINE

## 2021-06-20 PROCEDURE — 82728 ASSAY OF FERRITIN: CPT | Performed by: INTERNAL MEDICINE

## 2021-06-20 PROCEDURE — 99233 SBSQ HOSP IP/OBS HIGH 50: CPT | Mod: ,,, | Performed by: INTERNAL MEDICINE

## 2021-06-20 PROCEDURE — 88185 FLOWCYTOMETRY/TC ADD-ON: CPT | Mod: 59 | Performed by: PATHOLOGY

## 2021-06-20 PROCEDURE — 87040 BLOOD CULTURE FOR BACTERIA: CPT | Performed by: INTERNAL MEDICINE

## 2021-06-20 PROCEDURE — 99222 PR INITIAL HOSPITAL CARE,LEVL II: ICD-10-PCS | Mod: ,,, | Performed by: INTERNAL MEDICINE

## 2021-06-20 PROCEDURE — 25000003 PHARM REV CODE 250: Performed by: INTERNAL MEDICINE

## 2021-06-20 PROCEDURE — 99233 PR SUBSEQUENT HOSPITAL CARE,LEVL III: ICD-10-PCS | Mod: ,,, | Performed by: INTERNAL MEDICINE

## 2021-06-20 PROCEDURE — 88189 FLOWCYTOMETRY/READ 16 & >: CPT | Mod: ,,, | Performed by: PATHOLOGY

## 2021-06-20 PROCEDURE — 99222 PR INITIAL HOSPITAL CARE,LEVL II: ICD-10-PCS | Mod: ,,, | Performed by: ORTHOPAEDIC SURGERY

## 2021-06-20 PROCEDURE — 25000003 PHARM REV CODE 250: Performed by: NURSE PRACTITIONER

## 2021-06-20 PROCEDURE — 85025 COMPLETE CBC W/AUTO DIFF WBC: CPT | Performed by: NURSE PRACTITIONER

## 2021-06-20 PROCEDURE — 63600175 PHARM REV CODE 636 W HCPCS: Performed by: INTERNAL MEDICINE

## 2021-06-20 PROCEDURE — 94640 AIRWAY INHALATION TREATMENT: CPT

## 2021-06-20 PROCEDURE — 81003 URINALYSIS AUTO W/O SCOPE: CPT | Performed by: INTERNAL MEDICINE

## 2021-06-20 PROCEDURE — 88184 FLOWCYTOMETRY/ TC 1 MARKER: CPT | Performed by: PATHOLOGY

## 2021-06-20 PROCEDURE — 83735 ASSAY OF MAGNESIUM: CPT | Performed by: NURSE PRACTITIONER

## 2021-06-20 PROCEDURE — 21400001 HC TELEMETRY ROOM

## 2021-06-20 PROCEDURE — 97116 GAIT TRAINING THERAPY: CPT | Mod: CQ

## 2021-06-20 PROCEDURE — 99222 1ST HOSP IP/OBS MODERATE 55: CPT | Mod: ,,, | Performed by: ORTHOPAEDIC SURGERY

## 2021-06-20 PROCEDURE — 25000242 PHARM REV CODE 250 ALT 637 W/ HCPCS: Performed by: INTERNAL MEDICINE

## 2021-06-20 PROCEDURE — 84145 PROCALCITONIN (PCT): CPT | Performed by: INTERNAL MEDICINE

## 2021-06-20 PROCEDURE — 82746 ASSAY OF FOLIC ACID SERUM: CPT | Performed by: INTERNAL MEDICINE

## 2021-06-20 PROCEDURE — 94760 N-INVAS EAR/PLS OXIMETRY 1: CPT

## 2021-06-20 PROCEDURE — 11000001 HC ACUTE MED/SURG PRIVATE ROOM

## 2021-06-20 PROCEDURE — 82607 VITAMIN B-12: CPT | Performed by: INTERNAL MEDICINE

## 2021-06-20 PROCEDURE — 97165 OT EVAL LOW COMPLEX 30 MIN: CPT

## 2021-06-20 RX ORDER — LOPERAMIDE HYDROCHLORIDE 2 MG/1
2 CAPSULE ORAL EVERY 8 HOURS PRN
Status: DISCONTINUED | OUTPATIENT
Start: 2021-06-20 | End: 2021-06-21 | Stop reason: HOSPADM

## 2021-06-20 RX ORDER — SODIUM,POTASSIUM PHOSPHATES 280-250MG
2 POWDER IN PACKET (EA) ORAL 3 TIMES DAILY
Status: COMPLETED | OUTPATIENT
Start: 2021-06-20 | End: 2021-06-20

## 2021-06-20 RX ORDER — ATORVASTATIN CALCIUM 10 MG/1
20 TABLET, FILM COATED ORAL NIGHTLY
Status: DISCONTINUED | OUTPATIENT
Start: 2021-06-20 | End: 2021-06-21 | Stop reason: HOSPADM

## 2021-06-20 RX ORDER — TRAZODONE HYDROCHLORIDE 50 MG/1
50 TABLET ORAL NIGHTLY PRN
Status: DISCONTINUED | OUTPATIENT
Start: 2021-06-20 | End: 2021-06-21 | Stop reason: HOSPADM

## 2021-06-20 RX ORDER — IPRATROPIUM BROMIDE AND ALBUTEROL SULFATE 2.5; .5 MG/3ML; MG/3ML
3 SOLUTION RESPIRATORY (INHALATION) EVERY 6 HOURS PRN
Status: DISCONTINUED | OUTPATIENT
Start: 2021-06-20 | End: 2021-06-21 | Stop reason: HOSPADM

## 2021-06-20 RX ORDER — FLUTICASONE FUROATE AND VILANTEROL 200; 25 UG/1; UG/1
1 POWDER RESPIRATORY (INHALATION) DAILY
Status: DISCONTINUED | OUTPATIENT
Start: 2021-06-20 | End: 2021-06-20

## 2021-06-20 RX ORDER — ARFORMOTEROL TARTRATE 15 UG/2ML
15 SOLUTION RESPIRATORY (INHALATION) 2 TIMES DAILY
Status: DISCONTINUED | OUTPATIENT
Start: 2021-06-20 | End: 2021-06-21 | Stop reason: HOSPADM

## 2021-06-20 RX ORDER — FENOFIBRATE 160 MG/1
160 TABLET ORAL DAILY
Status: DISCONTINUED | OUTPATIENT
Start: 2021-06-20 | End: 2021-06-21 | Stop reason: HOSPADM

## 2021-06-20 RX ORDER — METOPROLOL TARTRATE 25 MG/1
25 TABLET, FILM COATED ORAL EVERY 12 HOURS
Status: DISCONTINUED | OUTPATIENT
Start: 2021-06-20 | End: 2021-06-21 | Stop reason: HOSPADM

## 2021-06-20 RX ORDER — THIAMINE HCL 100 MG
100 TABLET ORAL DAILY
Status: DISCONTINUED | OUTPATIENT
Start: 2021-06-20 | End: 2021-06-21 | Stop reason: HOSPADM

## 2021-06-20 RX ORDER — MAGNESIUM SULFATE HEPTAHYDRATE 40 MG/ML
2 INJECTION, SOLUTION INTRAVENOUS
Status: COMPLETED | OUTPATIENT
Start: 2021-06-20 | End: 2021-06-20

## 2021-06-20 RX ORDER — BUDESONIDE 0.5 MG/2ML
0.5 INHALANT ORAL EVERY 12 HOURS
Status: DISCONTINUED | OUTPATIENT
Start: 2021-06-20 | End: 2021-06-21 | Stop reason: HOSPADM

## 2021-06-20 RX ORDER — DULOXETIN HYDROCHLORIDE 30 MG/1
60 CAPSULE, DELAYED RELEASE ORAL DAILY
Status: DISCONTINUED | OUTPATIENT
Start: 2021-06-20 | End: 2021-06-21 | Stop reason: HOSPADM

## 2021-06-20 RX ORDER — ALPRAZOLAM 0.25 MG/1
0.25 TABLET ORAL 3 TIMES DAILY PRN
Status: DISCONTINUED | OUTPATIENT
Start: 2021-06-20 | End: 2021-06-21 | Stop reason: HOSPADM

## 2021-06-20 RX ADMIN — PANTOPRAZOLE SODIUM 40 MG: 40 TABLET, DELAYED RELEASE ORAL at 09:06

## 2021-06-20 RX ADMIN — Medication 1 TABLET: at 11:06

## 2021-06-20 RX ADMIN — METOPROLOL TARTRATE 25 MG: 25 TABLET, FILM COATED ORAL at 09:06

## 2021-06-20 RX ADMIN — CHLORDIAZEPOXIDE HYDROCHLORIDE 10 MG: 5 CAPSULE ORAL at 09:06

## 2021-06-20 RX ADMIN — POTASSIUM & SODIUM PHOSPHATES POWDER PACK 280-160-250 MG 2 PACKET: 280-160-250 PACK at 11:06

## 2021-06-20 RX ADMIN — OXYCODONE AND ACETAMINOPHEN 1 TABLET: 325; 10 TABLET ORAL at 07:06

## 2021-06-20 RX ADMIN — RANOLAZINE 1000 MG: 500 TABLET, EXTENDED RELEASE ORAL at 09:06

## 2021-06-20 RX ADMIN — ALPRAZOLAM 0.25 MG: 0.25 TABLET ORAL at 09:06

## 2021-06-20 RX ADMIN — POTASSIUM BICARBONATE 50 MEQ: 978 TABLET, EFFERVESCENT ORAL at 11:06

## 2021-06-20 RX ADMIN — Medication 6 MG: at 09:06

## 2021-06-20 RX ADMIN — OXYCODONE AND ACETAMINOPHEN 1 TABLET: 325; 10 TABLET ORAL at 03:06

## 2021-06-20 RX ADMIN — OXYCODONE AND ACETAMINOPHEN 1 TABLET: 325; 10 TABLET ORAL at 06:06

## 2021-06-20 RX ADMIN — CHLORDIAZEPOXIDE HYDROCHLORIDE 10 MG: 5 CAPSULE ORAL at 03:06

## 2021-06-20 RX ADMIN — MAGNESIUM SULFATE HEPTAHYDRATE 2 G: 40 INJECTION, SOLUTION INTRAVENOUS at 11:06

## 2021-06-20 RX ADMIN — MAGNESIUM SULFATE HEPTAHYDRATE 2 G: 40 INJECTION, SOLUTION INTRAVENOUS at 01:06

## 2021-06-20 RX ADMIN — LOPERAMIDE HYDROCHLORIDE 2 MG: 2 CAPSULE ORAL at 09:06

## 2021-06-20 RX ADMIN — ARFORMOTEROL TARTRATE 15 MCG: 15 SOLUTION RESPIRATORY (INHALATION) at 08:06

## 2021-06-20 RX ADMIN — OXYCODONE AND ACETAMINOPHEN 1 TABLET: 325; 10 TABLET ORAL at 02:06

## 2021-06-20 RX ADMIN — POTASSIUM & SODIUM PHOSPHATES POWDER PACK 280-160-250 MG 2 PACKET: 280-160-250 PACK at 09:06

## 2021-06-20 RX ADMIN — OXYCODONE AND ACETAMINOPHEN 1 TABLET: 325; 10 TABLET ORAL at 11:06

## 2021-06-20 RX ADMIN — SODIUM CHLORIDE: 0.9 INJECTION, SOLUTION INTRAVENOUS at 02:06

## 2021-06-20 RX ADMIN — BUDESONIDE 0.5 MG: 0.5 SUSPENSION RESPIRATORY (INHALATION) at 08:06

## 2021-06-20 RX ADMIN — POTASSIUM & SODIUM PHOSPHATES POWDER PACK 280-160-250 MG 2 PACKET: 280-160-250 PACK at 03:06

## 2021-06-20 RX ADMIN — FENOFIBRATE 160 MG: 160 TABLET ORAL at 09:06

## 2021-06-20 RX ADMIN — Medication 100 MG: at 11:06

## 2021-06-20 RX ADMIN — ALPRAZOLAM 0.25 MG: 0.25 TABLET ORAL at 01:06

## 2021-06-20 RX ADMIN — ATORVASTATIN CALCIUM 20 MG: 10 TABLET, FILM COATED ORAL at 09:06

## 2021-06-20 RX ADMIN — DULOXETINE HYDROCHLORIDE 60 MG: 30 CAPSULE, DELAYED RELEASE ORAL at 09:06

## 2021-06-21 VITALS
DIASTOLIC BLOOD PRESSURE: 64 MMHG | TEMPERATURE: 98 F | HEIGHT: 70 IN | WEIGHT: 200 LBS | OXYGEN SATURATION: 98 % | RESPIRATION RATE: 15 BRPM | BODY MASS INDEX: 28.63 KG/M2 | HEART RATE: 73 BPM | SYSTOLIC BLOOD PRESSURE: 120 MMHG

## 2021-06-21 DIAGNOSIS — J44.9 MODERATE COPD (CHRONIC OBSTRUCTIVE PULMONARY DISEASE): Primary | ICD-10-CM

## 2021-06-21 LAB
ALBUMIN SERPL BCP-MCNC: 2.6 G/DL (ref 3.5–5.2)
ALP SERPL-CCNC: 57 U/L (ref 55–135)
ALT SERPL W/O P-5'-P-CCNC: 11 U/L (ref 10–44)
ANION GAP SERPL CALC-SCNC: 10 MMOL/L (ref 8–16)
ANISOCYTOSIS BLD QL SMEAR: SLIGHT
AST SERPL-CCNC: 20 U/L (ref 10–40)
BASOPHILS # BLD AUTO: 0.01 K/UL (ref 0–0.2)
BASOPHILS NFR BLD: 1 % (ref 0–1.9)
BILIRUB SERPL-MCNC: 3 MG/DL (ref 0.1–1)
BUN SERPL-MCNC: 7 MG/DL (ref 6–20)
CALCIUM SERPL-MCNC: 8 MG/DL (ref 8.7–10.5)
CHLORIDE SERPL-SCNC: 98 MMOL/L (ref 95–110)
CO2 SERPL-SCNC: 23 MMOL/L (ref 23–29)
CREAT SERPL-MCNC: 0.7 MG/DL (ref 0.5–1.4)
DIFFERENTIAL METHOD: ABNORMAL
EOSINOPHIL # BLD AUTO: 0 K/UL (ref 0–0.5)
EOSINOPHIL NFR BLD: 2 % (ref 0–8)
ERYTHROCYTE [DISTWIDTH] IN BLOOD BY AUTOMATED COUNT: 25.2 % (ref 11.5–14.5)
EST. GFR  (AFRICAN AMERICAN): >60 ML/MIN/1.73 M^2
EST. GFR  (NON AFRICAN AMERICAN): >60 ML/MIN/1.73 M^2
FLOW CYTOMETRY ANTIBODIES ANALYZED - BLOOD: NORMAL
FLOW CYTOMETRY COMMENT - BLOOD: NORMAL
FLOW CYTOMETRY INTERPRETATION - BLOOD: NORMAL
GLUCOSE SERPL-MCNC: 74 MG/DL (ref 70–110)
HCT VFR BLD AUTO: 22.4 % (ref 40–54)
HGB BLD-MCNC: 7.5 G/DL (ref 14–18)
IMM GRANULOCYTES # BLD AUTO: 0.01 K/UL (ref 0–0.04)
IMM GRANULOCYTES NFR BLD AUTO: 1 % (ref 0–0.5)
LYMPHOCYTES # BLD AUTO: 0.5 K/UL (ref 1–4.8)
LYMPHOCYTES NFR BLD: 47.5 % (ref 18–48)
MAGNESIUM SERPL-MCNC: 1.6 MG/DL (ref 1.6–2.6)
MCH RBC QN AUTO: 35.5 PG (ref 27–31)
MCHC RBC AUTO-ENTMCNC: 33.5 G/DL (ref 32–36)
MCV RBC AUTO: 106 FL (ref 82–98)
MONOCYTES # BLD AUTO: 0.1 K/UL (ref 0.3–1)
MONOCYTES NFR BLD: 13.9 % (ref 4–15)
NEUTROPHILS # BLD AUTO: 0.4 K/UL (ref 1.8–7.7)
NEUTROPHILS NFR BLD: 34.6 % (ref 38–73)
NRBC BLD-RTO: 0 /100 WBC
OVALOCYTES BLD QL SMEAR: ABNORMAL
PHOSPHATE SERPL-MCNC: 2 MG/DL (ref 2.7–4.5)
PLATELET # BLD AUTO: 106 K/UL (ref 150–450)
PLATELET BLD QL SMEAR: ABNORMAL
PMV BLD AUTO: 10.3 FL (ref 9.2–12.9)
POIKILOCYTOSIS BLD QL SMEAR: SLIGHT
POTASSIUM SERPL-SCNC: 3.2 MMOL/L (ref 3.5–5.1)
PROT SERPL-MCNC: 4.7 G/DL (ref 6–8.4)
RBC # BLD AUTO: 2.11 M/UL (ref 4.6–6.2)
SCHISTOCYTES BLD QL SMEAR: PRESENT
SODIUM SERPL-SCNC: 131 MMOL/L (ref 136–145)
WBC # BLD AUTO: 1.01 K/UL (ref 3.9–12.7)

## 2021-06-21 PROCEDURE — 84100 ASSAY OF PHOSPHORUS: CPT | Performed by: NURSE PRACTITIONER

## 2021-06-21 PROCEDURE — 36415 COLL VENOUS BLD VENIPUNCTURE: CPT | Performed by: NURSE PRACTITIONER

## 2021-06-21 PROCEDURE — 94760 N-INVAS EAR/PLS OXIMETRY 1: CPT

## 2021-06-21 PROCEDURE — 85025 COMPLETE CBC W/AUTO DIFF WBC: CPT | Performed by: NURSE PRACTITIONER

## 2021-06-21 PROCEDURE — 25000242 PHARM REV CODE 250 ALT 637 W/ HCPCS: Performed by: INTERNAL MEDICINE

## 2021-06-21 PROCEDURE — 99232 SBSQ HOSP IP/OBS MODERATE 35: CPT | Mod: ,,, | Performed by: INTERNAL MEDICINE

## 2021-06-21 PROCEDURE — 94640 AIRWAY INHALATION TREATMENT: CPT

## 2021-06-21 PROCEDURE — 83540 ASSAY OF IRON: CPT | Performed by: INTERNAL MEDICINE

## 2021-06-21 PROCEDURE — 36415 COLL VENOUS BLD VENIPUNCTURE: CPT | Performed by: INTERNAL MEDICINE

## 2021-06-21 PROCEDURE — 97110 THERAPEUTIC EXERCISES: CPT | Mod: CQ

## 2021-06-21 PROCEDURE — 97530 THERAPEUTIC ACTIVITIES: CPT

## 2021-06-21 PROCEDURE — 97110 THERAPEUTIC EXERCISES: CPT

## 2021-06-21 PROCEDURE — 97116 GAIT TRAINING THERAPY: CPT | Mod: CQ

## 2021-06-21 PROCEDURE — 25000003 PHARM REV CODE 250: Performed by: NURSE PRACTITIONER

## 2021-06-21 PROCEDURE — 25000003 PHARM REV CODE 250: Performed by: INTERNAL MEDICINE

## 2021-06-21 PROCEDURE — 83735 ASSAY OF MAGNESIUM: CPT | Performed by: NURSE PRACTITIONER

## 2021-06-21 PROCEDURE — 99232 PR SUBSEQUENT HOSPITAL CARE,LEVL II: ICD-10-PCS | Mod: ,,, | Performed by: INTERNAL MEDICINE

## 2021-06-21 PROCEDURE — 80053 COMPREHEN METABOLIC PANEL: CPT | Performed by: NURSE PRACTITIONER

## 2021-06-21 RX ORDER — LISINOPRIL 20 MG/1
10 TABLET ORAL DAILY
Qty: 90 TABLET | Refills: 3
Start: 2021-06-21 | End: 2021-06-24

## 2021-06-21 RX ORDER — OXYCODONE AND ACETAMINOPHEN 10; 325 MG/1; MG/1
1 TABLET ORAL 4 TIMES DAILY PRN
Qty: 8 TABLET | Refills: 0 | Status: SHIPPED | OUTPATIENT
Start: 2021-06-21 | End: 2021-06-23

## 2021-06-21 RX ADMIN — RANOLAZINE 1000 MG: 500 TABLET, EXTENDED RELEASE ORAL at 08:06

## 2021-06-21 RX ADMIN — ALPRAZOLAM 0.25 MG: 0.25 TABLET ORAL at 12:06

## 2021-06-21 RX ADMIN — PANTOPRAZOLE SODIUM 40 MG: 40 TABLET, DELAYED RELEASE ORAL at 08:06

## 2021-06-21 RX ADMIN — OXYCODONE AND ACETAMINOPHEN 1 TABLET: 325; 10 TABLET ORAL at 01:06

## 2021-06-21 RX ADMIN — DULOXETINE HYDROCHLORIDE 60 MG: 30 CAPSULE, DELAYED RELEASE ORAL at 08:06

## 2021-06-21 RX ADMIN — Medication 100 MG: at 08:06

## 2021-06-21 RX ADMIN — OXYCODONE AND ACETAMINOPHEN 1 TABLET: 325; 10 TABLET ORAL at 12:06

## 2021-06-21 RX ADMIN — OXYCODONE AND ACETAMINOPHEN 1 TABLET: 325; 10 TABLET ORAL at 04:06

## 2021-06-21 RX ADMIN — Medication 1 TABLET: at 08:06

## 2021-06-21 RX ADMIN — FENOFIBRATE 160 MG: 160 TABLET ORAL at 08:06

## 2021-06-21 RX ADMIN — BUDESONIDE 0.5 MG: 0.5 SUSPENSION RESPIRATORY (INHALATION) at 07:06

## 2021-06-21 RX ADMIN — CHLORDIAZEPOXIDE HYDROCHLORIDE 10 MG: 5 CAPSULE ORAL at 08:06

## 2021-06-21 RX ADMIN — ARFORMOTEROL TARTRATE 15 MCG: 15 SOLUTION RESPIRATORY (INHALATION) at 08:06

## 2021-06-21 RX ADMIN — METOPROLOL TARTRATE 25 MG: 25 TABLET, FILM COATED ORAL at 08:06

## 2021-06-21 RX ADMIN — OXYCODONE AND ACETAMINOPHEN 1 TABLET: 325; 10 TABLET ORAL at 08:06

## 2021-06-22 ENCOUNTER — TELEPHONE (OUTPATIENT)
Dept: FAMILY MEDICINE | Facility: CLINIC | Age: 51
End: 2021-06-22

## 2021-06-22 ENCOUNTER — NURSE TRIAGE (OUTPATIENT)
Dept: ADMINISTRATIVE | Facility: CLINIC | Age: 51
End: 2021-06-22

## 2021-06-22 ENCOUNTER — PATIENT OUTREACH (OUTPATIENT)
Dept: ADMINISTRATIVE | Facility: CLINIC | Age: 51
End: 2021-06-22

## 2021-06-22 LAB
IRON SERPL-MCNC: 165 UG/DL (ref 45–160)
SATURATED IRON: 60 % (ref 20–50)
TOTAL IRON BINDING CAPACITY: 277 UG/DL (ref 250–450)
TRANSFERRIN SERPL-MCNC: 187 MG/DL (ref 200–375)

## 2021-06-24 ENCOUNTER — PATIENT OUTREACH (OUTPATIENT)
Dept: ADMINISTRATIVE | Facility: HOSPITAL | Age: 51
End: 2021-06-24

## 2021-06-24 PROBLEM — K52.9 ACUTE COLITIS: Status: RESOLVED | Noted: 2019-08-12 | Resolved: 2021-06-24

## 2021-06-24 PROBLEM — S42.301A CLOSED FRACTURE OF RIGHT HUMERUS: Status: RESOLVED | Noted: 2021-06-19 | Resolved: 2021-06-24

## 2021-06-25 ENCOUNTER — OFFICE VISIT (OUTPATIENT)
Dept: ORTHOPEDICS | Facility: CLINIC | Age: 51
End: 2021-06-25
Payer: COMMERCIAL

## 2021-06-25 VITALS
SYSTOLIC BLOOD PRESSURE: 91 MMHG | WEIGHT: 200 LBS | DIASTOLIC BLOOD PRESSURE: 54 MMHG | HEART RATE: 92 BPM | HEIGHT: 70 IN | BODY MASS INDEX: 28.63 KG/M2

## 2021-06-25 DIAGNOSIS — S42.301A CLOSED FRACTURE OF SHAFT OF RIGHT HUMERUS, UNSPECIFIED FRACTURE MORPHOLOGY, INITIAL ENCOUNTER: Primary | ICD-10-CM

## 2021-06-25 DIAGNOSIS — M25.511 RIGHT SHOULDER PAIN, UNSPECIFIED CHRONICITY: Primary | ICD-10-CM

## 2021-06-25 LAB — BACTERIA BLD CULT: NORMAL

## 2021-06-25 PROCEDURE — 1111F PR DISCHARGE MEDS RECONCILED W/ CURRENT OUTPATIENT MED LIST: ICD-10-PCS | Mod: CPTII,S$GLB,, | Performed by: ORTHOPAEDIC SURGERY

## 2021-06-25 PROCEDURE — 99213 PR OFFICE/OUTPT VISIT, EST, LEVL III, 20-29 MIN: ICD-10-PCS | Mod: S$GLB,,, | Performed by: ORTHOPAEDIC SURGERY

## 2021-06-25 PROCEDURE — 1125F AMNT PAIN NOTED PAIN PRSNT: CPT | Mod: S$GLB,,, | Performed by: ORTHOPAEDIC SURGERY

## 2021-06-25 PROCEDURE — 1125F PR PAIN SEVERITY QUANTIFIED, PAIN PRESENT: ICD-10-PCS | Mod: S$GLB,,, | Performed by: ORTHOPAEDIC SURGERY

## 2021-06-25 PROCEDURE — 99999 PR PBB SHADOW E&M-EST. PATIENT-LVL IV: ICD-10-PCS | Mod: PBBFAC,,, | Performed by: ORTHOPAEDIC SURGERY

## 2021-06-25 PROCEDURE — 3008F PR BODY MASS INDEX (BMI) DOCUMENTED: ICD-10-PCS | Mod: CPTII,S$GLB,, | Performed by: ORTHOPAEDIC SURGERY

## 2021-06-25 PROCEDURE — 99213 OFFICE O/P EST LOW 20 MIN: CPT | Mod: S$GLB,,, | Performed by: ORTHOPAEDIC SURGERY

## 2021-06-25 PROCEDURE — 1111F DSCHRG MED/CURRENT MED MERGE: CPT | Mod: CPTII,S$GLB,, | Performed by: ORTHOPAEDIC SURGERY

## 2021-06-25 PROCEDURE — 3008F BODY MASS INDEX DOCD: CPT | Mod: CPTII,S$GLB,, | Performed by: ORTHOPAEDIC SURGERY

## 2021-06-25 PROCEDURE — 99999 PR PBB SHADOW E&M-EST. PATIENT-LVL IV: CPT | Mod: PBBFAC,,, | Performed by: ORTHOPAEDIC SURGERY

## 2021-06-25 RX ORDER — OXYCODONE AND ACETAMINOPHEN 10; 325 MG/1; MG/1
1 TABLET ORAL EVERY 6 HOURS PRN
Qty: 40 TABLET | Refills: 0 | Status: SHIPPED | OUTPATIENT
Start: 2021-06-25

## 2021-06-28 ENCOUNTER — PATIENT OUTREACH (OUTPATIENT)
Dept: ADMINISTRATIVE | Facility: OTHER | Age: 51
End: 2021-06-28

## 2021-06-29 ENCOUNTER — TELEPHONE (OUTPATIENT)
Dept: HEMATOLOGY/ONCOLOGY | Facility: CLINIC | Age: 51
End: 2021-06-29

## 2021-07-03 ENCOUNTER — EXTERNAL HOME HEALTH (OUTPATIENT)
Dept: HOME HEALTH SERVICES | Facility: HOSPITAL | Age: 51
End: 2021-07-03
Payer: COMMERCIAL

## 2021-08-04 ENCOUNTER — PATIENT MESSAGE (OUTPATIENT)
Dept: ADMINISTRATIVE | Facility: HOSPITAL | Age: 51
End: 2021-08-04

## 2021-08-23 ENCOUNTER — TELEPHONE (OUTPATIENT)
Dept: FAMILY MEDICINE | Facility: CLINIC | Age: 51
End: 2021-08-23

## 2021-09-04 NOTE — ASSESSMENT & PLAN NOTE
- BG under fair control  - A1c 4.1  - Hold home Metformin  - Accu checks ACHS with SSI prn  - Monitor     Opt out [phone]

## 2021-09-29 ENCOUNTER — PATIENT OUTREACH (OUTPATIENT)
Dept: ADMINISTRATIVE | Facility: HOSPITAL | Age: 51
End: 2021-09-29

## 2021-09-29 DIAGNOSIS — E11.9 TYPE 2 DIABETES MELLITUS WITHOUT COMPLICATION: ICD-10-CM

## 2021-10-15 ENCOUNTER — PATIENT OUTREACH (OUTPATIENT)
Dept: ADMINISTRATIVE | Facility: HOSPITAL | Age: 51
End: 2021-10-15

## 2021-11-03 ENCOUNTER — PATIENT MESSAGE (OUTPATIENT)
Dept: ADMINISTRATIVE | Facility: HOSPITAL | Age: 51
End: 2021-11-03
Payer: COMMERCIAL

## 2022-02-02 ENCOUNTER — PATIENT MESSAGE (OUTPATIENT)
Dept: ADMINISTRATIVE | Facility: HOSPITAL | Age: 52
End: 2022-02-02
Payer: COMMERCIAL

## 2024-01-14 NOTE — ASSESSMENT & PLAN NOTE
- Continue Nystatin swish and swallow     Dr. Stover, please advise on vitamin supplement portion of patients question.

## 2024-03-16 NOTE — MR AVS SNAPSHOT
[facility]  [address] [place] [address]  Phone: [phone]  Fax: [phone]                  Kodak Valentine   2/10/2017 3:30 PM   Office Visit    Description:  Male : 1970   Provider:  Erma Green MD   Department:  [facility] - Cardiology           Diagnoses this Visit        Comments    Coronary artery disease, angina presence unspecified, unspecified vessel or lesion type, unspecified whether native or transplanted heart    -  Primary            To Do List           Future Appointments        Provider Department Dept Phone    3/8/2017 9:40 AM SIX, MINUTE WALK [facility] - Pulmonary Lab [phone]    3/8/2017 10:15 AM PULMONARY FUNCTION [facility] Pulmonary Lab [phone]    3/8/2017 11:00 AM MD [facility] - Lung Transplant [phone]    4/15/2017 8:20 AM LABORATORY, [facility] [phone]    2017 7:20 AM Eliza Galindo MD [facility] Internal Medicine [phone]      Goals (5 Years of Data)     None      [facility]     [facility] -  Assistance  Registered nurses in the [facility] provide clinical advisement, health education, appointment booking, and other advisory services.  Call for this free service at [phone].             Medications           Message regarding Medications     Verify the changes and/or additions to your medication regime listed below are the same as discussed with your clinician today.  If any of these changes or additions are incorrect, please notify your healthcare provider.             Verify that the below list of medications is an accurate representation of the medications you are currently taking.  If none reported, the list may be blank. If incorrect, please contact your healthcare provider. Carry this list with you in case of emergency.           Current Medications     alprazolam (XANAX) 0.25 MG tablet Take 1 tablet (0.25 mg total) by mouth 3  (three) times daily as needed for Anxiety.    alprazolam (XANAX) 0.25 MG tablet TAKE ONE TABLET BY MOUTH 3 TIMES DAILY AS NEEDED FOR ANXIETY (MAY CAUSE DROWSINESS) *NO ALCOHOLIC BEVERAGES*    aspirin (ECOTRIN) 81 MG EC tablet Take 81 mg by mouth once daily.    atorvastatin (LIPITOR) 20 MG tablet TAKE 1 TABLET BY MOUTH ONCE A DAY IN THE EVENING FOR CHOLESTEROL    blood sugar diagnostic Strp 1 strip by Misc.(Non-Drug; Combo Route) route 2 (two) times daily.    BREO ELLIPTA 200-25 mcg/dose DsDv diskus inhaler INHALE 1 PUFF ONCE A DAY    chlorhexidine (PERIDEX) 0.12 % solution     cholecalciferol, vitamin D3, 50,000 unit capsule Take 1 capsule (50,000 Units total) by mouth every 7 days.    duloxetine (CYMBALTA) 60 MG capsule TAKE 1 CAPSULE BY MOUTH ONCE A DAY    fenofibrate 160 MG Tab Take 1 tablet (160 mg total) by mouth once daily.    lancets 33 gauge Misc 1 lancet by Misc.(Non-Drug; Combo Route) route 2 (two) times daily.    lisinopril (PRINIVIL,ZESTRIL) 20 MG tablet Take 1 tablet (20 mg total) by mouth once daily.    metformin (GLUCOPHAGE) 500 MG tablet Take 1 tablet (500 mg total) by mouth 2 (two) times daily with meals.    metoprolol tartrate (LOPRESSOR) 50 MG tablet TAKE ONE TABLET BY MOUTH EVERY 12 HOURS    nitroGLYCERIN 0.2 mg/hr TD PT24 (NITRODUR) 0.2 mg/hr Place 1 patch onto the skin once daily.    oxyMORphone (OPANA) 10 MG tablet Take 10 mg by mouth 4 (four) times daily.    pantoprazole (PROTONIX) 40 MG tablet TAKE ONE TABLET BY MOUTH TWICE DAILY    penicillin v potassium (VEETID) 500 MG tablet Take 1 tablet (500 mg total) by mouth 4 (four) times daily.    predniSONE (DELTASONE) 20 MG tablet TAKE 3 TABLETS EVERY DAY FOR 3 DAYS 2 TABLETS EVERY DAY FOR 3 DAYS 1 TABLET EVERY DAY FOR 3 DAYS THEN 1/2 TABLET EVERY DAY FOR 3 DAYS    PROAIR HFA 90 mcg/actuation inhaler INHALE 2 PUFFS 3 TIMES A DAY AS NEEDED FOR WHEEZING    ranolazine (RANEXA) 1,000 mg Tb12 Take 1 tablet (1,000 mg total) by mouth 2 (two) times daily.  "   tiotropium bromide 1.25 mcg/actuation Mist Inhale 2.5 mcg into the lungs once daily. Educate on medication adminstration    varenicline (CHANTIX STARTING MONTH BOX) 0.5 mg (11)- 1 mg (42) tablet Take 1 tablet by mouth 2 (two) times daily. one 1mg tab twice daily    ipratropium (ATROVENT) 0.02 % nebulizer solution Take 2.5 mLs (500 mcg total) by nebulization 2 (two) times daily.           Clinical Reference Information           Your Vitals Were     Pulse Height Weight SpO2 BMI    68 5' 10" (1.778 m) 105.2 kg (231 lb 14.8 oz) 98% 33.28 kg/m2      Allergies as of 2/10/2017     No Known Allergies      Immunizations Administered on Date of Encounter - 2/10/2017     None      Orders Placed During Today's Visit     Future Labs/Procedures Expected by Expires    SCHEDULED EKG 12-LEAD (to Muse)  As directed 2/7/2018      Maintenance Dialysis History     Patient has no recorded history of maintenance dialysis.      Language Assistance Services     ATTENTION: Language assistance services are available, free of charge. Please call [phone].      ATENCIÓN: Si fritz andersen, tiene a west disposición servicios gratuitos de asistencia lingüística. Llame al [phone].     AIDEN Ý: N?u b?n nói Ti?ng Vi?t, có các d?ch v? h? tr? ngôn ng? mi?n phí dành cho b?n. G?i s? [phone].         [facility] complies with applicable Federal civil rights laws and does not discriminate on the basis of race, color, national origin, age, disability, or sex.        " no